# Patient Record
Sex: FEMALE | Race: WHITE | NOT HISPANIC OR LATINO | Employment: FULL TIME | URBAN - METROPOLITAN AREA
[De-identification: names, ages, dates, MRNs, and addresses within clinical notes are randomized per-mention and may not be internally consistent; named-entity substitution may affect disease eponyms.]

---

## 2013-11-13 LAB — HUMAN PAPILLOMAVIRUS (HPV): NORMAL

## 2017-03-06 ENCOUNTER — TELEPHONE (OUTPATIENT)
Dept: FAMILY MEDICINE | Facility: CLINIC | Age: 47
End: 2017-03-06

## 2017-03-06 NOTE — TELEPHONE ENCOUNTER
----- Message from Heide Sullivan sent at 3/6/2017 11:32 AM CST -----  Contact: pt  Pt needs an appt this week.  Pt is out of refills on her inhaler and other prescriptions. Pt will be out of prescription on Thursday for her inhaler.

## 2017-03-08 ENCOUNTER — OFFICE VISIT (OUTPATIENT)
Dept: FAMILY MEDICINE | Facility: CLINIC | Age: 47
End: 2017-03-08
Payer: COMMERCIAL

## 2017-03-08 VITALS
TEMPERATURE: 98 F | OXYGEN SATURATION: 98 % | HEIGHT: 65 IN | WEIGHT: 143.63 LBS | BODY MASS INDEX: 23.93 KG/M2 | HEART RATE: 98 BPM | SYSTOLIC BLOOD PRESSURE: 119 MMHG | DIASTOLIC BLOOD PRESSURE: 66 MMHG

## 2017-03-08 DIAGNOSIS — J45.40 MODERATE PERSISTENT ASTHMA WITHOUT COMPLICATION: ICD-10-CM

## 2017-03-08 DIAGNOSIS — K21.9 GASTROESOPHAGEAL REFLUX DISEASE, ESOPHAGITIS PRESENCE NOT SPECIFIED: ICD-10-CM

## 2017-03-08 DIAGNOSIS — F17.219 CIGARETTE NICOTINE DEPENDENCE WITH NICOTINE-INDUCED DISORDER: ICD-10-CM

## 2017-03-08 DIAGNOSIS — R41.840 ATTENTION DEFICIT: Primary | ICD-10-CM

## 2017-03-08 PROCEDURE — 1160F RVW MEDS BY RX/DR IN RCRD: CPT | Mod: S$GLB,,, | Performed by: FAMILY MEDICINE

## 2017-03-08 PROCEDURE — 99999 PR PBB SHADOW E&M-EST. PATIENT-LVL III: CPT | Mod: PBBFAC,,, | Performed by: FAMILY MEDICINE

## 2017-03-08 PROCEDURE — 99213 OFFICE O/P EST LOW 20 MIN: CPT | Mod: S$GLB,,, | Performed by: FAMILY MEDICINE

## 2017-03-08 RX ORDER — DEXTROAMPHETAMINE SACCHARATE, AMPHETAMINE ASPARTATE, DEXTROAMPHETAMINE SULFATE AND AMPHETAMINE SULFATE 7.5; 7.5; 7.5; 7.5 MG/1; MG/1; MG/1; MG/1
1 TABLET ORAL 2 TIMES DAILY
Qty: 60 TABLET | Refills: 0 | Status: SHIPPED | OUTPATIENT
Start: 2017-03-08 | End: 2017-04-07

## 2017-03-08 RX ORDER — DEXTROAMPHETAMINE SACCHARATE, AMPHETAMINE ASPARTATE, DEXTROAMPHETAMINE SULFATE AND AMPHETAMINE SULFATE 7.5; 7.5; 7.5; 7.5 MG/1; MG/1; MG/1; MG/1
1 TABLET ORAL 2 TIMES DAILY
Qty: 60 TABLET | Refills: 0 | Status: SHIPPED | OUTPATIENT
Start: 2017-05-07 | End: 2017-06-06 | Stop reason: SDUPTHER

## 2017-03-08 RX ORDER — MELOXICAM 15 MG/1
15 TABLET ORAL DAILY
Qty: 90 TABLET | Refills: 3 | Status: SHIPPED | OUTPATIENT
Start: 2017-03-08 | End: 2017-12-07 | Stop reason: SDUPTHER

## 2017-03-08 RX ORDER — OMEPRAZOLE 20 MG/1
CAPSULE, DELAYED RELEASE ORAL
Qty: 180 CAPSULE | Refills: 3 | Status: SHIPPED | OUTPATIENT
Start: 2017-03-08 | End: 2018-05-24 | Stop reason: SDUPTHER

## 2017-03-08 RX ORDER — TIOTROPIUM BROMIDE 18 UG/1
CAPSULE ORAL; RESPIRATORY (INHALATION)
Qty: 30 CAPSULE | Refills: 11 | Status: SHIPPED | OUTPATIENT
Start: 2017-03-08 | End: 2018-03-01 | Stop reason: SDUPTHER

## 2017-03-08 RX ORDER — FLUTICASONE PROPIONATE AND SALMETEROL 250; 50 UG/1; UG/1
POWDER RESPIRATORY (INHALATION)
Qty: 60 EACH | Refills: 11 | Status: SHIPPED | OUTPATIENT
Start: 2017-03-08 | End: 2018-05-24 | Stop reason: SDUPTHER

## 2017-03-08 RX ORDER — DEXTROAMPHETAMINE SACCHARATE, AMPHETAMINE ASPARTATE, DEXTROAMPHETAMINE SULFATE AND AMPHETAMINE SULFATE 7.5; 7.5; 7.5; 7.5 MG/1; MG/1; MG/1; MG/1
1 TABLET ORAL 2 TIMES DAILY
Qty: 60 TABLET | Refills: 0 | Status: SHIPPED | OUTPATIENT
Start: 2017-04-07 | End: 2017-05-07

## 2017-03-08 NOTE — MR AVS SNAPSHOT
[facility]  [address] [facility] [address]  Phone: [phone]  Fax: [phone]                  Luz Pelaez   3/8/2017 3:20 PM   Office Visit    Description:  Female : 1970   Provider:  Kb Mcgarry MD   Department:  [facility]           Diagnoses this Visit        Comments    Attention deficit    -  Primary     Gastroesophageal reflux disease, esophagitis presence not specified         Moderate persistent asthma without complication         Cigarette nicotine dependence with nicotine-induced disorder                To Do List           Goals (5 Years of Data)     None       These Medications        Disp Refills Start End    dextroamphetamine-amphetamine 30 mg Tab 60 tablet 0 3/8/2017 2017    Take 1 tablet by mouth 2 (two) times daily. - Oral    Pharmacy: [place] [address] Ph #: [phone]       dextroamphetamine-amphetamine 30 mg Tab 60 tablet 0 2017    Take 1 tablet by mouth 2 (two) times daily. - Oral    Pharmacy: [facility] [address] [facility] Ph #: [phone]       dextroamphetamine-amphetamine 30 mg Tab 60 tablet 0 2017    Take 1 tablet by mouth 2 (two) times daily. - Oral    Pharmacy: [facility] [address] [facility] Ph #: [phone]       fluticasone-salmeterol 250-50 mcg/dose (ADVAIR DISKUS) 250-50 mcg/dose diskus inhaler 60 each 11 3/8/2017     Use 1 inhalation into the  lungs two times daily    Pharmacy: [facility] [address] [facility] Ph #: [phone]       meloxicam (MOBIC) 15 MG tablet 90 tablet 3 3/8/2017 3/8/2018    Take 1 tablet (15 mg total) by mouth once daily. - Oral    Pharmacy: [facility] [address] [facility] Ph #: [phone]       omeprazole (PRILOSEC) 20 MG capsule 180 capsule 3 3/8/2017     Take 1 capsule by mouth  twice daily    Pharmacy:  [place], [place] - [address] [address]  #: [phone]       tiotropium (SPIRIVA WITH HANDIHALER) 18 mcg inhalation capsule 30 capsule 11 3/8/2017     Inhale the contents of 1    capsule via HandiHaler once daily    Pharmacy: [place], [place] - [address] Ph #: [phone]         [facility]     [facility] - 24/7 Assistance  Registered nurses in the [facility] provide clinical advisement, health education, appointment booking, and other advisory services.  Call for this free service at [phone].             Medications           Message regarding Medications     Verify the changes and/or additions to your medication regime listed below are the same as discussed with your clinician today.  If any of these changes or additions are incorrect, please notify your healthcare provider.        START taking these NEW medications        Refills    dextroamphetamine-amphetamine 30 mg Tab 0    Sig: Take 1 tablet by mouth 2 (two) times daily.    Class: Normal    Route: Oral    dextroamphetamine-amphetamine 30 mg Tab 0    Starting on: 4/7/2017    Sig: Take 1 tablet by mouth 2 (two) times daily.    Class: Normal    Route: Oral    dextroamphetamine-amphetamine 30 mg Tab 0    Starting on: 5/7/2017    Sig: Take 1 tablet by mouth 2 (two) times daily.    Class: Normal    Route: Oral           Verify that the below list of medications is an accurate representation of the medications you are currently taking.  If none reported, the list may be blank. If incorrect, please contact your healthcare provider. Carry this list with you in case of emergency.           Current Medications     albuterol (PROAIR HFA) 90 mcg/actuation inhaler Inhale 2 puffs into the  lungs every 6 hours as  needed for wheezing    fluticasone-salmeterol 250-50 mcg/dose (ADVAIR DISKUS) 250-50 mcg/dose diskus inhaler Use 1 inhalation into the  lungs two times daily    meloxicam  "(MOBIC) 15 MG tablet Take 1 tablet (15 mg total) by mouth once daily.    omeprazole (PRILOSEC) 20 MG capsule Take 1 capsule by mouth  twice daily    sumatriptan (IMITREX) 100 MG tablet Take at the onset of headache and if not resolved in 2 hours, repeat once. Do not take more than 2 pills in a 24 hour period.    tiotropium (SPIRIVA WITH HANDIHALER) 18 mcg inhalation capsule Inhale the contents of 1    capsule via HandiHaler once daily    dextroamphetamine-amphetamine 30 mg Tab Take 1 tablet by mouth 2 (two) times daily.    dextroamphetamine-amphetamine 30 mg Tab Starting on Apr 07, 2017. Take 1 tablet by mouth 2 (two) times daily.    dextroamphetamine-amphetamine 30 mg Tab Starting on May 07, 2017. Take 1 tablet by mouth 2 (two) times daily.           Clinical Reference Information           Your Vitals Were     BP Pulse Temp Height Weight SpO2    119/66 98 98.3 °F (36.8 °C) (Oral) 5' 5" (1.651 m) 65.2 kg (143 lb 10.1 oz) 98%    BMI                23.9 kg/m2          Blood Pressure          Most Recent Value    BP  119/66      Allergies as of 3/8/2017     Wellbutrin [Bupropion Hcl]      Immunizations Administered on Date of Encounter - 3/8/2017     None      Orders Placed During Today's Visit      Normal Orders This Visit    Ambulatory referral to Smoking Cessation Program       Smoking Cessation     If you would like to quit smoking:   You may be eligible for free services if you are a [place] resident and started smoking cigarettes before September 1, 1988.  Call the [employer] [facility] ([facility]) toll free at [phone] [phone] or [phone].   Call 1-800-QUIT-NOW if you do not meet the above criteria.            Language Assistance Services     ATTENTION: Language assistance services are available, free of charge. Please call [phone].      ATENCIÓN: Si lawrencela rosalio, tiene a uribe disposición servicios gratuitos de asistencia lingüística. Llame al [phone].     CHÚ Ý: N?u b?n nói Ti?ng Vi?t, có các " d?ch v? h? tr? ngôn ng? mi?n phí yvetteh cho b?n. G?i s? [phone].         [facility] complies with applicable Federal civil rights laws and does not discriminate on the basis of race, color, national origin, age, disability, or sex.

## 2017-03-08 NOTE — PROGRESS NOTES
Subjective:      Patient ID: Luz Peleaz is a 46 y.o. female.    Chief Complaint:   HPI CC:Attention Deficit Disorder  HPI:Luz Pelaez is a 46 y.o. female with a history of ADD. She is coming in today to discuss the chronic use of attention deficit medications. She has been on the medicine for the last 15 years and has been stable on the current dose of medicine for the last several years. She has been compliant on the medicine and is not receiving narcotics from any other physician. She is in need of refills of the medicine that is taken for ADD. She denies any complications from taking the medicine. The patient's weight and apatite has been stable and has not had difficulties with agitation. She reports improvement in the symptoms with the current dose.      She has asthma and she had to use her albuterol recently and we discussed her asthma and quitting smoking.          I counseld her on stopping smoking and she is willing to try to quit.      Health Maintenance Due   Topic Date Due    Pap Smear  12/15/2016       Past Medical History:  Past Medical History:   Diagnosis Date    Allergy     Asthma     Attention deficit     GERD (gastroesophageal reflux disease)     Kidney stones      Past Surgical History:   Procedure Laterality Date    BREAST SURGERY      Breast Reduction    CHOLECYSTECTOMY      COLONOSCOPY  4/1/2012    date is approximate-done by Dr. Naranjo due to chronic constipation    SLEEVE GASTROPLASTY       Review of patient's allergies indicates:   Allergen Reactions    Wellbutrin [bupropion hcl]      Mood swings     Current Outpatient Prescriptions on File Prior to Visit   Medication Sig Dispense Refill    albuterol (PROAIR HFA) 90 mcg/actuation inhaler Inhale 2 puffs into the  lungs every 6 hours as  needed for wheezing 18 g 11    sumatriptan (IMITREX) 100 MG tablet Take at the onset of headache and if not resolved in 2 hours, repeat once. Do not take more than 2 pills in a  "24 hour period. 9 tablet 11    [DISCONTINUED] fluticasone-salmeterol 250-50 mcg/dose (ADVAIR DISKUS) 250-50 mcg/dose diskus inhaler Use 1 inhalation into the  lungs two times daily 60 each 11    [DISCONTINUED] meloxicam (MOBIC) 15 MG tablet Take 1 tablet (15 mg total) by mouth once daily. 30 tablet 0    [DISCONTINUED] omeprazole (PRILOSEC) 20 MG capsule Take 1 capsule by mouth  twice daily 60 capsule 11    [DISCONTINUED] tiotropium (SPIRIVA WITH HANDIHALER) 18 mcg inhalation capsule Inhale the contents of 1    capsule via HandiHaler once daily 30 capsule 11    [DISCONTINUED] dextroamphetamine-amphetamine 30 mg Tab Take 1 tablet by mouth 2 (two) times daily. 60 tablet 0     No current facility-administered medications on file prior to visit.      Social History     Social History    Marital status: Single     Spouse name: N/A    Number of children: 2    Years of education: N/A     Occupational History     [employer]     Social History Main Topics    Smoking status: Current Every Day Smoker     Packs/day: 0.50     Years: 25.00     Types: Cigarettes    Smokeless tobacco: Never Used      Comment: she did quit once on chantix but it caused her nausea.    Alcohol use Yes      Comment: Socially    Drug use: No    Sexual activity: Yes     Partners: Male     Other Topics Concern    Not on file     Social History Narrative     Family History   Problem Relation Age of Onset    Hypertension Mother     Diabetes Mother     Hyperlipidemia Mother     Heart disease Mother     Colon cancer Maternal Grandmother     Breast cancer Paternal Grandmother      Breast    Colon polyps Father     Colon polyps Sister     Stroke Neg Hx              Review of Systems    Objective:   /66  Pulse 98  Temp 98.3 °F (36.8 °C) (Oral)   Ht 5' 5" (1.651 m)  Wt 65.2 kg (143 lb 10.1 oz)  SpO2 98%  BMI 23.9 kg/m2    Physical Exam   Constitutional: She appears well-developed and " well-nourished. No distress.   Cardiovascular: Normal rate, regular rhythm and intact distal pulses.  Exam reveals no gallop and no friction rub.    No murmur heard.  Pulmonary/Chest: Effort normal and breath sounds normal. No respiratory distress. She has no wheezes. She has no rales.   Musculoskeletal: She exhibits no edema.   Skin: She is not diaphoretic.       Assessment:     1. Attention deficit    2. Gastroesophageal reflux disease, esophagitis presence not specified    3. Moderate persistent asthma without complication    4. Cigarette nicotine dependence with nicotine-induced disorder        Plan:   Diagnoses and all orders for this visit:    Attention deficit  -     dextroamphetamine-amphetamine 30 mg Tab; Take 1 tablet by mouth 2 (two) times daily.  -     dextroamphetamine-amphetamine 30 mg Tab; Take 1 tablet by mouth 2 (two) times daily.  -     dextroamphetamine-amphetamine 30 mg Tab; Take 1 tablet by mouth 2 (two) times daily.    Gastroesophageal reflux disease, esophagitis presence not specified  -     omeprazole (PRILOSEC) 20 MG capsule; Take 1 capsule by mouth  twice daily    Moderate persistent asthma without complication  -     fluticasone-salmeterol 250-50 mcg/dose (ADVAIR DISKUS) 250-50 mcg/dose diskus inhaler; Use 1 inhalation into the  lungs two times daily  -     tiotropium (SPIRIVA WITH HANDIHALER) 18 mcg inhalation capsule; Inhale the contents of 1    capsule via HandiHaler once daily    Cigarette nicotine dependence with nicotine-induced disorder  -     Ambulatory referral to Smoking Cessation Program    Other orders  -     meloxicam (MOBIC) 15 MG tablet; Take 1 tablet (15 mg total) by mouth once daily.      She will call to get on the smoking cessation program.

## 2017-06-06 ENCOUNTER — OFFICE VISIT (OUTPATIENT)
Dept: FAMILY MEDICINE | Facility: CLINIC | Age: 47
End: 2017-06-06
Payer: COMMERCIAL

## 2017-06-06 VITALS
DIASTOLIC BLOOD PRESSURE: 74 MMHG | HEART RATE: 84 BPM | WEIGHT: 134.5 LBS | HEIGHT: 65 IN | BODY MASS INDEX: 22.41 KG/M2 | SYSTOLIC BLOOD PRESSURE: 115 MMHG

## 2017-06-06 DIAGNOSIS — R41.840 ATTENTION DEFICIT: Primary | ICD-10-CM

## 2017-06-06 PROCEDURE — 99213 OFFICE O/P EST LOW 20 MIN: CPT | Mod: S$GLB,,, | Performed by: FAMILY MEDICINE

## 2017-06-06 PROCEDURE — 99999 PR PBB SHADOW E&M-EST. PATIENT-LVL III: CPT | Mod: PBBFAC,,, | Performed by: FAMILY MEDICINE

## 2017-06-06 RX ORDER — DEXTROAMPHETAMINE SACCHARATE, AMPHETAMINE ASPARTATE, DEXTROAMPHETAMINE SULFATE AND AMPHETAMINE SULFATE 7.5; 7.5; 7.5; 7.5 MG/1; MG/1; MG/1; MG/1
1 TABLET ORAL 2 TIMES DAILY
Qty: 60 TABLET | Refills: 0 | Status: SHIPPED | OUTPATIENT
Start: 2017-06-06 | End: 2017-07-06

## 2017-06-06 RX ORDER — DEXTROAMPHETAMINE SACCHARATE, AMPHETAMINE ASPARTATE, DEXTROAMPHETAMINE SULFATE AND AMPHETAMINE SULFATE 7.5; 7.5; 7.5; 7.5 MG/1; MG/1; MG/1; MG/1
30 TABLET ORAL 2 TIMES DAILY
Qty: 60 TABLET | Refills: 0 | Status: SHIPPED | OUTPATIENT
Start: 2017-07-06 | End: 2017-09-06

## 2017-06-06 RX ORDER — DEXTROAMPHETAMINE SACCHARATE, AMPHETAMINE ASPARTATE, DEXTROAMPHETAMINE SULFATE AND AMPHETAMINE SULFATE 7.5; 7.5; 7.5; 7.5 MG/1; MG/1; MG/1; MG/1
30 TABLET ORAL 2 TIMES DAILY
Qty: 60 TABLET | Refills: 0 | Status: SHIPPED | OUTPATIENT
Start: 2017-08-05 | End: 2017-09-06

## 2017-06-06 NOTE — PROGRESS NOTES
"CC:Attention Deficit Disorder  HPI:Luz Pelaez is a 46 y.o. female with a history of ADD.  She is coming in today to discuss the chronic use of attention deficit medications.  She has been on the medicine for the greater than 10 years and has been stable on the current dose of medicine .  She has been compliant on the medicine and is not receiving narcotics from any other physician.  She is in need of refills of the medicine that is taken for ADD.  She denies any complications from taking the medicine.  The patient's weight and appetite has been stable and has not had difficulties with agitation.  She reports improvement in the symptoms with the current dose.        The current allergy list that we have since it was last reconciled is as follows:  Allergies   Allergen Reactions    Wellbutrin [Bupropion Hcl]      Mood swings     Outpatient Medications Prior to Visit   Medication Sig Dispense Refill    albuterol (PROAIR HFA) 90 mcg/actuation inhaler Inhale 2 puffs into the  lungs every 6 hours as  needed for wheezing 18 g 11    fluticasone-salmeterol 250-50 mcg/dose (ADVAIR DISKUS) 250-50 mcg/dose diskus inhaler Use 1 inhalation into the  lungs two times daily 60 each 11    meloxicam (MOBIC) 15 MG tablet Take 1 tablet (15 mg total) by mouth once daily. 90 tablet 3    omeprazole (PRILOSEC) 20 MG capsule Take 1 capsule by mouth  twice daily 180 capsule 3    sumatriptan (IMITREX) 100 MG tablet Take at the onset of headache and if not resolved in 2 hours, repeat once. Do not take more than 2 pills in a 24 hour period. 9 tablet 11    tiotropium (SPIRIVA WITH HANDIHALER) 18 mcg inhalation capsule Inhale the contents of 1    capsule via HandiHaler once daily 30 capsule 11    dextroamphetamine-amphetamine 30 mg Tab Take 1 tablet by mouth 2 (two) times daily. 60 tablet 0     No facility-administered medications prior to visit.         Physical Exam   /74   Pulse 84   Ht 5' 5" (1.651 m)   Wt 61 kg (134 " lb 7.7 oz)   BMI 22.38 kg/m²   Constitutional: The patient appears well-developed and well-nourished. No distress.   Skin: Not diaphoretic.   Psychiatric: The mood appears not anxious and the affect is not angry, not blunt, not labile and not inappropriate. Speech is not rapid and/or pressured, not delayed, not tangential and not slurred. The patient is not agitated, not aggressive, is not hyperactive, not slowed, not withdrawn, not actively hallucinating and not combative. Thought content is not paranoid and not delusional. Cognition and memory are not impaired. The patient does not express impulsivity or inappropriate judgment and does not exhibit a depressed mood. The patient expresses no homicidal and no suicidal ideation and has no suicidal plans and no homicidal plans. The patient is communicative and exhibits a normal recent memory and normal remote memory. The patient is attentive.     The right hand has poor range of motion.  There is no atrophy.  Motor strength intact.  Negative Tinel's and Phalen's.    Encounter Diagnosis   Name Primary?    Attention deficit Yes       PLAN:    Diagnoses and all orders for this visit:    Attention deficit  -     dextroamphetamine-amphetamine 30 mg Tab; Take 1 tablet by mouth 2 (two) times daily.    Other orders  -     dextroamphetamine-amphetamine (ADDERALL) 30 mg Tab; Take 30 mg by mouth 2 (two) times daily.  -     dextroamphetamine-amphetamine (ADDERALL) 30 mg Tab; Take 30 mg by mouth 2 (two) times daily.          Medications Ordered This Encounter      dextroamphetamine-amphetamine (ADDERALL) 30 mg Tab          Sig: Take 30 mg by mouth 2 (two) times daily.          Dispense:  60 tablet          Refill:  0      dextroamphetamine-amphetamine (ADDERALL) 30 mg Tab          Sig: Take 30 mg by mouth 2 (two) times daily.          Dispense:  60 tablet          Refill:  0      dextroamphetamine-amphetamine 30 mg Tab          Sig: Take 1 tablet by mouth 2 (two) times daily.           Dispense:  60 tablet          Refill:  0  No orders of the defined types were placed in this encounter.     Medications ordered for 3 months.  Followup 3 months with Dr. Mcgarry.

## 2017-08-07 RX ORDER — DEXTROAMPHETAMINE SACCHARATE, AMPHETAMINE ASPARTATE, DEXTROAMPHETAMINE SULFATE AND AMPHETAMINE SULFATE 7.5; 7.5; 7.5; 7.5 MG/1; MG/1; MG/1; MG/1
TABLET ORAL
Qty: 60 TABLET | Refills: 0 | OUTPATIENT
Start: 2017-08-07

## 2017-08-31 DIAGNOSIS — Z83.719 FAMILY HISTORY OF COLONIC POLYPS: ICD-10-CM

## 2017-08-31 DIAGNOSIS — Z12.39 BREAST CANCER SCREENING: Primary | ICD-10-CM

## 2017-08-31 DIAGNOSIS — Z80.0 FAMILY HISTORY OF MALIGNANT NEOPLASM OF GASTROINTESTINAL TRACT: ICD-10-CM

## 2017-08-31 DIAGNOSIS — J45.909 UNCOMPLICATED ASTHMA: ICD-10-CM

## 2017-08-31 RX ORDER — SODIUM, POTASSIUM,MAG SULFATES 17.5-3.13G
SOLUTION, RECONSTITUTED, ORAL ORAL
Qty: 354 ML | Refills: 0 | Status: SHIPPED | OUTPATIENT
Start: 2017-08-31 | End: 2017-12-07

## 2017-08-31 RX ORDER — PROMETHAZINE HYDROCHLORIDE 25 MG/1
25 TABLET ORAL EVERY 6 HOURS PRN
Qty: 6 TABLET | Refills: 0 | Status: SHIPPED | OUTPATIENT
Start: 2017-08-31 | End: 2017-12-07

## 2017-08-31 RX ORDER — ALBUTEROL SULFATE 90 UG/1
AEROSOL, METERED RESPIRATORY (INHALATION)
Qty: 17 G | Refills: 11 | Status: SHIPPED | OUTPATIENT
Start: 2017-08-31 | End: 2019-02-15 | Stop reason: SDUPTHER

## 2017-09-01 NOTE — TELEPHONE ENCOUNTER
Please place mammo order for pt. It has been over a year. She would like to do it on 9/6/17 the same day she has an appt with you. She had her pap done June 2016

## 2017-09-01 NOTE — TELEPHONE ENCOUNTER
I have signed for the following orders AND/OR meds.  Please call the patient and ask the patient to schedule the testing AND/OR inform about any medications that were sent.      Orders Placed This Encounter   Procedures    Mammo Digital Screening Bilat with CAD     Standing Status:   Future     Standing Expiration Date:   9/1/2018     Order Specific Question:   Reason for Exam:     Answer:   screening     Order Specific Question:   Is the patient pregnant?     Answer:   No    Case request GI: COLONOSCOPY     Order Specific Question:   Pre-op Diagnosis     Answer:   History of colon polyps [606676]     Order Specific Question:   CPT Code:     Answer:   [place] INTERVAL >=3 YRS PATIENT'S LAST COLONOSCOPY DOCUMENTED [0529F]     Order Specific Question:   Case Referring Provider     Answer:   LANI DESIR [[id number]]         Medications Ordered This Encounter      PROAIR HFA 90 mcg/actuation inhaler          Sig: INHALE 1-2 PUFFS INTO THE LUNGS EVERY 6 HOURS AS NEEDED FOR WHEEZING          Dispense:  17 g          Refill:  11          PT REQUEST REFILL  08/31/2017 2:39:08 PM     N O T I C E    Last quantity doesn't match original quantity      promethazine (PHENERGAN) 25 MG tablet          Sig: Take 1 tablet (25 mg total) by mouth every 6 (six) hours as needed for Nausea.          Dispense:  6 tablet          Refill:  0      sodium,potassium,mag sulfates (SUPREP BOWEL PREP KIT) 17.5-3.13-1.6 gram SolR          Sig: Take as instructed on prep sheet          Dispense:  354 mL          Refill:  0

## 2017-09-01 NOTE — TELEPHONE ENCOUNTER
Tell her that I noted that she is due for a colonoscopy.  I will put in orders so that we can book it at [facility] in [place] again.      She also needs a mammogram and pap smear.  Please arrange these. If she has had them elsewhere, get the copies.

## 2017-09-06 ENCOUNTER — OFFICE VISIT (OUTPATIENT)
Dept: FAMILY MEDICINE | Facility: CLINIC | Age: 47
End: 2017-09-06
Payer: COMMERCIAL

## 2017-09-06 VITALS
SYSTOLIC BLOOD PRESSURE: 131 MMHG | BODY MASS INDEX: 23.27 KG/M2 | WEIGHT: 139.63 LBS | HEART RATE: 68 BPM | HEIGHT: 65 IN | DIASTOLIC BLOOD PRESSURE: 78 MMHG

## 2017-09-06 DIAGNOSIS — Z86.010 HISTORY OF COLON POLYPS: ICD-10-CM

## 2017-09-06 DIAGNOSIS — R41.840 ATTENTION DEFICIT: Primary | ICD-10-CM

## 2017-09-06 DIAGNOSIS — Z12.31 ENCOUNTER FOR SCREENING MAMMOGRAM FOR BREAST CANCER: ICD-10-CM

## 2017-09-06 PROCEDURE — 3008F BODY MASS INDEX DOCD: CPT | Mod: S$GLB,,, | Performed by: FAMILY MEDICINE

## 2017-09-06 PROCEDURE — 99999 PR PBB SHADOW E&M-EST. PATIENT-LVL III: CPT | Mod: PBBFAC,,, | Performed by: FAMILY MEDICINE

## 2017-09-06 PROCEDURE — 99213 OFFICE O/P EST LOW 20 MIN: CPT | Mod: S$GLB,,, | Performed by: FAMILY MEDICINE

## 2017-09-06 RX ORDER — DEXTROAMPHETAMINE SACCHARATE, AMPHETAMINE ASPARTATE, DEXTROAMPHETAMINE SULFATE AND AMPHETAMINE SULFATE 7.5; 7.5; 7.5; 7.5 MG/1; MG/1; MG/1; MG/1
1 TABLET ORAL 2 TIMES DAILY
Qty: 60 TABLET | Refills: 0 | Status: SHIPPED | OUTPATIENT
Start: 2017-11-05 | End: 2017-12-07 | Stop reason: SDUPTHER

## 2017-09-06 RX ORDER — DEXTROAMPHETAMINE SACCHARATE, AMPHETAMINE ASPARTATE, DEXTROAMPHETAMINE SULFATE AND AMPHETAMINE SULFATE 7.5; 7.5; 7.5; 7.5 MG/1; MG/1; MG/1; MG/1
1 TABLET ORAL 2 TIMES DAILY
Qty: 60 TABLET | Refills: 0 | Status: SHIPPED | OUTPATIENT
Start: 2017-10-06 | End: 2017-11-05

## 2017-09-06 RX ORDER — DEXTROAMPHETAMINE SACCHARATE, AMPHETAMINE ASPARTATE, DEXTROAMPHETAMINE SULFATE AND AMPHETAMINE SULFATE 7.5; 7.5; 7.5; 7.5 MG/1; MG/1; MG/1; MG/1
1 TABLET ORAL 2 TIMES DAILY
Qty: 60 TABLET | Refills: 0 | Status: SHIPPED | OUTPATIENT
Start: 2017-09-06 | End: 2017-10-06

## 2017-09-06 NOTE — PROGRESS NOTES
Subjective:      Patient ID: Luz Pelaez is a 47 y.o. female.    Chief Complaint:   HPI CC:Attention Deficit Disorder  HPI:Luz Pelaez is a 46 y.o. female with a history of ADD. She is coming in today to discuss the chronic use of attention deficit medications. She has been on the medicine for the last 15 years and has been stable on the current dose of medicine for the last several years. She has been compliant on the medicine and is not receiving narcotics from any other physician. She is in need of refills of the medicine that is taken for ADD. She denies any complications from taking the medicine. The patient's weight and apatite has been stable and has not had difficulties with agitation. She reports improvement in the symptoms with the current dose.      She has had her pap done and [facility] will get me the pap.       Health Maintenance Due   Topic Date Due    Pap Smear with HPV Cotest  12/15/2016    Mammogram  06/13/2017    Colonoscopy  07/09/2017    Influenza Vaccine  08/01/2017     She is due for her mammogram and her colonoscopy so I put in orders for her.     Past Medical History:  Past Medical History:   Diagnosis Date    Allergy     Asthma     Attention deficit     GERD (gastroesophageal reflux disease)     Kidney stones      Past Surgical History:   Procedure Laterality Date    BREAST SURGERY      Breast Reduction    CHOLECYSTECTOMY      COLONOSCOPY  4/1/2012    date is approximate-done by Dr. Naranjo due to chronic constipation    SLEEVE GASTROPLASTY       Review of patient's allergies indicates:   Allergen Reactions    Wellbutrin [bupropion hcl]      Mood swings     Current Outpatient Prescriptions on File Prior to Visit   Medication Sig Dispense Refill    fluticasone-salmeterol 250-50 mcg/dose (ADVAIR DISKUS) 250-50 mcg/dose diskus inhaler Use 1 inhalation into the  lungs two times daily 60 each 11    meloxicam (MOBIC) 15 MG tablet Take 1 tablet (15 mg total) by mouth once  daily. 90 tablet 3    omeprazole (PRILOSEC) 20 MG capsule Take 1 capsule by mouth  twice daily 180 capsule 3    PROAIR HFA 90 mcg/actuation inhaler INHALE 1-2 PUFFS INTO THE LUNGS EVERY 6 HOURS AS NEEDED FOR WHEEZING 17 g 11    sumatriptan (IMITREX) 100 MG tablet Take at the onset of headache and if not resolved in 2 hours, repeat once. Do not take more than 2 pills in a 24 hour period. 9 tablet 11    tiotropium (SPIRIVA WITH HANDIHALER) 18 mcg inhalation capsule Inhale the contents of 1    capsule via HandiHaler once daily 30 capsule 11    [DISCONTINUED] dextroamphetamine-amphetamine (ADDERALL) 30 mg Tab Take 30 mg by mouth 2 (two) times daily. 60 tablet 0    [DISCONTINUED] dextroamphetamine-amphetamine (ADDERALL) 30 mg Tab Take 30 mg by mouth 2 (two) times daily. 60 tablet 0    promethazine (PHENERGAN) 25 MG tablet Take 1 tablet (25 mg total) by mouth every 6 (six) hours as needed for Nausea. 6 tablet 0    sodium,potassium,mag sulfates (SUPREP BOWEL PREP KIT) 17.5-3.13-1.6 gram SolR Take as instructed on prep sheet 354 mL 0     No current facility-administered medications on file prior to visit.      Social History     Social History    Marital status: Single     Spouse name: N/A    Number of children: 2    Years of education: N/A     Occupational History     [employer]     Social History Main Topics    Smoking status: Current Every Day Smoker     Packs/day: 0.50     Years: 25.00     Types: Cigarettes    Smokeless tobacco: Never Used      Comment: she did quit once on chantix but it caused her nausea.    Alcohol use Yes      Comment: Socially    Drug use: No    Sexual activity: Yes     Partners: Male     Other Topics Concern    Not on file     Social History Narrative    No narrative on file     Family History   Problem Relation Age of Onset    Hypertension Mother     Diabetes Mother     Hyperlipidemia Mother     Heart disease Mother     Colon cancer  "Maternal Grandmother     Breast cancer Paternal Grandmother      Breast    Colon polyps Father     Colon polyps Sister     Stroke Neg Hx              Review of Systems    Objective:   /78   Pulse 68   Ht 5' 5" (1.651 m)   Wt 63.3 kg (139 lb 9.6 oz)   BMI 23.23 kg/m²     Physical Exam   Constitutional: She appears well-developed and well-nourished. No distress.   Cardiovascular: Normal rate, regular rhythm and intact distal pulses.  Exam reveals no gallop and no friction rub.    No murmur heard.  Pulmonary/Chest: Effort normal and breath sounds normal. No respiratory distress. She has no wheezes. She has no rales.   Musculoskeletal: She exhibits no edema.   Skin: She is not diaphoretic.       Assessment:     1. Attention deficit    2. Encounter for screening mammogram for breast cancer    3. History of colon polyps        Plan:   Luz Guzman was seen today for medication refill.    Diagnoses and all orders for this visit:    Attention deficit  -     dextroamphetamine-amphetamine 30 mg Tab; Take 1 tablet by mouth 2 (two) times daily.  -     dextroamphetamine-amphetamine 30 mg Tab; Take 1 tablet by mouth 2 (two) times daily.  -     dextroamphetamine-amphetamine 30 mg Tab; Take 1 tablet by mouth 2 (two) times daily.    Encounter for screening mammogram for breast cancer  -     Mammo Digital Screening Bilat with CAD; Future    History of colon polyps  -     Case request GI: COLONOSCOPY      She will call to get on the smoking cessation program.    "

## 2017-09-28 ENCOUNTER — HOSPITAL ENCOUNTER (OUTPATIENT)
Dept: RADIOLOGY | Facility: HOSPITAL | Age: 47
Discharge: HOME | End: 2017-09-28
Attending: FAMILY MEDICINE
Payer: COMMERCIAL

## 2017-09-28 VITALS — HEIGHT: 65 IN | WEIGHT: 139 LBS | BODY MASS INDEX: 23.16 KG/M2

## 2017-09-28 DIAGNOSIS — Z12.31 ENCOUNTER FOR SCREENING MAMMOGRAM FOR BREAST CANCER: ICD-10-CM

## 2017-09-28 PROCEDURE — 77063 BREAST TOMOSYNTHESIS BI: CPT | Mod: 26,,, | Performed by: RADIOLOGY

## 2017-09-28 PROCEDURE — 77067 SCR MAMMO BI INCL CAD: CPT | Mod: 26,,, | Performed by: RADIOLOGY

## 2017-09-28 PROCEDURE — 77067 SCR MAMMO BI INCL CAD: CPT | Mod: TC

## 2017-11-28 ENCOUNTER — TELEPHONE (OUTPATIENT)
Dept: FAMILY MEDICINE | Facility: CLINIC | Age: 47
End: 2017-11-28

## 2017-11-28 NOTE — TELEPHONE ENCOUNTER
----- Message from Arturo Lawrence sent at 11/28/2017  2:33 PM CST -----  Contact: [phone]  Would like to consult with nurse about appt for refills; pt asked to be called bk at [phone]; would like 12/7 as fit in appt. roma powers

## 2017-12-07 ENCOUNTER — LAB VISIT (OUTPATIENT)
Dept: LAB | Facility: HOSPITAL | Age: 47
End: 2017-12-07
Attending: NURSE PRACTITIONER
Payer: COMMERCIAL

## 2017-12-07 ENCOUNTER — OFFICE VISIT (OUTPATIENT)
Dept: FAMILY MEDICINE | Facility: CLINIC | Age: 47
End: 2017-12-07
Payer: COMMERCIAL

## 2017-12-07 VITALS
HEART RATE: 78 BPM | WEIGHT: 140.19 LBS | BODY MASS INDEX: 23.36 KG/M2 | DIASTOLIC BLOOD PRESSURE: 80 MMHG | HEIGHT: 65 IN | SYSTOLIC BLOOD PRESSURE: 128 MMHG

## 2017-12-07 DIAGNOSIS — Z51.81 MEDICATION MONITORING ENCOUNTER: ICD-10-CM

## 2017-12-07 DIAGNOSIS — M54.12 CERVICAL RADICULOPATHY: ICD-10-CM

## 2017-12-07 DIAGNOSIS — R41.840 ATTENTION DEFICIT: Primary | ICD-10-CM

## 2017-12-07 LAB
ANION GAP SERPL CALC-SCNC: 8 MMOL/L
BUN SERPL-MCNC: 9 MG/DL
CALCIUM SERPL-MCNC: 8.9 MG/DL
CHLORIDE SERPL-SCNC: 103 MMOL/L
CO2 SERPL-SCNC: 27 MMOL/L
CREAT SERPL-MCNC: 0.7 MG/DL
EST. GFR  (AFRICAN AMERICAN): >60 ML/MIN/1.73 M^2
EST. GFR  (NON AFRICAN AMERICAN): >60 ML/MIN/1.73 M^2
GLUCOSE SERPL-MCNC: 102 MG/DL
POTASSIUM SERPL-SCNC: 4.4 MMOL/L
SODIUM SERPL-SCNC: 138 MMOL/L

## 2017-12-07 PROCEDURE — 99213 OFFICE O/P EST LOW 20 MIN: CPT | Mod: S$GLB,,, | Performed by: NURSE PRACTITIONER

## 2017-12-07 PROCEDURE — 80048 BASIC METABOLIC PNL TOTAL CA: CPT

## 2017-12-07 PROCEDURE — 99999 PR PBB SHADOW E&M-EST. PATIENT-LVL III: CPT | Mod: PBBFAC,,, | Performed by: NURSE PRACTITIONER

## 2017-12-07 PROCEDURE — 36415 COLL VENOUS BLD VENIPUNCTURE: CPT | Mod: PO

## 2017-12-07 RX ORDER — DEXTROAMPHETAMINE SACCHARATE, AMPHETAMINE ASPARTATE, DEXTROAMPHETAMINE SULFATE AND AMPHETAMINE SULFATE 7.5; 7.5; 7.5; 7.5 MG/1; MG/1; MG/1; MG/1
30 TABLET ORAL 2 TIMES DAILY
Qty: 60 TABLET | Refills: 0 | Status: SHIPPED | OUTPATIENT
Start: 2018-02-03 | End: 2018-03-01 | Stop reason: SDUPTHER

## 2017-12-07 RX ORDER — DEXTROAMPHETAMINE SACCHARATE, AMPHETAMINE ASPARTATE, DEXTROAMPHETAMINE SULFATE AND AMPHETAMINE SULFATE 7.5; 7.5; 7.5; 7.5 MG/1; MG/1; MG/1; MG/1
1 TABLET ORAL 2 TIMES DAILY
Qty: 60 TABLET | Refills: 0 | Status: SHIPPED | OUTPATIENT
Start: 2017-12-07 | End: 2018-01-06

## 2017-12-07 RX ORDER — DEXTROAMPHETAMINE SACCHARATE, AMPHETAMINE ASPARTATE, DEXTROAMPHETAMINE SULFATE AND AMPHETAMINE SULFATE 7.5; 7.5; 7.5; 7.5 MG/1; MG/1; MG/1; MG/1
30 TABLET ORAL 2 TIMES DAILY
Qty: 60 TABLET | Refills: 0 | Status: SHIPPED | OUTPATIENT
Start: 2018-01-05 | End: 2018-02-04

## 2017-12-07 RX ORDER — MELOXICAM 15 MG/1
15 TABLET ORAL DAILY
Qty: 90 TABLET | Refills: 3 | Status: SHIPPED | OUTPATIENT
Start: 2017-12-07 | End: 2018-11-19 | Stop reason: SDUPTHER

## 2017-12-07 NOTE — PROGRESS NOTES
Subjective:      Patient ID: Luz Pelaez is a 47 y.o. female.    Chief Complaint: Medication Refill    HPI   Luz Pelaez is a 46 y.o. female with a history of ADD. She is coming in today to discuss the chronic use of attention deficit medications. She has been on the medicine for the last 15 years and has been stable on the current dose of medicine for the last several years. She has been compliant on the medicine and is not receiving narcotics from any other physician.  She says that medication controls their symptoms well.  She denies any side effects from the medication; no chest pain, no restlessness, no palpitations, no unexpected weight loss, no agitation.  She has had not significantly changed weight since last visit.     Patient also requesting refill of Mobic which she takes for cervical radiculopathy.  She reports she has been on this medication over a year and it works well for her pain.  She does voice intermittent numbness in upper extremity associated with pain.  She cannot identify any exacerbating factors.    Review of Systems   Constitutional: Negative for activity change, chills, fatigue, fever and unexpected weight change.   HENT: Negative for hearing loss, rhinorrhea and trouble swallowing.    Eyes: Negative for discharge and visual disturbance.   Respiratory: Negative for cough, chest tightness, shortness of breath and wheezing.    Cardiovascular: Negative for chest pain, palpitations and leg swelling.   Gastrointestinal: Positive for constipation (treated with Linzess by GYN). Negative for blood in stool, diarrhea and vomiting.   Endocrine: Negative for polydipsia and polyuria.   Genitourinary: Negative for difficulty urinating, dysuria, hematuria and menstrual problem.   Musculoskeletal: Negative for arthralgias, joint swelling and neck pain.   Skin: Negative for rash and wound.   Neurological: Negative for weakness, numbness and headaches.   Psychiatric/Behavioral: Negative  "for confusion and dysphoric mood. The patient is not nervous/anxious.        Objective:     /80   Pulse 78   Ht 5' 5" (1.651 m)   Wt 63.6 kg (140 lb 3.2 oz)   BMI 23.33 kg/m²     Physical Exam   Constitutional: She is oriented to person, place, and time. She appears well-developed and well-nourished.   HENT:   Head: Normocephalic.   Eyes: Pupils are equal, round, and reactive to light.   Cardiovascular: Normal rate, regular rhythm and normal heart sounds.    Pulmonary/Chest: Effort normal and breath sounds normal. No respiratory distress. She has no decreased breath sounds. She has no wheezes. She has no rhonchi. She has no rales.   Lymphadenopathy:     She has no cervical adenopathy.   Neurological: She is alert and oriented to person, place, and time.   Skin: Skin is warm and dry. No rash noted.   Psychiatric: She has a normal mood and affect. Her behavior is normal. Judgment and thought content normal.   Vitals reviewed.    Assessment:     1. Attention deficit    2. Cervical radiculopathy    3. Medication monitoring encounter        Plan:     Problem List Items Addressed This Visit        Neuro    Attention deficit - Primary    Relevant Medications    dextroamphetamine-amphetamine 30 mg Tab    dextroamphetamine-amphetamine 30 mg Tab (Start on 1/5/2018)    dextroamphetamine-amphetamine 30 mg Tab (Start on 2/3/2018)      Other Visit Diagnoses     Cervical radiculopathy        Relevant Medications    meloxicam (MOBIC) 15 MG tablet    Medication monitoring encounter        Relevant Orders    Basic metabolic panel        Return in about 3 months (around 3/7/2018), or if symptoms worsen or fail to improve.        "

## 2018-03-01 ENCOUNTER — HOSPITAL ENCOUNTER (OUTPATIENT)
Dept: RADIOLOGY | Facility: HOSPITAL | Age: 48
Discharge: HOME | End: 2018-03-01
Attending: NURSE PRACTITIONER
Payer: COMMERCIAL

## 2018-03-01 ENCOUNTER — OFFICE VISIT (OUTPATIENT)
Dept: FAMILY MEDICINE | Facility: CLINIC | Age: 48
End: 2018-03-01
Payer: COMMERCIAL

## 2018-03-01 ENCOUNTER — TELEPHONE (OUTPATIENT)
Dept: FAMILY MEDICINE | Facility: CLINIC | Age: 48
End: 2018-03-01

## 2018-03-01 VITALS
DIASTOLIC BLOOD PRESSURE: 85 MMHG | WEIGHT: 140.19 LBS | SYSTOLIC BLOOD PRESSURE: 132 MMHG | HEIGHT: 65 IN | HEART RATE: 75 BPM | BODY MASS INDEX: 23.36 KG/M2

## 2018-03-01 DIAGNOSIS — J45.909 UNCOMPLICATED ASTHMA, UNSPECIFIED ASTHMA SEVERITY, UNSPECIFIED WHETHER PERSISTENT: ICD-10-CM

## 2018-03-01 DIAGNOSIS — J45.40 MODERATE PERSISTENT ASTHMA WITHOUT COMPLICATION: ICD-10-CM

## 2018-03-01 DIAGNOSIS — R41.840 ATTENTION DEFICIT: Primary | ICD-10-CM

## 2018-03-01 DIAGNOSIS — M54.12 CERVICAL RADICULOPATHY: ICD-10-CM

## 2018-03-01 DIAGNOSIS — Z12.11 ENCOUNTER FOR FIT (FECAL IMMUNOCHEMICAL TEST) SCREENING: ICD-10-CM

## 2018-03-01 DIAGNOSIS — B35.1 ONYCHOMYCOSIS: ICD-10-CM

## 2018-03-01 DIAGNOSIS — F17.219 CIGARETTE NICOTINE DEPENDENCE WITH NICOTINE-INDUCED DISORDER: ICD-10-CM

## 2018-03-01 PROCEDURE — 72050 X-RAY EXAM NECK SPINE 4/5VWS: CPT | Mod: TC,PO

## 2018-03-01 PROCEDURE — 99999 PR PBB SHADOW E&M-EST. PATIENT-LVL III: CPT | Mod: PBBFAC,,, | Performed by: NURSE PRACTITIONER

## 2018-03-01 PROCEDURE — 99214 OFFICE O/P EST MOD 30 MIN: CPT | Mod: S$GLB,,, | Performed by: NURSE PRACTITIONER

## 2018-03-01 PROCEDURE — 72050 X-RAY EXAM NECK SPINE 4/5VWS: CPT | Mod: 26,,, | Performed by: RADIOLOGY

## 2018-03-01 RX ORDER — DEXTROAMPHETAMINE SACCHARATE, AMPHETAMINE ASPARTATE, DEXTROAMPHETAMINE SULFATE AND AMPHETAMINE SULFATE 7.5; 7.5; 7.5; 7.5 MG/1; MG/1; MG/1; MG/1
30 TABLET ORAL 2 TIMES DAILY
Qty: 60 TABLET | Refills: 0 | Status: SHIPPED | OUTPATIENT
Start: 2018-04-28 | End: 2018-05-24 | Stop reason: SDUPTHER

## 2018-03-01 RX ORDER — TIOTROPIUM BROMIDE 18 UG/1
CAPSULE ORAL; RESPIRATORY (INHALATION)
Qty: 30 CAPSULE | Refills: 11 | Status: SHIPPED | OUTPATIENT
Start: 2018-03-01 | End: 2019-02-15 | Stop reason: SDUPTHER

## 2018-03-01 RX ORDER — DEXTROAMPHETAMINE SACCHARATE, AMPHETAMINE ASPARTATE, DEXTROAMPHETAMINE SULFATE AND AMPHETAMINE SULFATE 7.5; 7.5; 7.5; 7.5 MG/1; MG/1; MG/1; MG/1
30 TABLET ORAL 2 TIMES DAILY
Qty: 60 TABLET | Refills: 0 | Status: SHIPPED | OUTPATIENT
Start: 2018-03-01 | End: 2018-03-31

## 2018-03-01 RX ORDER — DEXTROAMPHETAMINE SACCHARATE, AMPHETAMINE ASPARTATE, DEXTROAMPHETAMINE SULFATE AND AMPHETAMINE SULFATE 7.5; 7.5; 7.5; 7.5 MG/1; MG/1; MG/1; MG/1
30 TABLET ORAL 2 TIMES DAILY
Qty: 60 TABLET | Refills: 0 | Status: SHIPPED | OUTPATIENT
Start: 2018-03-30 | End: 2018-04-29

## 2018-03-01 NOTE — PROGRESS NOTES
Subjective:      Patient ID: Luz Pelaez is a 47 y.o. female.    Chief Complaint: Medication Refill    HPI     The patient presents today for refill of her  Adderall which she takes for ADD.  The patient has been on the medicine for the last 15 years and has been stable on the current dose of medicine for the last several years.  She says that medication controls their symptoms well.  She denies any side effects from the medication; no chest pain, no restlessness, no palpitations, no unexpected weight loss, no agitation.  She has had unchanged weight since last visit.     Patient also reports a recent exacerbation of cervical radiculopathy.  She reports several days of pain that began in her neck and radiated to right shoulder and in trapezius area.  She does voice she had intermittent numbness in her right upper extremity associated with pain.  She takes Mobic for the pain and used a heating pad which helped.  She reports her pain and numbness have subsided.  She cannot identify any exacerbating factors.    Patient also verbalizes concern about both of her great toe nails becoming discolored after she stopped getting solar pedicures.  She reports the discoloration is constant.  She cannot identify any other exacerbating or mitigating factors.    She is due for colonoscopy but reports her deductible for this is too high.  Offered Fit testing and she agrees.    She is requesting a refill of Spiriva which she takes for her asthma which controls her symptoms.  She does admit that she is still smoking and she plans to quit smoking by May when her grandchild is born.  She has quit on Chantix before and she is open to smoking cessation.    Review of Systems   Constitutional: Negative for chills, fatigue and fever.   Respiratory: Negative for cough, shortness of breath and wheezing.    Cardiovascular: Negative for chest pain, palpitations and leg swelling.   Musculoskeletal: Positive for neck pain.   Skin: Negative  "for rash and wound.   Neurological: Negative for weakness and numbness.   Psychiatric/Behavioral: The patient is not nervous/anxious.        Objective:     /85   Pulse 75   Ht 5' 5" (1.651 m)   Wt 63.6 kg (140 lb 3.2 oz)   BMI 23.33 kg/m²     Physical Exam   Constitutional: She is oriented to person, place, and time. She appears well-developed and well-nourished.   HENT:   Head: Normocephalic.   Eyes: Pupils are equal, round, and reactive to light.   Cardiovascular: Normal rate, regular rhythm and normal heart sounds.    Pulmonary/Chest: Effort normal. No respiratory distress. She has no decreased breath sounds. She has no wheezes. She has rhonchi (faint, scattered rhonchi noted). She has no rales.   Lymphadenopathy:     She has no cervical adenopathy.   Neurological: She is alert and oriented to person, place, and time.   Skin: Skin is warm and dry. No rash noted.   Psychiatric: She has a normal mood and affect. Her behavior is normal. Judgment and thought content normal.   Vitals reviewed.    Assessment:     1. Attention deficit    2. Onychomycosis    3. Cervical radiculopathy    4. Encounter for FIT (fecal immunochemical test) screening    5. Cigarette nicotine dependence with nicotine-induced disorder    6. Uncomplicated asthma, unspecified asthma severity, unspecified whether persistent    7. Moderate persistent asthma without complication        Plan:     Problem List Items Addressed This Visit        Neuro    Attention deficit - Primary    Relevant Medications    dextroamphetamine-amphetamine 30 mg Tab    dextroamphetamine-amphetamine 30 mg Tab (Start on 3/30/2018)    dextroamphetamine-amphetamine 30 mg Tab (Start on 4/28/2018)       Pulmonary    Asthma    Relevant Medications    tiotropium (SPIRIVA WITH HANDIHALER) 18 mcg inhalation capsule       Other    Cigarette nicotine dependence with nicotine-induced disorder    Relevant Orders    Ambulatory referral to Smoking Cessation Program      Other " Visit Diagnoses     Onychomycosis        Relevant Medications    efinaconazole 10 % Lety    Cervical radiculopathy        Relevant Orders    X-Ray Cervical Spine Complete 5 view (Completed)    Encounter for FIT (fecal immunochemical test) screening        Relevant Orders    Fecal Immunochemical Test (iFOBT)        The patient was checked in the [facility] Board of Pharmacy's  Prescription Monitoring Program. There appears to be no incongruities with the patient's verbalized history.     Follow-up in about 3 months (around 6/1/2018), or if symptoms worsen or fail to improve.

## 2018-03-08 NOTE — PROGRESS NOTES
Patient's topical medication for toenails denied.  Attempted to contact patient to discuss other options such as oral medication.  No answer.

## 2018-03-29 ENCOUNTER — TELEPHONE (OUTPATIENT)
Dept: FAMILY MEDICINE | Facility: CLINIC | Age: 48
End: 2018-03-29

## 2018-03-29 NOTE — TELEPHONE ENCOUNTER
----- Message from Saige Arzate sent at 3/29/2018  4:14 PM CDT -----  Contact: pt  The pt states she is returning a missed call, the pt can be reached at [phone]///[id number]

## 2018-04-05 NOTE — TELEPHONE ENCOUNTER
Pt states she bought otc fungus medication for nail fungus and will try that and if that does not work she will let us know at her 3 mo follow up

## 2018-04-13 ENCOUNTER — OFFICE VISIT (OUTPATIENT)
Dept: FAMILY MEDICINE | Facility: CLINIC | Age: 48
End: 2018-04-13
Payer: COMMERCIAL

## 2018-04-13 VITALS
SYSTOLIC BLOOD PRESSURE: 123 MMHG | HEIGHT: 65 IN | DIASTOLIC BLOOD PRESSURE: 84 MMHG | BODY MASS INDEX: 23.14 KG/M2 | WEIGHT: 138.88 LBS | TEMPERATURE: 98 F | HEART RATE: 77 BPM

## 2018-04-13 DIAGNOSIS — R22.2 MASS OF LEFT CHEST WALL: Primary | ICD-10-CM

## 2018-04-13 PROCEDURE — 99213 OFFICE O/P EST LOW 20 MIN: CPT | Mod: S$GLB,,, | Performed by: NURSE PRACTITIONER

## 2018-04-13 PROCEDURE — 99999 PR PBB SHADOW E&M-EST. PATIENT-LVL III: CPT | Mod: PBBFAC,,, | Performed by: NURSE PRACTITIONER

## 2018-04-13 NOTE — PROGRESS NOTES
"Subjective:      Patient ID: Luz Pelaez is a 47 y.o. female.    Chief Complaint: Cyst    HPI   Patient to clinic with concern over a lump noted just under left breast about 1 month ago.  She reports she thought she felt a little knot under her breast and then it began to itch.  She reports it did not itch at first and once it began itching, it turned red.  She started using OTC antiitch cream that resolved the itching and redness but she still has a lump.  She denies fever, drainage, pain at site.  She has a family history of breast cancer in her maternal grandmother.  She had a negative mammogram in September 2017.  She has had bilateral breast reduction and the mass is located just under left breast scarring.  She cannot identify any exacerbating or mitigating factors.    Review of Systems   Constitutional: Negative for chills, fatigue and fever.   Respiratory: Negative for cough, shortness of breath and wheezing.    Cardiovascular: Negative for chest pain, palpitations and leg swelling.   Skin: Negative for rash and wound.   Neurological: Negative for weakness and numbness.   Hematological:        Mass under left breast   Psychiatric/Behavioral: The patient is not nervous/anxious.        Objective:     /84   Pulse 77   Temp 98.2 °F (36.8 °C) (Oral)   Ht 5' 5" (1.651 m)   Wt 63 kg (138 lb 14.2 oz)   BMI 23.11 kg/m²     Physical Exam   Constitutional: She is oriented to person, place, and time. She appears well-developed and well-nourished.   HENT:   Head: Normocephalic.   Eyes: Pupils are equal, round, and reactive to light.   Cardiovascular: Normal rate, regular rhythm and normal heart sounds.    Pulmonary/Chest: Effort normal and breath sounds normal. No respiratory distress. She has no decreased breath sounds. She has no wheezes. She has no rhonchi. She has no rales.       Approximate 1.5 cm firm, indurated mass noted just under left breast.  No erythema, warmth, or drainage noted. "   Lymphadenopathy:     She has no cervical adenopathy.   Neurological: She is alert and oriented to person, place, and time.   Skin: Skin is warm and dry. No rash noted.   Psychiatric: She has a normal mood and affect. Her behavior is normal. Judgment and thought content normal.   Vitals reviewed.    Assessment:     1. Mass of left chest wall        Plan:     Problem List Items Addressed This Visit     None      Visit Diagnoses     Mass of left chest wall    -  Primary    Relevant Orders    US Soft Tissue Misc      Will call with results    Follow-up if symptoms worsen or fail to improve.

## 2018-04-17 ENCOUNTER — HOSPITAL ENCOUNTER (OUTPATIENT)
Dept: RADIOLOGY | Facility: HOSPITAL | Age: 48
Discharge: HOME | End: 2018-04-17
Attending: NURSE PRACTITIONER
Payer: COMMERCIAL

## 2018-04-17 DIAGNOSIS — R22.2 MASS OF LEFT CHEST WALL: ICD-10-CM

## 2018-04-17 PROCEDURE — 76604 US EXAM CHEST: CPT | Mod: 26,52,, | Performed by: RADIOLOGY

## 2018-04-17 PROCEDURE — 76999 ECHO EXAMINATION PROCEDURE: CPT | Mod: TC,PO

## 2018-05-10 ENCOUNTER — PATIENT OUTREACH (OUTPATIENT)
Dept: ADMINISTRATIVE | Facility: HOSPITAL | Age: 48
End: 2018-05-10

## 2018-05-24 ENCOUNTER — OFFICE VISIT (OUTPATIENT)
Dept: FAMILY MEDICINE | Facility: CLINIC | Age: 48
End: 2018-05-24
Payer: COMMERCIAL

## 2018-05-24 VITALS
DIASTOLIC BLOOD PRESSURE: 82 MMHG | HEART RATE: 83 BPM | HEIGHT: 65 IN | BODY MASS INDEX: 23.82 KG/M2 | SYSTOLIC BLOOD PRESSURE: 117 MMHG | WEIGHT: 143 LBS | TEMPERATURE: 98 F

## 2018-05-24 DIAGNOSIS — R07.89 CHEST HEAVINESS: ICD-10-CM

## 2018-05-24 DIAGNOSIS — J45.40 MODERATE PERSISTENT ASTHMA WITHOUT COMPLICATION: ICD-10-CM

## 2018-05-24 DIAGNOSIS — R41.840 ATTENTION DEFICIT: Primary | ICD-10-CM

## 2018-05-24 DIAGNOSIS — K21.9 GASTROESOPHAGEAL REFLUX DISEASE, ESOPHAGITIS PRESENCE NOT SPECIFIED: ICD-10-CM

## 2018-05-24 DIAGNOSIS — F17.219 CIGARETTE NICOTINE DEPENDENCE WITH NICOTINE-INDUCED DISORDER: ICD-10-CM

## 2018-05-24 PROCEDURE — 3008F BODY MASS INDEX DOCD: CPT | Mod: CPTII,S$GLB,, | Performed by: NURSE PRACTITIONER

## 2018-05-24 PROCEDURE — 93005 ELECTROCARDIOGRAM TRACING: CPT | Mod: S$GLB,,, | Performed by: NURSE PRACTITIONER

## 2018-05-24 PROCEDURE — 99214 OFFICE O/P EST MOD 30 MIN: CPT | Mod: S$GLB,,, | Performed by: NURSE PRACTITIONER

## 2018-05-24 PROCEDURE — 93010 ELECTROCARDIOGRAM REPORT: CPT | Mod: S$GLB,,, | Performed by: INTERNAL MEDICINE

## 2018-05-24 PROCEDURE — 99999 PR PBB SHADOW E&M-EST. PATIENT-LVL III: CPT | Mod: PBBFAC,,, | Performed by: NURSE PRACTITIONER

## 2018-05-24 RX ORDER — FLUTICASONE PROPIONATE AND SALMETEROL 250; 50 UG/1; UG/1
POWDER RESPIRATORY (INHALATION)
Qty: 60 EACH | Refills: 11 | Status: SHIPPED | OUTPATIENT
Start: 2018-05-24 | End: 2019-02-15 | Stop reason: SDUPTHER

## 2018-05-24 RX ORDER — DEXTROAMPHETAMINE SACCHARATE, AMPHETAMINE ASPARTATE, DEXTROAMPHETAMINE SULFATE AND AMPHETAMINE SULFATE 7.5; 7.5; 7.5; 7.5 MG/1; MG/1; MG/1; MG/1
30 TABLET ORAL 2 TIMES DAILY
Qty: 60 TABLET | Refills: 0 | Status: SHIPPED | OUTPATIENT
Start: 2018-07-24 | End: 2018-08-22 | Stop reason: SDUPTHER

## 2018-05-24 RX ORDER — DEXTROAMPHETAMINE SACCHARATE, AMPHETAMINE ASPARTATE, DEXTROAMPHETAMINE SULFATE AND AMPHETAMINE SULFATE 7.5; 7.5; 7.5; 7.5 MG/1; MG/1; MG/1; MG/1
30 TABLET ORAL 2 TIMES DAILY
Qty: 60 TABLET | Refills: 0 | Status: SHIPPED | OUTPATIENT
Start: 2018-05-27 | End: 2018-06-26

## 2018-05-24 RX ORDER — DEXTROAMPHETAMINE SACCHARATE, AMPHETAMINE ASPARTATE, DEXTROAMPHETAMINE SULFATE AND AMPHETAMINE SULFATE 7.5; 7.5; 7.5; 7.5 MG/1; MG/1; MG/1; MG/1
30 TABLET ORAL 2 TIMES DAILY
Qty: 60 TABLET | Refills: 0 | Status: SHIPPED | OUTPATIENT
Start: 2018-06-25 | End: 2018-07-25

## 2018-05-24 RX ORDER — LINACLOTIDE 290 UG/1
290 CAPSULE, GELATIN COATED ORAL DAILY
Refills: 6 | COMMUNITY
Start: 2018-02-23 | End: 2018-11-19 | Stop reason: SDUPTHER

## 2018-05-24 RX ORDER — OMEPRAZOLE 20 MG/1
CAPSULE, DELAYED RELEASE ORAL
Qty: 180 CAPSULE | Refills: 3 | Status: SHIPPED | OUTPATIENT
Start: 2018-05-24 | End: 2019-02-15 | Stop reason: SDUPTHER

## 2018-05-24 NOTE — PROGRESS NOTES
Subjective:      Patient ID: Luz Pelaez is a 47 y.o. female.    Chief Complaint: Medication Refill     HPI   The patient presents today for refill of her  Adderall which she takes for ADD.  The patient has been on the medicine for the last 15 years and has been stable on the current dose of medicine for the last several years.  She says that medication controls their symptoms well.  She denies any side effects from the medication; no chest pain, no restlessness, no palpitations, no unexpected weight loss, no agitation.  She has had unchanged weight since last visit.     Patient also reports an ongoing, intermittent chest heaviness that has been occurring over the last several years.  Feels like a  on her chest and that she cannot catch her breath.  This discomfort occurs at rest and with activity.  She reports about 5 years ago she was worked up by cardiology for this same complaint and her workup was negative.  She cannot recall the name of the cardiologist she seen at that time.  The discomfort does not radiate and she does not experience any shortness of breath, dyspnea on exertion, heart palpitations, diaphoresis, or nausea.  She is a long-time smoker.  She cannot identify any exacerbating or mitigating factors.    She is also requesting refills on her Prilosec which she takes takes for GERD.  She tolerates the medication well and is not experiencing any side effects.    She is also requesting refills on her Advair for her asthma.  She admits she does not use her Advair consistently.  She does admit that when she uses consistently she notes a difference in her breathing.  She is not currently complaining of any wheezing, shortness of breath, or coughing.      Review of Systems   Constitutional: Negative for activity change, chills, fatigue, fever and unexpected weight change.   HENT: Negative for hearing loss, rhinorrhea and trouble swallowing.    Eyes: Negative for discharge and visual disturbance.  "  Respiratory: Positive for chest tightness (Heaviness). Negative for cough, shortness of breath and wheezing.    Cardiovascular: Negative for chest pain, palpitations and leg swelling.   Gastrointestinal: Negative for blood in stool, constipation, diarrhea and vomiting.   Endocrine: Negative for polydipsia and polyuria.   Genitourinary: Negative for difficulty urinating, dysuria, hematuria and menstrual problem.   Musculoskeletal: Positive for neck pain. Negative for arthralgias and joint swelling.   Skin: Negative for rash and wound.   Neurological: Negative for weakness, numbness and headaches.   Psychiatric/Behavioral: Negative for confusion and dysphoric mood. The patient is not nervous/anxious.        Objective:     /82   Pulse 83   Temp 98.2 °F (36.8 °C) (Oral)   Ht 5' 5" (1.651 m)   Wt 64.9 kg (143 lb)   BMI 23.80 kg/m²     Physical Exam   Constitutional: She is oriented to person, place, and time. She appears well-developed and well-nourished.   HENT:   Head: Normocephalic.   Eyes: Pupils are equal, round, and reactive to light.   Neck: No JVD present. Carotid bruit is not present.   Cardiovascular: Normal rate, regular rhythm and normal heart sounds.    Pulmonary/Chest: Effort normal and breath sounds normal. No respiratory distress. She has no decreased breath sounds. She has no wheezes. She has no rhonchi. She has no rales.   Lymphadenopathy:     She has no cervical adenopathy.   Neurological: She is alert and oriented to person, place, and time.   Skin: Skin is warm and dry. No rash noted.   Psychiatric: She has a normal mood and affect. Her behavior is normal. Judgment and thought content normal.   Vitals reviewed.    Assessment:     1. Attention deficit    2. Gastroesophageal reflux disease, esophagitis presence not specified    3. Moderate persistent asthma without complication    4. Cigarette nicotine dependence with nicotine-induced disorder    5. Chest heaviness        Plan:     Problem " List Items Addressed This Visit        Neuro    Attention deficit - Primary    Relevant Medications    dextroamphetamine-amphetamine 30 mg Tab (Start on 5/27/2018)    dextroamphetamine-amphetamine 30 mg Tab (Start on 6/25/2018)    dextroamphetamine-amphetamine 30 mg Tab (Start on 7/24/2018)       Pulmonary    Asthma    Relevant Medications    fluticasone-salmeterol 250-50 mcg/dose (ADVAIR DISKUS) 250-50 mcg/dose diskus inhaler       GI    GERD (gastroesophageal reflux disease)    Relevant Medications    omeprazole (PRILOSEC) 20 MG capsule       Other    Cigarette nicotine dependence with nicotine-induced disorder    Relevant Orders    Ambulatory referral to Smoking Cessation Program      Other Visit Diagnoses     Chest heaviness        Relevant Orders    IN OFFICE EKG 12-LEAD (to Muse)      Patient courage to stop smoking  Report to emergency room if chest heaviness returns  Symptomatic care discussed  Report to ER if symptoms worsen    Follow-up in about 3 months (around 8/24/2018), or if symptoms worsen or fail to improve, for medication refills.

## 2018-08-08 ENCOUNTER — PATIENT OUTREACH (OUTPATIENT)
Dept: ADMINISTRATIVE | Facility: HOSPITAL | Age: 48
End: 2018-08-08

## 2018-08-08 NOTE — LETTER
August 15, 2018        Luz Pelaez  [address]  [facility] [address]      Dear Mrs. Pelaez,    You have an upcoming appointment with Kb Mcgarry MD on 8/22/18 @ 9:00 am.      Your chart is indicating you may be due for the following and I will be happy to assist you in scheduling any needed appointments:  Health Maintenance Due   Topic    Colonoscopy     Influenza Vaccine     Mammogram      If you have had any of the above done at another facility, please bring the records or information with you so that your record at [facility] will be complete.    We will be happy to assist you with scheduling any necessary appointments or you may contact the [facility] appointment desk at [phone] to schedule at your convenience.   This information was sent to you via your patient portal.  Please check your message portal for important information.      Thank you for choosing [facility] for your healthcare needs,      DARÍO Livingston  Care Coordination Department  [facility]  [phone]

## 2018-08-22 ENCOUNTER — OFFICE VISIT (OUTPATIENT)
Dept: FAMILY MEDICINE | Facility: CLINIC | Age: 48
End: 2018-08-22
Payer: COMMERCIAL

## 2018-08-22 VITALS
HEART RATE: 75 BPM | TEMPERATURE: 99 F | DIASTOLIC BLOOD PRESSURE: 87 MMHG | HEIGHT: 65 IN | WEIGHT: 147 LBS | BODY MASS INDEX: 24.49 KG/M2 | SYSTOLIC BLOOD PRESSURE: 128 MMHG

## 2018-08-22 DIAGNOSIS — Z12.11 COLON CANCER SCREENING: ICD-10-CM

## 2018-08-22 DIAGNOSIS — R41.840 ATTENTION DEFICIT: Primary | ICD-10-CM

## 2018-08-22 PROCEDURE — 3008F BODY MASS INDEX DOCD: CPT | Mod: CPTII,S$GLB,, | Performed by: NURSE PRACTITIONER

## 2018-08-22 PROCEDURE — 99999 PR PBB SHADOW E&M-EST. PATIENT-LVL III: CPT | Mod: PBBFAC,,, | Performed by: NURSE PRACTITIONER

## 2018-08-22 PROCEDURE — 99213 OFFICE O/P EST LOW 20 MIN: CPT | Mod: S$GLB,,, | Performed by: NURSE PRACTITIONER

## 2018-08-22 RX ORDER — DEXTROAMPHETAMINE SACCHARATE, AMPHETAMINE ASPARTATE, DEXTROAMPHETAMINE SULFATE AND AMPHETAMINE SULFATE 7.5; 7.5; 7.5; 7.5 MG/1; MG/1; MG/1; MG/1
30 TABLET ORAL 2 TIMES DAILY
Qty: 60 TABLET | Refills: 0 | Status: SHIPPED | OUTPATIENT
Start: 2018-08-22 | End: 2018-09-21

## 2018-08-22 RX ORDER — DEXTROAMPHETAMINE SACCHARATE, AMPHETAMINE ASPARTATE, DEXTROAMPHETAMINE SULFATE AND AMPHETAMINE SULFATE 7.5; 7.5; 7.5; 7.5 MG/1; MG/1; MG/1; MG/1
30 TABLET ORAL 2 TIMES DAILY
Qty: 60 TABLET | Refills: 0 | Status: SHIPPED | OUTPATIENT
Start: 2018-10-19 | End: 2018-11-19 | Stop reason: SDUPTHER

## 2018-08-22 RX ORDER — DEXTROAMPHETAMINE SACCHARATE, AMPHETAMINE ASPARTATE, DEXTROAMPHETAMINE SULFATE AND AMPHETAMINE SULFATE 7.5; 7.5; 7.5; 7.5 MG/1; MG/1; MG/1; MG/1
30 TABLET ORAL 2 TIMES DAILY
Qty: 60 TABLET | Refills: 0 | Status: SHIPPED | OUTPATIENT
Start: 2018-09-20 | End: 2018-10-20

## 2018-08-22 NOTE — PROGRESS NOTES
"Subjective:      Patient ID: Luz Pelaez is a 48 y.o. female.    Chief Complaint: Medication Refill    HPI   The patient presents today for refill of her  Adderall which she takes for ADD.  The patient has been on the medicine for the last 16 years and has been stable on the current dose of medicine for the last several years.  She says that medication controls their symptoms well.  She denies any side effects from the medication; no chest pain, no restlessness, no palpitations, no unexpected weight loss, no agitation.  Her weight has been relatively unchanged since last visit.  Health Maintenance Due   Topic Date Due    Colonoscopy  07/09/2017    Influenza Vaccine  08/01/2018    Mammogram  09/28/2018       Review of Systems   Constitutional: Negative for chills, fatigue and fever.   Respiratory: Negative for cough, shortness of breath and wheezing.    Cardiovascular: Negative for chest pain, palpitations and leg swelling.   Skin: Negative for rash and wound.   Neurological: Negative for weakness and numbness.   Psychiatric/Behavioral: The patient is not nervous/anxious.        Objective:     /87   Pulse 75   Temp 98.6 °F (37 °C) (Oral)   Ht 5' 5" (1.651 m)   Wt 66.7 kg (147 lb)   BMI 24.46 kg/m²     Physical Exam   Constitutional: She is oriented to person, place, and time. She appears well-developed and well-nourished.   HENT:   Head: Normocephalic.   Eyes: Pupils are equal, round, and reactive to light.   Neck: Normal range of motion. Neck supple.   Cardiovascular: Normal rate, regular rhythm and normal heart sounds.   Pulmonary/Chest: Effort normal and breath sounds normal. No respiratory distress. She has no decreased breath sounds. She has no wheezes. She has no rhonchi. She has no rales.   Neurological: She is alert and oriented to person, place, and time.   Skin: Skin is warm and dry. No rash noted.   Psychiatric: She has a normal mood and affect. Her speech is normal and behavior is " normal. Judgment and thought content normal. She is not actively hallucinating. Cognition and memory are normal. She is attentive.   Vitals reviewed.    Assessment:     1. Attention deficit    2. Colon cancer screening        Plan:     Problem List Items Addressed This Visit        Neuro    Attention deficit - Primary    Relevant Medications    dextroamphetamine-amphetamine 30 mg Tab    dextroamphetamine-amphetamine 30 mg Tab (Start on 9/20/2018)    dextroamphetamine-amphetamine 30 mg Tab (Start on 10/19/2018)      Other Visit Diagnoses     Colon cancer screening        Relevant Orders    Case request GI: COLONOSCOPY (Completed)      The patient was checked in the [facility] Board of Pharmacy's  Prescription Monitoring Program. There appears to be no incongruities with the patient's verbalized history.     Follow-up in about 3 months (around 11/22/2018), or if symptoms worsen or fail to improve.        Parts of this note was dictated using voice recognition software. Please excuse any grammatical or typographical errors.

## 2018-08-27 ENCOUNTER — DOCUMENTATION ONLY (OUTPATIENT)
Dept: ENDOSCOPY | Facility: HOSPITAL | Age: 48
End: 2018-08-27

## 2018-08-27 NOTE — PROGRESS NOTES
08/27/18 Per [employer], Person pressed touch tone key to speak with an endoscopy .  Colonoscopy scheduled for 11/01/18. Instructions given and mailed. Suprep ordered.

## 2018-10-31 PROBLEM — Z86.0100 HISTORY OF COLON POLYPS: Status: ACTIVE | Noted: 2018-10-31

## 2018-10-31 PROBLEM — Z12.11 COLON CANCER SCREENING: Status: ACTIVE | Noted: 2018-10-31

## 2018-10-31 PROBLEM — Z86.010 HISTORY OF COLON POLYPS: Status: ACTIVE | Noted: 2018-10-31

## 2018-11-06 ENCOUNTER — PATIENT OUTREACH (OUTPATIENT)
Dept: ADMINISTRATIVE | Facility: HOSPITAL | Age: 48
End: 2018-11-06

## 2018-11-06 NOTE — PROGRESS NOTES
Patient return my call and stated she will discuss both mammogram and colonoscopy with Ernst Clayton NP due to concerns of insurance.

## 2018-11-06 NOTE — PROGRESS NOTES
Attempt to call pt concerning Mammogram.  No Answer. Left message for pt to return my call. Mammogram order pended.

## 2018-11-19 ENCOUNTER — OFFICE VISIT (OUTPATIENT)
Dept: FAMILY MEDICINE | Facility: CLINIC | Age: 48
End: 2018-11-19
Payer: COMMERCIAL

## 2018-11-19 ENCOUNTER — HOSPITAL ENCOUNTER (OUTPATIENT)
Dept: RADIOLOGY | Facility: HOSPITAL | Age: 48
Discharge: HOME | End: 2018-11-19
Attending: NURSE PRACTITIONER
Payer: COMMERCIAL

## 2018-11-19 VITALS — WEIGHT: 147.25 LBS | HEIGHT: 65 IN | BODY MASS INDEX: 24.53 KG/M2

## 2018-11-19 VITALS
WEIGHT: 147.19 LBS | HEART RATE: 80 BPM | DIASTOLIC BLOOD PRESSURE: 88 MMHG | TEMPERATURE: 98 F | HEIGHT: 65 IN | SYSTOLIC BLOOD PRESSURE: 129 MMHG | BODY MASS INDEX: 24.52 KG/M2

## 2018-11-19 DIAGNOSIS — Z12.31 ENCOUNTER FOR SCREENING MAMMOGRAM FOR BREAST CANCER: ICD-10-CM

## 2018-11-19 DIAGNOSIS — Z80.0 FAMILY HISTORY OF MALIGNANT NEOPLASM OF GASTROINTESTINAL TRACT: ICD-10-CM

## 2018-11-19 DIAGNOSIS — F17.219 CIGARETTE NICOTINE DEPENDENCE WITH NICOTINE-INDUCED DISORDER: ICD-10-CM

## 2018-11-19 DIAGNOSIS — R41.840 ATTENTION DEFICIT: ICD-10-CM

## 2018-11-19 DIAGNOSIS — M54.12 CERVICAL RADICULOPATHY: ICD-10-CM

## 2018-11-19 DIAGNOSIS — Z00.00 ANNUAL PHYSICAL EXAM: ICD-10-CM

## 2018-11-19 DIAGNOSIS — Z12.11 COLON CANCER SCREENING: ICD-10-CM

## 2018-11-19 DIAGNOSIS — K59.00 CONSTIPATION, UNSPECIFIED CONSTIPATION TYPE: ICD-10-CM

## 2018-11-19 DIAGNOSIS — Z00.00 ANNUAL PHYSICAL EXAM: Primary | ICD-10-CM

## 2018-11-19 DIAGNOSIS — K21.9 GASTROESOPHAGEAL REFLUX DISEASE, ESOPHAGITIS PRESENCE NOT SPECIFIED: ICD-10-CM

## 2018-11-19 DIAGNOSIS — Z83.719 FAMILY HISTORY OF COLONIC POLYPS: ICD-10-CM

## 2018-11-19 DIAGNOSIS — Z86.010 HISTORY OF COLON POLYPS: ICD-10-CM

## 2018-11-19 DIAGNOSIS — Z23 NEED FOR INFLUENZA VACCINATION: ICD-10-CM

## 2018-11-19 DIAGNOSIS — J45.909 UNCOMPLICATED ASTHMA, UNSPECIFIED ASTHMA SEVERITY, UNSPECIFIED WHETHER PERSISTENT: ICD-10-CM

## 2018-11-19 PROCEDURE — 99396 PREV VISIT EST AGE 40-64: CPT | Mod: 25,S$GLB,, | Performed by: NURSE PRACTITIONER

## 2018-11-19 PROCEDURE — 90471 IMMUNIZATION ADMIN: CPT | Mod: S$GLB,,, | Performed by: NURSE PRACTITIONER

## 2018-11-19 PROCEDURE — 77063 BREAST TOMOSYNTHESIS BI: CPT | Mod: TC,PO

## 2018-11-19 PROCEDURE — 99999 PR PBB SHADOW E&M-EST. PATIENT-LVL III: CPT | Mod: PBBFAC,,, | Performed by: NURSE PRACTITIONER

## 2018-11-19 PROCEDURE — 77063 BREAST TOMOSYNTHESIS BI: CPT | Mod: 26,,, | Performed by: RADIOLOGY

## 2018-11-19 PROCEDURE — 77067 SCR MAMMO BI INCL CAD: CPT | Mod: 26,,, | Performed by: RADIOLOGY

## 2018-11-19 PROCEDURE — 90686 IIV4 VACC NO PRSV 0.5 ML IM: CPT | Mod: S$GLB,,, | Performed by: NURSE PRACTITIONER

## 2018-11-19 RX ORDER — LINACLOTIDE 290 UG/1
290 CAPSULE, GELATIN COATED ORAL DAILY
Qty: 30 CAPSULE | Refills: 6 | Status: SHIPPED | OUTPATIENT
Start: 2018-11-19 | End: 2019-02-15 | Stop reason: SDUPTHER

## 2018-11-19 RX ORDER — DEXTROAMPHETAMINE SACCHARATE, AMPHETAMINE ASPARTATE, DEXTROAMPHETAMINE SULFATE AND AMPHETAMINE SULFATE 7.5; 7.5; 7.5; 7.5 MG/1; MG/1; MG/1; MG/1
30 TABLET ORAL 2 TIMES DAILY
Qty: 60 TABLET | Refills: 0 | Status: SHIPPED | OUTPATIENT
Start: 2018-11-19 | End: 2018-12-19

## 2018-11-19 RX ORDER — DEXTROAMPHETAMINE SACCHARATE, AMPHETAMINE ASPARTATE, DEXTROAMPHETAMINE SULFATE AND AMPHETAMINE SULFATE 7.5; 7.5; 7.5; 7.5 MG/1; MG/1; MG/1; MG/1
1 TABLET ORAL 2 TIMES DAILY
Qty: 60 TABLET | Refills: 0 | Status: SHIPPED | OUTPATIENT
Start: 2019-01-16 | End: 2019-02-15 | Stop reason: SDUPTHER

## 2018-11-19 RX ORDER — DEXTROAMPHETAMINE SACCHARATE, AMPHETAMINE ASPARTATE, DEXTROAMPHETAMINE SULFATE AND AMPHETAMINE SULFATE 7.5; 7.5; 7.5; 7.5 MG/1; MG/1; MG/1; MG/1
1 TABLET ORAL 2 TIMES DAILY
Qty: 60 TABLET | Refills: 0 | Status: SHIPPED | OUTPATIENT
Start: 2018-12-18 | End: 2019-01-17

## 2018-11-19 RX ORDER — MELOXICAM 15 MG/1
15 TABLET ORAL DAILY
Qty: 90 TABLET | Refills: 3 | Status: SHIPPED | OUTPATIENT
Start: 2018-11-19 | End: 2019-11-13 | Stop reason: SDUPTHER

## 2018-11-19 NOTE — PROGRESS NOTES
Administered flu shot to L deltoid.  VIS given.  See immunization record.  Pt tolerated well.  Advised to wait at least 15 minutes to monitor for adverse reactions.  Pt verbalizes understanding.

## 2018-11-19 NOTE — PROGRESS NOTES
Subjective:      Patient ID: Luz Pelaez is a 48 y.o. female.    Chief Complaint: Medication Refill (and to talk about colonoscpy and mammo)    HPI   Patient to clinic for annual exam and medication refill.  Her last colonoscopy was 7/9/2014 which she was found to have 3 polyps ranging in size 8 mm to 15 mm.  It was recommended that she follow up with the repeat colonoscopy in 3 years.  She is now due for that.  She denies blood in stool, dark stools, unexplained weight loss.  She does have constipation which she takes Linzess for and controls her symptoms well.  Patient also has had cervical radiculopathy for quite some time.  She reports numbness and tingling to right arm radiating down to her hand and is now starting to experience left arm and hand numbness and tingling.  She reports it is worst when performing overhead tasks such as fixing her hair and makeup.  The Mobic help some with this but patient reports is becoming more bothersome.  She has not gone to physical therapy for this yet.  She is a smoker and has asthma.  Her symptoms are controlled with Advair, Spiriva, and ProAir p.r.n.  Patient presents today for refill of her Adderall which she takes for ADD.  The patient has been on the medicine for the last 16 years and has been stable on the current dose of medicine for the last several years.  She says that medication controls their symptoms well.  She denies any side effects from the medication; no chest pain, no restlessness, no palpitations, no unexpected weight loss, no agitation.  Her weight has been relatively unchanged since last visit.    Review of Systems   Constitutional: Negative for activity change, chills, fatigue, fever and unexpected weight change.   HENT: Positive for rhinorrhea. Negative for hearing loss and trouble swallowing.    Eyes: Negative for discharge and visual disturbance.   Respiratory: Negative for cough, chest tightness, shortness of breath and wheezing.   "  Cardiovascular: Negative for chest pain, palpitations and leg swelling.   Gastrointestinal: Positive for constipation. Negative for blood in stool, diarrhea and vomiting.   Endocrine: Negative for polydipsia and polyuria.   Genitourinary: Negative for difficulty urinating, dysuria, hematuria and menstrual problem.   Musculoskeletal: Positive for arthralgias, joint swelling and neck pain.   Skin: Negative for rash and wound.   Neurological: Negative for weakness, numbness and headaches.   Psychiatric/Behavioral: Negative for confusion and dysphoric mood. The patient is not nervous/anxious.        Objective:     /88   Pulse 80   Temp 98.3 °F (36.8 °C)   Ht 5' 5" (1.651 m)   Wt 66.8 kg (147 lb 3.2 oz)   BMI 24.50 kg/m²     Physical Exam   Constitutional: She is oriented to person, place, and time. She appears well-developed and well-nourished. No distress.   HENT:   Head: Normocephalic and atraumatic.   Right Ear: Tympanic membrane normal.   Left Ear: Tympanic membrane normal.   Nose: Nose normal. Right sinus exhibits no maxillary sinus tenderness and no frontal sinus tenderness. Left sinus exhibits no maxillary sinus tenderness and no frontal sinus tenderness.   Mouth/Throat: Oropharynx is clear and moist and mucous membranes are normal.   Eyes: EOM are normal. Pupils are equal, round, and reactive to light.   Neck: Normal range of motion. Neck supple. No JVD present. Carotid bruit is not present.   Cardiovascular: Normal rate, regular rhythm and normal heart sounds.   Pulses:       Carotid pulses are 2+ on the right side, and 2+ on the left side.       Dorsalis pedis pulses are 2+ on the right side, and 2+ on the left side.        Posterior tibial pulses are 2+ on the right side, and 2+ on the left side.   Pulmonary/Chest: Effort normal and breath sounds normal. No respiratory distress. She has no wheezes. She has no rhonchi. She has no rales.   Abdominal: Soft. Bowel sounds are normal. There is no " tenderness.   Musculoskeletal: Normal range of motion. She exhibits no edema.        Cervical back: She exhibits pain. She exhibits normal range of motion, no tenderness, no bony tenderness, no swelling, no edema, no deformity, no laceration, no spasm and normal pulse.   Lymphadenopathy:     She has no cervical adenopathy.   Neurological: She is alert and oriented to person, place, and time.   Skin: Skin is warm and dry. No rash noted.   Psychiatric: She has a normal mood and affect. Her behavior is normal. Judgment and thought content normal.   Vitals reviewed.    Assessment:     1. Annual physical exam    2. Cervical radiculopathy    3. Attention deficit    4. Constipation, unspecified constipation type    5. History of colon polyps    6. Colon cancer screening    7. Encounter for screening mammogram for breast cancer    8. Need for influenza vaccination    9. Uncomplicated asthma, unspecified asthma severity, unspecified whether persistent    10. Gastroesophageal reflux disease, esophagitis presence not specified    11. Family history of colonic polyps    12. Family history of malignant neoplasm of gastrointestinal tract    13. Cigarette nicotine dependence with nicotine-induced disorder        Plan:     Problem List Items Addressed This Visit        Neuro    Attention deficit    Relevant Medications    dextroamphetamine-amphetamine 30 mg Tab    dextroamphetamine-amphetamine 30 mg Tab (Start on 12/18/2018)    dextroamphetamine-amphetamine 30 mg Tab (Start on 1/16/2019)       Pulmonary    Asthma       GI    GERD (gastroesophageal reflux disease)    Relevant Orders    Case request GI: COLONOSCOPY (Completed)    Colon cancer screening    Relevant Orders    Case request GI: COLONOSCOPY (Completed)    History of colon polyps    Relevant Orders    Case request GI: COLONOSCOPY (Completed)       Other    Family history of colonic polyps    Relevant Orders    Case request GI: COLONOSCOPY (Completed)    Family history of  malignant neoplasm of gastrointestinal tract    Relevant Orders    Case request GI: COLONOSCOPY (Completed)    Cigarette nicotine dependence with nicotine-induced disorder      Other Visit Diagnoses     Annual physical exam    -  Primary    Relevant Orders    Mammo Digital Screening Bilateral With CAD    Comprehensive metabolic panel    Lipid panel    TSH    Cervical radiculopathy        Relevant Medications    meloxicam (MOBIC) 15 MG tablet    Other Relevant Orders    Ambulatory Referral to Physical/Occupational Therapy    Constipation, unspecified constipation type        Relevant Medications    LINZESS 290 mcg Cap    Encounter for screening mammogram for breast cancer        Relevant Orders    Mammo Digital Screening Bilateral With CAD    Need for influenza vaccination        Relevant Orders    Influenza - Quadrivalent (3 years & older) (PF)      The patient was checked in the [facility] Board of Pharmacy's  Prescription Monitoring Program. There appears to be no incongruities with the patient's verbalized history.     Follow-up in about 3 months (around 2/19/2019), or if symptoms worsen or fail to improve.        Parts of this note was dictated using voice recognition software. Please excuse any grammatical or typographical errors.

## 2018-11-20 DIAGNOSIS — R74.8 ELEVATED LIVER ENZYMES: Primary | ICD-10-CM

## 2018-11-21 ENCOUNTER — TELEPHONE (OUTPATIENT)
Dept: FAMILY MEDICINE | Facility: CLINIC | Age: 48
End: 2018-11-21

## 2018-11-21 NOTE — TELEPHONE ENCOUNTER
----- Message from Ernst Clatyon NP sent at 11/20/2018 12:50 PM CST -----  Thyroid and cholesterol is normal.  Sugar, kidneys, electrolytes are all acceptable.  Liver enzymes are high and this needs to be further evaluated.  I am ordering a liver ultrasound in addition to hepatitis panel.  We will also plan to repeat hepatic enzymes in 3 months.  Avoid alcohol and follow a low saturated fat diet as well as incorporate exercise.

## 2018-12-19 ENCOUNTER — TELEPHONE (OUTPATIENT)
Dept: RADIOLOGY | Facility: HOSPITAL | Age: 48
End: 2018-12-19

## 2019-01-23 ENCOUNTER — TELEPHONE (OUTPATIENT)
Dept: ENDOSCOPY | Facility: HOSPITAL | Age: 49
End: 2019-01-23

## 2019-02-15 ENCOUNTER — OFFICE VISIT (OUTPATIENT)
Dept: FAMILY MEDICINE | Facility: CLINIC | Age: 49
End: 2019-02-15
Payer: COMMERCIAL

## 2019-02-15 VITALS
HEIGHT: 65 IN | BODY MASS INDEX: 25.09 KG/M2 | SYSTOLIC BLOOD PRESSURE: 136 MMHG | TEMPERATURE: 98 F | DIASTOLIC BLOOD PRESSURE: 87 MMHG | WEIGHT: 150.63 LBS | HEART RATE: 78 BPM

## 2019-02-15 DIAGNOSIS — K21.9 GASTROESOPHAGEAL REFLUX DISEASE, ESOPHAGITIS PRESENCE NOT SPECIFIED: ICD-10-CM

## 2019-02-15 DIAGNOSIS — R41.840 ATTENTION DEFICIT: ICD-10-CM

## 2019-02-15 DIAGNOSIS — J45.40 MODERATE PERSISTENT ASTHMA WITHOUT COMPLICATION: ICD-10-CM

## 2019-02-15 DIAGNOSIS — K59.00 CONSTIPATION, UNSPECIFIED CONSTIPATION TYPE: ICD-10-CM

## 2019-02-15 PROCEDURE — 3008F BODY MASS INDEX DOCD: CPT | Mod: CPTII,S$GLB,, | Performed by: NURSE PRACTITIONER

## 2019-02-15 PROCEDURE — 99999 PR PBB SHADOW E&M-EST. PATIENT-LVL III: CPT | Mod: PBBFAC,,, | Performed by: NURSE PRACTITIONER

## 2019-02-15 PROCEDURE — 99214 PR OFFICE/OUTPT VISIT, EST, LEVL IV, 30-39 MIN: ICD-10-PCS | Mod: S$GLB,,, | Performed by: NURSE PRACTITIONER

## 2019-02-15 PROCEDURE — 99214 OFFICE O/P EST MOD 30 MIN: CPT | Mod: S$GLB,,, | Performed by: NURSE PRACTITIONER

## 2019-02-15 PROCEDURE — 3008F PR BODY MASS INDEX (BMI) DOCUMENTED: ICD-10-PCS | Mod: CPTII,S$GLB,, | Performed by: NURSE PRACTITIONER

## 2019-02-15 PROCEDURE — 99999 PR PBB SHADOW E&M-EST. PATIENT-LVL III: ICD-10-PCS | Mod: PBBFAC,,, | Performed by: NURSE PRACTITIONER

## 2019-02-15 RX ORDER — OMEPRAZOLE 20 MG/1
CAPSULE, DELAYED RELEASE ORAL
Qty: 180 CAPSULE | Refills: 3 | Status: SHIPPED | OUTPATIENT
Start: 2019-02-15 | End: 2019-10-09

## 2019-02-15 RX ORDER — ALBUTEROL SULFATE 90 UG/1
AEROSOL, METERED RESPIRATORY (INHALATION)
Qty: 17 G | Refills: 11 | Status: SHIPPED | OUTPATIENT
Start: 2019-02-15 | End: 2020-02-12 | Stop reason: SDUPTHER

## 2019-02-15 RX ORDER — DEXTROAMPHETAMINE SACCHARATE, AMPHETAMINE ASPARTATE, DEXTROAMPHETAMINE SULFATE AND AMPHETAMINE SULFATE 7.5; 7.5; 7.5; 7.5 MG/1; MG/1; MG/1; MG/1
1 TABLET ORAL 2 TIMES DAILY
Qty: 60 TABLET | Refills: 0 | Status: SHIPPED | OUTPATIENT
Start: 2019-04-11 | End: 2019-05-11

## 2019-02-15 RX ORDER — LINACLOTIDE 290 UG/1
290 CAPSULE, GELATIN COATED ORAL DAILY
Qty: 30 CAPSULE | Refills: 6 | Status: SHIPPED | OUTPATIENT
Start: 2019-02-15 | End: 2019-12-18 | Stop reason: SDUPTHER

## 2019-02-15 RX ORDER — FLUTICASONE PROPIONATE AND SALMETEROL 250; 50 UG/1; UG/1
POWDER RESPIRATORY (INHALATION)
Qty: 60 EACH | Refills: 11 | Status: SHIPPED | OUTPATIENT
Start: 2019-02-15 | End: 2020-02-12 | Stop reason: SDUPTHER

## 2019-02-15 RX ORDER — TIOTROPIUM BROMIDE 18 UG/1
CAPSULE ORAL; RESPIRATORY (INHALATION)
Qty: 30 CAPSULE | Refills: 11 | Status: SHIPPED | OUTPATIENT
Start: 2019-02-15 | End: 2020-02-12 | Stop reason: SDUPTHER

## 2019-02-15 RX ORDER — DEXTROAMPHETAMINE SACCHARATE, AMPHETAMINE ASPARTATE, DEXTROAMPHETAMINE SULFATE AND AMPHETAMINE SULFATE 7.5; 7.5; 7.5; 7.5 MG/1; MG/1; MG/1; MG/1
30 TABLET ORAL 2 TIMES DAILY
Qty: 60 TABLET | Refills: 0 | Status: SHIPPED | OUTPATIENT
Start: 2019-02-15 | End: 2019-05-13 | Stop reason: SDUPTHER

## 2019-02-15 RX ORDER — DEXTROAMPHETAMINE SACCHARATE, AMPHETAMINE ASPARTATE, DEXTROAMPHETAMINE SULFATE AND AMPHETAMINE SULFATE 7.5; 7.5; 7.5; 7.5 MG/1; MG/1; MG/1; MG/1
1 TABLET ORAL 2 TIMES DAILY
Qty: 60 TABLET | Refills: 0 | Status: SHIPPED | OUTPATIENT
Start: 2019-03-13 | End: 2019-05-13 | Stop reason: SDUPTHER

## 2019-02-15 NOTE — PROGRESS NOTES
Subjective:       Patient ID: Luz Pelaez is a 48 y.o. female.    Chief Complaint: Medication Refill    Medication Refill   This is a chronic (Adderall) problem. The current episode started more than 1 year ago. The problem occurs constantly. The problem has been unchanged. Associated symptoms include neck pain. Pertinent negatives include no abdominal pain, arthralgias, chest pain, coughing, fatigue, fever, headaches, joint swelling, myalgias, rash, sore throat, vomiting or weakness. The treatment provided significant relief.   Gastroesophageal Reflux   She reports no abdominal pain, no chest pain, no coughing, no sore throat or no wheezing. This is a chronic problem. The current episode started more than 1 year ago. The problem occurs occasionally. The problem has been unchanged. The symptoms are aggravated by certain foods. Pertinent negatives include no fatigue. She has tried a PPI for the symptoms. The treatment provided significant relief.   Asthma   There is no cough, shortness of breath or wheezing. This is a chronic problem. The current episode started more than 1 year ago. Asthma causes daytime symptoms monthly. Asthma causes nighttime symptoms monthly. The problem has been waxing and waning. Pertinent negatives include no chest pain, ear pain, fever, headaches, myalgias, rhinorrhea, sore throat or trouble swallowing. Associated symptoms comments: Cough, wheezing. Her past medical history is significant for asthma.       Review of Systems   Constitutional: Negative for activity change, fatigue, fever and unexpected weight change.   HENT: Negative for ear pain, hearing loss, rhinorrhea, sore throat and trouble swallowing.    Eyes: Negative for pain, discharge and visual disturbance.   Respiratory: Negative for cough, chest tightness, shortness of breath and wheezing.    Cardiovascular: Negative for chest pain and palpitations.   Gastrointestinal: Positive for constipation. Negative for abdominal  pain, blood in stool, diarrhea and vomiting.   Endocrine: Negative for polydipsia and polyuria.   Genitourinary: Negative for difficulty urinating, dysuria, hematuria and menstrual problem.   Musculoskeletal: Positive for neck pain. Negative for arthralgias, joint swelling and myalgias.   Skin: Negative for color change and rash.   Neurological: Negative for dizziness, weakness and headaches.   Psychiatric/Behavioral: Negative for confusion, dysphoric mood and sleep disturbance. The patient is not nervous/anxious.        Vitals:    02/15/19 1449   BP: 136/87   Pulse: 78   Temp: 98.3 °F (36.8 °C)       Objective:     Current Outpatient Medications   Medication Sig Dispense Refill    albuterol (PROAIR HFA) 90 mcg/actuation inhaler INHALE 1-2 PUFFS INTO THE LUNGS EVERY 6 HOURS AS NEEDED FOR WHEEZING 17 g 11    [START ON 4/11/2019] dextroamphetamine-amphetamine 30 mg Tab Take 1 tablet by mouth 2 (two) times daily. 60 tablet 0    fluticasone-salmeterol 250-50 mcg/dose (ADVAIR DISKUS) 250-50 mcg/dose diskus inhaler Use 1 inhalation into the  lungs two times daily 60 each 11    LINZESS 290 mcg Cap Take 1 capsule (290 mcg total) by mouth once daily. 30 capsule 6    meloxicam (MOBIC) 15 MG tablet Take 1 tablet (15 mg total) by mouth once daily. 90 tablet 3    omeprazole (PRILOSEC) 20 MG capsule Take 1 capsule by mouth  twice daily 180 capsule 3    tiotropium (SPIRIVA WITH HANDIHALER) 18 mcg inhalation capsule Inhale the contents of 1    capsule via HandiHaler once daily 30 capsule 11    [START ON 3/13/2019] dextroamphetamine-amphetamine (ADDERALL) 30 mg Tab Take 1 tablet by mouth 2 (two) times daily. 60 tablet 0    dextroamphetamine-amphetamine (ADDERALL) 30 mg Tab Take 30 mg by mouth 2 (two) times daily. 60 tablet 0     No current facility-administered medications for this visit.        Physical Exam   Constitutional: She is oriented to person, place, and time. She appears well-developed and well-nourished. No  distress.   HENT:   Head: Normocephalic and atraumatic.   Eyes: EOM are normal. Pupils are equal, round, and reactive to light.   Neck: Normal range of motion. Neck supple.   Cardiovascular: Normal rate and regular rhythm.   Pulmonary/Chest: Effort normal and breath sounds normal.   Musculoskeletal: Normal range of motion.   Neurological: She is alert and oriented to person, place, and time.   Skin: Skin is warm and dry. No rash noted.   Psychiatric: She has a normal mood and affect. Judgment normal.   Nursing note and vitals reviewed.      Assessment:       1. Moderate persistent asthma without complication    2. Constipation, unspecified constipation type    3. Gastroesophageal reflux disease, esophagitis presence not specified    4. Attention deficit        Plan:   Moderate persistent asthma without complication  -     tiotropium (SPIRIVA WITH HANDIHALER) 18 mcg inhalation capsule; Inhale the contents of 1    capsule via HandiHaler once daily  Dispense: 30 capsule; Refill: 11  -     albuterol (PROAIR HFA) 90 mcg/actuation inhaler; INHALE 1-2 PUFFS INTO THE LUNGS EVERY 6 HOURS AS NEEDED FOR WHEEZING  Dispense: 17 g; Refill: 11  -     fluticasone-salmeterol 250-50 mcg/dose (ADVAIR DISKUS) 250-50 mcg/dose diskus inhaler; Use 1 inhalation into the  lungs two times daily  Dispense: 60 each; Refill: 11    Constipation, unspecified constipation type  -     LINZESS 290 mcg Cap; Take 1 capsule (290 mcg total) by mouth once daily.  Dispense: 30 capsule; Refill: 6    Gastroesophageal reflux disease, esophagitis presence not specified  -     omeprazole (PRILOSEC) 20 MG capsule; Take 1 capsule by mouth  twice daily  Dispense: 180 capsule; Refill: 3    Attention deficit  -     dextroamphetamine-amphetamine 30 mg Tab; Take 1 tablet by mouth 2 (two) times daily.  Dispense: 60 tablet; Refill: 0    Other orders  -     dextroamphetamine-amphetamine (ADDERALL) 30 mg Tab; Take 1 tablet by mouth 2 (two) times daily.  Dispense: 60  tablet; Refill: 0  -     dextroamphetamine-amphetamine (ADDERALL) 30 mg Tab; Take 30 mg by mouth 2 (two) times daily.  Dispense: 60 tablet; Refill: 0        Follow-up in about 3 months (around 5/15/2019), or if symptoms worsen or fail to improve.    There are no Patient Instructions on file for this visit.

## 2019-04-30 ENCOUNTER — PATIENT OUTREACH (OUTPATIENT)
Dept: ADMINISTRATIVE | Facility: HOSPITAL | Age: 49
End: 2019-04-30

## 2019-04-30 NOTE — LETTER
April 30, 2019        We are seeing Luz Pelaez, 1970, at [facility]. Kb Mcgarry MD is their primary care physician. To help with our [employer] maintenance records could you please send the following:     Patient last Past Report performed by Dr. Naylor.    Please fax to [facility] at [phone], attention Yara Tavares LPN.    Thank-you in advance for your assistance. If you have any questions or concerns please contact me at [phone].     Yara Tavares LPN  Care Coordination Department  [facility]

## 2019-05-13 ENCOUNTER — OFFICE VISIT (OUTPATIENT)
Dept: FAMILY MEDICINE | Facility: CLINIC | Age: 49
End: 2019-05-13
Payer: COMMERCIAL

## 2019-05-13 VITALS
SYSTOLIC BLOOD PRESSURE: 137 MMHG | BODY MASS INDEX: 25.02 KG/M2 | HEIGHT: 65 IN | DIASTOLIC BLOOD PRESSURE: 83 MMHG | HEART RATE: 82 BPM | WEIGHT: 150.19 LBS | TEMPERATURE: 98 F

## 2019-05-13 DIAGNOSIS — R41.840 ATTENTION DEFICIT: Primary | ICD-10-CM

## 2019-05-13 PROCEDURE — 99213 OFFICE O/P EST LOW 20 MIN: CPT | Mod: S$GLB,,, | Performed by: NURSE PRACTITIONER

## 2019-05-13 PROCEDURE — 99213 PR OFFICE/OUTPT VISIT, EST, LEVL III, 20-29 MIN: ICD-10-PCS | Mod: S$GLB,,, | Performed by: NURSE PRACTITIONER

## 2019-05-13 PROCEDURE — 3008F PR BODY MASS INDEX (BMI) DOCUMENTED: ICD-10-PCS | Mod: CPTII,S$GLB,, | Performed by: NURSE PRACTITIONER

## 2019-05-13 PROCEDURE — 99999 PR PBB SHADOW E&M-EST. PATIENT-LVL III: CPT | Mod: PBBFAC,,, | Performed by: NURSE PRACTITIONER

## 2019-05-13 PROCEDURE — 99999 PR PBB SHADOW E&M-EST. PATIENT-LVL III: ICD-10-PCS | Mod: PBBFAC,,, | Performed by: NURSE PRACTITIONER

## 2019-05-13 PROCEDURE — 3008F BODY MASS INDEX DOCD: CPT | Mod: CPTII,S$GLB,, | Performed by: NURSE PRACTITIONER

## 2019-05-13 RX ORDER — MUPIROCIN 20 MG/G
OINTMENT TOPICAL
Refills: 0 | COMMUNITY
Start: 2019-04-29 | End: 2019-09-19

## 2019-05-13 RX ORDER — DEXTROAMPHETAMINE SACCHARATE, AMPHETAMINE ASPARTATE, DEXTROAMPHETAMINE SULFATE AND AMPHETAMINE SULFATE 7.5; 7.5; 7.5; 7.5 MG/1; MG/1; MG/1; MG/1
1 TABLET ORAL 2 TIMES DAILY
Qty: 60 TABLET | Refills: 0 | Status: SHIPPED | OUTPATIENT
Start: 2019-05-13 | End: 2019-08-12 | Stop reason: SDUPTHER

## 2019-05-13 RX ORDER — DEXTROAMPHETAMINE SACCHARATE, AMPHETAMINE ASPARTATE, DEXTROAMPHETAMINE SULFATE AND AMPHETAMINE SULFATE 7.5; 7.5; 7.5; 7.5 MG/1; MG/1; MG/1; MG/1
30 TABLET ORAL 2 TIMES DAILY
Qty: 60 TABLET | Refills: 0 | Status: SHIPPED | OUTPATIENT
Start: 2019-07-12 | End: 2019-08-12 | Stop reason: SDUPTHER

## 2019-05-13 RX ORDER — DEXTROAMPHETAMINE SACCHARATE, AMPHETAMINE ASPARTATE, DEXTROAMPHETAMINE SULFATE AND AMPHETAMINE SULFATE 7.5; 7.5; 7.5; 7.5 MG/1; MG/1; MG/1; MG/1
1 TABLET ORAL 2 TIMES DAILY
Qty: 60 TABLET | Refills: 0 | Status: SHIPPED | OUTPATIENT
Start: 2019-06-13 | End: 2019-08-12 | Stop reason: SDUPTHER

## 2019-05-13 NOTE — PROGRESS NOTES
Subjective:       Patient ID: Luz Pelaez is a 48 y.o. female.    Chief Complaint: medication refills    Medication Refill   This is a chronic (Adderall) problem. The current episode started more than 1 year ago. The problem occurs daily. The problem has been unchanged. Associated symptoms include neck pain. Pertinent negatives include no abdominal pain, arthralgias, chest pain, coughing, fatigue, fever, headaches, joint swelling, myalgias, rash, sore throat, vomiting or weakness. The treatment provided significant relief.       Review of Systems   Constitutional: Negative for activity change, fatigue, fever and unexpected weight change.   HENT: Negative for ear pain, hearing loss, rhinorrhea, sore throat and trouble swallowing.    Eyes: Negative for pain, discharge and visual disturbance.   Respiratory: Negative for cough, chest tightness, shortness of breath and wheezing.    Cardiovascular: Negative for chest pain and palpitations.   Gastrointestinal: Negative for abdominal pain, blood in stool, constipation, diarrhea and vomiting.   Endocrine: Negative for polydipsia and polyuria.   Genitourinary: Negative for difficulty urinating, dysuria, hematuria and menstrual problem.   Musculoskeletal: Positive for neck pain. Negative for arthralgias, joint swelling and myalgias.   Skin: Negative for color change and rash.   Neurological: Negative for dizziness, weakness and headaches.   Psychiatric/Behavioral: Negative for confusion, dysphoric mood and sleep disturbance. The patient is not nervous/anxious.        Vitals:    05/13/19 0757   BP: 137/83   Pulse: 82   Temp: 97.9 °F (36.6 °C)       Objective:     Current Outpatient Medications   Medication Sig Dispense Refill    albuterol (PROAIR HFA) 90 mcg/actuation inhaler INHALE 1-2 PUFFS INTO THE LUNGS EVERY 6 HOURS AS NEEDED FOR WHEEZING 17 g 11    [START ON 7/12/2019] dextroamphetamine-amphetamine (ADDERALL) 30 mg Tab Take 30 mg by mouth 2 (two) times daily. 60  tablet 0    [START ON 6/13/2019] dextroamphetamine-amphetamine (ADDERALL) 30 mg Tab Take 1 tablet by mouth 2 (two) times daily. 60 tablet 0    fluticasone-salmeterol 250-50 mcg/dose (ADVAIR DISKUS) 250-50 mcg/dose diskus inhaler Use 1 inhalation into the  lungs two times daily 60 each 11    LINZESS 290 mcg Cap Take 1 capsule (290 mcg total) by mouth once daily. 30 capsule 6    meloxicam (MOBIC) 15 MG tablet Take 1 tablet (15 mg total) by mouth once daily. 90 tablet 3    mupirocin (BACTROBAN) 2 % ointment APPLY TOPICALLY 3 (THREE) TIMES DAILY FOR 7 DAYS  0    omeprazole (PRILOSEC) 20 MG capsule Take 1 capsule by mouth  twice daily 180 capsule 3    tiotropium (SPIRIVA WITH HANDIHALER) 18 mcg inhalation capsule Inhale the contents of 1    capsule via HandiHaler once daily 30 capsule 11    dextroamphetamine-amphetamine (ADDERALL) 30 mg Tab Take 1 tablet by mouth 2 (two) times daily. 60 tablet 0     No current facility-administered medications for this visit.        Physical Exam   Constitutional: She is oriented to person, place, and time. She appears well-developed and well-nourished. No distress.   HENT:   Head: Normocephalic and atraumatic.   Eyes: Pupils are equal, round, and reactive to light. EOM are normal.   Neck: Normal range of motion. Neck supple.   Cardiovascular: Normal rate and regular rhythm.   Pulmonary/Chest: Effort normal and breath sounds normal.   Musculoskeletal: Normal range of motion.   Neurological: She is alert and oriented to person, place, and time.   Skin: Skin is warm and dry. No rash noted.   Psychiatric: She has a normal mood and affect. Judgment normal.   Nursing note and vitals reviewed.      Assessment:       1. Attention deficit        Plan:   Attention deficit    Other orders  -     dextroamphetamine-amphetamine (ADDERALL) 30 mg Tab; Take 30 mg by mouth 2 (two) times daily.  Dispense: 60 tablet; Refill: 0  -     dextroamphetamine-amphetamine (ADDERALL) 30 mg Tab; Take 1 tablet  by mouth 2 (two) times daily.  Dispense: 60 tablet; Refill: 0  -     dextroamphetamine-amphetamine (ADDERALL) 30 mg Tab; Take 1 tablet by mouth 2 (two) times daily.  Dispense: 60 tablet; Refill: 0        Follow up in about 3 months (around 8/13/2019), or if symptoms worsen or fail to improve.    There are no Patient Instructions on file for this visit.

## 2019-06-13 ENCOUNTER — PATIENT OUTREACH (OUTPATIENT)
Dept: ADMINISTRATIVE | Facility: HOSPITAL | Age: 49
End: 2019-06-13

## 2019-08-12 ENCOUNTER — HOSPITAL ENCOUNTER (OUTPATIENT)
Dept: RADIOLOGY | Facility: HOSPITAL | Age: 49
Discharge: HOME | End: 2019-08-12
Attending: NURSE PRACTITIONER
Payer: MEDICAID

## 2019-08-12 ENCOUNTER — OFFICE VISIT (OUTPATIENT)
Dept: FAMILY MEDICINE | Facility: CLINIC | Age: 49
End: 2019-08-12
Payer: MEDICAID

## 2019-08-12 VITALS
HEIGHT: 65 IN | HEART RATE: 84 BPM | WEIGHT: 150.19 LBS | SYSTOLIC BLOOD PRESSURE: 136 MMHG | DIASTOLIC BLOOD PRESSURE: 86 MMHG | BODY MASS INDEX: 25.02 KG/M2 | TEMPERATURE: 98 F

## 2019-08-12 DIAGNOSIS — R53.83 FATIGUE, UNSPECIFIED TYPE: Primary | ICD-10-CM

## 2019-08-12 DIAGNOSIS — M25.541 ARTHRALGIA OF BOTH HANDS: ICD-10-CM

## 2019-08-12 DIAGNOSIS — M54.2 NECK PAIN: ICD-10-CM

## 2019-08-12 DIAGNOSIS — R20.0 BILATERAL HAND NUMBNESS: ICD-10-CM

## 2019-08-12 DIAGNOSIS — M25.551 RIGHT HIP PAIN: ICD-10-CM

## 2019-08-12 DIAGNOSIS — M25.542 ARTHRALGIA OF BOTH HANDS: ICD-10-CM

## 2019-08-12 PROCEDURE — 73502 X-RAY EXAM HIP UNI 2-3 VIEWS: CPT | Mod: TC,PO,RT

## 2019-08-12 PROCEDURE — 99999 PR PBB SHADOW E&M-EST. PATIENT-LVL IV: CPT | Mod: PBBFAC,,, | Performed by: NURSE PRACTITIONER

## 2019-08-12 PROCEDURE — 99214 OFFICE O/P EST MOD 30 MIN: CPT | Mod: PBBFAC,25,PO | Performed by: NURSE PRACTITIONER

## 2019-08-12 PROCEDURE — 99214 OFFICE O/P EST MOD 30 MIN: CPT | Mod: S$PBB,,, | Performed by: NURSE PRACTITIONER

## 2019-08-12 PROCEDURE — 99214 PR OFFICE/OUTPT VISIT, EST, LEVL IV, 30-39 MIN: ICD-10-PCS | Mod: S$PBB,,, | Performed by: NURSE PRACTITIONER

## 2019-08-12 PROCEDURE — 99999 PR PBB SHADOW E&M-EST. PATIENT-LVL IV: ICD-10-PCS | Mod: PBBFAC,,, | Performed by: NURSE PRACTITIONER

## 2019-08-12 PROCEDURE — 73502 X-RAY EXAM HIP UNI 2-3 VIEWS: CPT | Mod: 26,RT,, | Performed by: RADIOLOGY

## 2019-08-12 PROCEDURE — 73502 XR HIP 2 VIEW RIGHT: ICD-10-PCS | Mod: 26,RT,, | Performed by: RADIOLOGY

## 2019-08-12 RX ORDER — DEXTROAMPHETAMINE SACCHARATE, AMPHETAMINE ASPARTATE, DEXTROAMPHETAMINE SULFATE AND AMPHETAMINE SULFATE 7.5; 7.5; 7.5; 7.5 MG/1; MG/1; MG/1; MG/1
1 TABLET ORAL 2 TIMES DAILY
Qty: 60 TABLET | Refills: 0 | Status: SHIPPED | OUTPATIENT
Start: 2019-08-12 | End: 2019-09-19 | Stop reason: SDUPTHER

## 2019-08-12 RX ORDER — DEXTROAMPHETAMINE SACCHARATE, AMPHETAMINE ASPARTATE, DEXTROAMPHETAMINE SULFATE AND AMPHETAMINE SULFATE 7.5; 7.5; 7.5; 7.5 MG/1; MG/1; MG/1; MG/1
30 TABLET ORAL 2 TIMES DAILY
Qty: 60 TABLET | Refills: 0 | Status: SHIPPED | OUTPATIENT
Start: 2019-09-12 | End: 2019-09-19 | Stop reason: SDUPTHER

## 2019-08-12 RX ORDER — DEXTROAMPHETAMINE SACCHARATE, AMPHETAMINE ASPARTATE, DEXTROAMPHETAMINE SULFATE AND AMPHETAMINE SULFATE 7.5; 7.5; 7.5; 7.5 MG/1; MG/1; MG/1; MG/1
1 TABLET ORAL 2 TIMES DAILY
Qty: 60 TABLET | Refills: 0 | Status: SHIPPED | OUTPATIENT
Start: 2019-10-12 | End: 2019-11-13 | Stop reason: SDUPTHER

## 2019-08-17 ENCOUNTER — HOSPITAL ENCOUNTER (OUTPATIENT)
Dept: RADIOLOGY | Facility: HOSPITAL | Age: 49
Discharge: HOME | End: 2019-08-17
Attending: NURSE PRACTITIONER
Payer: MEDICAID

## 2019-08-17 DIAGNOSIS — M54.2 NECK PAIN: ICD-10-CM

## 2019-08-17 PROCEDURE — 72141 MRI NECK SPINE W/O DYE: CPT | Mod: 26,,, | Performed by: RADIOLOGY

## 2019-08-17 PROCEDURE — 72141 MRI NECK SPINE W/O DYE: CPT | Mod: TC,PO

## 2019-08-17 PROCEDURE — 72141 MRI CERVICAL SPINE WITHOUT CONTRAST: ICD-10-PCS | Mod: 26,,, | Performed by: RADIOLOGY

## 2019-08-21 ENCOUNTER — TELEPHONE (OUTPATIENT)
Dept: FAMILY MEDICINE | Facility: CLINIC | Age: 49
End: 2019-08-21

## 2019-08-21 DIAGNOSIS — M50.30 DEGENERATIVE CERVICAL DISC: Primary | ICD-10-CM

## 2019-08-21 NOTE — TELEPHONE ENCOUNTER
----- Message from Liv Faustin sent at 8/21/2019  9:09 AM CDT -----  Contact: Patient  Type:  Needs Medical Advice    Who Called: Patient  Symptoms (please be specific):    How long has patient had these symptoms:    Pharmacy name and phone #:    Would the patient rather a call back or a response via [facility]? call  Best Call Back Number: [phone]  Additional Information: Patient would like to speak to someone regarding her MRI results.

## 2019-08-21 NOTE — TELEPHONE ENCOUNTER
Details     Reading Physician Reading Date Result Priority   Chuck Urena MD 8/18/2019 Routine      Narrative     EXAMINATION:  MRI CERVICAL SPINE WITHOUT CONTRAST    CLINICAL HISTORY:  Neck pain, prior xray, abn neuro exam;.  Cervicalgia    TECHNIQUE:  Multiplanar, multisequence MR images of the cervical spine were acquired without the administration of contrast.    COMPARISON:  03/01/2018    FINDINGS:  No spondylolisthesis.  No marrow replacement.  No acute fracture with preserved vertebral body heights.  Cervical cord normal in contour and signal.  Multilevel disc desiccation.  Mild disc height loss at C4-5.    At C2-3 there is left facet degenerative change.    At C3-4 there is right uncovertebral spur and facet degenerative change contributing to severe neural foraminal narrowing.    At C4-5 there is posterior disc osteophyte complex asymmetric to the right and right uncovertebral spur as well as right facet degenerative change.  Severe right neural foraminal narrowing.      Impression       1. No malalignment.  2. Degenerative change at several upper cervical levels contributing to severe neural foraminal narrowing on the right at C3-4 and C4-5.  No high-grade spinal canal stenosis.      Electronically signed by: Chuck Urena  Date: 08/18/2019  Time: 22:48          I am responding to the patient's request for a report that was ordered by Danitza hudson performed on October 17, 2019.    The MRI is showing arthritis contributing to a narrowing of the nerves exiting the spine at the C3-C4 and C4-5 levels.  Because of this, the patient needs an examination by a neurosurgeon.  Please have her seen by  at [facility] in [place].  I have placed orders.

## 2019-08-23 ENCOUNTER — PATIENT MESSAGE (OUTPATIENT)
Dept: FAMILY MEDICINE | Facility: CLINIC | Age: 49
End: 2019-08-23

## 2019-08-27 ENCOUNTER — OFFICE VISIT (OUTPATIENT)
Dept: NEUROSURGERY | Facility: CLINIC | Age: 49
End: 2019-08-27
Payer: MEDICAID

## 2019-08-27 VITALS
HEART RATE: 72 BPM | RESPIRATION RATE: 18 BRPM | SYSTOLIC BLOOD PRESSURE: 154 MMHG | HEIGHT: 65 IN | WEIGHT: 151.25 LBS | BODY MASS INDEX: 25.2 KG/M2 | DIASTOLIC BLOOD PRESSURE: 94 MMHG

## 2019-08-27 DIAGNOSIS — M50.30 DEGENERATIVE DISC DISEASE, CERVICAL: ICD-10-CM

## 2019-08-27 DIAGNOSIS — G56.03 CARPAL TUNNEL SYNDROME ON BOTH SIDES: ICD-10-CM

## 2019-08-27 DIAGNOSIS — R93.7 ABNORMAL X-RAY OF CERVICAL SPINE: Primary | ICD-10-CM

## 2019-08-27 PROCEDURE — 99999 PR PBB SHADOW E&M-EST. PATIENT-LVL III: ICD-10-PCS | Mod: PBBFAC,,, | Performed by: NEUROLOGICAL SURGERY

## 2019-08-27 PROCEDURE — 99204 OFFICE O/P NEW MOD 45 MIN: CPT | Mod: S$PBB,,, | Performed by: NEUROLOGICAL SURGERY

## 2019-08-27 PROCEDURE — 99999 PR PBB SHADOW E&M-EST. PATIENT-LVL III: CPT | Mod: PBBFAC,,, | Performed by: NEUROLOGICAL SURGERY

## 2019-08-27 PROCEDURE — 99204 PR OFFICE/OUTPT VISIT, NEW, LEVL IV, 45-59 MIN: ICD-10-PCS | Mod: S$PBB,,, | Performed by: NEUROLOGICAL SURGERY

## 2019-08-27 PROCEDURE — 99213 OFFICE O/P EST LOW 20 MIN: CPT | Mod: PBBFAC | Performed by: NEUROLOGICAL SURGERY

## 2019-08-27 NOTE — LETTER
August 27, 2019      Kb Mcgarry MD  [address]  [place] [address]           [facility]  [address] [place] [address]  Phone: [phone]  Fax: [phone]          Patient: Luz Pelaez   MR Number: [id number]   YOB: 1970   Date of Visit: 8/27/2019       Dear Dr. Kb Mcgarry:    Thank you for referring Luz Pelaez to me for evaluation. Attached you will find relevant portions of my assessment and plan of care.    If you have questions, please do not hesitate to call me. I look forward to following Luz Pelaez along with you.    Sincerely,    Renny Kelley MD    Enclosure  CC:  No Recipients    If you would like to receive this communication electronically, please contact [email or web address] or [phone] to request more information on [employer] Link access.    For providers and/or their staff who would like to refer a patient to [facility], please contact us through our one-stop-shop provider referral line, [facility] , at [phone].    If you feel you have received this communication in error or would no longer like to receive these types of communications, please e-mail [email or web address]

## 2019-08-27 NOTE — PROGRESS NOTES
Subjective:      Patient ID: Luz Pelaez is a 49 y.o. female.    Chief Complaint: Cervical Spine Pain (C-spine) (Degenerative cervical )    Patient is here today for evaluation of neck and upper extremity pain numbness and tingling    She has had the symptoms for a number of months now getting worse in severity.  Right side is worse than the left.  She states that she has pain from the elbow in particular going into the hands.  Numbness and tingling into bilateral hands.  This occasionally will wake her up from sleep.  Symptoms are worse with activity and better with rest.  She has been taking Mobic as needed for symptom relief  No history of Pain Management  No prior physical therapy  She denies any weakness as well as any bowel or bladder symptoms       Review of Systems   Constitutional: Negative for activity change, appetite change and chills.   HENT: Negative for congestion and ear pain.    Eyes: Negative for photophobia, redness and visual disturbance.   Respiratory: Negative for apnea and chest tightness.    Cardiovascular: Negative for chest pain.   Gastrointestinal: Negative for abdominal distention and abdominal pain.   Endocrine: Negative for cold intolerance.   Genitourinary: Negative for difficulty urinating.   Musculoskeletal: Positive for myalgias, neck pain and neck stiffness. Negative for arthralgias.   Skin: Negative for color change.   Allergic/Immunologic: Negative for environmental allergies.   Neurological: Positive for weakness and numbness. Negative for dizziness.   Hematological: Negative for adenopathy. Does not bruise/bleed easily.   Psychiatric/Behavioral: Negative for agitation, behavioral problems and confusion.         Objective:       Neurosurgery Physical Exam  Ortho/SPM Exam    Nursing note and vitals reviewed  Gen:Oriented to person, place, and time.             Appears stated age   Head:Normocephalic and atraumatic.  Nose: Nose normal.    Eyes: EOM are normal. Pupils are equal,  round, and reactive to light.   Neck: Neck supple. No masses or lesions palpated  Cardiovascular: Intact distal pulses.    Abdominal: Soft.   Neurological: Alert and oriented to person, place, and time.  No cranial nerve deficit.  Coordination normal. Normal muscle tone  Psychiatric: Normal mood and affect. Behavior is normal.    Neck:   Tenderness bilaterally Paraspinal tenderness    No spasms noted Paraspinal muscle spasms    None Pain with flexion and extention    Full range of motion noted Range of motion    Neg  Spurling's sign     Motor   Right Right Left Left  Level Group   5  5  C5 Deltoid   5  5  C6 Bicep   5  5   Wrist extension    5  5  C7 Triceps   5  5   Wrist flexion   5  5  C8    5  5  T1 Interossei    Sensation  NL Decreased (R/L/BL) Level Sensation    X  C5 Lateral upper arm   X  C6 Thumb and index finger, lat forearm   X  C7 Middle finger   X  C8 Ring and little finger   X  T1 Medial arm      Reflex  2+  Bicep tendon   2+  Brachioradialis   2+  Triceps tendon   Not present  Barney's   none  Clonus    Present bilaterally Tinel's     I  reviewed all pertinent imaging regarding this case.    MRI cervical spine  1. No malalignment.  2. Degenerative change at several upper cervical levels contributing to severe neural foraminal narrowing on the right at C3-4 and C4-5.  No high-grade spinal canal stenosis.  Assessment:     1. Abnormal x-ray of cervical spine    2. Degenerative disc disease, cervical    3. Carpal tunnel syndrome on both sides      Plan:     Abnormal x-ray of cervical spine  -     CT Cervical Spine Without Contrast; Future; Expected date: 08/27/2019  -     EMG W/ ULTRASOUND AND NERVE CONDUCTION TEST 2 Extremities; Future    Degenerative disc disease, cervical    Carpal tunnel syndrome on both sides     Patient is primarily complaining of symptoms into her hands bilaterally.  She has some mild degenerative changes in the cervical spine however no major central stenosis or foraminal  compromise is noted.  Her symptoms seem to be more consistent with a peripheral neuropathy.  Will order EMG nerve conduction study of the bilateral upper extremities for evaluation.  In addition to this we will order a CT scan to make sure there is no bony foraminal stenosis noted which could be causing the symptoms.  We will see her back after the imaging is complete as well as EMG nerve conduction study to see if there is any other therapies or treatments warranted      Thank you for the referral   Please call with any questions    Renny Kelley MD  Neurosurgery

## 2019-09-05 ENCOUNTER — HOSPITAL ENCOUNTER (OUTPATIENT)
Dept: RADIOLOGY | Facility: HOSPITAL | Age: 49
Discharge: HOME | End: 2019-09-05
Attending: NEUROLOGICAL SURGERY
Payer: MEDICAID

## 2019-09-05 ENCOUNTER — OFFICE VISIT (OUTPATIENT)
Dept: PHYSICAL MEDICINE AND REHAB | Facility: CLINIC | Age: 49
End: 2019-09-05
Payer: MEDICAID

## 2019-09-05 VITALS
WEIGHT: 151 LBS | HEIGHT: 65 IN | DIASTOLIC BLOOD PRESSURE: 88 MMHG | RESPIRATION RATE: 14 BRPM | HEART RATE: 85 BPM | BODY MASS INDEX: 25.16 KG/M2 | SYSTOLIC BLOOD PRESSURE: 136 MMHG

## 2019-09-05 DIAGNOSIS — R93.7 ABNORMAL X-RAY OF CERVICAL SPINE: ICD-10-CM

## 2019-09-05 DIAGNOSIS — G56.03 BILATERAL CARPAL TUNNEL SYNDROME: ICD-10-CM

## 2019-09-05 PROCEDURE — 99213 OFFICE O/P EST LOW 20 MIN: CPT | Mod: PBBFAC,25 | Performed by: PHYSICAL MEDICINE & REHABILITATION

## 2019-09-05 PROCEDURE — 72125 CT CERVICAL SPINE WITHOUT CONTRAST: ICD-10-PCS | Mod: 26,,, | Performed by: RADIOLOGY

## 2019-09-05 PROCEDURE — 99204 PR OFFICE/OUTPT VISIT, NEW, LEVL IV, 45-59 MIN: ICD-10-PCS | Mod: 25,S$PBB,, | Performed by: PHYSICAL MEDICINE & REHABILITATION

## 2019-09-05 PROCEDURE — 99204 OFFICE O/P NEW MOD 45 MIN: CPT | Mod: 25,S$PBB,, | Performed by: PHYSICAL MEDICINE & REHABILITATION

## 2019-09-05 PROCEDURE — 99999 PR PBB SHADOW E&M-EST. PATIENT-LVL III: ICD-10-PCS | Mod: PBBFAC,,, | Performed by: PHYSICAL MEDICINE & REHABILITATION

## 2019-09-05 PROCEDURE — 95911 NRV CNDJ TEST 9-10 STUDIES: CPT | Mod: 26,S$PBB,, | Performed by: PHYSICAL MEDICINE & REHABILITATION

## 2019-09-05 PROCEDURE — 72125 CT NECK SPINE W/O DYE: CPT | Mod: TC

## 2019-09-05 PROCEDURE — 95911 NRV CNDJ TEST 9-10 STUDIES: CPT | Mod: PBBFAC | Performed by: PHYSICAL MEDICINE & REHABILITATION

## 2019-09-05 PROCEDURE — 95911 PR NERVE CONDUCTION STUDY; 9-10 STUDIES: ICD-10-PCS | Mod: 26,S$PBB,, | Performed by: PHYSICAL MEDICINE & REHABILITATION

## 2019-09-05 PROCEDURE — 72125 CT NECK SPINE W/O DYE: CPT | Mod: 26,,, | Performed by: RADIOLOGY

## 2019-09-05 PROCEDURE — 99999 PR PBB SHADOW E&M-EST. PATIENT-LVL III: CPT | Mod: PBBFAC,,, | Performed by: PHYSICAL MEDICINE & REHABILITATION

## 2019-09-05 NOTE — LETTER
September 5, 2019      Renny Kelley MD  [address] [facility] [address]           [facility]  [address] [facility] [address]  Phone: [phone]  Fax: [phone]          Patient: Luz Pelaez   MR Number: [id number]   YOB: 1970   Date of Visit: 9/5/2019       Dear Dr. Renny Kelley:    Thank you for referring Luz Pelaez to me for evaluation. Attached you will find relevant portions of my assessment and plan of care.    If you have questions, please do not hesitate to call me. I look forward to following Luz Pelaez along with you.    Sincerely,    Mayra Kimbrough MD    Enclosure  CC:  No Recipients    If you would like to receive this communication electronically, please contact [email or web address] or [phone] to request more information on [employer] access.    For providers and/or their staff who would like to refer a patient to [facility], please contact us through our one-stop-shop provider referral line, [facility], at [phone].    If you feel you have received this communication in error or would no longer like to receive these types of communications, please e-mail [email or web address]

## 2019-09-05 NOTE — PROGRESS NOTES
[facility]   [address]  [place] [address]  Phone: [phone]        Full Name: miya bach Patient ID: [id number]      Visit Date: 9/5/2019 14:29  Examining Physician: Mayra Kimbrough M.D.  Referring Physician:   Reason for Referral: ue pain          Chief Complaint   Patient presents with    Hand Pain     numbness    Neck Pain       HPI: This is a 49 y.o.  female being seen in clinic today for evaluation of chronic achy neck pain with hand numbness/tingling.  Her symptoms have gotten worse over the past year-daily symptoms and now waking her up at night.  She has noticed weakness of  strength and difficulty with fine motor activity.  Resting her hands provide minimal relief.     History obtained from patient    Past family, medical, social, and surgical history reviewed in chart    Review of Systems:     General- denies lethargy, weight change, fever, chills  Head/neck- denies swallowing difficulties  ENT- denies hearing changes  Cardiovascular-denies chest pain  Pulmonary- denies shortness of breath  GI- denies constipation or bowel incontinence  - denies bladder incontinence  Skin- denies wounds or rashes  Musculoskeletal- +weakness, +pain  Neurologic-+ numbness and tingling  Psychiatric- denies depressive or psychotic features, denies anxiety  Lymphatic-denies swelling  Endocrine- denies hypoglycemic symptoms/DM history  All other pertinent systems negative     Physical Examination:  General: Well developed, well nourished female, NAD  HEENT:NCAT EOMI bilaterally   Pulmonary:Normal respirations    Spinal Examination: CERVICAL  Active ROM is within normal limits.  Inspection: No deformity of spinal alignment.  Palpation: No vertebral tenderness to percussion.  Tight and tender bands at bilateral trapezius     Spinal Examination: LUMBAR or THORACIC  Active ROM is within normal limits.  Inspection: No deformity of spinal alignment.      Musculoskeletal Tests:  Phalen:  neg  Elbow compression (ulnar): neg  Tinels at wrist: +right    Bilateral Upper and Lower Extremities:  Pulses are 2+ at radial bilaterally.  Shoulder/Elbow/Wrist/Hand ROM wnl  Hip/Knee/Ankle ROM   Bilateral Extremities show normal capillary refill.  No signs of cyanosis, rubor, edema, skin changes, or dysvascular changes of appendages.  Nails appear intact.    Neurological Exam:  Cranial Nerves:  II-XII grossly intact    Manual Muscle Testing: (Motor 5=normal)  5/5 strength bilateral upper extremities    No focal atrophy is noted of either upper extremity.    Bilateral Reflexes:1+bic tric br  Barney's response is absent bilaterally.    Sensation: tested to light touch  - intact in arms except mild dec at right 1st-3rd digits  Gait: Narrow base and good arm swing.      Entire procedure explained to patient prior to proceeding.  Verbal consent obtained    SNC      Nerve / Sites Rec. Site Onset Lat Peak Lat Amp Segments Distance Velocity     ms ms µV  mm m/s   R Median - Digit II (Antidromic)      Wrist Dig II 4.5 5.8 19.4 Wrist - Dig  31   L Median - Digit II (Antidromic)      Wrist Dig II 4.9 6.7 16.4 Wrist - Dig  28   R Ulnar - Digit V (Antidromic)      Wrist Dig V 2.9 3.7 36.0 Wrist - Dig V 140 49   L Ulnar - Digit V (Antidromic)      Wrist Dig V 2.8 3.6 21.1 Wrist - Dig V 140 51   R Radial - Anatomical snuff box (Forearm)      Forearm Wrist 2.0 2.6 11.5 Forearm - Wrist 100 51   L Radial - Anatomical snuff box (Forearm)      Forearm Wrist 1.8 2.4 12.7 Forearm - Wrist 100 55       MNC      Nerve / Sites Muscle Latency Amplitude Duration Rel Amp Segments Distance Lat Diff Velocity     ms mV ms %  mm ms m/s   R Median - APB      Wrist APB 6.1 7.2 7.2 100 Wrist - APB 80        Elbow APB 10.6 7.1 7.8 97.9 Elbow - Wrist 210 4.5 47   L Median - APB      Wrist APB 6.0 7.0 7.9 100 Wrist - APB 80        Elbow APB 10.4 7.0 8.2 101 Elbow - Wrist 220 4.3 51   R Ulnar - ADM      Wrist ADM 3.0 6.8 7.2 100 Wrist -  ADM 80        B.Elbow ADM 6.4 6.7 7.2 97.5 B.Elbow - Wrist 220 3.4 64      A.Elbow ADM 8.5 6.5 7.7 96.7 A.Elbow - B.Elbow 120 2.1 56         A.Elbow - Wrist  5.6    L Ulnar - ADM      Wrist ADM 3.1 7.8 6.8 100 Wrist - ADM 80        B.Elbow ADM 6.9 7.9 6.8 102 B.Elbow - Wrist 210 3.8 55      A.Elbow ADM 8.8 7.7 7.4 96.5 A.Elbow - B.Elbow 100 1.9 52         A.Elbow - Wrist  5.7                                   INTERPRETATION  -Bilateral median motor nerve conduction study showed prolonged latency, normal amplitude, and conduction velocity  -Bilateral median sensory nerve conduction study showed prolonged peak latency and amplitude  -Bilateral ulnar motor nerve conduction study showed normal latency, amplitude, and conduction velocity  -Bilateral ulnar sensory nerve conduction study showed normal peak latency and amplitude  -Bilateral radial sensory nerve conduction study showed normal peak latency and amplitude      IMPRESSION  1. ABNORMAL study  2. There is electrodiagnostic evidence of a MODERATE demyelinating median neuropathy (Carpal tunnel syndrome) across BILATERAL wrists     PLAN  1. Follow up with referring provider: Dr. Renny Kelley  2. Handouts on CTS provided. Rec CTS release eval for both wrists.  3. This study is good for one year. If symptoms worsen or do not improve, please re-consult.    Mayra Kimbrough M.D.  Physical Medicine and Rehab

## 2019-09-05 NOTE — PATIENT INSTRUCTIONS
Carpal Tunnel Syndrome    Carpal tunnel syndrome is a painful condition of the wrist and arm. It is caused by pressure on the median nerve.  The median nerve is one of the nerves that give feeling and movement to the hand. It passes through a tunnel in the wrist called the carpal tunnel. This tunnel is made up of bones and ligaments. Narrowing of this tunnel or swelling of the tissues inside the tunnel puts pressure on the median nerve. This causes numbness, pins and needles, or electric shooting pains in your hand and forearm. Often the pain is worse at night and may wake you when you are asleep.  Carpal tunnel syndrome may occur during pregnancy and with use of birth control pills. It is more common in workers who must often bend their wrists. It is also common in people who work with power tools that cause strong vibrations.  Home care  · Rest the painful wrist. Avoid repeated bending of the wrist back and forth. This puts pressure on the median nerve. Avoid using power tools with strong vibrations.  · If you were given a splint, wear it at night while you sleep. You may also wear it during the day for comfort.  · Move your fingers and wrists often to avoid stiffness.  · Elevate your arms on pillows when you lie down.  · Try using the unaffected hand more.  · Try not to hold your wrists in a bent, downward position.  · Sometimes changes in the work place may ease symptoms. If you type most of the day, it may help to change the position of your keyboard or add a wrist support. Your wrist should be in a neutral position and not bent back when typing.  · You may use over-the-counter pain medicine to treat pain and inflammation, unless another medicine was prescribed. Anti-inflammatory pain medicines, such as ibuprofen or naproxen may be more effective than acetaminophen, which treats pain, but not inflammation. If you have chronic liver or kidney disease or ever had a stomach ulcer or GI bleeding, talk with your  doctor before using these medicines.  · Opioid pain medicine will only give temporary relief and does not treat the problem. If pain continues, you may need a shot of a steroid drug into your wrist.  · If the above methods fail, you may need surgery. This will open the carpal tunnel and release the pressure on the trapped nerve.  Follow-up care  Follow up with your healthcare provider, or as advised, if the pain doesnt begin to improve within the next week.  If X-rays were taken, you will be notified of any new findings that may affect your care.  When to seek medical advice  Call your healthcare provider right away if any of these occur:  · Pain not improving with the above treatment  · Fingers or hand become cold, blue, numb, or tingly  · Your whole arm becomes swollen or weak  Date Last Reviewed: 11/23/2015  © 8411-2402 [employer]. [address] [place] [address]. All rights reserved. This information is not intended as a substitute for professional medical care. Always follow your healthcare professional's instructions.        Understanding Carpal Tunnel Syndrome    The carpal tunnel is a narrow space inside the wrist. It is ringed by bone and a band of tough tissue called the transverse carpal ligament. A major nerve called the median nerve runs from the forearm into the hand through the carpal tunnel. Tendons also run through the carpal tunnel.  With carpal tunnel syndrome, the tendons or nearby tissues within the carpal tunnel may swell or thicken. Or the transverse carpal ligament may harden and shorten. This narrows the space in the carpal tunnel and puts pressure on the median nerve. This pressure leads to tingling and numbness of the hand and wrist. In time, the condition can make even simple tasks hard to do.  What causes carpal tunnel syndrome?  Doctors arent entirely clear why the condition occurs. Certain things may make a person more likely to have it. These  include:  · Being female  · Being pregnant  · Being overweight  · Having diabetes or rheumatoid arthritis  Symptoms of carpal tunnel syndrome  Symptoms often come and go. At first, symptoms may occur mainly at night. Later, they may be noticed during the day as well. They may get worse with activities such as driving, reading, typing, or holding a phone. Symptoms can include:  · Tingling and numbness in the hand or wrist  · Sharp pain that shoots up the arm or down to the fingers  · Hand stiffness or cramping, especially in the morning  · Trouble making a fist  · Hand weakness and clumsiness  Treatment for carpal tunnel syndrome  Certain treatments help reduce the pressure on the median nerve and relieve symptoms. Choices for treatment may include one or more of the following:  · Wrist splint. This involves wearing a special brace on the wrist and hand. The splint holds the wrist straight, in a neutral position. This helps keep the carpal tunnel as open as possible.  · Cortisone shots. Cortisone is a medicine that helps reduce swelling. It is injected directly into the wrist. It helps shrink tissues inside the carpal tunnel. This relieves symptoms for a time.  · Pain medicines. You may take over-the-counter or prescription medicines to help reduce swelling and relieve symptoms.  · Surgery. If the condition doesnt respond to other treatments and doesnt go away on its own, you may need surgery. During surgery, the surgeon cuts the transverse carpal ligament to relieve pressure on the median nerve.     When to call your healthcare provider  Call your healthcare provider right away if you have any of these:  · Fever of 100.4°F (38°C) or higher, or as directed  · Symptoms that dont get better, or get worse  · New symptoms   Date Last Reviewed: 3/10/2016  © 1625-1198 [employer]. [address] [place] [address], [place] [address]. All rights reserved. This information is not intended as a substitute for  professional medical care. Always follow your healthcare professional's instructions.        Carpal Tunnel Syndrome Prevention Tips  Some repetitive hand activities put you at higher risk for carpal tunnel syndrome (CTS). But you can reduce your risk. Learn how to change the way you use your hands. Below are tips for at home and on the job. Be sure to also follow the hand and wrist safety policies at your workplace.      Keep your wrist in a neutral (straight) position when exercising.      Keep your wrist in neutral  Keep a neutral (straight) wrist position as often as you can. Dont use your wrist in a bent (flexed) position for long periods of time. This includes extended or twisted positions.  Watch your   Dont just use your thumb and index finger to grasp or lift. This can put stress on your wrist. When you can, use your whole hand and all its fingers to grasp an object.  Minimize repetition  Dont move your arms or hands or hold an object in the same way for long periods of time. Even simple, light tasks can cause injury this way. Instead, alternate tasks or switch hands.  Rest your hands  Give your hands a break from time to time with a rest. Even a few minutes once an hour can help.  Reduce speed and force  Slow down the speed in which you do a forceful, repetitive motion. This gives your wrist time to recover from the effort. Use power tools to help reduce the force.  Strengthen the muscles  Weak muscles may lead to a poor wrist or arm position. Exercises will make your hand and arm muscles stronger. This can help you keep a better position.  Date Last Reviewed: 9/11/2015  © 3899-0353 [employer]. [address] [place] [address]. All rights reserved. This information is not intended as a substitute for professional medical care. Always follow your healthcare professional's instructions.        Carpal Tunnel Release Surgery  Surgery may be done if your carpal tunnel syndrome  (CTS) symptoms become severe. Or, you may have surgery if no other treatment brings relief. There are 2 types of CTS procedures. You will be told about the one you will have. Youll also be instructed how to prepare for it.      The goals of surgery  Two types of surgery--open and endoscopic--are used to treat CTS.  · With open surgery, your surgeon makes one incision in your palm. Standard surgical tools are used.  · With endoscopic surgery, one or two small incisions may be made in your hand. A scope (with a very small camera attached) and tools are inserted under the carpal ligament. The surgeon then operates while watching images on a video screen. No matter which one you have, the goal remains the same: Your surgeon will relieve pressure on the median nerve. To do this, the transverse carpal ligament is cut (released).  After surgery  If youve had carpal tunnel surgery, you will spend a few hours resting before you go home. The nerve sensation and circulation in your hand will be checked at this time. For the safest healing, keep the following in mind.  · Keep your hand raised above heart level. This will help reduce swelling.  · Limit hand and wrist use as instructed. A wrist brace may be required.  · Take any pain medication as directed.  · Do hand exercises as directed by your surgeon or therapist.  When to call the surgeon  Call your surgeon if you notice any of the following:  · White or pale-blue hand or nails (If you pinch your skin or nail and the color doesnt return)  · Pain that is not relieved by prescribed medicine  · Loss of sensation or excess swelling in hand or fingers  · Fever over 100.4°F (38°C)   Date Last Reviewed: 9/11/2015  © 6280-2764 [employer]. [address] [place] [address]. All rights reserved. This information is not intended as a substitute for professional medical care. Always follow your healthcare professional's instructions.

## 2019-09-09 ENCOUNTER — TELEPHONE (OUTPATIENT)
Dept: NEUROSURGERY | Facility: CLINIC | Age: 49
End: 2019-09-09

## 2019-09-09 NOTE — TELEPHONE ENCOUNTER
Spoke with patient advised to keep scheduled follow up appt to be informed testing results .    ----- Message from Annette Jade sent at 9/9/2019 11:53 AM CDT -----  Contact: [phone]  Would like to consult with the nurse, Patient has some question concerning her Test result, Patient would like to speak with sooner before her Appt, please call back at [phone], Thanks sj

## 2019-09-11 ENCOUNTER — OFFICE VISIT (OUTPATIENT)
Dept: NEUROSURGERY | Facility: CLINIC | Age: 49
End: 2019-09-11
Payer: MEDICAID

## 2019-09-11 VITALS
HEART RATE: 75 BPM | WEIGHT: 152.31 LBS | DIASTOLIC BLOOD PRESSURE: 93 MMHG | HEIGHT: 65 IN | BODY MASS INDEX: 25.38 KG/M2 | SYSTOLIC BLOOD PRESSURE: 122 MMHG

## 2019-09-11 DIAGNOSIS — M50.30 DEGENERATIVE DISC DISEASE, CERVICAL: ICD-10-CM

## 2019-09-11 DIAGNOSIS — R93.7 ABNORMAL X-RAY OF CERVICAL SPINE: ICD-10-CM

## 2019-09-11 DIAGNOSIS — G56.03 CARPAL TUNNEL SYNDROME ON BOTH SIDES: Primary | ICD-10-CM

## 2019-09-11 PROCEDURE — 99214 OFFICE O/P EST MOD 30 MIN: CPT | Mod: S$PBB,,, | Performed by: NEUROLOGICAL SURGERY

## 2019-09-11 PROCEDURE — 99214 OFFICE O/P EST MOD 30 MIN: CPT | Mod: PBBFAC | Performed by: NEUROLOGICAL SURGERY

## 2019-09-11 PROCEDURE — 99999 PR PBB SHADOW E&M-EST. PATIENT-LVL IV: CPT | Mod: PBBFAC,,, | Performed by: NEUROLOGICAL SURGERY

## 2019-09-11 PROCEDURE — 99214 PR OFFICE/OUTPT VISIT, EST, LEVL IV, 30-39 MIN: ICD-10-PCS | Mod: S$PBB,,, | Performed by: NEUROLOGICAL SURGERY

## 2019-09-11 PROCEDURE — 99999 PR PBB SHADOW E&M-EST. PATIENT-LVL IV: ICD-10-PCS | Mod: PBBFAC,,, | Performed by: NEUROLOGICAL SURGERY

## 2019-09-11 NOTE — PROGRESS NOTES
Subjective:      Patient ID: Luz Pelaez is a 49 y.o. female.    Chief Complaint: Cervical Spine Pain (C-spine); Follow-up; and Results    Patient is here today for evaluation of neck and upper extremity she is here today as a follow-up    She complains of pain in the neck 8/10 in severity  With radiation into the bilateral upper extremities  She states that the arm pain is 6/10.  She has numbness and tingling and weakness bilaterally  Pain is primarily in the hands  Recently had EMG nerve conduction study which showed carpal tunnel bilateraly        Follow-up   Associated symptoms include myalgias, neck pain, numbness and weakness. Pertinent negatives include no abdominal pain, arthralgias, chest pain, chills or congestion.        Review of Systems   Constitutional: Negative for activity change, appetite change and chills.   HENT: Negative for congestion and ear pain.    Eyes: Negative for photophobia, redness and visual disturbance.   Respiratory: Negative for apnea and chest tightness.    Cardiovascular: Negative for chest pain.   Gastrointestinal: Negative for abdominal distention and abdominal pain.   Endocrine: Negative for cold intolerance.   Genitourinary: Negative for difficulty urinating.   Musculoskeletal: Positive for myalgias, neck pain and neck stiffness. Negative for arthralgias.   Skin: Negative for color change.   Allergic/Immunologic: Negative for environmental allergies.   Neurological: Positive for weakness and numbness. Negative for dizziness.   Hematological: Negative for adenopathy. Does not bruise/bleed easily.   Psychiatric/Behavioral: Negative for agitation, behavioral problems and confusion.         Objective:       Neurosurgery Physical Exam  Ortho/SPM Exam    Nursing note and vitals reviewed  Gen:Oriented to person, place, and time.             Appears stated age   Head:Normocephalic and atraumatic.  Nose: Nose normal.    Eyes: EOM are normal. Pupils are equal, round, and reactive to  light.   Neck: Neck supple. No masses or lesions palpated  Cardiovascular: Intact distal pulses.    Abdominal: Soft.   Neurological: Alert and oriented to person, place, and time.  No cranial nerve deficit.  Coordination normal. Normal muscle tone  Psychiatric: Normal mood and affect. Behavior is normal.    Neck:   Tenderness bilaterally Paraspinal tenderness    No spasms noted Paraspinal muscle spasms    None Pain with flexion and extention    Full range of motion noted Range of motion    Neg  Spurling's sign     Motor   Right Right Left Left  Level Group   5  5  C5 Deltoid   5  5  C6 Bicep   5  5   Wrist extension    5  5  C7 Triceps   5  5   Wrist flexion   5  5  C8    5  5  T1 Interossei    Sensation  NL Decreased (R/L/BL) Level Sensation    X  C5 Lateral upper arm   X  C6 Thumb and index finger, lat forearm   X  C7 Middle finger   X  C8 Ring and little finger   X  T1 Medial arm      Reflex  2+  Bicep tendon   2+  Brachioradialis   2+  Triceps tendon   Not present  Barney's   none  Clonus    Present bilaterally Tinel's     I  reviewed all pertinent imaging regarding this case.    MRI cervical spine  1. No malalignment.  2. Degenerative change at several upper cervical levels contributing to severe neural foraminal narrowing on the right at C3-4 and C4-5.  No high-grade spinal canal stenosis.    CT scan of the cervical spine shows mild degenerative changes at C4-5 without any major foraminal or central stenosis noted.  There is a mention of right lung apex opacity.  CT chest is recommended  Assessment:     1. Carpal tunnel syndrome on both sides    2. Abnormal x-ray of cervical spine    3. Degenerative disc disease, cervical      Plan:     Carpal tunnel syndrome on both sides  -     CT Neck Chest Without Contrast (XPD); Future; Expected date: 09/11/2019  -     Ambulatory referral/consult to Orthopedics; Future; Expected date: 09/11/2019    Abnormal x-ray of cervical spine  -     CT Neck Chest Without  Contrast (XPD); Future; Expected date: 09/11/2019    Degenerative disc disease, cervical  -     CT Neck Chest Without Contrast (XPD); Future; Expected date: 09/11/2019     Patient had evidence of carpal tunnel syndrome bilaterally  Recommend evaluation by Orthopedics hand specialist for possible release  The report recommends CT of the chest for incidental right lung apex nodule.  At this time do not feel as though the symptoms are from her cervical spine.  Will continue to follow the patient.  Will see her back after orthopedic evaluation.        Thank you for the referral   Please call with any questions    Renny Kelley MD  Neurosurgery

## 2019-09-12 ENCOUNTER — TELEPHONE (OUTPATIENT)
Dept: ORTHOPEDICS | Facility: CLINIC | Age: 49
End: 2019-09-12

## 2019-09-17 DIAGNOSIS — M25.532 PAIN IN BOTH WRISTS: Primary | ICD-10-CM

## 2019-09-17 DIAGNOSIS — M25.531 PAIN IN BOTH WRISTS: Primary | ICD-10-CM

## 2019-09-18 ENCOUNTER — HOSPITAL ENCOUNTER (OUTPATIENT)
Dept: RADIOLOGY | Facility: HOSPITAL | Age: 49
Discharge: HOME | End: 2019-09-18
Attending: ORTHOPAEDIC SURGERY
Payer: MEDICAID

## 2019-09-18 ENCOUNTER — OFFICE VISIT (OUTPATIENT)
Dept: ORTHOPEDICS | Facility: CLINIC | Age: 49
End: 2019-09-18
Payer: MEDICAID

## 2019-09-18 VITALS
HEIGHT: 65 IN | HEART RATE: 87 BPM | WEIGHT: 152 LBS | BODY MASS INDEX: 25.33 KG/M2 | SYSTOLIC BLOOD PRESSURE: 127 MMHG | DIASTOLIC BLOOD PRESSURE: 87 MMHG

## 2019-09-18 DIAGNOSIS — M25.532 PAIN IN BOTH WRISTS: ICD-10-CM

## 2019-09-18 DIAGNOSIS — G56.03 CARPAL TUNNEL SYNDROME ON BOTH SIDES: ICD-10-CM

## 2019-09-18 DIAGNOSIS — M25.531 PAIN IN BOTH WRISTS: ICD-10-CM

## 2019-09-18 DIAGNOSIS — G56.02 CARPAL TUNNEL SYNDROME ON LEFT: Primary | ICD-10-CM

## 2019-09-18 PROCEDURE — 99999 PR PBB SHADOW E&M-EST. PATIENT-LVL III: CPT | Mod: PBBFAC,,, | Performed by: ORTHOPAEDIC SURGERY

## 2019-09-18 PROCEDURE — 73110 XR WRIST COMPLETE 3 VIEWS BILATERAL: ICD-10-PCS | Mod: 26,50,, | Performed by: RADIOLOGY

## 2019-09-18 PROCEDURE — 99999 PR PBB SHADOW E&M-EST. PATIENT-LVL III: ICD-10-PCS | Mod: PBBFAC,,, | Performed by: ORTHOPAEDIC SURGERY

## 2019-09-18 PROCEDURE — 99203 PR OFFICE/OUTPT VISIT, NEW, LEVL III, 30-44 MIN: ICD-10-PCS | Mod: S$PBB,,, | Performed by: ORTHOPAEDIC SURGERY

## 2019-09-18 PROCEDURE — 99203 OFFICE O/P NEW LOW 30 MIN: CPT | Mod: S$PBB,,, | Performed by: ORTHOPAEDIC SURGERY

## 2019-09-18 PROCEDURE — 99213 OFFICE O/P EST LOW 20 MIN: CPT | Mod: PBBFAC,25 | Performed by: ORTHOPAEDIC SURGERY

## 2019-09-18 PROCEDURE — 73110 X-RAY EXAM OF WRIST: CPT | Mod: 26,50,, | Performed by: RADIOLOGY

## 2019-09-18 PROCEDURE — 73110 X-RAY EXAM OF WRIST: CPT | Mod: 50,TC

## 2019-09-18 NOTE — PROGRESS NOTES
Subjective:     Patient ID: Luz Pelaez is a 49 y.o. female.    Chief Complaint: Pain, Numbness, and Swelling of the Left Wrist and Pain, Numbness, and Swelling of the Right Wrist    The patient is a 49-year-old female with bilateral carpal tunnel syndrome documented by nerve conduction studies unresponsive to conservative measures.  She has tried splinting without improvement.  The numbness is constant rather than intermittent.  She wishes to have left carpal tunnel release.      Past Medical History:   Diagnosis Date    Allergy     Asthma     Attention deficit     GERD (gastroesophageal reflux disease)     Kidney stones      Past Surgical History:   Procedure Laterality Date    CHOLECYSTECTOMY      COLONOSCOPY  4/1/2012    date is approximate-done by Dr. Naranjo due to chronic constipation    COLONOSCOPY N/A 7/9/2014    [facility]    ESOPHAGOGASTRODUODENOSCOPY (EGD) N/A 7/9/2014    [facility]    SLEEVE GASTROPLASTY      TOTAL REDUCTION MAMMOPLASTY       Family History   Problem Relation Age of Onset    Hypertension Mother     Diabetes Mother     Hyperlipidemia Mother     Heart disease Mother     Colon cancer Maternal Grandmother     Breast cancer Maternal Grandmother     Breast cancer Paternal Grandmother         Breast    Colon polyps Father     Colon polyps Sister     Breast cancer Paternal Aunt     Stroke Neg Hx      Social History     Socioeconomic History    Marital status: Single     Spouse name: Not on file    Number of children: 2    Years of education: Not on file    Highest education level: Not on file   Occupational History    Occupation:      Employer: [employer]   Social Needs    Financial resource strain: Not on file    Food insecurity:     Worry: Not on file     Inability: Not on file    Transportation needs:     Medical: Not on file     Non-medical: Not on file    Tobacco Use    Smoking status: Current Every Day Smoker     Packs/day: 0.50     Years: 25.00     Pack years: 12.50     Types: Cigarettes    Smokeless tobacco: Never Used    Tobacco comment: she did quit once on chantix but it caused her nausea.   Substance and Sexual Activity    Alcohol use: Yes     Comment: Socially    Drug use: No    Sexual activity: Yes     Partners: Male   Lifestyle    Physical activity:     Days per week: Not on file     Minutes per session: Not on file    Stress: Not on file   Relationships    Social connections:     Talks on phone: Not on file     Gets together: Not on file     Attends Anglican service: Not on file     Active member of club or organization: Not on file     Attends meetings of clubs or organizations: Not on file     Relationship status: Not on file   Other Topics Concern    Not on file   Social History Narrative    Not on file     Medication List with Changes/Refills   Current Medications    ALBUTEROL (PROAIR HFA) 90 MCG/ACTUATION INHALER    INHALE 1-2 PUFFS INTO THE LUNGS EVERY 6 HOURS AS NEEDED FOR WHEEZING    DEXTROAMPHETAMINE-AMPHETAMINE (ADDERALL) 30 MG TAB    Take 1 tablet by mouth 2 (two) times daily.    DEXTROAMPHETAMINE-AMPHETAMINE (ADDERALL) 30 MG TAB    Take 30 mg by mouth 2 (two) times daily.    DEXTROAMPHETAMINE-AMPHETAMINE (ADDERALL) 30 MG TAB    Take 1 tablet by mouth 2 (two) times daily.    FLUTICASONE-SALMETEROL 250-50 MCG/DOSE (ADVAIR DISKUS) 250-50 MCG/DOSE DISKUS INHALER    Use 1 inhalation into the  lungs two times daily    LINZESS 290 MCG CAP    Take 1 capsule (290 mcg total) by mouth once daily.    MELOXICAM (MOBIC) 15 MG TABLET    Take 1 tablet (15 mg total) by mouth once daily.    MUPIROCIN (BACTROBAN) 2 % OINTMENT    APPLY TOPICALLY 3 (THREE) TIMES DAILY FOR 7 DAYS    OMEPRAZOLE (PRILOSEC) 20 MG CAPSULE    Take 1 capsule by mouth  twice daily    TIOTROPIUM (SPIRIVA WITH HANDIHALER) 18 MCG INHALATION CAPSULE    Inhale the contents of 1     capsule via HandiHaler once daily     Review of patient's allergies indicates:   Allergen Reactions    Bupropion hcl Other (See Comments)     Mood swings  Mood swings     Review of Systems   Constitution: Negative for malaise/fatigue.   HENT: Negative for hearing loss.    Eyes: Negative for double vision and visual disturbance.   Cardiovascular: Negative for chest pain.   Respiratory: Positive for shortness of breath and wheezing.    Endocrine: Negative for cold intolerance.   Hematologic/Lymphatic: Does not bruise/bleed easily.   Skin: Negative for poor wound healing and suspicious lesions.   Musculoskeletal: Negative for gout, joint pain and joint swelling.   Gastrointestinal: Negative for nausea and vomiting.   Genitourinary: Negative for dysuria.   Neurological: Positive for headaches, numbness, paresthesias and sensory change.   Psychiatric/Behavioral: Positive for altered mental status. Negative for depression, memory loss and substance abuse. The patient is not nervous/anxious.    Allergic/Immunologic: Negative for persistent infections.       Objective:   Body mass index is 25.29 kg/m².  Vitals:    09/18/19 1358   BP: 127/87   Pulse: 87                General    Constitutional: She is oriented to person, place, and time. She appears well-developed and well-nourished. No distress.   HENT:   Head: Normocephalic.   Right Ear: External ear normal.   Left Ear: External ear normal.   Mouth/Throat: Oropharynx is clear and moist.   Eyes: EOM are normal. Pupils are equal, round, and reactive to light.   Neck: Normal range of motion.   Cardiovascular: Regular rhythm.    Pulmonary/Chest: Breath sounds normal.   Abdominal: Soft.   Neurological: She is alert and oriented to person, place, and time. No cranial nerve deficit.   Psychiatric: She has a normal mood and affect.             Right Hand/Wrist Exam     Inspection   Scars: Hand -  absent  Effusion: Hand -  absent    Pain   Wrist - The patient exhibits pain of the  flexor/pronator group.    Tenderness   The patient is tender to palpation of the [place] area.    Tests   Phalens sign: positive  Tinel's sign (median nerve): positive  Carpal Tunnel Compression Test: positive    Atrophy   Thenar:  negative  Intrinsic:  negative    Other     Neuorologic Exam    Median Distribution: abnormal  Ulnar Distribution: normal  Radial Distribution: normal    Comments:  The patient has a positive Tinel and a positive Phalen sign.  Two-point discrimination is 3-5 mm in all fingertips.  There is no intrinsic atrophy noted.      Left Hand/Wrist Exam     Inspection   Scars: Hand -  absent  Effusion: Hand -  absent    Pain   Wrist - The patient exhibits pain of the flexor/pronator group.    Tenderness   The patient is tender to palpation of the [place] area.     Tests   Phalens sign: positive  Tinel's sign (median nerve): positive  Carpal Tunnel Compression Test: positive    Atrophy  Thenar:  Negative  Intrinsic: negative    Other     Sensory Exam  Median Distribution: abnormal  Ulnar Distribution: normal  Radial Distribution: normal    Comments:  The patient has a positive Tinel and a positive Phalen sign.  Two-point discrimination is 3-5 mm in all fingertips.  There is no intrinsic atrophy noted.          Vascular Exam       Capillary Refill  Right Hand: normal capillary refill  Left Hand: normal capillary refill      Relevant imaging results reviewed and interpreted by me, discussed with the patient and / or family today radiographs of both hands are normal with the exception early osteoarthritis  Assessment:     Encounter Diagnosis   Name Primary?    Carpal tunnel syndrome on both sides         Plan:   The patient wishes to have left carpal tunnel release.  She was counseled regarding that surgery. Risk complications and alternatives were discussed including the risk of infection, anesthetic risk, injury to nerves and vessels, loss of motion, and possible need for additional surgeries were  discussed. She seems to understand and agree to that surgery. All questions were answered.                  Disclaimer: This note was prepared using a voice recognition system and is likely to have sound alike errors within the text.

## 2019-09-18 NOTE — H&P (VIEW-ONLY)
Subjective:     Patient ID: Luz Pelaez is a 49 y.o. female.    Chief Complaint: Pain, Numbness, and Swelling of the Left Wrist and Pain, Numbness, and Swelling of the Right Wrist    The patient is a 49-year-old female with bilateral carpal tunnel syndrome documented by nerve conduction studies unresponsive to conservative measures.  She has tried splinting without improvement.  The numbness is constant rather than intermittent.  She wishes to have left carpal tunnel release.      Past Medical History:   Diagnosis Date    Allergy     Asthma     Attention deficit     GERD (gastroesophageal reflux disease)     Kidney stones      Past Surgical History:   Procedure Laterality Date    CHOLECYSTECTOMY      COLONOSCOPY  4/1/2012    date is approximate-done by Dr. Naranjo due to chronic constipation    COLONOSCOPY N/A 7/9/2014    [facility]    ESOPHAGOGASTRODUODENOSCOPY (EGD) N/A 7/9/2014    [facility]    SLEEVE GASTROPLASTY      TOTAL REDUCTION MAMMOPLASTY       Family History   Problem Relation Age of Onset    Hypertension Mother     Diabetes Mother     Hyperlipidemia Mother     Heart disease Mother     Colon cancer Maternal Grandmother     Breast cancer Maternal Grandmother     Breast cancer Paternal Grandmother         Breast    Colon polyps Father     Colon polyps Sister     Breast cancer Paternal Aunt     Stroke Neg Hx      Social History     Socioeconomic History    Marital status: Single     Spouse name: Not on file    Number of children: 2    Years of education: Not on file    Highest education level: Not on file   Occupational History    Occupation:      Employer: [employer]   Social Needs    Financial resource strain: Not on file    Food insecurity:     Worry: Not on file     Inability: Not on file    Transportation needs:     Medical: Not on file     Non-medical: Not on file    Tobacco Use    Smoking status: Current Every Day Smoker     Packs/day: 0.50     Years: 25.00     Pack years: 12.50     Types: Cigarettes    Smokeless tobacco: Never Used    Tobacco comment: she did quit once on chantix but it caused her nausea.   Substance and Sexual Activity    Alcohol use: Yes     Comment: Socially    Drug use: No    Sexual activity: Yes     Partners: Male   Lifestyle    Physical activity:     Days per week: Not on file     Minutes per session: Not on file    Stress: Not on file   Relationships    Social connections:     Talks on phone: Not on file     Gets together: Not on file     Attends Pentecostalism service: Not on file     Active member of club or organization: Not on file     Attends meetings of clubs or organizations: Not on file     Relationship status: Not on file   Other Topics Concern    Not on file   Social History Narrative    Not on file     Medication List with Changes/Refills   Current Medications    ALBUTEROL (PROAIR HFA) 90 MCG/ACTUATION INHALER    INHALE 1-2 PUFFS INTO THE LUNGS EVERY 6 HOURS AS NEEDED FOR WHEEZING    DEXTROAMPHETAMINE-AMPHETAMINE (ADDERALL) 30 MG TAB    Take 1 tablet by mouth 2 (two) times daily.    DEXTROAMPHETAMINE-AMPHETAMINE (ADDERALL) 30 MG TAB    Take 30 mg by mouth 2 (two) times daily.    DEXTROAMPHETAMINE-AMPHETAMINE (ADDERALL) 30 MG TAB    Take 1 tablet by mouth 2 (two) times daily.    FLUTICASONE-SALMETEROL 250-50 MCG/DOSE (ADVAIR DISKUS) 250-50 MCG/DOSE DISKUS INHALER    Use 1 inhalation into the  lungs two times daily    LINZESS 290 MCG CAP    Take 1 capsule (290 mcg total) by mouth once daily.    MELOXICAM (MOBIC) 15 MG TABLET    Take 1 tablet (15 mg total) by mouth once daily.    MUPIROCIN (BACTROBAN) 2 % OINTMENT    APPLY TOPICALLY 3 (THREE) TIMES DAILY FOR 7 DAYS    OMEPRAZOLE (PRILOSEC) 20 MG CAPSULE    Take 1 capsule by mouth  twice daily    TIOTROPIUM (SPIRIVA WITH HANDIHALER) 18 MCG INHALATION CAPSULE    Inhale the contents of 1     capsule via HandiHaler once daily     Review of patient's allergies indicates:   Allergen Reactions    Bupropion hcl Other (See Comments)     Mood swings  Mood swings     Review of Systems   Constitution: Negative for malaise/fatigue.   HENT: Negative for hearing loss.    Eyes: Negative for double vision and visual disturbance.   Cardiovascular: Negative for chest pain.   Respiratory: Positive for shortness of breath and wheezing.    Endocrine: Negative for cold intolerance.   Hematologic/Lymphatic: Does not bruise/bleed easily.   Skin: Negative for poor wound healing and suspicious lesions.   Musculoskeletal: Negative for gout, joint pain and joint swelling.   Gastrointestinal: Negative for nausea and vomiting.   Genitourinary: Negative for dysuria.   Neurological: Positive for headaches, numbness, paresthesias and sensory change.   Psychiatric/Behavioral: Positive for altered mental status. Negative for depression, memory loss and substance abuse. The patient is not nervous/anxious.    Allergic/Immunologic: Negative for persistent infections.       Objective:   Body mass index is 25.29 kg/m².  Vitals:    09/18/19 1358   BP: 127/87   Pulse: 87                General    Constitutional: She is oriented to person, place, and time. She appears well-developed and well-nourished. No distress.   HENT:   Head: Normocephalic.   Right Ear: External ear normal.   Left Ear: External ear normal.   Mouth/Throat: Oropharynx is clear and moist.   Eyes: EOM are normal. Pupils are equal, round, and reactive to light.   Neck: Normal range of motion.   Cardiovascular: Regular rhythm.    Pulmonary/Chest: Breath sounds normal.   Abdominal: Soft.   Neurological: She is alert and oriented to person, place, and time. No cranial nerve deficit.   Psychiatric: She has a normal mood and affect.             Right Hand/Wrist Exam     Inspection   Scars: Hand -  absent  Effusion: Hand -  absent    Pain   Wrist - The patient exhibits pain of the  flexor/pronator group.    Tenderness   The patient is tender to palpation of the [place] area.    Tests   Phalens sign: positive  Tinel's sign (median nerve): positive  Carpal Tunnel Compression Test: positive    Atrophy   Thenar:  negative  Intrinsic:  negative    Other     Neuorologic Exam    Median Distribution: abnormal  Ulnar Distribution: normal  Radial Distribution: normal    Comments:  The patient has a positive Tinel and a positive Phalen sign.  Two-point discrimination is 3-5 mm in all fingertips.  There is no intrinsic atrophy noted.      Left Hand/Wrist Exam     Inspection   Scars: Hand -  absent  Effusion: Hand -  absent    Pain   Wrist - The patient exhibits pain of the flexor/pronator group.    Tenderness   The patient is tender to palpation of the [place] area.     Tests   Phalens sign: positive  Tinel's sign (median nerve): positive  Carpal Tunnel Compression Test: positive    Atrophy  Thenar:  Negative  Intrinsic: negative    Other     Sensory Exam  Median Distribution: abnormal  Ulnar Distribution: normal  Radial Distribution: normal    Comments:  The patient has a positive Tinel and a positive Phalen sign.  Two-point discrimination is 3-5 mm in all fingertips.  There is no intrinsic atrophy noted.          Vascular Exam       Capillary Refill  Right Hand: normal capillary refill  Left Hand: normal capillary refill      Relevant imaging results reviewed and interpreted by me, discussed with the patient and / or family today radiographs of both hands are normal with the exception early osteoarthritis  Assessment:     Encounter Diagnosis   Name Primary?    Carpal tunnel syndrome on both sides         Plan:   The patient wishes to have left carpal tunnel release.  She was counseled regarding that surgery. Risk complications and alternatives were discussed including the risk of infection, anesthetic risk, injury to nerves and vessels, loss of motion, and possible need for additional surgeries were  discussed. She seems to understand and agree to that surgery. All questions were answered.                  Disclaimer: This note was prepared using a voice recognition system and is likely to have sound alike errors within the text.

## 2019-09-19 ENCOUNTER — HOSPITAL ENCOUNTER (OUTPATIENT)
Dept: PREADMISSION TESTING | Facility: HOSPITAL | Age: 49
Discharge: HOME | End: 2019-09-19
Attending: ORTHOPAEDIC SURGERY
Payer: MEDICAID

## 2019-09-19 VITALS
RESPIRATION RATE: 15 BRPM | SYSTOLIC BLOOD PRESSURE: 139 MMHG | DIASTOLIC BLOOD PRESSURE: 77 MMHG | HEART RATE: 78 BPM | OXYGEN SATURATION: 97 % | TEMPERATURE: 98 F

## 2019-09-19 DIAGNOSIS — R41.840 ATTENTION DEFICIT: ICD-10-CM

## 2019-09-19 DIAGNOSIS — G56.03 BILATERAL CARPAL TUNNEL SYNDROME: ICD-10-CM

## 2019-09-19 DIAGNOSIS — J45.909 UNCOMPLICATED ASTHMA, UNSPECIFIED ASTHMA SEVERITY, UNSPECIFIED WHETHER PERSISTENT: ICD-10-CM

## 2019-09-19 DIAGNOSIS — Z01.818 PREOP TESTING: Primary | ICD-10-CM

## 2019-09-19 DIAGNOSIS — K21.9 GASTROESOPHAGEAL REFLUX DISEASE, ESOPHAGITIS PRESENCE NOT SPECIFIED: ICD-10-CM

## 2019-09-19 DIAGNOSIS — Z01.818 PREOPERATIVE TESTING: Primary | ICD-10-CM

## 2019-09-19 PROBLEM — M47.812 CERVICAL SPINE DEGENERATION: Status: ACTIVE | Noted: 2019-09-19

## 2019-09-19 LAB
ALBUMIN SERPL BCP-MCNC: 3.9 G/DL (ref 3.5–5.2)
ALP SERPL-CCNC: 71 U/L (ref 55–135)
ALT SERPL W/O P-5'-P-CCNC: 16 U/L (ref 10–44)
ANION GAP SERPL CALC-SCNC: 10 MMOL/L (ref 8–16)
AST SERPL-CCNC: 17 U/L (ref 10–40)
BILIRUB SERPL-MCNC: 0.5 MG/DL (ref 0.1–1)
BUN SERPL-MCNC: 16 MG/DL (ref 6–20)
CALCIUM SERPL-MCNC: 9.2 MG/DL (ref 8.7–10.5)
CHLORIDE SERPL-SCNC: 106 MMOL/L (ref 95–110)
CO2 SERPL-SCNC: 25 MMOL/L (ref 23–29)
CREAT SERPL-MCNC: 0.7 MG/DL (ref 0.5–1.4)
EST. GFR  (AFRICAN AMERICAN): >60 ML/MIN/1.73 M^2
EST. GFR  (NON AFRICAN AMERICAN): >60 ML/MIN/1.73 M^2
GLUCOSE SERPL-MCNC: 104 MG/DL (ref 70–110)
POTASSIUM SERPL-SCNC: 4 MMOL/L (ref 3.5–5.1)
PROT SERPL-MCNC: 6.7 G/DL (ref 6–8.4)
SODIUM SERPL-SCNC: 141 MMOL/L (ref 136–145)

## 2019-09-19 PROCEDURE — 93010 EKG 12-LEAD: ICD-10-PCS | Mod: ,,, | Performed by: INTERNAL MEDICINE

## 2019-09-19 PROCEDURE — 93010 ELECTROCARDIOGRAM REPORT: CPT | Mod: ,,, | Performed by: INTERNAL MEDICINE

## 2019-09-19 PROCEDURE — 80053 COMPREHEN METABOLIC PANEL: CPT

## 2019-09-19 PROCEDURE — 93005 ELECTROCARDIOGRAM TRACING: CPT

## 2019-09-19 RX ORDER — ACETAMINOPHEN 500 MG
1000 TABLET ORAL
Status: CANCELLED | OUTPATIENT
Start: 2019-09-19 | End: 2019-09-19

## 2019-09-19 RX ORDER — FAMOTIDINE 20 MG/1
20 TABLET, FILM COATED ORAL
Status: CANCELLED | OUTPATIENT
Start: 2019-09-19 | End: 2019-09-19

## 2019-09-19 RX ORDER — SODIUM CHLORIDE, SODIUM LACTATE, POTASSIUM CHLORIDE, CALCIUM CHLORIDE 600; 310; 30; 20 MG/100ML; MG/100ML; MG/100ML; MG/100ML
INJECTION, SOLUTION INTRAVENOUS
Status: CANCELLED | OUTPATIENT
Start: 2019-09-19 | End: 2019-09-19

## 2019-09-19 RX ORDER — ALPRAZOLAM 0.25 MG/1
0.25 TABLET ORAL ONCE AS NEEDED
Status: CANCELLED | OUTPATIENT
Start: 2019-09-19 | End: 2031-02-15

## 2019-09-19 NOTE — H&P
Preoperative History and Physical                                                             Hospital Medicine      Chief Complaint: Preoperative evaluation     History of Present Illness:      Luz Pelaez is a 49 y.o. female with Carpal tunnel syndrome, Asthma, Tobacco abuse, ADD, GERD, S/p gastric sleeve who presents to the office today for a preoperative consultation at the request of Dr. Cain  who plans on performing left carpal tunnel release on October 7.     Functional Status:      The patient is able to climb a flight of stairs. The patient is able to ambulate over 3 blocks without difficulty. The patient's functional status is not affected by the surgical problem. The patient's functional status is not affected by shortness of breath, chest pain, dyspnea on exertion and fatigue.    MET score greater than 4    Past Medical History:      Past Medical History:   Diagnosis Date    Allergy     Asthma     Attention deficit     Cervical spine degeneration     GERD (gastroesophageal reflux disease)     Kidney stones         Past Surgical History:      Past Surgical History:   Procedure Laterality Date    CHOLECYSTECTOMY      COLONOSCOPY  4/1/2012    date is approximate-done by Dr. Naranjo due to chronic constipation    COLONOSCOPY N/A 7/9/2014    Performed by [facility] at [facility]    ESOPHAGOGASTRODUODENOSCOPY (EGD) N/A 7/9/2014    Performed by [facility] MD at [facility]    SLEEVE GASTROPLASTY      TOTAL REDUCTION MAMMOPLASTY          Social History:      Social History     Socioeconomic History    Marital status: Single     Spouse name: Not on file    Number of children: 2    Years of education: Not on file    Highest education level: Not on file   Occupational History    Occupation:      Employer: [employer]   Social Needs    Financial resource strain: Not on file    Food insecurity:      Worry: Not on file     Inability: Not on file    Transportation needs:     Medical: Not on file     Non-medical: Not on file   Tobacco Use    Smoking status: Current Every Day Smoker     Packs/day: 1.00     Years: 30.00     Pack years: 30.00     Types: Cigarettes    Smokeless tobacco: Never Used   Substance and Sexual Activity    Alcohol use: Yes     Frequency: 2-3 times a week     Drinks per session: 1 or 2     Comment: Socially    Drug use: No    Sexual activity: Yes     Partners: Male   Lifestyle    Physical activity:     Days per week: Not on file     Minutes per session: Not on file    Stress: Not on file   Relationships    Social connections:     Talks on phone: Not on file     Gets together: Not on file     Attends Jehovah's witness service: Not on file     Active member of club or organization: Not on file     Attends meetings of clubs or organizations: Not on file     Relationship status: Not on file   Other Topics Concern    Not on file   Social History Narrative    Not on file        Family History:      Family History   Problem Relation Age of Onset    Hypertension Mother     Diabetes Mother     Hyperlipidemia Mother     Heart disease Mother 58    Colon cancer Maternal Grandmother     Breast cancer Maternal Grandmother     Breast cancer Paternal Grandmother         Breast    Heart attack Father 70    Thyroid disease Sister     Hypertension Sister     Breast cancer Paternal Aunt     Stroke Neg Hx        Allergies:      Review of patient's allergies indicates:   Allergen Reactions    Bupropion hcl Other (See Comments)     Mood swings  Mood swings       Medications:      Current Outpatient Medications   Medication Sig    albuterol (PROAIR HFA) 90 mcg/actuation inhaler INHALE 1-2 PUFFS INTO THE LUNGS EVERY 6 HOURS AS NEEDED FOR WHEEZING (Patient taking differently: INHALE 1-2 PUFFS INTO THE LUNGS EVERY 6 HOURS AS NEEDED FOR WHEEZING  Take am of surgery)    [START ON 10/12/2019]  dextroamphetamine-amphetamine (ADDERALL) 30 mg Tab Take 1 tablet by mouth 2 (two) times daily. (Patient taking differently: Take 1 tablet by mouth 2 (two) times daily. Hold am of surgery)    fluticasone-salmeterol 250-50 mcg/dose (ADVAIR DISKUS) 250-50 mcg/dose diskus inhaler Use 1 inhalation into the  lungs two times daily (Patient taking differently: Use 1 inhalation into the  lungs two times daily  Take am of surgery)    LINZESS 290 mcg Cap Take 1 capsule (290 mcg total) by mouth once daily.    meloxicam (MOBIC) 15 MG tablet Take 1 tablet (15 mg total) by mouth once daily. (Patient taking differently: Take 15 mg by mouth once daily. Hold 5-7 days prior to surgery)    omeprazole (PRILOSEC) 20 MG capsule Take 1 capsule by mouth  twice daily (Patient taking differently: Take 1 capsule by mouth  twice daily  Take am of surgery)    tiotropium (SPIRIVA WITH HANDIHALER) 18 mcg inhalation capsule Inhale the contents of 1    capsule via HandiHaler once daily (Patient taking differently: Inhale the contents of 1    capsule via HandiHaler once daily  Take am of surgery)     No current facility-administered medications for this encounter.        Vitals:      Vitals:    09/19/19 1319   BP: 139/77   Pulse: 78   Resp: 15   Temp: 98.3 °F (36.8 °C)       Review of Systems:        Constitutional: Negative for fever, chills, weight loss, malaise/fatigue and diaphoresis.   HENT: Negative for hearing loss, ear pain, nosebleeds, congestion, sore throat, neck pain, tinnitus and ear discharge.    Eyes: Negative for blurred vision, double vision, photophobia, pain, discharge and redness.   Respiratory: Negative for cough, hemoptysis, sputum production, shortness of breath, wheezing and stridor.    Cardiovascular: Negative for chest pain, palpitations, orthopnea, claudication, leg swelling and PND.   Gastrointestinal: Negative for heartburn, nausea, vomiting, abdominal pain, diarrhea, constipation, blood in stool and melena.    Genitourinary: Negative for dysuria, urgency, frequency, hematuria and flank pain.   Musculoskeletal: +chronic neck pain and bilateral wrist/hand pain Negative for myalgias, back pain, joint pain and falls.   Skin: Negative for itching and rash.   Neurological: Negative for dizziness, tingling, tremors, sensory change, speech change, focal weakness, seizures, loss of consciousness, weakness and headaches.   Endo/Heme/Allergies: Negative for environmental allergies and polydipsia. Does not bruise/bleed easily.   Psychiatric/Behavioral: Negative for depression, suicidal ideas, hallucinations, memory loss and substance abuse. The patient is not nervous/anxious and does not have insomnia.    All 14 systems reviewed and negative except as noted above.    Physical Exam:      Constitutional: Appears well-developed, well-nourished and in no acute distress.  Patient is oriented to person, place, and time.   Head: Normocephalic and atraumatic. Mucous membranes moist.  Neck: Neck supple no mass.   Cardiovascular: Normal rate and regular rhythm.  S1 S2 appreciated by ascultation.  Pulmonary/Chest: Effort normal and clear to auscultation bilaterally. No respiratory distress.   Abdomen: Soft. Non-tender and non-distended. Bowel sounds are normal.   Neurological: Patient is alert and oriented to person, place and time. Moves all extremities.  Skin: Warm and dry. No lesions.  Extremities: No clubbing, cyanosis or edema.    Laboratory data:      Reviewed and noted in plan where applicable. Please see chart for full laboratory data.    No results for input(s): CPK, CPKMB, TROPONINI, MB in the last 24 hours. No results for input(s): POCTGLUCOSE in the last 24 hours.     No results found for: INR, PROTIME    Lab Results   Component Value Date    WBC 5.86 08/12/2019    HGB 14.6 08/12/2019    HCT 47.6 08/12/2019     (H) 08/12/2019     08/12/2019       Recent Labs   Lab 09/19/19  1330         K 4.0       CO2 25   BUN 16   CREATININE 0.7   CALCIUM 9.2       Predictors of intubation difficulty:       Morbid obesity? no   Anatomically abnormal facies? no   Prominent incisors? no   Receding mandible? no   Short, thick neck? no   Neck range of motion: normal      Cardiographics:      ECG: normal sinus rhythm, no blocks or conduction defects, no ischemic changes 9/19/19    Imaging:      Chest x-ray: Pt has CT chest scheduled next week     Assessment and Plan:      Bilateral carpal tunnel syndrome  The patient has carpal tunnel syndrome and will have a left carpal tunnel release by Dr. Cain on 10/7/19    Known risk factors for perioperative complications:     Asthma/COPD  Tobacco abuse  ADD    Difficulty with intubation is not anticipated.    Cardiac Risk Estimation: low intraoperative cardiac risk     1.) Preoperative workup as follows: pt has a CT chest pending .  2.) Change in medication regimen before surgery: discontinue NSAIDs (mobic) 7 days before surgery.  3.) Prophylaxis for cardiac events with perioperative beta-blockers: not indicated.  4.) Invasive hemodynamic monitoring perioperatively: not indicated.  5.) Deep vein thrombosis prophylaxis postoperatively: regimen to be chosen by surgical team.  6.) Surveillance for postoperative MI with ECG immediately postoperatively and on postoperati ve days 1 and 2 AND troponin levels 24 hours postoperatively and on day 4 or hospital discharge (whichever comes first): not indicated.  7.) Current medications which may produce withdrawal symptoms if withheld perioperatively: none  8.) Other measures: Preoperative tobacco abstention x 60 days.    Asthma  Long smoking history   Has not seen a Pulmonologist- managed by PCP  Cont Albuterol inhaler prn, Advair BID and Spiriva daily - take Am of surgery     Attention deficit  Cont Adderall- hold am of surgery     GERD (gastroesophageal reflux disease)  Cont Prilosec     Cervical spine degeneration  Cont Mobic  Hold 5 days  prior to surgery   Good cervical ROM w/o complaints    MRI C-spine 8/18/19   1. No malalignment.  2. Degenerative change at several upper cervical levels contributing to severe neural foraminal narrowing on the right at C3-4 and C4-5.  No high-grade spinal canal stenosis.    S/P Gastric sleeve  Stable- denies digestive problems

## 2019-09-19 NOTE — ASSESSMENT & PLAN NOTE
Cont Mobic  Hold 5 days prior to surgery   Good cervical ROM w/o complaints    MRI C-spine 8/18/19   1. No malalignment.  2. Degenerative change at several upper cervical levels contributing to severe neural foraminal narrowing on the right at C3-4 and C4-5.  No high-grade spinal canal stenosis.

## 2019-09-19 NOTE — ASSESSMENT & PLAN NOTE
The patient has carpal tunnel syndrome and will have a left carpal tunnel release by Dr. Cain on 10/7/19    Known risk factors for perioperative complications:     Asthma/COPD  Tobacco abuse  ADD    Difficulty with intubation is not anticipated.    Cardiac Risk Estimation: low intraoperative cardiac risk     1.) Preoperative workup as follows: pt has a CT chest pending .  2.) Change in medication regimen before surgery: discontinue NSAIDs (mobic) 7 days before surgery.  3.) Prophylaxis for cardiac events with perioperative beta-blockers: not indicated.  4.) Invasive hemodynamic monitoring perioperatively: not indicated.  5.) Deep vein thrombosis prophylaxis postoperatively: regimen to be chosen by surgical team.  6.) Surveillance for postoperative MI with ECG immediately postoperatively and on postoperati ve days 1 and 2 AND troponin levels 24 hours postoperatively and on day 4 or hospital discharge (whichever comes first): not indicated.  7.) Current medications which may produce withdrawal symptoms if withheld perioperatively: none  8.) Other measures: Preoperative tobacco abstention x 60 days.

## 2019-09-19 NOTE — ASSESSMENT & PLAN NOTE
Long smoking history   Has not seen a Pulmonologist- managed by PCP  Cont Albuterol inhaler prn, Advair BID and Spiriva daily - take Am of surgery

## 2019-09-19 NOTE — DISCHARGE INSTRUCTIONS
To confirm, Your doctor has instructed you that surgery is scheduled for 10/07/2019.       Please report to [facility] [place].  Pre admit office will call afternoon prior to surgery with final arrival time    INSTRUCTIONS IMPORTANT!!!   Do not eat, drink, or smoke after 12 midnight-including water. OK to brush teeth, no gum, candy or mints!    ¨ Take only these medicines with a small swallow of water-morning of surgery.  Spiriva  Prilosec  Advair  Albuterol Inhaler    Pre operative instructions:  Please review the Pre-Operative Instruction booklet that you were given.        Bathing Instructions--See page 6 in the Pre-operative booklet.      Prevention of surgical site infections:     -Keep incisions clean and dry.   -Do not soak/submerge incisions in water until completely healed.   -Do not apply lotions, powders, creams, or deodorants to site.   -Always make sure hands are cleaned with antibacterial soap/ alcohol-based  prior to touching the surgical site.  (This includes doctors, nurses, staff, and yourself.)    Signs and symptoms:   -Redness and pain around the area where you had surgery   -Drainage of cloudy fluid from your surgical wound   -Fever over 100.4       I have read or had read and explained to me, and understand the above information.  Additional comments or instructions:  Received a copy of Pre-operative instructions booklet, FAQ surgical site infection sheet, and packets of hibiclens (if indicated).

## 2019-09-23 ENCOUNTER — TELEPHONE (OUTPATIENT)
Dept: NEUROSURGERY | Facility: CLINIC | Age: 49
End: 2019-09-23

## 2019-09-23 DIAGNOSIS — M50.30 DEGENERATIVE DISC DISEASE, CERVICAL: Primary | ICD-10-CM

## 2019-09-23 NOTE — TELEPHONE ENCOUNTER
Order is in and linked to the appt//ns    ----- Message from Mercy Sims RT sent at 9/23/2019  9:15 AM CDT -----  Regarding: CT Order  Ms Pelaez had a CT Cervical spine that suggested a follow up ct of the chest. The order that is in is for a CT Neck Chest without. Please change this order to CT CHEST WITHOUT and link the new order to her appointment.

## 2019-09-26 ENCOUNTER — HOSPITAL ENCOUNTER (OUTPATIENT)
Dept: RADIOLOGY | Facility: HOSPITAL | Age: 49
Discharge: HOME | End: 2019-09-26
Attending: NEUROLOGICAL SURGERY
Payer: MEDICAID

## 2019-09-26 DIAGNOSIS — R93.7 ABNORMAL X-RAY OF CERVICAL SPINE: ICD-10-CM

## 2019-09-26 DIAGNOSIS — G56.03 CARPAL TUNNEL SYNDROME ON BOTH SIDES: ICD-10-CM

## 2019-09-26 DIAGNOSIS — M50.30 DEGENERATIVE DISC DISEASE, CERVICAL: ICD-10-CM

## 2019-09-26 PROCEDURE — 71250 CT CHEST WITHOUT CONTRAST: ICD-10-PCS | Mod: 26,,, | Performed by: RADIOLOGY

## 2019-09-26 PROCEDURE — 71250 CT THORAX DX C-: CPT | Mod: 26,,, | Performed by: RADIOLOGY

## 2019-09-26 PROCEDURE — 71250 CT THORAX DX C-: CPT | Mod: TC

## 2019-10-04 ENCOUNTER — ANESTHESIA EVENT (OUTPATIENT)
Dept: SURGERY | Facility: HOSPITAL | Age: 49
End: 2019-10-04
Payer: MEDICAID

## 2019-10-04 RX ORDER — ACETAMINOPHEN 500 MG
1000 TABLET ORAL
Status: CANCELLED | OUTPATIENT
Start: 2019-10-04

## 2019-10-04 NOTE — ANESTHESIA PREPROCEDURE EVALUATION
"                                                                                                             10/04/2019  Luz Pelaez is a 49 y.o., female.    Anesthesia Evaluation    I have reviewed the Patient Summary Reports.    I have reviewed the Nursing Notes.   I have reviewed the Medications.     Review of Systems  Anesthesia Hx:  No problems with previous Anesthesia EGD without problems. Denies Family Hx of Anesthesia complications.   Denies Personal Hx of Anesthesia complications.   Social:  Smoker, Social Alcohol Use    Hematology/Oncology:  Hematology Normal        Cardiovascular:  Cardiovascular Normal  ECG has been reviewed.    Pulmonary:   Asthma moderate "Spot on lungs", being worked up.   Renal/:   renal calculi    Hepatic/GI:   GERD    Musculoskeletal:   Arthritis   Spine Disorders: cervical    Neurological:   Neuromuscular Disease, Headaches CTS.   Psych:  Psychiatric Normal  Denies Psychiatric History.          Physical Exam  General:  Well nourished    Airway/Jaw/Neck:  Airway Findings: Mouth Opening: Normal Tongue: Normal  General Airway Assessment: Adult  Mallampati: II  TM Distance: Normal, at least 6 cm  Jaw/Neck Findings:  Neck ROM: Normal ROM  Neck Findings:     Eyes/Ears/Nose:  Eyes/Ears/Nose Findings:    Dental:  Dental Findings: In tact, Upper partial dentures   Chest/Lungs:  Chest/Lungs Findings: Clear to auscultation, Normal Respiratory Rate     Heart/Vascular:  Heart Findings: Rate: Normal  Rhythm: Regular Rhythm  Sounds: Normal  Heart murmur: negative Vascular Findings: Normal    Abdomen:  Abdomen Findings: Normal    Musculoskeletal:  Musculoskeletal Findings: Normal   Skin:  Skin Findings: Normal    Mental Status:  Mental Status Findings:  Alert and Oriented         Anesthesia Plan  Type of Anesthesia, risks & benefits discussed:  Anesthesia Type:  MAC  Patient's Preference:   Intra-op Monitoring Plan: standard ASA monitors  Intra-op Monitoring Plan Comments:   Post Op Pain " Control Plan: per primary service following discharge from PACU, peripheral nerve block and multimodal analgesia  Post Op Pain Control Plan Comments:   Induction:   IV  Beta Blocker:  Patient is not currently on a Beta-Blocker (No further documentation required).       Informed Consent: Patient understands risks and agrees with Anesthesia plan.  Questions answered. Anesthesia consent signed with patient.  ASA Score: 2     Day of Surgery Review of History & Physical:    H&P update referred to the surgeon.         Ready For Surgery From Anesthesia Perspective.

## 2019-10-07 ENCOUNTER — ANESTHESIA (OUTPATIENT)
Dept: SURGERY | Facility: HOSPITAL | Age: 49
End: 2019-10-07
Payer: MEDICAID

## 2019-10-07 ENCOUNTER — HOSPITAL ENCOUNTER (OUTPATIENT)
Facility: HOSPITAL | Age: 49
Discharge: HOME | End: 2019-10-07
Attending: ORTHOPAEDIC SURGERY | Admitting: ORTHOPAEDIC SURGERY
Payer: MEDICAID

## 2019-10-07 ENCOUNTER — TELEPHONE (OUTPATIENT)
Dept: NEUROSURGERY | Facility: CLINIC | Age: 49
End: 2019-10-07

## 2019-10-07 DIAGNOSIS — G56.03 BILATERAL CARPAL TUNNEL SYNDROME: Primary | ICD-10-CM

## 2019-10-07 DIAGNOSIS — Z01.818 PREOPERATIVE TESTING: ICD-10-CM

## 2019-10-07 PROCEDURE — 25000003 PHARM REV CODE 250: Performed by: NURSE PRACTITIONER

## 2019-10-07 PROCEDURE — 64415 NJX AA&/STRD BRCH PLXS IMG: CPT | Mod: 59,LT,, | Performed by: ANESTHESIOLOGY

## 2019-10-07 PROCEDURE — 36000707: Performed by: ORTHOPAEDIC SURGERY

## 2019-10-07 PROCEDURE — 64415 NJX AA&/STRD BRCH PLXS IMG: CPT | Performed by: ANESTHESIOLOGY

## 2019-10-07 PROCEDURE — 37000009 HC ANESTHESIA EA ADD 15 MINS: Performed by: ORTHOPAEDIC SURGERY

## 2019-10-07 PROCEDURE — D9220A PRA ANESTHESIA: Mod: ANES,,, | Performed by: ANESTHESIOLOGY

## 2019-10-07 PROCEDURE — 36000706: Performed by: ORTHOPAEDIC SURGERY

## 2019-10-07 PROCEDURE — 76942 PR U/S GUIDANCE FOR NEEDLE GUIDANCE: ICD-10-PCS | Mod: 26,,, | Performed by: ANESTHESIOLOGY

## 2019-10-07 PROCEDURE — 64417 NJX AA&/STRD AX NERVE IMG: CPT | Performed by: ORTHOPAEDIC SURGERY

## 2019-10-07 PROCEDURE — D9220A PRA ANESTHESIA: Mod: CRNA,,, | Performed by: NURSE ANESTHETIST, CERTIFIED REGISTERED

## 2019-10-07 PROCEDURE — 64415 PR NERVE BLOCK INJ, ANES/STEROID, BRACHIAL PLEXUS, INCL IMAG GUIDANCE: ICD-10-PCS | Mod: 59,LT,, | Performed by: ANESTHESIOLOGY

## 2019-10-07 PROCEDURE — 63600175 PHARM REV CODE 636 W HCPCS: Performed by: NURSE ANESTHETIST, CERTIFIED REGISTERED

## 2019-10-07 PROCEDURE — 63600175 PHARM REV CODE 636 W HCPCS: Performed by: ORTHOPAEDIC SURGERY

## 2019-10-07 PROCEDURE — 01810 ANES PX NRV MUSC F/ARM WRST: CPT | Performed by: ORTHOPAEDIC SURGERY

## 2019-10-07 PROCEDURE — 37000008 HC ANESTHESIA 1ST 15 MINUTES: Performed by: ORTHOPAEDIC SURGERY

## 2019-10-07 PROCEDURE — 76942 ECHO GUIDE FOR BIOPSY: CPT | Mod: 26,,, | Performed by: ANESTHESIOLOGY

## 2019-10-07 PROCEDURE — 63600175 PHARM REV CODE 636 W HCPCS: Performed by: ANESTHESIOLOGY

## 2019-10-07 PROCEDURE — D9220A PRA ANESTHESIA: ICD-10-PCS | Mod: CRNA,,, | Performed by: NURSE ANESTHETIST, CERTIFIED REGISTERED

## 2019-10-07 PROCEDURE — 71000033 HC RECOVERY, INTIAL HOUR: Performed by: ORTHOPAEDIC SURGERY

## 2019-10-07 PROCEDURE — 64721 CARPAL TUNNEL SURGERY: CPT | Mod: LT,,, | Performed by: ORTHOPAEDIC SURGERY

## 2019-10-07 PROCEDURE — 64721 PR REVISE MEDIAN N/CARPAL TUNNEL SURG: ICD-10-PCS | Mod: LT,,, | Performed by: ORTHOPAEDIC SURGERY

## 2019-10-07 PROCEDURE — 63600175 PHARM REV CODE 636 W HCPCS: Performed by: NURSE PRACTITIONER

## 2019-10-07 PROCEDURE — 71000015 HC POSTOP RECOV 1ST HR: Performed by: ORTHOPAEDIC SURGERY

## 2019-10-07 PROCEDURE — D9220A PRA ANESTHESIA: ICD-10-PCS | Mod: ANES,,, | Performed by: ANESTHESIOLOGY

## 2019-10-07 PROCEDURE — 76942 ECHO GUIDE FOR BIOPSY: CPT | Performed by: ANESTHESIOLOGY

## 2019-10-07 RX ORDER — SODIUM CHLORIDE, SODIUM LACTATE, POTASSIUM CHLORIDE, CALCIUM CHLORIDE 600; 310; 30; 20 MG/100ML; MG/100ML; MG/100ML; MG/100ML
INJECTION, SOLUTION INTRAVENOUS
Status: COMPLETED | OUTPATIENT
Start: 2019-10-07 | End: 2019-10-07

## 2019-10-07 RX ORDER — LIDOCAINE HCL/PF 100 MG/5ML
SYRINGE (ML) INTRAVENOUS
Status: DISCONTINUED | OUTPATIENT
Start: 2019-10-07 | End: 2019-10-07

## 2019-10-07 RX ORDER — ONDANSETRON 2 MG/ML
4 INJECTION INTRAMUSCULAR; INTRAVENOUS ONCE AS NEEDED
Status: DISCONTINUED | OUTPATIENT
Start: 2019-10-07 | End: 2019-10-07 | Stop reason: HOSPADM

## 2019-10-07 RX ORDER — LIDOCAINE HYDROCHLORIDE 20 MG/ML
INJECTION, SOLUTION INFILTRATION; PERINEURAL
Status: DISCONTINUED
Start: 2019-10-07 | End: 2019-10-07 | Stop reason: WASHOUT

## 2019-10-07 RX ORDER — ROPIVACAINE HYDROCHLORIDE 5 MG/ML
INJECTION, SOLUTION EPIDURAL; INFILTRATION; PERINEURAL
Status: COMPLETED | OUTPATIENT
Start: 2019-10-07 | End: 2019-10-07

## 2019-10-07 RX ORDER — ONDANSETRON 4 MG/1
4 TABLET, FILM COATED ORAL EVERY 6 HOURS PRN
Qty: 1515 TABLET | Refills: 0 | Status: ON HOLD | OUTPATIENT
Start: 2019-10-07 | End: 2020-06-18 | Stop reason: HOSPADM

## 2019-10-07 RX ORDER — DIPHENHYDRAMINE HYDROCHLORIDE 50 MG/ML
25 INJECTION INTRAMUSCULAR; INTRAVENOUS EVERY 6 HOURS PRN
Status: DISCONTINUED | OUTPATIENT
Start: 2019-10-07 | End: 2019-10-07 | Stop reason: HOSPADM

## 2019-10-07 RX ORDER — LIDOCAINE HYDROCHLORIDE 10 MG/ML
1 INJECTION, SOLUTION EPIDURAL; INFILTRATION; INTRACAUDAL; PERINEURAL ONCE
Status: DISCONTINUED | OUTPATIENT
Start: 2019-10-07 | End: 2019-10-07 | Stop reason: HOSPADM

## 2019-10-07 RX ORDER — MEPERIDINE HYDROCHLORIDE 25 MG/ML
12.5 INJECTION INTRAMUSCULAR; INTRAVENOUS; SUBCUTANEOUS ONCE
Status: DISCONTINUED | OUTPATIENT
Start: 2019-10-07 | End: 2019-10-07 | Stop reason: HOSPADM

## 2019-10-07 RX ORDER — ALBUTEROL SULFATE 0.83 MG/ML
2.5 SOLUTION RESPIRATORY (INHALATION) EVERY 4 HOURS PRN
Status: DISCONTINUED | OUTPATIENT
Start: 2019-10-07 | End: 2019-10-07 | Stop reason: HOSPADM

## 2019-10-07 RX ORDER — FENTANYL CITRATE 50 UG/ML
INJECTION, SOLUTION INTRAMUSCULAR; INTRAVENOUS
Status: DISCONTINUED | OUTPATIENT
Start: 2019-10-07 | End: 2019-10-07

## 2019-10-07 RX ORDER — CHLORHEXIDINE GLUCONATE ORAL RINSE 1.2 MG/ML
10 SOLUTION DENTAL
Status: DISCONTINUED | OUTPATIENT
Start: 2019-10-07 | End: 2019-10-07 | Stop reason: HOSPADM

## 2019-10-07 RX ORDER — CHLORHEXIDINE GLUCONATE ORAL RINSE 1.2 MG/ML
10 SOLUTION DENTAL 2 TIMES DAILY
Status: DISCONTINUED | OUTPATIENT
Start: 2019-10-07 | End: 2019-10-07

## 2019-10-07 RX ORDER — HYDROCODONE BITARTRATE AND ACETAMINOPHEN 5; 325 MG/1; MG/1
1 TABLET ORAL EVERY 6 HOURS PRN
Qty: 15 TABLET | Refills: 0 | Status: ON HOLD | OUTPATIENT
Start: 2019-10-07 | End: 2019-11-04 | Stop reason: SDUPTHER

## 2019-10-07 RX ORDER — PROPOFOL 10 MG/ML
VIAL (ML) INTRAVENOUS
Status: DISCONTINUED | OUTPATIENT
Start: 2019-10-07 | End: 2019-10-07

## 2019-10-07 RX ORDER — ACETAMINOPHEN 500 MG
1000 TABLET ORAL
Status: COMPLETED | OUTPATIENT
Start: 2019-10-07 | End: 2019-10-07

## 2019-10-07 RX ORDER — KETOROLAC TROMETHAMINE 30 MG/ML
15 INJECTION, SOLUTION INTRAMUSCULAR; INTRAVENOUS EVERY 8 HOURS PRN
Status: DISCONTINUED | OUTPATIENT
Start: 2019-10-07 | End: 2019-10-07 | Stop reason: HOSPADM

## 2019-10-07 RX ORDER — MIDAZOLAM HYDROCHLORIDE 1 MG/ML
INJECTION, SOLUTION INTRAMUSCULAR; INTRAVENOUS
Status: DISCONTINUED | OUTPATIENT
Start: 2019-10-07 | End: 2019-10-07

## 2019-10-07 RX ORDER — SODIUM CHLORIDE, SODIUM LACTATE, POTASSIUM CHLORIDE, CALCIUM CHLORIDE 600; 310; 30; 20 MG/100ML; MG/100ML; MG/100ML; MG/100ML
INJECTION, SOLUTION INTRAVENOUS CONTINUOUS
Status: DISCONTINUED | OUTPATIENT
Start: 2019-10-07 | End: 2019-10-07 | Stop reason: HOSPADM

## 2019-10-07 RX ORDER — ONDANSETRON 8 MG/1
8 TABLET, ORALLY DISINTEGRATING ORAL EVERY 8 HOURS PRN
Status: DISCONTINUED | OUTPATIENT
Start: 2019-10-07 | End: 2019-10-07 | Stop reason: HOSPADM

## 2019-10-07 RX ORDER — FAMOTIDINE 20 MG/1
20 TABLET, FILM COATED ORAL
Status: COMPLETED | OUTPATIENT
Start: 2019-10-07 | End: 2019-10-07

## 2019-10-07 RX ORDER — FENTANYL CITRATE 50 UG/ML
25 INJECTION, SOLUTION INTRAMUSCULAR; INTRAVENOUS EVERY 5 MIN PRN
Status: DISCONTINUED | OUTPATIENT
Start: 2019-10-07 | End: 2019-10-07 | Stop reason: HOSPADM

## 2019-10-07 RX ORDER — CHLORHEXIDINE GLUCONATE ORAL RINSE 1.2 MG/ML
10 SOLUTION DENTAL 2 TIMES DAILY
Status: DISCONTINUED | OUTPATIENT
Start: 2019-10-07 | End: 2019-10-07 | Stop reason: HOSPADM

## 2019-10-07 RX ORDER — HYDROCODONE BITARTRATE AND ACETAMINOPHEN 5; 325 MG/1; MG/1
1 TABLET ORAL
Status: DISCONTINUED | OUTPATIENT
Start: 2019-10-07 | End: 2019-10-07 | Stop reason: HOSPADM

## 2019-10-07 RX ORDER — ALPRAZOLAM 0.25 MG/1
0.25 TABLET ORAL ONCE AS NEEDED
Status: COMPLETED | OUTPATIENT
Start: 2019-10-07 | End: 2019-10-07

## 2019-10-07 RX ORDER — CEFAZOLIN SODIUM 2 G/50ML
2 SOLUTION INTRAVENOUS
Status: COMPLETED | OUTPATIENT
Start: 2019-10-07 | End: 2019-10-07

## 2019-10-07 RX ADMIN — LIDOCAINE HYDROCHLORIDE 50 MG: 20 INJECTION, SOLUTION INTRAVENOUS at 08:10

## 2019-10-07 RX ADMIN — ROPIVACAINE HYDROCHLORIDE 25 ML: 5 INJECTION, SOLUTION EPIDURAL; INFILTRATION; PERINEURAL at 08:10

## 2019-10-07 RX ADMIN — FENTANYL CITRATE 50 MCG: 50 INJECTION, SOLUTION INTRAMUSCULAR; INTRAVENOUS at 08:10

## 2019-10-07 RX ADMIN — ALPRAZOLAM 0.25 MG: 0.25 TABLET ORAL at 06:10

## 2019-10-07 RX ADMIN — FAMOTIDINE 20 MG: 20 TABLET, FILM COATED ORAL at 06:10

## 2019-10-07 RX ADMIN — PROPOFOL 20 MG: 10 INJECTION, EMULSION INTRAVENOUS at 08:10

## 2019-10-07 RX ADMIN — MIDAZOLAM 2 MG: 1 INJECTION INTRAMUSCULAR; INTRAVENOUS at 08:10

## 2019-10-07 RX ADMIN — ACETAMINOPHEN 1000 MG: 500 TABLET ORAL at 06:10

## 2019-10-07 RX ADMIN — CEFAZOLIN SODIUM 2 G: 2 SOLUTION INTRAVENOUS at 08:10

## 2019-10-07 RX ADMIN — PROPOFOL 50 MG: 10 INJECTION, EMULSION INTRAVENOUS at 08:10

## 2019-10-07 RX ADMIN — SODIUM CHLORIDE, SODIUM LACTATE, POTASSIUM CHLORIDE, AND CALCIUM CHLORIDE: 600; 310; 30; 20 INJECTION, SOLUTION INTRAVENOUS at 08:10

## 2019-10-07 RX ADMIN — SODIUM CHLORIDE, SODIUM LACTATE, POTASSIUM CHLORIDE, AND CALCIUM CHLORIDE: .6; .31; .03; .02 INJECTION, SOLUTION INTRAVENOUS at 06:10

## 2019-10-07 NOTE — DISCHARGE INSTRUCTIONS
Ondansetron tablets  What is this medicine?  ONDANSETRON (on DAN se sloan) is used to treat nausea and vomiting caused by chemotherapy. It is also used to prevent or treat nausea and vomiting after surgery.  How should I use this medicine?  Take this medicine by mouth with a glass of water. Follow the directions on your prescription label. Take your doses at regular intervals. Do not take your medicine more often than directed.  Talk to your pediatrician regarding the use of this medicine in children. Special care may be needed.  What side effects may I notice from receiving this medicine?  Side effects that you should report to your doctor or health care professional as soon as possible:  · allergic reactions like skin rash, itching or hives, swelling of the face, lips or tongue  · breathing problems  · confusion  · dizziness  · fast or irregular heartbeat  · feeling faint or lightheaded, falls  · fever and chills  · loss of balance or coordination  · seizures  · sweating  · swelling of the hands or feet  · tightness in the chest  · tremors  · unusually weak or tired  Side effects that usually do not require medical attention (report to your doctor or health care professional if they continue or are bothersome):  · constipation or diarrhea  · headache  What may interact with this medicine?  Do not take this medicine with any of the following medications:  · apomorphine  · certain medicines for fungal infections like fluconazole, itraconazole, ketoconazole, posaconazole, voriconazole  · cisapride  · dofetilide  · dronedarone  · pimozide  · thioridazine  · ziprasidone  This medicine may also interact with the following medications:  · carbamazepine  · certain medicines for depression, anxiety, or psychotic disturbances  · fentanyl  · linezolid  · MAOIs like Carbex, Eldepryl, Marplan, Nardil, and Parnate  · methylene blue (injected into a vein)  · other medicines that prolong the QT interval (cause an abnormal heart  rhythm)  · phenytoin  · rifampicin  · tramadol  What if I miss a dose?  If you miss a dose, take it as soon as you can. If it is almost time for your next dose, take only that dose. Do not take double or extra doses.  Where should I keep my medicine?  Keep out of the reach of children.  Store between 2 and 30 degrees C (36 and 86 degrees F). Throw away any unused medicine after the expiration date.  What should I tell my health care provider before I take this medicine?  They need to know if you have any of these conditions:  · heart disease  · history of irregular heartbeat  · liver disease  · low levels of magnesium or potassium in the blood  · an unusual or allergic reaction to ondansetron, granisetron, other medicines, foods, dyes, or preservatives  · pregnant or trying to get pregnant  · breast-feeding  What should I watch for while using this medicine?  Check with your doctor or health care professional right away if you have any sign of an allergic reaction.  NOTE:This sheet is a summary. It may not cover all possible information. If you have questions about this medicine, talk to your doctor, pharmacist, or health care provider. Copyright© 2017 [employer]        Acetaminophen; Hydrocodone tablets or capsules  What is this medicine?  ACETAMINOPHEN; HYDROCODONE (a set a CRISTIN sneha fen; calixto droe KOE done) is a pain reliever. It is used to treat moderate to severe pain.  How should I use this medicine?  Take this medicine by mouth with a glass of water. Follow the directions on the prescription label. You can take it with or without food. If it upsets your stomach, take it with food. Do not take your medicine more often than directed.  A special MedGuide will be given to you by the pharmacist with each prescription and refill. Be sure to read this information carefully each time.  Talk to your pediatrician regarding the use of this medicine in children. Special care may be needed.  What side effects may I notice  from receiving this medicine?  Side effects that you should report to your doctor or health care professional as soon as possible:  · allergic reactions like skin rash, itching or hives, swelling of the face, lips, or tongue  · breathing problems  · confusion  · redness, blistering, peeling or loosening of the skin, including inside the mouth  · signs and symptoms of low blood pressure like dizziness; feeling faint or lightheaded, falls; unusually weak or tired  · trouble passing urine or change in the amount of urine  · yellowing of the eyes or skin  Side effects that usually do not require medical attention (report to your doctor or health care professional if they continue or are bothersome):  · constipation  · dry mouth  · nausea, vomiting  · tiredness  What may interact with this medicine?  This medicine may interact with the following medications:  · alcohol  · antiviral medicines for HIV or AIDS  · atropine  · antihistamines for allergy, cough and cold  · certain antibiotics like erythromycin, clarithromycin  · certain medicines for anxiety or sleep  · certain medicines for bladder problems like oxybutynin, tolterodine  · certain medicines for depression like amitriptyline, fluoxetine, sertraline  · certain medicines for fungal infections like ketoconazole and itraconazole  · certain medicines for Parkinson's disease like benztropine, trihexyphenidyl  · certain medicines for seizures like carbamazepine, phenobarbital, phenytoin, primidone  · certain medicines for stomach problems like dicyclomine, hyoscyamine  · certain medicines for travel sickness like scopolamine  · general anesthetics like halothane, isoflurane, methoxyflurane, propofol  · ipratropium  · local anesthetics like lidocaine, pramoxine, tetracaine  · MAOIs like Carbex, Eldepryl, Marplan, Nardil, and Parnate  · medicines that relax muscles for surgery  · other medicines with acetaminophen  · other narcotic medicines for pain or  cough  · phenothiazines like chlorpromazine, mesoridazine, prochlorperazine, thioridazine  · rifampin  What if I miss a dose?  If you miss a dose, take it as soon as you can. If it is almost time for your next dose, take only that dose. Do not take double or extra doses.  Where should I keep my medicine?  Keep out of the reach of children. This medicine can be abused. Keep your medicine in a safe place to protect it from theft. Do not share this medicine with anyone. Selling or giving away this medicine is dangerous and against the law.  This medicine may cause accidental overdose and death if it taken by other adults, children, or pets. Mix any unused medicine with a substance like cat litter or coffee grounds. Then throw the medicine away in a sealed container like a sealed bag or a coffee can with a lid. Do not use the medicine after the expiration date.  Store at room temperature between 15 and 30 degrees C (59 and 86 degrees F).  What should I tell my health care provider before I take this medicine?  They need to know if you have any of these conditions:  · brain tumor  · Crohn's disease, inflammatory bowel disease, or ulcerative colitis  · drug abuse or addiction  · head injury  · heart or circulation problems  · if you often drink alcohol  · kidney disease or problems going to the bathroom  · liver disease  · lung disease, asthma, or breathing problems  · an unusual or allergic reaction to acetaminophen, hydrocodone, other opioid analgesics, other medicines, foods, dyes, or preservatives  · pregnant or trying to get pregnant  · breast-feeding  What should I watch for while using this medicine?  Tell your doctor or health care professional if your pain does not go away, if it gets worse, or if you have new or a different type of pain. You may develop tolerance to the medicine. Tolerance means that you will need a higher dose of the medicine for pain relief. Tolerance is normal and is expected if you take the  medicine for a long time.  Do not suddenly stop taking your medicine because you may develop a severe reaction. Your body becomes used to the medicine. This does NOT mean you are addicted. Addiction is a behavior related to getting and using a drug for a non-medical reason. If you have pain, you have a medical reason to take pain medicine. Your doctor will tell you how much medicine to take. If your doctor wants you to stop the medicine, the dose will be slowly lowered over time to avoid any side effects.  There are different types of narcotic medicines (opiates). If you take more than one type at the same time or if you are taking another medicine that also causes drowsiness, you may have more side effects. Give your health care provider a list of all medicines you use. Your doctor will tell you how much medicine to take. Do not take more medicine than directed. Call emergency for help if you have problems breathing or unusual sleepiness.  Do not take other medicines that contain acetaminophen with this medicine. Always read labels carefully. If you have questions, ask your doctor or pharmacist.  If you take too much acetaminophen get medical help right away. Too much acetaminophen can be very dangerous and cause liver damage. Even if you do not have symptoms, it is important to get help right away.  You may get drowsy or dizzy. Do not drive, use machinery, or do anything that needs mental alertness until you know how this medicine affects you. Do not stand or sit up quickly, especially if you are an older patient. This reduces the risk of dizzy or fainting spells. Alcohol may interfere with the effect of this medicine. Avoid alcoholic drinks.  The medicine will cause constipation. Try to have a bowel movement at least every 2 to 3 days. If you do not have a bowel movement for 3 days, call your doctor or health care professional.  Your mouth may get dry. Chewing sugarless gum or sucking hard candy, and drinking  plenty of water may help. Contact your doctor if the problem does not go away or is severe.  NOTE:This sheet is a summary. It may not cover all possible information. If you have questions about this medicine, talk to your doctor, pharmacist, or health care provider. Copyright© 2017 [employer]        Discharge Instructions for Carpal Tunnel Release  You had a carpal tunnel release procedure to help relieve the symptoms of carpal tunnel syndrome. In carpal tunnel syndrome, a nerve in the wrist is compressed and irritated. This causes numbness and pain in the fingers and hand. Carpal tunnel release relieves the compression of the nerve. Here are instructions that will help you care for your arm and wrist when you are at home.  Home care  · Avoid gripping objects tightly or lifting with your affected arm.  · Wear your bandage, splint, or cast as directed by your doctor.  · Always keep the dressing, splint, or cast dry and clean.  · When showering, cover your hand and  wrist with plastic and use tape or rubberbands to keep the dressing, splint, or cast dry. Shower as necessary.    · Use an ice pack or bag of frozen peas -- or something similar -- wrapped in a thin towel on your wrist to reduce swelling for the first 48 hours. Leave the ice pack on for 20 minutes; then take it off for 20 minutes. Repeat as needed.  · Keep your arm elevated above your heart for 24 to 48 hours after surgery.  · Do the exercises you learned in the hospital, or as instructed by your doctor.  · Take pain medicine as directed.  · Dont drive until your doctor says its OK. Never drive while you are taking opioid pain medicine.  · Ask your doctor when you can return to work. If your job requires heavy lifting, you may not be able to begin working again for several weeks.  Follow-up care  Make a follow-up appointment as directed by your doctor.     When to seek medical care  Call 911 right away if you have any of the following:  · Chest  pain  · Shortness of breath  Otherwise, call your doctor immediately if you have any of the following:  · A splint, cast, or dressing that has gotten wet  · Increased bleeding or drainage from the incision (cut)  · Opening of the incision  · Fever above 100.4°F (38.9°C) taken by mouth, or shaking chills  · Any new numbness in the fingers or thumb  · Blue hand or fingers  · Increased pain with or without activity  · Increased redness, tenderness, or swelling of the incision   Date Last Reviewed: 11/15/2015  © 8638-8696 [employer]. [address] [place] [address]. All rights reserved. This information is not intended as a substitute for professional medical care. Always follow your healthcare professional's instructions.

## 2019-10-07 NOTE — TELEPHONE ENCOUNTER
Called patient as directed advised an 4 wk f/u appt is needed .    Patient verbalized understanding. Patient is scheduled for 11/8/19 at 1030am w/ MD Kelley.       ----- Message from Marian Monreal PA-C sent at 10/7/2019 10:11 AM CDT -----  Can we make this patient a follow up for Cervical Spine 4 weeks from today?     Thanks

## 2019-10-07 NOTE — OP NOTE
[facility]  Orthopedic Surgery  Operative Note    SUMMARY     Date of Procedure: 10/7/2019   Assistant: None    Procedure: Procedure(s) (LRB):  RELEASE, CARPAL TUNNEL (Left)       Surgeon(s) and Role:     * Delfino Cain MD - Primary    Assisting Surgeon: None    Pre-Operative Diagnosis: Carpal tunnel syndrome on left [G56.02]    Post-Operative Diagnosis: Post-Op Diagnosis Codes:     * Carpal tunnel syndrome on left [G56.02]    Anesthesia:  Local with sedation    Technical Procedures Used:  left carpal tunnel release    Description of the Findings of the Procedure:  The patient taken the operating room where 10 cc of 2% plain lidocaine use local anesthetic about the volar aspect of the left wrist. Satisfactory anesthesia had been achieved the left hand was prepped and draped usual sterile fashion.  After exsanguination of the extremity a proximal tourniquet was inflated to 250 mm of mercury after the patient received 2 g of Ancef intravenously preoperatively.  At this time a longitudinal incision was made in line with 4th ray over the transverse carpal ligament.  The incision extended using blunt sharp dissection using 3.5 loupe magnification.  The transverse carpal ligament identified and sharply incised under direct vision.  Complete release was performed and verified by the surgeon small finger both proximally distally. No additional pathology was encountered satisfactory release had been achieved a cc of Kenalog split topically skin was closed using interrupted 4-0 nylon suture.      Complications: No    Estimated Blood Loss (EBL): 5cc                        Condition: Good    Disposition: PACU - hemodynamically stable.    Attestation: I was present and scrubbed for the entire procedure.

## 2019-10-07 NOTE — TRANSFER OF CARE
"Anesthesia Transfer of Care Note    Patient: Luz Pelaez    Procedure(s) Performed: Procedure(s) (LRB):  RELEASE, CARPAL TUNNEL (Left)    Patient location: PACU    Anesthesia Type: MAC    Transport from OR: Transported from OR on room air with adequate spontaneous ventilation    Post pain: adequate analgesia    Post assessment: no apparent anesthetic complications    Post vital signs: stable    Level of consciousness: awake and alert    Nausea/Vomiting: no nausea/vomiting    Complications: none    Transfer of care protocol was followed      Last vitals:   Visit Vitals  /86 (BP Location: Right arm, Patient Position: Lying)   Pulse 76   Temp (P) 36.6 °C (97.9 °F) (Temporal)   Resp 19   Ht 5' 5" (1.651 m)   Wt 69 kg (152 lb 1.9 oz)   SpO2 (!) 94%   Breastfeeding? No   BMI 25.31 kg/m²     "

## 2019-10-07 NOTE — ANESTHESIA PROCEDURE NOTES
Peripheral Block    Patient location during procedure: pre-op   Block not for primary anesthetic.  Reason for block: at surgeon's request and post-op pain management   Post-op Pain Location: Left hand  Timeout: 10/7/2019 7:33 AM   End time: 10/7/2019 7:41 AM    Staffing  Authorizing Provider: Patricia Isaac MD  Performing Provider: Patricia Isaac MD    Staffing  Other anesthesia staff: Kathy Vogel CRNA  Preanesthetic Checklist  Completed: patient identified, site marked, surgical consent, pre-op evaluation, timeout performed, IV checked, risks and benefits discussed and monitors and equipment checked  Peripheral Block  Patient position: supine  Prep: ChloraPrep  Patient monitoring: heart rate, cardiac monitor, continuous pulse ox, continuous capnometry and frequent blood pressure checks  Block type: axillary  Laterality: left  Injection technique: single shot  Needle  Needle type: Stimuplex   Needle gauge: 21 G  Needle length: 4 in  Needle localization: anatomical landmarks and ultrasound guidance   -ultrasound image captured on disc.  Assessment  Injection assessment: negative aspiration, negative parasthesia and local visualized surrounding nerve  Paresthesia pain: none  Heart rate change: no  Slow fractionated injection: yes  Additional Notes  VSS.  [facility] RN monitoring vitals throughout procedure.  Patient tolerated procedure well.

## 2019-10-07 NOTE — DISCHARGE SUMMARY
[facility]  Discharge Note  Short Stay    OUTCOME: Patient tolerated treatment/procedure well without complication and is now ready for discharge.    DISPOSITION: Home or Self Care    FINAL DIAGNOSIS:  Left carpal tunnel syndrome    FOLLOWUP: In orthopedic clinic

## 2019-10-07 NOTE — PLAN OF CARE
Axillary nerve block complete at this time with Marian Florez CRNA, and myself at bedside. Cardiac monitoring in place, patient resting quietly. Will monitor.

## 2019-10-07 NOTE — ANESTHESIA POSTPROCEDURE EVALUATION
Anesthesia Post Evaluation    Patient: Luz Pelaez    Procedure(s) Performed: Procedure(s) (LRB):  RELEASE, CARPAL TUNNEL (Left)    Final Anesthesia Type: MAC  Patient location during evaluation: PACU  Patient participation: Yes- Able to Participate  Level of consciousness: awake and alert and oriented  Post-procedure vital signs: reviewed and stable  Pain management: adequate  Airway patency: patent  PONV status at discharge: No PONV  Anesthetic complications: no      Cardiovascular status: hemodynamically stable  Respiratory status: unassisted  Hydration status: euvolemic  Follow-up not needed.          Vitals Value Taken Time   /92 10/7/2019  9:15 AM   Temp 36.6 °C (97.9 °F) 10/7/2019  8:40 AM   Pulse 55 10/7/2019  9:18 AM   Resp 28 10/7/2019  9:18 AM   SpO2 99 % 10/7/2019  9:18 AM   Vitals shown include unvalidated device data.      No case tracking events are documented in the log.      Pain/Marvin Score: Pain Rating Prior to Med Admin: 7 (10/7/2019  6:31 AM)  Marvin Score: 9 (10/7/2019  9:00 AM)

## 2019-10-08 ENCOUNTER — TELEPHONE (OUTPATIENT)
Dept: NEUROSURGERY | Facility: CLINIC | Age: 49
End: 2019-10-08

## 2019-10-08 NOTE — TELEPHONE ENCOUNTER
Called patient and discussed abnormal findings on CT Chest. Discussed with patient refer to Pulm to better evaluate relative risk for patient. Patient verbalized understanding.    BM

## 2019-10-09 ENCOUNTER — OFFICE VISIT (OUTPATIENT)
Dept: FAMILY MEDICINE | Facility: CLINIC | Age: 49
End: 2019-10-09
Payer: MEDICAID

## 2019-10-09 VITALS
HEART RATE: 90 BPM | WEIGHT: 153.31 LBS | SYSTOLIC BLOOD PRESSURE: 128 MMHG | BODY MASS INDEX: 25.54 KG/M2 | TEMPERATURE: 98 F | HEIGHT: 65 IN | DIASTOLIC BLOOD PRESSURE: 88 MMHG

## 2019-10-09 DIAGNOSIS — Z12.11 COLON CANCER SCREENING: ICD-10-CM

## 2019-10-09 DIAGNOSIS — F17.219 CIGARETTE NICOTINE DEPENDENCE WITH NICOTINE-INDUCED DISORDER: ICD-10-CM

## 2019-10-09 DIAGNOSIS — Z86.010 HISTORY OF COLON POLYPS: ICD-10-CM

## 2019-10-09 DIAGNOSIS — R11.2 NAUSEA AND VOMITING, INTRACTABILITY OF VOMITING NOT SPECIFIED, UNSPECIFIED VOMITING TYPE: Primary | ICD-10-CM

## 2019-10-09 DIAGNOSIS — R91.8 ABNORMAL CT SCAN OF LUNG: ICD-10-CM

## 2019-10-09 DIAGNOSIS — Z12.31 ENCOUNTER FOR SCREENING MAMMOGRAM FOR BREAST CANCER: ICD-10-CM

## 2019-10-09 DIAGNOSIS — K20.90 ESOPHAGITIS: ICD-10-CM

## 2019-10-09 DIAGNOSIS — Z80.0 FAMILY HISTORY OF MALIGNANT NEOPLASM OF GASTROINTESTINAL TRACT: ICD-10-CM

## 2019-10-09 DIAGNOSIS — Z83.719 FAMILY HISTORY OF COLONIC POLYPS: ICD-10-CM

## 2019-10-09 PROCEDURE — 99999 PR PBB SHADOW E&M-EST. PATIENT-LVL IV: CPT | Mod: PBBFAC,,, | Performed by: FAMILY MEDICINE

## 2019-10-09 PROCEDURE — 99214 OFFICE O/P EST MOD 30 MIN: CPT | Mod: PBBFAC,PO,25 | Performed by: FAMILY MEDICINE

## 2019-10-09 PROCEDURE — 99214 OFFICE O/P EST MOD 30 MIN: CPT | Mod: S$PBB,,, | Performed by: FAMILY MEDICINE

## 2019-10-09 PROCEDURE — 90471 IMMUNIZATION ADMIN: CPT | Mod: PBBFAC,PO

## 2019-10-09 PROCEDURE — 99214 PR OFFICE/OUTPT VISIT, EST, LEVL IV, 30-39 MIN: ICD-10-PCS | Mod: S$PBB,,, | Performed by: FAMILY MEDICINE

## 2019-10-09 PROCEDURE — 99999 PR PBB SHADOW E&M-EST. PATIENT-LVL IV: ICD-10-PCS | Mod: PBBFAC,,, | Performed by: FAMILY MEDICINE

## 2019-10-09 RX ORDER — OMEPRAZOLE 40 MG/1
40 CAPSULE, DELAYED RELEASE ORAL DAILY
Qty: 30 CAPSULE | Refills: 11 | Status: SHIPPED | OUTPATIENT
Start: 2019-10-09 | End: 2020-10-13 | Stop reason: SDUPTHER

## 2019-10-09 RX ORDER — VARENICLINE TARTRATE 0.5 (11)-1
KIT ORAL
Qty: 1 PACKAGE | Refills: 0 | Status: SHIPPED | OUTPATIENT
Start: 2019-10-09 | End: 2021-02-17

## 2019-10-09 RX ORDER — ONDANSETRON 4 MG/1
TABLET, ORALLY DISINTEGRATING ORAL
Qty: 100 TABLET | Refills: 2 | Status: ON HOLD | OUTPATIENT
Start: 2019-10-09 | End: 2020-06-18 | Stop reason: HOSPADM

## 2019-10-09 NOTE — PROGRESS NOTES
Subjective:      Patient ID: Luz Pelaez is a 49 y.o. female.    Chief Complaint: Follow-up (carpal tunnel surgery and CT results)    Problem List Items Addressed This Visit     Abnormal CT scan of lung    Cigarette nicotine dependence with nicotine-induced disorder    Overview     The patient presents with a complaint of smoking.  She has a history of 31 pack years. She currently smokes 1 packs per day. She has attempted smoking cessation once. She has remained smoke free for up to 10 months..   The patient has been smoking 1 packs a day for 31  years.  Reasons given as to why the patient has not stopped smoking include under a lot of stress now.   She recently had an abnormality found on a CT and she has been scheduled with Dr. Mcknight for an evaluation.  When she quit in the past, she did this with chantix.  She had nausea with this and wants to get zofran to help.    CT Chest Without Contrast   Order: [id number]   Status:  Final result   Visible to patient:  Yes (Patient Portal) Next appt:  10/11/2019 at 10:00 AM in Orthopedics (Delfino Cain MD) Dx:  Carpal tunnel syndrome on both sides;...   Details     Reading Physician Reading Date Result Priority   Tarik Gallo III, MD 9/26/2019 Routine      Narrative     EXAMINATION:  CT CHEST WITHOUT CONTRAST    CLINICAL HISTORY:  abnormal x ray of cervial spine; Abnormal findings on diagnostic imaging of other parts of musculoskeletal system    TECHNIQUE:  Low dose axial images, sagittal and coronal reformations were obtained from the thoracic inlet to the lung bases. Contrast was not administered.    COMPARISON:  CXR March 26, 2014, CT C-spine September 5, 2019    FINDINGS:  Minimal apical pleuroparenchymal scarring noted bilaterally, slightly greater on the right.  Subpleural minimally spiculated nodule adjacent to the scarring noted measuring 6 mm.  This best visualized on series 603 image 224.  Per Fleischner society guidelines for single solid nodule in the  6-8 mm range: Low risk patient CT at 6-12 months then consider CT at 18-24 months.  For high risk patient CT at 6-12 months then CT at 18-24 months.    Several bilateral areas of ground-glass opacity with the central bronchiectasis or dilated bronchus noted.  These are more prominent in the upper lobes, right greater than left.  No dense consolidation, mass lesion or effusion.  No focal or diffuse pleural thickening.  Additional areas of the cylindrical bronchiectasis noted again with the upper lobe prominence, right greater than left.  Infectious or inflammatory processes within the differential.    Trachea and mainstem bronchi within normal limits.  Thyroid unremarkable.  Heart size normal without pericardial effusion.    No bulky or matted adenopathy within the axillary, mediastinal or hilar regions.  A few subcentimeter short axis nodes identified in these areas.  Postsurgical changes noted in the GE junction region with small hiatal hernia identified.    Included upper abdomen remarkable for prior cholecystectomy.  Degenerative changes noted throughout the spine.  No lytic or blastic lesion.      Impression       6 mm spiculated nodule identified in the right apex abutting adjacent pleuroparenchymal scarring.  Fleischner society guidelines for single solid nodule in the 6-8 mm range: Low risk patient CT at 6-12 months then consider CT at 18-24 months.  For high risk patient: CT at 6-12 months then again at 18-24 months.    Bilateral bronchiectasis most prominently involving the upper lobes, right greater than left.    Focal bronchiectasis with surrounding ground-glass opacity noted within both upper lobes, greater on the right.  Possible etiologies would include sequela from infectious or inflammatory process.  Per Fleischner society guidelines for ground-glass opacities greater than 6 mm: CT at 6-12 months to confirm presence then CT at 3 and 5 years.    Subcentimeter short axis axillary and mediastinal nodes  without bulky or matted adenopathy identified.    Postsurgical changes GE junction region with small to moderate size hiatal hernia.    Additional incidental findings as above.      Electronically signed by: Tarik Gallo MD  Date: 09/26/2019  Time: 16:15                    Colon cancer screening    Esophagitis    Overview     She had an EGD in 2014 and had esophagitis. She is on mobic, she smokes and she is on a low dose of the prilosec. She has had continued pain with this and would like more coverage.  She is about to quit smoking.         Family history of colonic polyps    Family history of malignant neoplasm of gastrointestinal tract    History of colon polyps      Other Visit Diagnoses     Nausea and vomiting, intractability of vomiting not specified, unspecified vomiting type    -  Primary    Encounter for screening mammogram for breast cancer              Past Medical History:  Past Medical History:   Diagnosis Date    Allergy     Asthma     Attention deficit     Cervical spine degeneration     GERD (gastroesophageal reflux disease)     Kidney stones      Past Surgical History:   Procedure Laterality Date    CARPAL TUNNEL RELEASE Left 10/7/2019    Procedure: RELEASE, CARPAL TUNNEL;  Surgeon: Delfino Cain MD;  Location: [facility];  Service: Orthopedics;  Laterality: Left;    CHOLECYSTECTOMY      COLONOSCOPY  4/1/2012    date is approximate-done by Dr. Naranjo due to chronic constipation    SLEEVE GASTROPLASTY      TOTAL REDUCTION MAMMOPLASTY       Review of patient's allergies indicates:   Allergen Reactions    Bupropion hcl Other (See Comments)     Mood swings  Mood swings     Current Outpatient Medications on File Prior to Visit   Medication Sig Dispense Refill    albuterol (PROAIR HFA) 90 mcg/actuation inhaler INHALE 1-2 PUFFS INTO THE LUNGS EVERY 6 HOURS AS NEEDED FOR WHEEZING (Patient taking differently: INHALE 1-2 PUFFS INTO THE LUNGS EVERY 6 HOURS AS NEEDED FOR WHEEZING  Take am of  surgery) 17 g 11    [START ON 10/12/2019] dextroamphetamine-amphetamine (ADDERALL) 30 mg Tab Take 1 tablet by mouth 2 (two) times daily. (Patient taking differently: Take 1 tablet by mouth 2 (two) times daily. Hold am of surgery) 60 tablet 0    fluticasone-salmeterol 250-50 mcg/dose (ADVAIR DISKUS) 250-50 mcg/dose diskus inhaler Use 1 inhalation into the  lungs two times daily (Patient taking differently: Use 1 inhalation into the  lungs two times daily  Take am of surgery) 60 each 11    HYDROcodone-acetaminophen (NORCO) 5-325 mg per tablet Take 1 tablet by mouth every 6 (six) hours as needed for Pain. 15 tablet 0    LINZESS 290 mcg Cap Take 1 capsule (290 mcg total) by mouth once daily. 30 capsule 6    meloxicam (MOBIC) 15 MG tablet Take 1 tablet (15 mg total) by mouth once daily. (Patient taking differently: Take 15 mg by mouth once daily. Hold 5-7 days prior to surgery) 90 tablet 3    ondansetron (ZOFRAN) 4 MG tablet Take 1 tablet (4 mg total) by mouth every 6 (six) hours as needed for Nausea. 1515 tablet 0    tiotropium (SPIRIVA WITH HANDIHALER) 18 mcg inhalation capsule Inhale the contents of 1    capsule via HandiHaler once daily (Patient taking differently: Inhale the contents of 1    capsule via HandiHaler once daily  Take am of surgery) 30 capsule 11    [DISCONTINUED] omeprazole (PRILOSEC) 20 MG capsule Take 1 capsule by mouth  twice daily (Patient taking differently: Take 1 capsule by mouth  twice daily  Take am of surgery) 180 capsule 3     No current facility-administered medications on file prior to visit.      Social History     Socioeconomic History    Marital status: Single     Spouse name: Not on file    Number of children: 2    Years of education: Not on file    Highest education level: Not on file   Occupational History    Occupation:      Employer: [employer]   Social Needs    Financial resource strain: Not on file    Food insecurity:      Worry: Not on file     Inability: Not on file    Transportation needs:     Medical: Not on file     Non-medical: Not on file   Tobacco Use    Smoking status: Current Every Day Smoker     Packs/day: 1.00     Years: 30.00     Pack years: 30.00     Types: Cigarettes    Smokeless tobacco: Never Used   Substance and Sexual Activity    Alcohol use: Yes     Frequency: 2-3 times a week     Drinks per session: 1 or 2     Comment: Socially    Drug use: No    Sexual activity: Yes     Partners: Male   Lifestyle    Physical activity:     Days per week: Not on file     Minutes per session: Not on file    Stress: Not on file   Relationships    Social connections:     Talks on phone: Not on file     Gets together: Not on file     Attends Episcopal service: Not on file     Active member of club or organization: Not on file     Attends meetings of clubs or organizations: Not on file     Relationship status: Not on file   Other Topics Concern    Not on file   Social History Narrative    Not on file     Family History   Problem Relation Age of Onset    Hypertension Mother     Diabetes Mother     Hyperlipidemia Mother     Heart disease Mother 58    Colon cancer Maternal Grandmother     Breast cancer Maternal Grandmother     Breast cancer Paternal Grandmother         Breast    Heart attack Father 70    Thyroid disease Sister     Hypertension Sister     Breast cancer Paternal Aunt     Stroke Neg Hx        Review of Systems   Constitutional: Negative for activity change and unexpected weight change.   HENT: Negative for hearing loss, rhinorrhea and trouble swallowing.    Eyes: Negative for discharge and visual disturbance.   Respiratory: Positive for chest tightness and wheezing.    Cardiovascular: Positive for chest pain. Negative for palpitations.   Gastrointestinal: Positive for constipation. Negative for blood in stool, diarrhea and vomiting.   Endocrine: Negative for polydipsia and polyuria.   Genitourinary:  "Negative for difficulty urinating, dysuria, hematuria and menstrual problem.   Musculoskeletal: Positive for arthralgias, joint swelling and neck pain.   Neurological: Negative for weakness and headaches.   Psychiatric/Behavioral: Positive for dysphoric mood. Negative for confusion.       Objective:     /88   Pulse 90   Temp 98.4 °F (36.9 °C) (Oral)   Ht 5' 5" (1.651 m)   Wt 69.5 kg (153 lb 4.8 oz)   BMI 25.51 kg/m²     Physical Exam   Constitutional: She appears well-developed and well-nourished. She is cooperative.   HENT:   Head: Normocephalic and atraumatic.   Right Ear: Tympanic membrane, external ear and ear canal normal.   Left Ear: Tympanic membrane, external ear and ear canal normal.   Nose: Nose normal.   Mouth/Throat: Uvula is midline and mucous membranes are normal. No oral lesions. No oropharyngeal exudate, posterior oropharyngeal edema or posterior oropharyngeal erythema.   Eyes: Pupils are equal, round, and reactive to light. EOM and lids are normal. Right eye exhibits no discharge. Left eye exhibits no discharge. Right conjunctiva is not injected. Right conjunctiva has no hemorrhage. Left conjunctiva is not injected. Left conjunctiva has no hemorrhage. No scleral icterus. Right eye exhibits no nystagmus. Left eye exhibits no nystagmus.   Neck: Normal range of motion and full passive range of motion without pain. Neck supple. No JVD present. No tracheal tenderness present. Carotid bruit is not present. No tracheal deviation present. No thyroid mass and no thyromegaly present.   Cardiovascular: Normal rate, regular rhythm, S1 normal and S2 normal.   No murmur heard.  Pulses:       Carotid pulses are 2+ on the right side, and 2+ on the left side.       Radial pulses are 2+ on the right side, and 2+ on the left side.        Posterior tibial pulses are 2+ on the right side, and 2+ on the left side.   Pulmonary/Chest: Effort normal and breath sounds normal. No respiratory distress. She has no " wheezes. She has no rhonchi. She has no rales.   Abdominal: Soft. Normal appearance, normal aorta and bowel sounds are normal. She exhibits no distension, no abdominal bruit, no pulsatile midline mass and no mass. There is no hepatosplenomegaly. There is no tenderness. There is no rebound.   Musculoskeletal:        Right knee: She exhibits no swelling. No tenderness found.        Left knee: She exhibits no swelling. No tenderness found.        Arms:  Lymphadenopathy:        Head (right side): No submental and no submandibular adenopathy present.        Head (left side): No submental and no submandibular adenopathy present.     She has no cervical adenopathy.   Neurological: She is alert. She has normal strength. No cranial nerve deficit or sensory deficit.   Skin: Skin is warm and dry. No rash noted. No cyanosis. Nails show no clubbing.   Psychiatric: She has a normal mood and affect. Her speech is normal and behavior is normal. Thought content normal. Cognition and memory are normal.     Assessment:     1. Nausea and vomiting, intractability of vomiting not specified, unspecified vomiting type    2. Cigarette nicotine dependence with nicotine-induced disorder    3. Abnormal CT scan of lung    4. Colon cancer screening    5. Family history of colonic polyps    6. Family history of malignant neoplasm of gastrointestinal tract    7. History of colon polyps    8. Encounter for screening mammogram for breast cancer    9. Esophagitis        Plan:     Problem List Items Addressed This Visit     Abnormal CT scan of lung    Cigarette nicotine dependence with nicotine-induced disorder    Relevant Medications    varenicline (CHANTIX STARTING MONTH BOX) 0.5 mg (11)- 1 mg (42) tablet    Colon cancer screening    Relevant Orders    Case request GI: COLONOSCOPY (Completed)    Esophagitis    Relevant Medications    omeprazole (PRILOSEC) 40 MG capsule    Family history of colonic polyps    Family history of malignant neoplasm of  gastrointestinal tract    History of colon polyps      Other Visit Diagnoses     Nausea and vomiting, intractability of vomiting not specified, unspecified vomiting type    -  Primary    Relevant Medications    ondansetron (ZOFRAN-ODT) 4 MG TbDL    Encounter for screening mammogram for breast cancer        Relevant Orders    Mammo Digital Screening Bilat      see Dr. Kapadia.  If she tolerates the chantix, in 1 month change to the 1 mg po bid RX for 2 months.  She will call me.  No follow-ups on file.

## 2019-10-10 ENCOUNTER — TELEPHONE (OUTPATIENT)
Dept: ENDOSCOPY | Facility: HOSPITAL | Age: 49
End: 2019-10-10

## 2019-10-10 NOTE — TELEPHONE ENCOUNTER
Endoscopy Scheduling Questionnaire:    1. Have you been admitted overnight to the hospital in the past 3 months? no  2. Have you had a cardiac stent placed in the past 12 months? no  3. Have you had a stroke or heart attack in the past 6 months? no  4. Have you had any chest pain in the past 3 months? no      If so, have you been evaluated by your PCP or Cardiologist? no  5. Do you take prescription weight loss medication? no  6. Have you been diagnosed with Diverticulitis within the past 3 months? no  7. Are you on dialysis? no  8. Are you diabetic? no Do you have an insulin pump? no  9. Do you have any other health issues that you feel might limit your ability to safely have the procedure and/or sedation? no  10. Is the patient over 79 yo? no        If so, has the patient been seen by their PCP or GI in the last 6 months? N/A       -I have reviewed the last colonoscopy for recommendations regarding surveillance and bowel prep  Yes  -I have reviewed the patient's medications and allergies. She is not on high risk medication, A clearance request NA  -I have verified the pharmacy information. The prep being used is Miralax. The patient's prep instructions were sent via [facility] patient portal..    Date Endoscopy Scheduled: Date: 11-14-19

## 2019-10-11 ENCOUNTER — OFFICE VISIT (OUTPATIENT)
Dept: ORTHOPEDICS | Facility: CLINIC | Age: 49
End: 2019-10-11
Payer: MEDICAID

## 2019-10-11 VITALS
DIASTOLIC BLOOD PRESSURE: 80 MMHG | HEART RATE: 73 BPM | HEIGHT: 65 IN | SYSTOLIC BLOOD PRESSURE: 131 MMHG | TEMPERATURE: 98 F | WEIGHT: 153 LBS | BODY MASS INDEX: 25.49 KG/M2

## 2019-10-11 DIAGNOSIS — G56.03 CARPAL TUNNEL SYNDROME ON BOTH SIDES: Primary | ICD-10-CM

## 2019-10-11 PROCEDURE — 99213 OFFICE O/P EST LOW 20 MIN: CPT | Mod: PBBFAC | Performed by: ORTHOPAEDIC SURGERY

## 2019-10-11 PROCEDURE — 99024 PR POST-OP FOLLOW-UP VISIT: ICD-10-PCS | Mod: ,,, | Performed by: ORTHOPAEDIC SURGERY

## 2019-10-11 PROCEDURE — 99999 PR PBB SHADOW E&M-EST. PATIENT-LVL III: CPT | Mod: PBBFAC,,, | Performed by: ORTHOPAEDIC SURGERY

## 2019-10-11 PROCEDURE — 99999 PR PBB SHADOW E&M-EST. PATIENT-LVL III: ICD-10-PCS | Mod: PBBFAC,,, | Performed by: ORTHOPAEDIC SURGERY

## 2019-10-11 PROCEDURE — 99024 POSTOP FOLLOW-UP VISIT: CPT | Mod: ,,, | Performed by: ORTHOPAEDIC SURGERY

## 2019-10-11 NOTE — H&P (VIEW-ONLY)
Subjective:     Patient ID: Luz Pelaez is a 49 y.o. female.    Chief Complaint: Pain and Post-op Evaluation of the Left Wrist    The patient is a 49-year-old female status post left carpal tunnel release date of surgery is 10/07/2019.  She is quite pleased with that result and wishes to have right carpal tunnel release scheduled.      Past Medical History:   Diagnosis Date    Allergy     Asthma     Attention deficit     Cervical spine degeneration     GERD (gastroesophageal reflux disease)     Kidney stones      Past Surgical History:   Procedure Laterality Date    CARPAL TUNNEL RELEASE Left 10/7/2019    Procedure: RELEASE, CARPAL TUNNEL;  Surgeon: Delfino Cain MD;  Location: [facility];  Service: Orthopedics;  Laterality: Left;    CHOLECYSTECTOMY      COLONOSCOPY  4/1/2012    date is approximate-done by Dr. Naranjo due to chronic constipation    SLEEVE GASTROPLASTY      TOTAL REDUCTION MAMMOPLASTY       Family History   Problem Relation Age of Onset    Hypertension Mother     Diabetes Mother     Hyperlipidemia Mother     Heart disease Mother 58    Colon cancer Maternal Grandmother     Breast cancer Maternal Grandmother     Breast cancer Paternal Grandmother         Breast    Heart attack Father 70    Thyroid disease Sister     Hypertension Sister     Breast cancer Paternal Aunt     Stroke Neg Hx      Social History     Socioeconomic History    Marital status: Single     Spouse name: Not on file    Number of children: 2    Years of education: Not on file    Highest education level: Not on file   Occupational History    Occupation:      Employer: [employer]   Social Needs    Financial resource strain: Not on file    Food insecurity:     Worry: Not on file     Inability: Not on file    Transportation needs:     Medical: Not on file     Non-medical: Not on file   Tobacco Use    Smoking status: Current Every Day Smoker     Packs/day: 1.00      Years: 30.00     Pack years: 30.00     Types: Cigarettes    Smokeless tobacco: Never Used   Substance and Sexual Activity    Alcohol use: Yes     Frequency: 2-3 times a week     Drinks per session: 1 or 2     Comment: Socially    Drug use: No    Sexual activity: Yes     Partners: Male   Lifestyle    Physical activity:     Days per week: Not on file     Minutes per session: Not on file    Stress: Not on file   Relationships    Social connections:     Talks on phone: Not on file     Gets together: Not on file     Attends Moravian service: Not on file     Active member of club or organization: Not on file     Attends meetings of clubs or organizations: Not on file     Relationship status: Not on file   Other Topics Concern    Not on file   Social History Narrative    Not on file     Medication List with Changes/Refills   Current Medications    ALBUTEROL (PROAIR HFA) 90 MCG/ACTUATION INHALER    INHALE 1-2 PUFFS INTO THE LUNGS EVERY 6 HOURS AS NEEDED FOR WHEEZING    DEXTROAMPHETAMINE-AMPHETAMINE (ADDERALL) 30 MG TAB    Take 1 tablet by mouth 2 (two) times daily.    FLUTICASONE-SALMETEROL 250-50 MCG/DOSE (ADVAIR DISKUS) 250-50 MCG/DOSE DISKUS INHALER    Use 1 inhalation into the  lungs two times daily    HYDROCODONE-ACETAMINOPHEN (NORCO) 5-325 MG PER TABLET    Take 1 tablet by mouth every 6 (six) hours as needed for Pain.    LINZESS 290 MCG CAP    Take 1 capsule (290 mcg total) by mouth once daily.    MELOXICAM (MOBIC) 15 MG TABLET    Take 1 tablet (15 mg total) by mouth once daily.    OMEPRAZOLE (PRILOSEC) 40 MG CAPSULE    Take 1 capsule (40 mg total) by mouth once daily.    ONDANSETRON (ZOFRAN) 4 MG TABLET    Take 1 tablet (4 mg total) by mouth every 6 (six) hours as needed for Nausea.    ONDANSETRON (ZOFRAN-ODT) 4 MG TBDL    Every 6 hours prn nausea    TIOTROPIUM (SPIRIVA WITH HANDIHALER) 18 MCG INHALATION CAPSULE    Inhale the contents of 1    capsule via HandiHaler once daily    VARENICLINE (CHANTIX  STARTING MONTH BOX) 0.5 MG (11)- 1 MG (42) TABLET    Take one 0.5mg tab by mouth once daily X3 days,then increase to one 0.5mg tab twice daily X4 days,then increase to one 1mg tab twice daily     Review of patient's allergies indicates:   Allergen Reactions    Bupropion hcl Other (See Comments)     Mood swings  Mood swings     Review of Systems   Constitution: Negative for malaise/fatigue.   HENT: Negative for hearing loss.    Eyes: Negative for double vision and visual disturbance.   Cardiovascular: Negative for chest pain.   Respiratory: Positive for wheezing. Negative for shortness of breath.    Endocrine: Negative for cold intolerance.   Hematologic/Lymphatic: Does not bruise/bleed easily.   Skin: Negative for poor wound healing and suspicious lesions.   Musculoskeletal: Positive for neck pain. Negative for gout, joint pain and joint swelling.   Gastrointestinal: Positive for heartburn. Negative for nausea and vomiting.   Genitourinary: Negative for dysuria.   Neurological: Positive for headaches, numbness, paresthesias and sensory change.   Psychiatric/Behavioral: Positive for altered mental status. Negative for depression, memory loss and substance abuse. The patient is not nervous/anxious.    Allergic/Immunologic: Negative for persistent infections.       Objective:   Body mass index is 25.46 kg/m².  Vitals:    10/11/19 1026   BP: 131/80   Pulse: 73   Temp: 97.5 °F (36.4 °C)                General    Constitutional: She is oriented to person, place, and time. She appears well-developed and well-nourished. No distress.   HENT:   Head: Normocephalic.   Eyes: EOM are normal.   Neck: Normal range of motion.   Pulmonary/Chest: Effort normal.   Neurological: She is oriented to person, place, and time.   Psychiatric: She has a normal mood and affect.             Right Hand/Wrist Exam     Pain   Wrist - The patient exhibits pain of the flexor/pronator group.    Tenderness   The patient is tender to palpation of the  [place] area.    Tests   Phalens sign: positive  Tinel's sign (median nerve): positive  Carpal Tunnel Compression Test: positive    Atrophy   Thenar:  negative  Intrinsic:  negative    Other     Neuorologic Exam    Median Distribution: abnormal  Ulnar Distribution: normal  Radial Distribution: normal    Comments:  The patient has a positive Tinel and positive Phalen sign.  There is no intrinsic atrophy noted.      Left Hand/Wrist Exam     Inspection   Scars: Wrist - present     Pain   Wrist - The patient exhibits pain of the flexor/pronator group.    Tenderness   The patient is tender to palpation of the [place] area.     Other     Sensory Exam  Median Distribution: normal  Ulnar Distribution: normal  Radial Distribution: normal    Comments:  The suture line is intact in left carpal tunnel incision.  There is no sign of infection.  There are no motor or sensory deficits.          Vascular Exam       Capillary Refill  Right Hand: normal capillary refill  Left Hand: normal capillary refill      Relevant imaging results reviewed and interpreted by me, discussed with the patient and / or family today radiographs were not obtained today.  Assessment:     Encounter Diagnosis   Name Primary?    Carpal tunnel syndrome on both sides Yes        Plan:     The patient had Band-Aids applied left wrist. Wound care was discussed. She wished to schedule a right carpal tunnel release.  Risk complications and alternatives were discussed including the risk of infection, anesthetic risk, injury to nerves and vessels, loss of motion, and possible need for additional surgeries were discussed.  She seems to understand and agree that surgery. All questions were answered.  She will return in 1 week for suture removal.                Disclaimer: This note was prepared using a voice recognition system and is likely to have sound alike errors within the text.

## 2019-10-11 NOTE — PROGRESS NOTES
Subjective:     Patient ID: Luz Pelaez is a 49 y.o. female.    Chief Complaint: Pain and Post-op Evaluation of the Left Wrist    The patient is a 49-year-old female status post left carpal tunnel release date of surgery is 10/07/2019.  She is quite pleased with that result and wishes to have right carpal tunnel release scheduled.      Past Medical History:   Diagnosis Date    Allergy     Asthma     Attention deficit     Cervical spine degeneration     GERD (gastroesophageal reflux disease)     Kidney stones      Past Surgical History:   Procedure Laterality Date    CARPAL TUNNEL RELEASE Left 10/7/2019    Procedure: RELEASE, CARPAL TUNNEL;  Surgeon: Delfino Cain MD;  Location: [facility];  Service: Orthopedics;  Laterality: Left;    CHOLECYSTECTOMY      COLONOSCOPY  4/1/2012    date is approximate-done by Dr. Naranjo due to chronic constipation    SLEEVE GASTROPLASTY      TOTAL REDUCTION MAMMOPLASTY       Family History   Problem Relation Age of Onset    Hypertension Mother     Diabetes Mother     Hyperlipidemia Mother     Heart disease Mother 58    Colon cancer Maternal Grandmother     Breast cancer Maternal Grandmother     Breast cancer Paternal Grandmother         Breast    Heart attack Father 70    Thyroid disease Sister     Hypertension Sister     Breast cancer Paternal Aunt     Stroke Neg Hx      Social History     Socioeconomic History    Marital status: Single     Spouse name: Not on file    Number of children: 2    Years of education: Not on file    Highest education level: Not on file   Occupational History    Occupation:      Employer: [employer]   Social Needs    Financial resource strain: Not on file    Food insecurity:     Worry: Not on file     Inability: Not on file    Transportation needs:     Medical: Not on file     Non-medical: Not on file   Tobacco Use    Smoking status: Current Every Day Smoker     Packs/day: 1.00      Years: 30.00     Pack years: 30.00     Types: Cigarettes    Smokeless tobacco: Never Used   Substance and Sexual Activity    Alcohol use: Yes     Frequency: 2-3 times a week     Drinks per session: 1 or 2     Comment: Socially    Drug use: No    Sexual activity: Yes     Partners: Male   Lifestyle    Physical activity:     Days per week: Not on file     Minutes per session: Not on file    Stress: Not on file   Relationships    Social connections:     Talks on phone: Not on file     Gets together: Not on file     Attends [employer] service: Not on file     Active member of club or organization: Not on file     Attends meetings of clubs or organizations: Not on file     Relationship status: Not on file   Other Topics Concern    Not on file   Social History Narrative    Not on file     Medication List with Changes/Refills   Current Medications    ALBUTEROL (PROAIR HFA) 90 MCG/ACTUATION INHALER    INHALE 1-2 PUFFS INTO THE LUNGS EVERY 6 HOURS AS NEEDED FOR WHEEZING    DEXTROAMPHETAMINE-AMPHETAMINE (ADDERALL) 30 MG TAB    Take 1 tablet by mouth 2 (two) times daily.    FLUTICASONE-SALMETEROL 250-50 MCG/DOSE (ADVAIR DISKUS) 250-50 MCG/DOSE DISKUS INHALER    Use 1 inhalation into the  lungs two times daily    HYDROCODONE-ACETAMINOPHEN (NORCO) 5-325 MG PER TABLET    Take 1 tablet by mouth every 6 (six) hours as needed for Pain.    LINZESS 290 MCG CAP    Take 1 capsule (290 mcg total) by mouth once daily.    MELOXICAM (MOBIC) 15 MG TABLET    Take 1 tablet (15 mg total) by mouth once daily.    OMEPRAZOLE (PRILOSEC) 40 MG CAPSULE    Take 1 capsule (40 mg total) by mouth once daily.    ONDANSETRON (ZOFRAN) 4 MG TABLET    Take 1 tablet (4 mg total) by mouth every 6 (six) hours as needed for Nausea.    ONDANSETRON (ZOFRAN-ODT) 4 MG TBDL    Every 6 hours prn nausea    TIOTROPIUM (SPIRIVA WITH HANDIHALER) 18 MCG INHALATION CAPSULE    Inhale the contents of 1    capsule via HandiHaler once daily    VARENICLINE (CHANTIX  STARTING MONTH BOX) 0.5 MG (11)- 1 MG (42) TABLET    Take one 0.5mg tab by mouth once daily X3 days,then increase to one 0.5mg tab twice daily X4 days,then increase to one 1mg tab twice daily     Review of patient's allergies indicates:   Allergen Reactions    Bupropion hcl Other (See Comments)     Mood swings  Mood swings     Review of Systems   Constitution: Negative for malaise/fatigue.   HENT: Negative for hearing loss.    Eyes: Negative for double vision and visual disturbance.   Cardiovascular: Negative for chest pain.   Respiratory: Positive for wheezing. Negative for shortness of breath.    Endocrine: Negative for cold intolerance.   Hematologic/Lymphatic: Does not bruise/bleed easily.   Skin: Negative for poor wound healing and suspicious lesions.   Musculoskeletal: Positive for neck pain. Negative for gout, joint pain and joint swelling.   Gastrointestinal: Positive for heartburn. Negative for nausea and vomiting.   Genitourinary: Negative for dysuria.   Neurological: Positive for headaches, numbness, paresthesias and sensory change.   Psychiatric/Behavioral: Positive for altered mental status. Negative for depression, memory loss and substance abuse. The patient is not nervous/anxious.    Allergic/Immunologic: Negative for persistent infections.       Objective:   Body mass index is 25.46 kg/m².  Vitals:    10/11/19 1026   BP: 131/80   Pulse: 73   Temp: 97.5 °F (36.4 °C)                General    Constitutional: She is oriented to person, place, and time. She appears well-developed and well-nourished. No distress.   HENT:   Head: Normocephalic.   Eyes: EOM are normal.   Neck: Normal range of motion.   Pulmonary/Chest: Effort normal.   Neurological: She is oriented to person, place, and time.   Psychiatric: She has a normal mood and affect.             Right Hand/Wrist Exam     Pain   Wrist - The patient exhibits pain of the flexor/pronator group.    Tenderness   The patient is tender to palpation of the  [place]ictor[place] area.    Tests   Phalens sign: positive  Tinel's sign (median nerve): positive  Carpal Tunnel Compression Test: positive    Atrophy   Thenar:  negative  Intrinsic:  negative    Other     Neuorologic Exam    Median Distribution: abnormal  Ulnar Distribution: normal  Radial Distribution: normal    Comments:  The patient has a positive Tinel and positive Phalen sign.  There is no intrinsic atrophy noted.      Left Hand/Wrist Exam     Inspection   Scars: Wrist - present     Pain   Wrist - The patient exhibits pain of the flexor/pronator group.    Tenderness   The patient is tender to palpation of the [place]oria area.     Other     Sensory Exam  Median Distribution: normal  Ulnar Distribution: normal  Radial Distribution: normal    Comments:  The suture line is intact in left carpal tunnel incision.  There is no sign of infection.  There are no motor or sensory deficits.          Vascular Exam       Capillary Refill  Right Hand: normal capillary refill  Left Hand: normal capillary refill      Relevant imaging results reviewed and interpreted by me, discussed with the patient and / or family today radiographs were not obtained today.  Assessment:     Encounter Diagnosis   Name Primary?    Carpal tunnel syndrome on both sides Yes        Plan:     The patient had Band-Aids applied left wrist. Wound care was discussed. She wished to schedule a right carpal tunnel release.  Risk complications and alternatives were discussed including the risk of infection, anesthetic risk, injury to nerves and vessels, loss of motion, and possible need for additional surgeries were discussed.  She seems to understand and agree that surgery. All questions were answered.  She will return in 1 week for suture removal.                Disclaimer: This note was prepared using a voice recognition system and is likely to have sound alike errors within the text.

## 2019-10-17 ENCOUNTER — TELEPHONE (OUTPATIENT)
Dept: PULMONOLOGY | Facility: CLINIC | Age: 49
End: 2019-10-17

## 2019-10-17 DIAGNOSIS — J45.909 ASTHMA, UNSPECIFIED ASTHMA SEVERITY, UNSPECIFIED WHETHER COMPLICATED, UNSPECIFIED WHETHER PERSISTENT: Primary | ICD-10-CM

## 2019-10-17 NOTE — PROGRESS NOTES
Subjective:       Patient ID: Luz Pelaez is a 49 y.o. female.  Patient Active Problem List   Diagnosis    Asthma    Attention deficit    GERD (gastroesophageal reflux disease)    Family history of colonic polyps    Family history of malignant neoplasm of gastrointestinal tract    Migraine with aura and without status migrainosus, not intractable    Cigarette nicotine dependence with nicotine-induced disorder    Colon cancer screening    History of colon polyps    Bilateral carpal tunnel syndrome    Cervical spine degeneration    Preoperative testing    Abnormal CT scan of lung    Esophagitis     Immunization History   Administered Date(s) Administered    Influenza - Quadrivalent 12/07/2016    Influenza - Quadrivalent - PF (6 months and older) 11/19/2018, 10/09/2019    Pneumococcal Polysaccharide - 23 Valent 03/26/2014    Tdap 03/26/2014     Social History     Tobacco Use   Smoking Status Current Every Day Smoker    Packs/day: 1.00    Years: 30.00    Pack years: 30.00    Types: Cigarettes   Smokeless Tobacco Never Used     Asthma Control Test  In the past 4  weeks, how much of the time did your asthma keep you from getting as much done at work, school or at home?: Most of the time  During the past 4 weeks, how often have you had shortness of breath?: More than once a day  During the past 4 weeks, how often did your asthma symptoms (wheezing, couging, shortness of breath, chest tightness or pain) wake you up at night or earlier than usual in the morning?: Once or twice  During the past 4 weeks, how often have you used your rescue inhaler or nebulizer medication (such as albuterol)?: 1 or 2 times per day  How would you rate your asthma control during the past 4 weeks?: Somewhat controlled  If your score is 19 or less, your asthma may not be under control: 12     Chief Complaint:   Luz Pelaez is 49 y.o.  Asked to see me by Dr Mcgarry,  Abn chest CT during w/u for neck pain  No  hemoptysis  Recent left carpal tunnel surgery and awaiting possible neck procedure by Dr Kelley  Await surgery to right wrist  Active smoker: @18 years. Works in sale  Quit in 2008  Has Asthma: regime Advair, Spiriva, nebuliser: No prior marci  No mold or asbestos exposure  Current spirometry: FEV1: 65.3%)  No occupational exposure  No travel        The following portions of the patient's history were reviewed and updated as appropriate:   She  has a past medical history of Allergy, Asthma, Attention deficit, Cervical spine degeneration, GERD (gastroesophageal reflux disease), and Kidney stones.  She does not have any pertinent problems on file.  She  has a past surgical history that includes Cholecystectomy; Sleeve Gastroplasty; Colonoscopy (4/1/2012); Total Reduction Mammoplasty; and Carpal tunnel release (Left, 10/7/2019).  Her family history includes Breast cancer in her maternal grandmother, paternal aunt, and paternal grandmother; Colon cancer in her maternal grandmother; Diabetes in her mother; Heart attack (age of onset: 70) in her father; Heart disease (age of onset: 58) in her mother; Hyperlipidemia in her mother; Hypertension in her mother and sister; Thyroid disease in her sister.  She  reports that she has been smoking cigarettes. She has a 30.00 pack-year smoking history. She has never used smokeless tobacco. She reports that she drinks alcohol. She reports that she does not use drugs.  She has a current medication list which includes the following prescription(s): albuterol, dextroamphetamine-amphetamine, fluticasone-salmeterol 250-50 mcg/dose, linzess, meloxicam, omeprazole, ondansetron, ondansetron, tiotropium, varenicline, and hydrocodone-acetaminophen.  Current Outpatient Medications on File Prior to Visit   Medication Sig Dispense Refill    albuterol (PROAIR HFA) 90 mcg/actuation inhaler INHALE 1-2 PUFFS INTO THE LUNGS EVERY 6 HOURS AS NEEDED FOR WHEEZING (Patient taking differently: INHALE 1-2  PUFFS INTO THE LUNGS EVERY 6 HOURS AS NEEDED FOR WHEEZING  Take am of surgery) 17 g 11    dextroamphetamine-amphetamine (ADDERALL) 30 mg Tab Take 1 tablet by mouth 2 (two) times daily. (Patient taking differently: Take 1 tablet by mouth 2 (two) times daily. Hold am of surgery) 60 tablet 0    fluticasone-salmeterol 250-50 mcg/dose (ADVAIR DISKUS) 250-50 mcg/dose diskus inhaler Use 1 inhalation into the  lungs two times daily (Patient taking differently: Use 1 inhalation into the  lungs two times daily  Take am of surgery) 60 each 11    LINZESS 290 mcg Cap Take 1 capsule (290 mcg total) by mouth once daily. 30 capsule 6    meloxicam (MOBIC) 15 MG tablet Take 1 tablet (15 mg total) by mouth once daily. (Patient taking differently: Take 15 mg by mouth once daily. Hold 5-7 days prior to surgery) 90 tablet 3    omeprazole (PRILOSEC) 40 MG capsule Take 1 capsule (40 mg total) by mouth once daily. 30 capsule 11    ondansetron (ZOFRAN) 4 MG tablet Take 1 tablet (4 mg total) by mouth every 6 (six) hours as needed for Nausea. 1515 tablet 0    ondansetron (ZOFRAN-ODT) 4 MG TbDL Every 6 hours prn nausea 100 tablet 2    tiotropium (SPIRIVA WITH HANDIHALER) 18 mcg inhalation capsule Inhale the contents of 1    capsule via HandiHaler once daily (Patient taking differently: Inhale the contents of 1    capsule via HandiHaler once daily  Take am of surgery) 30 capsule 11    varenicline (CHANTIX STARTING MONTH BOX) 0.5 mg (11)- 1 mg (42) tablet Take one 0.5mg tab by mouth once daily X3 days,then increase to one 0.5mg tab twice daily X4 days,then increase to one 1mg tab twice daily 1 Package 0    HYDROcodone-acetaminophen (NORCO) 5-325 mg per tablet Take 1 tablet by mouth every 6 (six) hours as needed for Pain. 15 tablet 0     No current facility-administered medications on file prior to visit.      She is allergic to bupropion hcl..    Review of Systems   Review of Systems   Constitutional: Negative.    HENT: Negative.   "  Eyes: Negative.    Respiratory: Negative for cough, hemoptysis, sputum production, shortness of breath and wheezing.    Cardiovascular: Negative.    Gastrointestinal: Negative.    Genitourinary: Negative.    Musculoskeletal: Positive for neck pain.   Skin: Negative.    All other systems reviewed and are negative.       Objective:        Vitals:    10/18/19 0759   BP: 122/78   Pulse: 90   Resp: 18   SpO2: 97%   Weight: 70.6 kg (155 lb 10.3 oz)   Height: 5' 5" (1.651 m)       Physical Exam   Constitutional: She is oriented to person, place, and time. She appears well-developed and well-nourished. She does not have a sickly appearance. She does not appear ill.       HENT:   Head: Normocephalic and atraumatic.   Nose: Nose normal.   Mouth/Throat: Oropharynx is clear and moist. No oropharyngeal exudate.   Eyes: Pupils are equal, round, and reactive to light. Conjunctivae and EOM are normal. No scleral icterus.   Neck: Normal range of motion. Neck supple. No JVD present. No tracheal deviation present. No thyromegaly present.   Cardiovascular: Normal rate, regular rhythm, S1 normal, S2 normal, normal heart sounds and intact distal pulses.   No murmur heard.  Pulmonary/Chest: Effort normal and breath sounds normal. No accessory muscle usage or stridor. No tachypnea. No respiratory distress.   Abdominal: Soft. Bowel sounds are normal. She exhibits no distension, no ascites and no mass. There is no hepatosplenomegaly, splenomegaly or hepatomegaly. There is no tenderness. There is no rebound, no guarding and no CVA tenderness.   Musculoskeletal: Normal range of motion. She exhibits no edema or tenderness.   Lymphadenopathy:     She has no axillary adenopathy.        Right: No supraclavicular adenopathy present.        Left: No supraclavicular adenopathy present.   Neurological: She is alert and oriented to person, place, and time. She has normal strength and normal reflexes. Coordination and gait normal.   Skin: Skin is warm " and dry. No rash noted. No cyanosis. Nails show no clubbing.   Psychiatric: She has a normal mood and affect.   Nursing note and vitals reviewed.      Data Review:   CBC:   Lab Results   Component Value Date    WBC 5.86 08/12/2019    RBC 4.60 08/12/2019    HGB 14.6 08/12/2019    HCT 47.6 08/12/2019     08/12/2019     BMP:   Lab Results   Component Value Date     09/19/2019     09/19/2019    K 4.0 09/19/2019     09/19/2019    CO2 25 09/19/2019    BUN 16 09/19/2019    CREATININE 0.7 09/19/2019    CALCIUM 9.2 09/19/2019     Radiology review: CHEST CT        Diagnostics: CT of chest performed    TECHNIQUE:  Low dose axial images, sagittal and coronal reformations were obtained from the thoracic inlet to the lung bases. Contrast was not administered.    COMPARISON:  CXR March 26, 2014, CT C-spine September 5, 2019    FINDINGS:  Minimal apical pleuroparenchymal scarring noted bilaterally, slightly greater on the right.  Subpleural minimally spiculated nodule adjacent to the scarring noted measuring 6 mm.  This best visualized on series 603 image 224.  Per Fleischner society guidelines for single solid nodule in the 6-8 mm range: Low risk patient CT at 6-12 months then consider CT at 18-24 months.  For high risk patient CT at 6-12 months then CT at 18-24 months.    Several bilateral areas of ground-glass opacity with the central bronchiectasis or dilated bronchus noted.  These are more prominent in the upper lobes, right greater than left.  No dense consolidation, mass lesion or effusion.  No focal or diffuse pleural thickening.  Additional areas of the cylindrical bronchiectasis noted again with the upper lobe prominence, right greater than left.  Infectious or inflammatory processes within the differential.    Trachea and mainstem bronchi within normal limits.  Thyroid unremarkable.  Heart size normal without pericardial effusion.    No bulky or matted adenopathy within the axillary, mediastinal  or hilar regions.  A few subcentimeter short axis nodes identified in these areas.  Postsurgical changes noted in the GE junction region with small hiatal hernia identified.    Included upper abdomen remarkable for prior cholecystectomy.  Degenerative changes noted throughout the spine.  No lytic or blastic lesion.      Impression       6 mm spiculated nodule identified in the right apex abutting adjacent pleuroparenchymal scarring.  Fleischner society guidelines for single solid nodule in the 6-8 mm range: Low risk patient CT at 6-12 months then consider CT at 18-24 months.  For high risk patient: CT at 6-12 months then again at 18-24 months.    Bilateral bronchiectasis most prominently involving the upper lobes, right greater than left.    Focal bronchiectasis with surrounding ground-glass opacity noted within both upper lobes, greater on the right.  Possible etiologies would include sequela from infectious or inflammatory process.  Per Fleischner society guidelines for ground-glass opacities greater than 6 mm: CT at 6-12 months to confirm presence then CT at 3 and 5 years.    Subcentimeter short axis axillary and mediastinal nodes without bulky or matted adenopathy identified.    Postsurgical changes GE junction region with small to moderate size hiatal hernia.    Additional incidental findings as above.        Spirometry  FEV1: 1.88L( 65.3%), FVC 3.07L ( 85.5%)  FEV1/FVC 61.13    Assessment:      Problem List Items Addressed This Visit     Asthma    Relevant Orders    Spirometry without Bronchodilator (Completed)    Alpha 1 Antitrypsin Phenotype    Alpha-1-antitrypsin    IgE    Ambulatory Referral to Pulmonary Disease Management    Cigarette nicotine dependence with nicotine-induced disorder    Relevant Orders    Spirometry without Bronchodilator (Completed)    Ambulatory referral to Smoking Cessation Program    Ambulatory Referral to Pulmonary Disease Management    Abnormal CT scan of lung    Relevant Orders     CT Chest Without Contrast    Ambulatory Referral to Pulmonary Disease Management      Other Visit Diagnoses     Pulmonary nodule, right    -  Primary    Relevant Orders    Spirometry without Bronchodilator (Completed)    CT Chest Without Contrast    Ambulatory Referral to Pulmonary Disease Management    FUNGAL IMMUNODIFFUSION - BLOOD    QUANTIFERON GOLD TB        Smoking cessation key: ref to program  PDM review inhale use  May have asthma COPD overlap  Nodule f/u imaging 4-6 months       Plan:          Problem List Items Addressed This Visit     Asthma    Relevant Orders    Spirometry without Bronchodilator (Completed)    Alpha 1 Antitrypsin Phenotype    Alpha-1-antitrypsin    IgE    Ambulatory Referral to Pulmonary Disease Management    Cigarette nicotine dependence with nicotine-induced disorder    Relevant Orders    Spirometry without Bronchodilator (Completed)    Ambulatory referral to Smoking Cessation Program    Ambulatory Referral to Pulmonary Disease Management    Abnormal CT scan of lung    Relevant Orders    CT Chest Without Contrast    Ambulatory Referral to Pulmonary Disease Management      Other Visit Diagnoses     Pulmonary nodule, right    -  Primary    Relevant Orders    Spirometry without Bronchodilator (Completed)    CT Chest Without Contrast    Ambulatory Referral to Pulmonary Disease Management    FUNGAL IMMUNODIFFUSION - BLOOD    QUANTIFERON GOLD TB          Orders Placed This Encounter   Procedures    CT Chest Without Contrast     Standing Status:   Future     Standing Expiration Date:   10/18/2020     Order Specific Question:   May the Radiologist modify the order per protocol to meet the clinical needs of the patient?     Answer:   Yes    Alpha 1 Antitrypsin Phenotype     Standing Status:   Future     Number of Occurrences:   1     Standing Expiration Date:   12/16/2020    Alpha-1-antitrypsin     Standing Status:   Future     Number of Occurrences:   1     Standing Expiration Date:    12/16/2020    IgE     Standing Status:   Future     Number of Occurrences:   1     Standing Expiration Date:   12/16/2020    FUNGAL IMMUNODIFFUSION - BLOOD     Standing Status:   Future     Number of Occurrences:   1     Standing Expiration Date:   12/16/2020    QUANTIFERON GOLD TB     Standing Status:   Future     Number of Occurrences:   1     Standing Expiration Date:   12/16/2020    Ambulatory referral to Smoking Cessation Program     Referral Priority:   Routine     Referral Type:   Consultation     Referral Reason:   Specialty Services Required     Requested Specialty:   CTTS     Number of Visits Requested:   1    Ambulatory Referral to Pulmonary Disease Management     Referral Priority:   Routine     Referral Type:   Consultation     Referral Reason:   Specialty Services Required     Requested Specialty:   Pulmonary Disease     Number of Visits Requested:   1    Spirometry without Bronchodilator     Standing Status:   Future     Number of Occurrences:   1     Standing Expiration Date:   10/17/2020       Follow up in about 4 months (around 2/18/2020), or marci today, labs today, chest ct in 4 months, , for Pulmonary disease management, Smoking cessation counselling and referal.    This note was prepared using voice recognition system and is likely to have sound alike errors that may have been overlooked even after proof reading.  Please call me with any questions    Discussed diagnosis, its evaluation, treatment and usual course. All questions answered.    Thank you for the courtesy of participating in the care of this patient    Antelmo Mcknight MD

## 2019-10-18 ENCOUNTER — TELEPHONE (OUTPATIENT)
Dept: ORTHOPEDICS | Facility: CLINIC | Age: 49
End: 2019-10-18

## 2019-10-18 ENCOUNTER — HOSPITAL ENCOUNTER (OUTPATIENT)
Dept: RADIOLOGY | Facility: HOSPITAL | Age: 49
Discharge: HOME | End: 2019-10-18
Attending: INTERNAL MEDICINE
Payer: MEDICAID

## 2019-10-18 ENCOUNTER — OFFICE VISIT (OUTPATIENT)
Dept: PULMONOLOGY | Facility: CLINIC | Age: 49
End: 2019-10-18
Payer: MEDICAID

## 2019-10-18 ENCOUNTER — OFFICE VISIT (OUTPATIENT)
Dept: ORTHOPEDICS | Facility: CLINIC | Age: 49
End: 2019-10-18
Payer: MEDICAID

## 2019-10-18 ENCOUNTER — CLINICAL SUPPORT (OUTPATIENT)
Dept: PULMONOLOGY | Facility: CLINIC | Age: 49
End: 2019-10-18
Payer: MEDICAID

## 2019-10-18 VITALS
DIASTOLIC BLOOD PRESSURE: 88 MMHG | HEART RATE: 73 BPM | HEIGHT: 65 IN | WEIGHT: 155 LBS | BODY MASS INDEX: 25.83 KG/M2 | SYSTOLIC BLOOD PRESSURE: 151 MMHG

## 2019-10-18 VITALS
SYSTOLIC BLOOD PRESSURE: 122 MMHG | HEART RATE: 90 BPM | OXYGEN SATURATION: 97 % | BODY MASS INDEX: 25.93 KG/M2 | WEIGHT: 155.63 LBS | HEIGHT: 65 IN | DIASTOLIC BLOOD PRESSURE: 78 MMHG | RESPIRATION RATE: 18 BRPM

## 2019-10-18 DIAGNOSIS — J45.909 SEVERE ASTHMA WITHOUT COMPLICATION, UNSPECIFIED WHETHER PERSISTENT: ICD-10-CM

## 2019-10-18 DIAGNOSIS — F17.219 CIGARETTE NICOTINE DEPENDENCE WITH NICOTINE-INDUCED DISORDER: ICD-10-CM

## 2019-10-18 DIAGNOSIS — R91.8 ABNORMAL CT SCAN OF LUNG: ICD-10-CM

## 2019-10-18 DIAGNOSIS — R91.1 PULMONARY NODULE, RIGHT: Primary | ICD-10-CM

## 2019-10-18 DIAGNOSIS — J45.909 ASTHMA, UNSPECIFIED ASTHMA SEVERITY, UNSPECIFIED WHETHER COMPLICATED, UNSPECIFIED WHETHER PERSISTENT: ICD-10-CM

## 2019-10-18 DIAGNOSIS — R91.1 PULMONARY NODULE, RIGHT: ICD-10-CM

## 2019-10-18 DIAGNOSIS — G56.03 CARPAL TUNNEL SYNDROME ON BOTH SIDES: Primary | ICD-10-CM

## 2019-10-18 LAB
BRPFT: ABNORMAL
FEF 25 75 LLN: 1.6
FEF 25 75 PRE REF: 33.3 %
FEF 25 75 REF: 2.82
FEV1 FVC LLN: 69
FEV1 FVC PRE REF: 75.9 %
FEV1 FVC REF: 81
FEV1 LLN: 2.25
FEV1 PRE REF: 65.3 %
FEV1 REF: 2.87
FVC LLN: 2.82
FVC PRE REF: 85.5 %
FVC REF: 3.59
PEF LLN: 5.15
PEF PRE REF: 57.3 %
PEF REF: 6.92
PRE FEF 25 75: 0.94 L/S (ref 1.6–4.05)
PRE FET 100: 8.54 SEC
PRE FEV1 FVC: 61.13 % (ref 69.42–91.6)
PRE FEV1: 1.88 L (ref 2.25–3.5)
PRE FVC: 3.07 L (ref 2.82–4.37)
PRE PEF: 3.96 L/S (ref 5.15–8.68)

## 2019-10-18 PROCEDURE — 99024 POSTOP FOLLOW-UP VISIT: CPT | Mod: ,,, | Performed by: ORTHOPAEDIC SURGERY

## 2019-10-18 PROCEDURE — 99213 OFFICE O/P EST LOW 20 MIN: CPT | Mod: PBBFAC,25 | Performed by: ORTHOPAEDIC SURGERY

## 2019-10-18 PROCEDURE — 99999 PR PBB SHADOW E&M-EST. PATIENT-LVL V: ICD-10-PCS | Mod: PBBFAC,,, | Performed by: INTERNAL MEDICINE

## 2019-10-18 PROCEDURE — 99999 PR PBB SHADOW E&M-EST. PATIENT-LVL III: CPT | Mod: PBBFAC,,, | Performed by: ORTHOPAEDIC SURGERY

## 2019-10-18 PROCEDURE — 94010 BREATHING CAPACITY TEST: CPT | Mod: 26,S$PBB,, | Performed by: INTERNAL MEDICINE

## 2019-10-18 PROCEDURE — 99999 PR PBB SHADOW E&M-EST. PATIENT-LVL V: CPT | Mod: PBBFAC,,, | Performed by: INTERNAL MEDICINE

## 2019-10-18 PROCEDURE — 99205 OFFICE O/P NEW HI 60 MIN: CPT | Mod: 25,S$PBB,, | Performed by: INTERNAL MEDICINE

## 2019-10-18 PROCEDURE — 94010 BREATHING CAPACITY TEST: CPT | Mod: PBBFAC

## 2019-10-18 PROCEDURE — 99205 PR OFFICE/OUTPT VISIT, NEW, LEVL V, 60-74 MIN: ICD-10-PCS | Mod: 25,S$PBB,, | Performed by: INTERNAL MEDICINE

## 2019-10-18 PROCEDURE — 94010 BREATHING CAPACITY TEST: ICD-10-PCS | Mod: 26,S$PBB,, | Performed by: INTERNAL MEDICINE

## 2019-10-18 PROCEDURE — 99999 PR PBB SHADOW E&M-EST. PATIENT-LVL III: ICD-10-PCS | Mod: PBBFAC,,, | Performed by: ORTHOPAEDIC SURGERY

## 2019-10-18 PROCEDURE — 99024 PR POST-OP FOLLOW-UP VISIT: ICD-10-PCS | Mod: ,,, | Performed by: ORTHOPAEDIC SURGERY

## 2019-10-18 PROCEDURE — 99215 OFFICE O/P EST HI 40 MIN: CPT | Mod: PBBFAC,27,25 | Performed by: INTERNAL MEDICINE

## 2019-10-18 NOTE — PATIENT INSTRUCTIONS
Pulmonary Nodule      A pulmonary nodule is a small, round spot in your lung. It is usually found when pictures of your lungs are taken for other reasons. A pulmonary nodule can be caused by an infection, injury, or some diseases. Tests will be done to see if the nodule is cancer ( malignant) or not cancer ( benign).  HOME CARE  Stop smoking if you smoke. Ask your doctor how to quit.   Treatment of the pulmonary nodule will depend on the test results.   Get regular pictures taken of your lungs to see if the pulmonary nodule changes. This is to see if the pulmonary nodule gets bigger.   Keep all your appointments with your doctor. If you cannot make an appointment, call your doctor and reschedule your appointment.   GET HELP RIGHT AWAY IF:  You cannot catch your breath or it is hard to breath.   You have a cough that does not go away.   You cough up blood.   You have sudden chest pain.   You have a fever.   MAKE SURE YOU:  Understand these instructions.   Will watch your condition.   Will get help right away if you are not doing well or get worse.       Please call office [phone] for any questions

## 2019-10-18 NOTE — TELEPHONE ENCOUNTER
----- Message from Dmitry Louise sent at 10/18/2019  2:43 PM CDT -----  Contact: pt   Pt checking on surgery that is supposed to be scheduled for 11/1. pls return call.         ..[phone]

## 2019-10-18 NOTE — LETTER
October 18, 2019      [place]  [id number] [place] [address] [address] [place] [address]           [facility]  [address] [facility]  [place] [address]  Phone: [phone]  Fax: [phone]          Patient: Luz Pelaez   MR Number: [id number]   YOB: 1970   Date of Visit: 10/18/2019       Dear Birdie Salazar:    Thank you for referring Luz Pelaez to me for evaluation. Attached you will find relevant portions of my assessment and plan of care.    If you have questions, please do not hesitate to call me. I look forward to following Luz Pelaez along with you.    Sincerely,    Antelmo Mcknight MD    Enclosure  CC:  No Recipients    If you would like to receive this communication electronically, please contact [email or web address] or [phone] to request more information on [employer] access.    For providers and/or their staff who would like to refer a patient to [facility], please contact us through our one-stop-shop provider referral line, [facility] , at [phone].    If you feel you have received this communication in error or would no longer like to receive these types of communications, please e-mail [email or web address]

## 2019-10-18 NOTE — PROGRESS NOTES
Subjective:     Patient ID: Luz Pelaez is a 49 y.o. female.    Chief Complaint: Post-op Evaluation of the Left Wrist    The patient is a 49-year-old female status post left carpal tunnel release date of surgery was 10/07/2019.  She is scheduled for the right carpal tunnel release next month.      Past Medical History:   Diagnosis Date    Allergy     Asthma     Attention deficit     Cervical spine degeneration     GERD (gastroesophageal reflux disease)     Kidney stones      Past Surgical History:   Procedure Laterality Date    CARPAL TUNNEL RELEASE Left 10/7/2019    Procedure: RELEASE, CARPAL TUNNEL;  Surgeon: Delfino Cain MD;  Location: [facility];  Service: Orthopedics;  Laterality: Left;    CHOLECYSTECTOMY      COLONOSCOPY  4/1/2012    date is approximate-done by Dr. Naranjo due to chronic constipation    SLEEVE GASTROPLASTY      TOTAL REDUCTION MAMMOPLASTY       Family History   Problem Relation Age of Onset    Hypertension Mother     Diabetes Mother     Hyperlipidemia Mother     Heart disease Mother 58    Colon cancer Maternal Grandmother     Breast cancer Maternal Grandmother     Breast cancer Paternal Grandmother         Breast    Heart attack Father 70    Thyroid disease Sister     Hypertension Sister     Breast cancer Paternal Aunt     Stroke Neg Hx      Social History     Socioeconomic History    Marital status: Single     Spouse name: Not on file    Number of children: 2    Years of education: Not on file    Highest education level: Not on file   Occupational History    Occupation:      Employer: [employer]   Social Needs    Financial resource strain: Not on file    Food insecurity:     Worry: Not on file     Inability: Not on file    Transportation needs:     Medical: Not on file     Non-medical: Not on file   Tobacco Use    Smoking status: Current Every Day Smoker     Packs/day: 1.00     Years: 30.00     Pack years: 30.00      Types: Cigarettes    Smokeless tobacco: Never Used   Substance and Sexual Activity    Alcohol use: Yes     Frequency: 2-3 times a week     Drinks per session: 1 or 2     Comment: Socially    Drug use: No    Sexual activity: Yes     Partners: Male   Lifestyle    Physical activity:     Days per week: Not on file     Minutes per session: Not on file    Stress: Not on file   Relationships    Social connections:     Talks on phone: Not on file     Gets together: Not on file     Attends Worship service: Not on file     Active member of club or organization: Not on file     Attends meetings of clubs or organizations: Not on file     Relationship status: Not on file   Other Topics Concern    Not on file   Social History Narrative    Not on file     Medication List with Changes/Refills   Current Medications    ALBUTEROL (PROAIR HFA) 90 MCG/ACTUATION INHALER    INHALE 1-2 PUFFS INTO THE LUNGS EVERY 6 HOURS AS NEEDED FOR WHEEZING    DEXTROAMPHETAMINE-AMPHETAMINE (ADDERALL) 30 MG TAB    Take 1 tablet by mouth 2 (two) times daily.    FLUTICASONE-SALMETEROL 250-50 MCG/DOSE (ADVAIR DISKUS) 250-50 MCG/DOSE DISKUS INHALER    Use 1 inhalation into the  lungs two times daily    HYDROCODONE-ACETAMINOPHEN (NORCO) 5-325 MG PER TABLET    Take 1 tablet by mouth every 6 (six) hours as needed for Pain.    LINZESS 290 MCG CAP    Take 1 capsule (290 mcg total) by mouth once daily.    MELOXICAM (MOBIC) 15 MG TABLET    Take 1 tablet (15 mg total) by mouth once daily.    OMEPRAZOLE (PRILOSEC) 40 MG CAPSULE    Take 1 capsule (40 mg total) by mouth once daily.    ONDANSETRON (ZOFRAN) 4 MG TABLET    Take 1 tablet (4 mg total) by mouth every 6 (six) hours as needed for Nausea.    ONDANSETRON (ZOFRAN-ODT) 4 MG TBDL    Every 6 hours prn nausea    TIOTROPIUM (SPIRIVA WITH HANDIHALER) 18 MCG INHALATION CAPSULE    Inhale the contents of 1    capsule via HandiHaler once daily    VARENICLINE (CHANTIX STARTING MONTH BOX) 0.5 MG (11)- 1 MG (42)  TABLET    Take one 0.5mg tab by mouth once daily X3 days,then increase to one 0.5mg tab twice daily X4 days,then increase to one 1mg tab twice daily     Review of patient's allergies indicates:   Allergen Reactions    Bupropion hcl Other (See Comments)     Mood swings  Mood swings     Review of Systems   Constitution: Negative for malaise/fatigue.   HENT: Negative for hearing loss.    Eyes: Negative for double vision and visual disturbance.   Cardiovascular: Negative for chest pain.   Respiratory: Positive for wheezing. Negative for shortness of breath.    Endocrine: Negative for cold intolerance.   Hematologic/Lymphatic: Does not bruise/bleed easily.   Skin: Negative for poor wound healing and suspicious lesions.   Musculoskeletal: Positive for neck pain. Negative for gout, joint pain and joint swelling.   Gastrointestinal: Positive for heartburn. Negative for nausea and vomiting.   Genitourinary: Negative for dysuria.   Neurological: Positive for headaches, numbness, paresthesias and sensory change.   Psychiatric/Behavioral: Positive for altered mental status. Negative for depression, memory loss and substance abuse. The patient is not nervous/anxious.    Allergic/Immunologic: Negative for persistent infections.       Objective:   Body mass index is 25.79 kg/m².  Vitals:    10/18/19 1109   BP: (!) 151/88   Pulse: 73                General    Constitutional: She is oriented to person, place, and time. She appears well-developed and well-nourished. No distress.   HENT:   Head: Normocephalic.   Eyes: EOM are normal.   Neck: Normal range of motion.   Pulmonary/Chest: Effort normal.   Neurological: She is oriented to person, place, and time.   Psychiatric: She has a normal mood and affect.             Right Hand/Wrist Exam     Inspection   Scars: Wrist - absent Hand -  absent  Effusion: Wrist - absent Hand -  absent    Tests   Phalens sign: positive  Tinel's sign (median nerve): positive  Carpal Tunnel Compression  Test: positive    Atrophy   Thenar:  negative  Intrinsic:  negative    Other     Neuorologic Exam    Median Distribution: abnormal  Ulnar Distribution: normal  Radial Distribution: normal    Comments:  The patient has a positive Tinel and positive Phalen sign.  There is no intrinsic atrophy noted.      Left Hand/Wrist Exam     Inspection   Scars: Wrist - present     Pain   Wrist - The patient exhibits pain of the flexor/pronator group.    Tenderness   The patient is tender to palpation of the victoria area.     Other     Sensory Exam  Median Distribution: normal  Ulnar Distribution: normal  Radial Distribution: normal    Comments:  The suture line is intact left wrist.  There is no sign of infection.  There are no motor or sensory deficits.          Vascular Exam       Capillary Refill  Right Hand: normal capillary refill  Left Hand: normal capillary refill      Relevant imaging results reviewed and interpreted by me, discussed with the patient and / or family today radiographs were not obtained today  Assessment:     Encounter Diagnosis   Name Primary?    Carpal tunnel syndrome on both sides Yes        Plan:         The patient had sutures removed left wrist.  Steri-Strips were applied.  Wound care was discussed.  She was again counseled regarding right carpal tunnel release.  The risk of infection, anesthetic risk, injury to nerves and vessels, loss of motion, and possible need for additional surgeries were discussed. She seems to understand and agree that surgery. She seems pleased with the result on the left wrist            Disclaimer: This note was prepared using a voice recognition system and is likely to have sound alike errors within the text.

## 2019-10-18 NOTE — H&P (VIEW-ONLY)
Subjective:     Patient ID: Luz Pelaez is a 49 y.o. female.    Chief Complaint: Post-op Evaluation of the Left Wrist    The patient is a 49-year-old female status post left carpal tunnel release date of surgery was 10/07/2019.  She is scheduled for the right carpal tunnel release next month.      Past Medical History:   Diagnosis Date    Allergy     Asthma     Attention deficit     Cervical spine degeneration     GERD (gastroesophageal reflux disease)     Kidney stones      Past Surgical History:   Procedure Laterality Date    CARPAL TUNNEL RELEASE Left 10/7/2019    Procedure: RELEASE, CARPAL TUNNEL;  Surgeon: Delfino Cain MD;  Location: [facility];  Service: Orthopedics;  Laterality: Left;    CHOLECYSTECTOMY      COLONOSCOPY  4/1/2012    date is approximate-done by Dr. Naranjo due to chronic constipation    SLEEVE GASTROPLASTY      TOTAL REDUCTION MAMMOPLASTY       Family History   Problem Relation Age of Onset    Hypertension Mother     Diabetes Mother     Hyperlipidemia Mother     Heart disease Mother 58    Colon cancer Maternal Grandmother     Breast cancer Maternal Grandmother     Breast cancer Paternal Grandmother         Breast    Heart attack Father 70    Thyroid disease Sister     Hypertension Sister     Breast cancer Paternal Aunt     Stroke Neg Hx      Social History     Socioeconomic History    Marital status: Single     Spouse name: Not on file    Number of children: 2    Years of education: Not on file    Highest education level: Not on file   Occupational History    Occupation:      Employer: [employer]   Social Needs    Financial resource strain: Not on file    Food insecurity:     Worry: Not on file     Inability: Not on file    Transportation needs:     Medical: Not on file     Non-medical: Not on file   Tobacco Use    Smoking status: Current Every Day Smoker     Packs/day: 1.00     Years: 30.00     Pack years: 30.00      Types: Cigarettes    Smokeless tobacco: Never Used   Substance and Sexual Activity    Alcohol use: Yes     Frequency: 2-3 times a week     Drinks per session: 1 or 2     Comment: Socially    Drug use: No    Sexual activity: Yes     Partners: Male   Lifestyle    Physical activity:     Days per week: Not on file     Minutes per session: Not on file    Stress: Not on file   Relationships    Social connections:     Talks on phone: Not on file     Gets together: Not on file     Attends Yazdanism service: Not on file     Active member of club or organization: Not on file     Attends meetings of clubs or organizations: Not on file     Relationship status: Not on file   Other Topics Concern    Not on file   Social History Narrative    Not on file     Medication List with Changes/Refills   Current Medications    ALBUTEROL (PROAIR HFA) 90 MCG/ACTUATION INHALER    INHALE 1-2 PUFFS INTO THE LUNGS EVERY 6 HOURS AS NEEDED FOR WHEEZING    DEXTROAMPHETAMINE-AMPHETAMINE (ADDERALL) 30 MG TAB    Take 1 tablet by mouth 2 (two) times daily.    FLUTICASONE-SALMETEROL 250-50 MCG/DOSE (ADVAIR DISKUS) 250-50 MCG/DOSE DISKUS INHALER    Use 1 inhalation into the  lungs two times daily    HYDROCODONE-ACETAMINOPHEN (NORCO) 5-325 MG PER TABLET    Take 1 tablet by mouth every 6 (six) hours as needed for Pain.    LINZESS 290 MCG CAP    Take 1 capsule (290 mcg total) by mouth once daily.    MELOXICAM (MOBIC) 15 MG TABLET    Take 1 tablet (15 mg total) by mouth once daily.    OMEPRAZOLE (PRILOSEC) 40 MG CAPSULE    Take 1 capsule (40 mg total) by mouth once daily.    ONDANSETRON (ZOFRAN) 4 MG TABLET    Take 1 tablet (4 mg total) by mouth every 6 (six) hours as needed for Nausea.    ONDANSETRON (ZOFRAN-ODT) 4 MG TBDL    Every 6 hours prn nausea    TIOTROPIUM (SPIRIVA WITH HANDIHALER) 18 MCG INHALATION CAPSULE    Inhale the contents of 1    capsule via HandiHaler once daily    VARENICLINE (CHANTIX STARTING MONTH BOX) 0.5 MG (11)- 1 MG (42)  TABLET    Take one 0.5mg tab by mouth once daily X3 days,then increase to one 0.5mg tab twice daily X4 days,then increase to one 1mg tab twice daily     Review of patient's allergies indicates:   Allergen Reactions    Bupropion hcl Other (See Comments)     Mood swings  Mood swings     Review of Systems   Constitution: Negative for malaise/fatigue.   HENT: Negative for hearing loss.    Eyes: Negative for double vision and visual disturbance.   Cardiovascular: Negative for chest pain.   Respiratory: Positive for wheezing. Negative for shortness of breath.    Endocrine: Negative for cold intolerance.   Hematologic/Lymphatic: Does not bruise/bleed easily.   Skin: Negative for poor wound healing and suspicious lesions.   Musculoskeletal: Positive for neck pain. Negative for gout, joint pain and joint swelling.   Gastrointestinal: Positive for heartburn. Negative for nausea and vomiting.   Genitourinary: Negative for dysuria.   Neurological: Positive for headaches, numbness, paresthesias and sensory change.   Psychiatric/Behavioral: Positive for altered mental status. Negative for depression, memory loss and substance abuse. The patient is not nervous/anxious.    Allergic/Immunologic: Negative for persistent infections.       Objective:   Body mass index is 25.79 kg/m².  Vitals:    10/18/19 1109   BP: (!) 151/88   Pulse: 73                General    Constitutional: She is oriented to person, place, and time. She appears well-developed and well-nourished. No distress.   HENT:   Head: Normocephalic.   Eyes: EOM are normal.   Neck: Normal range of motion.   Pulmonary/Chest: Effort normal.   Neurological: She is oriented to person, place, and time.   Psychiatric: She has a normal mood and affect.             Right Hand/Wrist Exam     Inspection   Scars: Wrist - absent Hand -  absent  Effusion: Wrist - absent Hand -  absent    Tests   Phalens sign: positive  Tinel's sign (median nerve): positive  Carpal Tunnel Compression  Test: positive    Atrophy   Thenar:  negative  Intrinsic:  negative    Other     Neuorologic Exam    Median Distribution: abnormal  Ulnar Distribution: normal  Radial Distribution: normal    Comments:  The patient has a positive Tinel and positive Phalen sign.  There is no intrinsic atrophy noted.      Left Hand/Wrist Exam     Inspection   Scars: Wrist - present     Pain   Wrist - The patient exhibits pain of the flexor/pronator group.    Tenderness   The patient is tender to palpation of the victoria area.     Other     Sensory Exam  Median Distribution: normal  Ulnar Distribution: normal  Radial Distribution: normal    Comments:  The suture line is intact left wrist.  There is no sign of infection.  There are no motor or sensory deficits.          Vascular Exam       Capillary Refill  Right Hand: normal capillary refill  Left Hand: normal capillary refill      Relevant imaging results reviewed and interpreted by me, discussed with the patient and / or family today radiographs were not obtained today  Assessment:     Encounter Diagnosis   Name Primary?    Carpal tunnel syndrome on both sides Yes        Plan:         The patient had sutures removed left wrist.  Steri-Strips were applied.  Wound care was discussed.  She was again counseled regarding right carpal tunnel release.  The risk of infection, anesthetic risk, injury to nerves and vessels, loss of motion, and possible need for additional surgeries were discussed. She seems to understand and agree that surgery. She seems pleased with the result on the left wrist            Disclaimer: This note was prepared using a voice recognition system and is likely to have sound alike errors within the text.

## 2019-10-21 DIAGNOSIS — M25.531 PAIN IN BOTH WRISTS: ICD-10-CM

## 2019-10-21 DIAGNOSIS — G56.02 CARPAL TUNNEL SYNDROME ON LEFT: Primary | ICD-10-CM

## 2019-10-21 DIAGNOSIS — M25.532 PAIN IN BOTH WRISTS: ICD-10-CM

## 2019-10-22 VITALS
OXYGEN SATURATION: 99 % | SYSTOLIC BLOOD PRESSURE: 148 MMHG | HEIGHT: 65 IN | TEMPERATURE: 98 F | RESPIRATION RATE: 19 BRPM | DIASTOLIC BLOOD PRESSURE: 84 MMHG | HEART RATE: 60 BPM | WEIGHT: 152.13 LBS | BODY MASS INDEX: 25.34 KG/M2

## 2019-11-01 ENCOUNTER — ANESTHESIA EVENT (OUTPATIENT)
Dept: SURGERY | Facility: HOSPITAL | Age: 49
End: 2019-11-01
Payer: MEDICAID

## 2019-11-01 PROBLEM — Z01.818 PREOPERATIVE TESTING: Status: RESOLVED | Noted: 2019-10-07 | Resolved: 2019-11-01

## 2019-11-01 PROBLEM — K44.9 HIATAL HERNIA: Status: ACTIVE | Noted: 2019-11-01

## 2019-11-01 NOTE — ANESTHESIA PREPROCEDURE EVALUATION
11/01/2019  Luz Pelaez is a 49 y.o., female.    Anesthesia Evaluation    I have reviewed the Patient Summary Reports.    I have reviewed the Nursing Notes.   I have reviewed the Medications.     Review of Systems  Anesthesia Hx:  History of prior surgery of interest to airway management or planning: Previous anesthesia: MAC  10/07/2019 - left carpal tunnel release with MAC.  Procedure performed at an [facility].   Social:  Smoker    Cardiovascular:   ECG has been reviewed.    Pulmonary:   Asthma    Renal/:   renal calculi    Hepatic/GI:   Hiatal Hernia, GERD    Musculoskeletal:  Spine Disorders: cervical    Neurological:   Neuromuscular Disease, Headaches Migraines.  CTS. Pain Etiology/Diagnosis, Carpal Tunnel Syndrome    Psych:  Attention Deficit Disorder.         Physical Exam  General:  Well nourished    Airway/Jaw/Neck:  Airway Findings: Mouth Opening: Normal Tongue: Normal  General Airway Assessment: Adult  Mallampati: II  TM Distance: Normal, at least 6 cm  Jaw/Neck Findings:  Neck ROM: Normal ROM  Neck Findings:     Eyes/Ears/Nose:  Eyes/Ears/Nose Findings:    Dental:  Dental Findings: In tact   Chest/Lungs:  Chest/Lungs Findings: Clear to auscultation, Normal Respiratory Rate     Heart/Vascular:  Heart Findings: Rate: Normal  Rhythm: Regular Rhythm  Sounds: Normal  Heart murmur: negative Vascular Findings: Normal    Abdomen:  Abdomen Findings: Normal    Musculoskeletal:  Musculoskeletal Findings: Normal   Skin:  Skin Findings: Normal    Mental Status:  Mental Status Findings:  Alert and Oriented         Anesthesia Plan  Type of Anesthesia, risks & benefits discussed:  Anesthesia Type:  MAC  Patient's Preference:   Intra-op Monitoring Plan: standard ASA monitors  Intra-op Monitoring Plan Comments:   Post Op Pain Control Plan: per primary service following discharge from PACU, multimodal  analgesia and peripheral nerve block  Post Op Pain Control Plan Comments:   Induction:   IV  Beta Blocker:  Patient is not currently on a Beta-Blocker (No further documentation required).       Informed Consent: Patient understands risks and agrees with Anesthesia plan.  Questions answered. Anesthesia consent signed with patient.  ASA Score: 2     Day of Surgery Review of History & Physical:    H&P update referred to the surgeon.         Ready For Surgery From Anesthesia Perspective.

## 2019-11-04 ENCOUNTER — HOSPITAL ENCOUNTER (OUTPATIENT)
Facility: HOSPITAL | Age: 49
Discharge: HOME | End: 2019-11-04
Attending: ORTHOPAEDIC SURGERY | Admitting: ORTHOPAEDIC SURGERY
Payer: MEDICAID

## 2019-11-04 ENCOUNTER — ANESTHESIA (OUTPATIENT)
Dept: SURGERY | Facility: HOSPITAL | Age: 49
End: 2019-11-04
Payer: MEDICAID

## 2019-11-04 DIAGNOSIS — G56.03 BILATERAL CARPAL TUNNEL SYNDROME: ICD-10-CM

## 2019-11-04 DIAGNOSIS — G56.01 RIGHT CARPAL TUNNEL SYNDROME: Primary | ICD-10-CM

## 2019-11-04 PROCEDURE — 71000033 HC RECOVERY, INTIAL HOUR: Performed by: ORTHOPAEDIC SURGERY

## 2019-11-04 PROCEDURE — 36000707: Performed by: ORTHOPAEDIC SURGERY

## 2019-11-04 PROCEDURE — 63600175 PHARM REV CODE 636 W HCPCS: Performed by: ANESTHESIOLOGY

## 2019-11-04 PROCEDURE — 63600175 PHARM REV CODE 636 W HCPCS: Performed by: ORTHOPAEDIC SURGERY

## 2019-11-04 PROCEDURE — D9220A PRA ANESTHESIA: Mod: ANES,,, | Performed by: ANESTHESIOLOGY

## 2019-11-04 PROCEDURE — 36000706: Performed by: ORTHOPAEDIC SURGERY

## 2019-11-04 PROCEDURE — 64721 CARPAL TUNNEL SURGERY: CPT | Mod: 79,RT,, | Performed by: ORTHOPAEDIC SURGERY

## 2019-11-04 PROCEDURE — 76942 ECHO GUIDE FOR BIOPSY: CPT | Mod: 26,,, | Performed by: ANESTHESIOLOGY

## 2019-11-04 PROCEDURE — 71000015 HC POSTOP RECOV 1ST HR: Performed by: ORTHOPAEDIC SURGERY

## 2019-11-04 PROCEDURE — 64415 NJX AA&/STRD BRCH PLXS IMG: CPT | Mod: 59,RT,, | Performed by: ANESTHESIOLOGY

## 2019-11-04 PROCEDURE — 25000003 PHARM REV CODE 250: Performed by: ANESTHESIOLOGY

## 2019-11-04 PROCEDURE — D9220A PRA ANESTHESIA: ICD-10-PCS | Mod: CRNA,,, | Performed by: NURSE ANESTHETIST, CERTIFIED REGISTERED

## 2019-11-04 PROCEDURE — 37000009 HC ANESTHESIA EA ADD 15 MINS: Performed by: ORTHOPAEDIC SURGERY

## 2019-11-04 PROCEDURE — 63600175 PHARM REV CODE 636 W HCPCS: Performed by: NURSE ANESTHETIST, CERTIFIED REGISTERED

## 2019-11-04 PROCEDURE — 76942 ECHO GUIDE FOR BIOPSY: CPT | Performed by: ANESTHESIOLOGY

## 2019-11-04 PROCEDURE — 01810 ANES PX NRV MUSC F/ARM WRST: CPT | Performed by: ORTHOPAEDIC SURGERY

## 2019-11-04 PROCEDURE — 37000008 HC ANESTHESIA 1ST 15 MINUTES: Performed by: ORTHOPAEDIC SURGERY

## 2019-11-04 PROCEDURE — 64417 NJX AA&/STRD AX NERVE IMG: CPT | Performed by: ORTHOPAEDIC SURGERY

## 2019-11-04 PROCEDURE — 64415 NJX AA&/STRD BRCH PLXS IMG: CPT | Performed by: ANESTHESIOLOGY

## 2019-11-04 PROCEDURE — D9220A PRA ANESTHESIA: ICD-10-PCS | Mod: ANES,,, | Performed by: ANESTHESIOLOGY

## 2019-11-04 PROCEDURE — 64415 PR NERVE BLOCK INJ, ANES/STEROID, BRACHIAL PLEXUS, INCL IMAG GUIDANCE: ICD-10-PCS | Mod: 59,RT,, | Performed by: ANESTHESIOLOGY

## 2019-11-04 PROCEDURE — D9220A PRA ANESTHESIA: Mod: CRNA,,, | Performed by: NURSE ANESTHETIST, CERTIFIED REGISTERED

## 2019-11-04 PROCEDURE — 64721 PR REVISE MEDIAN N/CARPAL TUNNEL SURG: ICD-10-PCS | Mod: 79,RT,, | Performed by: ORTHOPAEDIC SURGERY

## 2019-11-04 PROCEDURE — 76942 PR U/S GUIDANCE FOR NEEDLE GUIDANCE: ICD-10-PCS | Mod: 26,,, | Performed by: ANESTHESIOLOGY

## 2019-11-04 RX ORDER — TRIAMCINOLONE ACETONIDE 40 MG/ML
INJECTION, SUSPENSION INTRA-ARTICULAR; INTRAMUSCULAR
Status: DISCONTINUED
Start: 2019-11-04 | End: 2019-11-04 | Stop reason: HOSPADM

## 2019-11-04 RX ORDER — FENTANYL CITRATE 50 UG/ML
INJECTION, SOLUTION INTRAMUSCULAR; INTRAVENOUS
Status: DISCONTINUED | OUTPATIENT
Start: 2019-11-04 | End: 2019-11-04

## 2019-11-04 RX ORDER — LIDOCAINE HCL/PF 100 MG/5ML
SYRINGE (ML) INTRAVENOUS
Status: DISCONTINUED | OUTPATIENT
Start: 2019-11-04 | End: 2019-11-04

## 2019-11-04 RX ORDER — HYDROCODONE BITARTRATE AND ACETAMINOPHEN 5; 325 MG/1; MG/1
1 TABLET ORAL EVERY 6 HOURS PRN
Qty: 15 TABLET | Refills: 0 | Status: SHIPPED | OUTPATIENT
Start: 2019-11-04 | End: 2020-02-12

## 2019-11-04 RX ORDER — ALBUTEROL SULFATE 0.83 MG/ML
2.5 SOLUTION RESPIRATORY (INHALATION) EVERY 4 HOURS PRN
Status: DISCONTINUED | OUTPATIENT
Start: 2019-11-04 | End: 2019-11-04 | Stop reason: HOSPADM

## 2019-11-04 RX ORDER — FAMOTIDINE 20 MG/1
20 TABLET, FILM COATED ORAL ONCE
Status: COMPLETED | OUTPATIENT
Start: 2019-11-04 | End: 2019-11-04

## 2019-11-04 RX ORDER — FENTANYL CITRATE 50 UG/ML
25 INJECTION, SOLUTION INTRAMUSCULAR; INTRAVENOUS EVERY 5 MIN PRN
Status: DISCONTINUED | OUTPATIENT
Start: 2019-11-04 | End: 2019-11-04 | Stop reason: HOSPADM

## 2019-11-04 RX ORDER — ROPIVACAINE HYDROCHLORIDE 5 MG/ML
INJECTION, SOLUTION EPIDURAL; INFILTRATION; PERINEURAL
Status: COMPLETED
Start: 2019-11-04 | End: 2019-11-04

## 2019-11-04 RX ORDER — ACETAMINOPHEN 500 MG
1000 TABLET ORAL ONCE
Status: COMPLETED | OUTPATIENT
Start: 2019-11-04 | End: 2019-11-04

## 2019-11-04 RX ORDER — MEPERIDINE HYDROCHLORIDE 25 MG/ML
12.5 INJECTION INTRAMUSCULAR; INTRAVENOUS; SUBCUTANEOUS ONCE
Status: DISCONTINUED | OUTPATIENT
Start: 2019-11-04 | End: 2019-11-04 | Stop reason: HOSPADM

## 2019-11-04 RX ORDER — TRIAMCINOLONE ACETONIDE 40 MG/ML
INJECTION, SUSPENSION INTRA-ARTICULAR; INTRAMUSCULAR
Status: DISCONTINUED | OUTPATIENT
Start: 2019-11-04 | End: 2019-11-04 | Stop reason: HOSPADM

## 2019-11-04 RX ORDER — SODIUM CHLORIDE, SODIUM LACTATE, POTASSIUM CHLORIDE, CALCIUM CHLORIDE 600; 310; 30; 20 MG/100ML; MG/100ML; MG/100ML; MG/100ML
INJECTION, SOLUTION INTRAVENOUS CONTINUOUS
Status: DISCONTINUED | OUTPATIENT
Start: 2019-11-04 | End: 2019-11-04 | Stop reason: HOSPADM

## 2019-11-04 RX ORDER — HYDROCODONE BITARTRATE AND ACETAMINOPHEN 5; 325 MG/1; MG/1
1 TABLET ORAL
Status: DISCONTINUED | OUTPATIENT
Start: 2019-11-04 | End: 2019-11-04 | Stop reason: HOSPADM

## 2019-11-04 RX ORDER — CELECOXIB 100 MG/1
200 CAPSULE ORAL ONCE
Status: COMPLETED | OUTPATIENT
Start: 2019-11-04 | End: 2019-11-04

## 2019-11-04 RX ORDER — CHLORHEXIDINE GLUCONATE ORAL RINSE 1.2 MG/ML
10 SOLUTION DENTAL 2 TIMES DAILY
Status: DISCONTINUED | OUTPATIENT
Start: 2019-11-04 | End: 2019-11-04 | Stop reason: HOSPADM

## 2019-11-04 RX ORDER — LIDOCAINE HYDROCHLORIDE 10 MG/ML
0.5 INJECTION, SOLUTION EPIDURAL; INFILTRATION; INTRACAUDAL; PERINEURAL ONCE
Status: DISCONTINUED | OUTPATIENT
Start: 2019-11-04 | End: 2019-11-04 | Stop reason: HOSPADM

## 2019-11-04 RX ORDER — MIDAZOLAM HYDROCHLORIDE 1 MG/ML
INJECTION, SOLUTION INTRAMUSCULAR; INTRAVENOUS
Status: DISCONTINUED | OUTPATIENT
Start: 2019-11-04 | End: 2019-11-04

## 2019-11-04 RX ORDER — ROPIVACAINE HYDROCHLORIDE 5 MG/ML
INJECTION, SOLUTION EPIDURAL; INFILTRATION; PERINEURAL
Status: COMPLETED | OUTPATIENT
Start: 2019-11-04 | End: 2019-11-04

## 2019-11-04 RX ORDER — DIPHENHYDRAMINE HYDROCHLORIDE 50 MG/ML
25 INJECTION INTRAMUSCULAR; INTRAVENOUS EVERY 6 HOURS PRN
Status: DISCONTINUED | OUTPATIENT
Start: 2019-11-04 | End: 2019-11-04 | Stop reason: HOSPADM

## 2019-11-04 RX ORDER — KETOROLAC TROMETHAMINE 30 MG/ML
15 INJECTION, SOLUTION INTRAMUSCULAR; INTRAVENOUS EVERY 8 HOURS PRN
Status: DISCONTINUED | OUTPATIENT
Start: 2019-11-04 | End: 2019-11-04 | Stop reason: HOSPADM

## 2019-11-04 RX ORDER — CEFAZOLIN SODIUM 2 G/50ML
2 SOLUTION INTRAVENOUS
Status: CANCELLED | OUTPATIENT
Start: 2019-11-04

## 2019-11-04 RX ORDER — ALPRAZOLAM 0.5 MG/1
0.5 TABLET ORAL ONCE AS NEEDED
Status: DISCONTINUED | OUTPATIENT
Start: 2019-11-04 | End: 2019-11-04 | Stop reason: HOSPADM

## 2019-11-04 RX ORDER — CHLORHEXIDINE GLUCONATE ORAL RINSE 1.2 MG/ML
10 SOLUTION DENTAL
Status: CANCELLED | OUTPATIENT
Start: 2019-11-04

## 2019-11-04 RX ORDER — ONDANSETRON 2 MG/ML
4 INJECTION INTRAMUSCULAR; INTRAVENOUS ONCE AS NEEDED
Status: DISCONTINUED | OUTPATIENT
Start: 2019-11-04 | End: 2019-11-04 | Stop reason: HOSPADM

## 2019-11-04 RX ORDER — PROPOFOL 10 MG/ML
INJECTION, EMULSION INTRAVENOUS
Status: DISCONTINUED | OUTPATIENT
Start: 2019-11-04 | End: 2019-11-04

## 2019-11-04 RX ADMIN — FAMOTIDINE 20 MG: 20 TABLET, FILM COATED ORAL at 07:11

## 2019-11-04 RX ADMIN — ACETAMINOPHEN 1000 MG: 500 TABLET ORAL at 07:11

## 2019-11-04 RX ADMIN — SODIUM CHLORIDE, SODIUM LACTATE, POTASSIUM CHLORIDE, AND CALCIUM CHLORIDE: 600; 310; 30; 20 INJECTION, SOLUTION INTRAVENOUS at 07:11

## 2019-11-04 RX ADMIN — CELECOXIB 200 MG: 100 CAPSULE ORAL at 07:11

## 2019-11-04 RX ADMIN — FENTANYL CITRATE 100 MCG: 50 INJECTION, SOLUTION INTRAMUSCULAR; INTRAVENOUS at 09:11

## 2019-11-04 RX ADMIN — MIDAZOLAM 2 MG: 1 INJECTION INTRAMUSCULAR; INTRAVENOUS at 09:11

## 2019-11-04 RX ADMIN — LIDOCAINE HYDROCHLORIDE 40 MG: 20 INJECTION, SOLUTION INTRAVENOUS at 09:11

## 2019-11-04 RX ADMIN — SODIUM CHLORIDE 2 G: 9 INJECTION, SOLUTION INTRAVENOUS at 09:11

## 2019-11-04 RX ADMIN — ROPIVACAINE HYDROCHLORIDE 27 ML: 5 INJECTION, SOLUTION EPIDURAL; INFILTRATION; PERINEURAL at 09:11

## 2019-11-04 RX ADMIN — PROPOFOL 80 MG: 10 INJECTION, EMULSION INTRAVENOUS at 09:11

## 2019-11-04 NOTE — DISCHARGE SUMMARY
[facility]  Discharge Note  Short Stay    OUTCOME: Patient tolerated treatment/procedure well without complication and is now ready for discharge.    DISPOSITION: Home or Self Care    FINAL DIAGNOSIS:  Right carpal tunnel syndrome    FOLLOWUP: In orthopedic clinic

## 2019-11-04 NOTE — PLAN OF CARE
NERVE BLOCK COMPLETE, CONTINUOUS CARDIAC MONITORING IN PLACE, PATIENT RESTING QUIETLY. SOPHIE COOPER, RN, AND MYSELF AT BEDSIDE.

## 2019-11-04 NOTE — PLAN OF CARE
Reviewed and completed all discharge orders. Printed AVS and educated patient and family member of its entirety, including physician's orders, follow-up appt, medications, when to call, and when to report to the emergency room. Reviewed prescriptions, pharmacy information, and made sure there were no conflicts preventing the patient from obtaining the newly prescribed medications. I encouraged questions, answered them thoroughly, and evaluated my instructions via teach-back method. Patient has met all hospital discharge criteria at this point.

## 2019-11-04 NOTE — TRANSFER OF CARE
"Anesthesia Transfer of Care Note    Patient: Luz Pelaez    Procedure(s) Performed: Procedure(s) (LRB):  RELEASE, CARPAL TUNNEL (Right)    Patient location: PACU    Anesthesia Type: MAC and regional    Transport from OR: Transported from OR on room air with adequate spontaneous ventilation    Post pain: adequate analgesia    Post assessment: no apparent anesthetic complications    Post vital signs: stable    Level of consciousness: awake    Nausea/Vomiting: no nausea/vomiting    Complications: none    Transfer of care protocol was followed      Last vitals:   Visit Vitals  /68 (BP Location: Left arm, Patient Position: Lying)   Pulse 70   Temp 36.4 °C (97.6 °F) (Oral)   Resp 18   Ht 5' 5" (1.651 m)   Wt 69 kg (152 lb 1.9 oz)   SpO2 98%   Breastfeeding? No   BMI 25.31 kg/m²     "

## 2019-11-04 NOTE — ANESTHESIA POSTPROCEDURE EVALUATION
Anesthesia Post Evaluation    Patient: Luz Pelaez    Procedure(s) Performed: Procedure(s) (LRB):  RELEASE, CARPAL TUNNEL (Right)    Final Anesthesia Type: MAC  Patient location during evaluation: PACU  Patient participation: Yes- Able to Participate  Level of consciousness: awake and alert and oriented  Post-procedure vital signs: reviewed and stable  Pain management: adequate  Airway patency: patent  PONV status at discharge: No PONV  Anesthetic complications: no      Cardiovascular status: hemodynamically stable  Respiratory status: unassisted  Hydration status: euvolemic  Follow-up not needed.          Vitals Value Taken Time   /79 11/4/2019 10:30 AM   Temp 36.6 °C (97.9 °F) 11/4/2019 10:30 AM   Pulse 72 11/4/2019 10:30 AM   Resp 18 11/4/2019 10:30 AM   SpO2 97 % 11/4/2019 10:30 AM         Event Time     Out of Recovery 10:21:00          Pain/Marvin Score: Pain Rating Prior to Med Admin: 6 (11/4/2019  7:37 AM)  Marvin Score: 10 (11/4/2019 10:30 AM)

## 2019-11-04 NOTE — OP NOTE
[facility]  Orthopedic Surgery  Operative Note    SUMMARY     Date of Procedure: 11/4/2019   Assistant: None    Procedure: Procedure(s) (LRB):  RELEASE, CARPAL TUNNEL (Right)       Surgeon(s) and Role:     * Delfino Cain MD - Primary    Assisting Surgeon: None    Pre-Operative Diagnosis: Carpal tunnel syndrome on left [G56.02]  Pain in both wrists [M25.531, M25.532]    Post-Operative Diagnosis: Post-Op Diagnosis Codes:     * Carpal tunnel syndrome on right[G56.02]     * Pain in both wrists [M25.531, M25.532]    Anesthesia:  Local with sedation    Technical Procedures Used:  right carpal tunnel release    Description of the Findings of the Procedure:  The patient taken the operating room where 10 cc of 2% plain lidocaine use local anesthetic about the volar aspect of the right wrist. Satisfactory anesthesia had been achieved the right hand was prepped and draped usual sterile fashion.  After exsanguination of the extremity a proximal tourniquet was inflated to 250 mm of mercury after the patient received 2 g of Ancef intravenously preoperatively.  At this time a longitudinal incision was made in line with 4th ray over the transverse carpal ligament.  The incision extended using blunt sharp dissection using 3.5 loupe magnification.  The transverse carpal ligament identified and sharply incised under direct vision.  Complete release was performed and verified by the surgeon small finger both proximally distally. No additional pathology was encountered satisfactory release had been achieved a cc of Kenalog was applied topically skin was closed using interrupted 4-0 nylon suture. Xeroform gauze 4x4s and 2 ABD pads over wrapped with 3 in gauze dressing.  The patient tolerated procedure well and was transferred to the recovery room in satisfactory condition.      Complications: No    Estimated Blood Loss (EBL): 5cc                        Condition: Good    Disposition: PACU - hemodynamically  stable.    Attestation: I was present and scrubbed for the entire procedure.

## 2019-11-04 NOTE — ANESTHESIA PROCEDURE NOTES
Peripheral Block    Patient location during procedure: pre-op   Block not for primary anesthetic.  Reason for block: at surgeon's request and post-op pain management   Post-op Pain Location: Right wrist  Start time: 11/4/2019 8:54 AM  Timeout: 11/4/2019 8:49 AM   End time: 11/4/2019 8:58 AM    Staffing  Authorizing Provider: Patricia Isaac MD  Performing Provider: Patricia Isaac MD    Preanesthetic Checklist  Completed: patient identified, site marked, surgical consent, pre-op evaluation, timeout performed, IV checked, risks and benefits discussed and monitors and equipment checked  Peripheral Block  Patient position: supine  Prep: ChloraPrep  Patient monitoring: heart rate, cardiac monitor, continuous pulse ox, continuous capnometry and frequent blood pressure checks  Block type: axillary  Laterality: right  Injection technique: single shot  Needle  Needle type: Stimuplex   Needle gauge: 21 G  Needle length: 4 in  Needle localization: anatomical landmarks, ultrasound guidance and nerve stimulator   -ultrasound image captured on disc.  Assessment  Injection assessment: negative aspiration, negative parasthesia and local visualized surrounding nerve  Paresthesia pain: none  Heart rate change: no  Slow fractionated injection: yes  Additional Notes  VSS.  [facility] RN monitoring vitals throughout procedure.  Patient tolerated procedure well.

## 2019-11-08 ENCOUNTER — OFFICE VISIT (OUTPATIENT)
Dept: ORTHOPEDICS | Facility: CLINIC | Age: 49
End: 2019-11-08
Payer: MEDICAID

## 2019-11-08 ENCOUNTER — OFFICE VISIT (OUTPATIENT)
Dept: NEUROSURGERY | Facility: CLINIC | Age: 49
End: 2019-11-08
Payer: MEDICAID

## 2019-11-08 VITALS
HEIGHT: 65 IN | RESPIRATION RATE: 18 BRPM | WEIGHT: 154.75 LBS | DIASTOLIC BLOOD PRESSURE: 96 MMHG | BODY MASS INDEX: 25.78 KG/M2 | HEART RATE: 82 BPM | SYSTOLIC BLOOD PRESSURE: 150 MMHG

## 2019-11-08 VITALS
BODY MASS INDEX: 25.66 KG/M2 | SYSTOLIC BLOOD PRESSURE: 136 MMHG | HEIGHT: 65 IN | DIASTOLIC BLOOD PRESSURE: 90 MMHG | HEART RATE: 78 BPM | WEIGHT: 154 LBS

## 2019-11-08 DIAGNOSIS — G56.03 CARPAL TUNNEL SYNDROME ON BOTH SIDES: ICD-10-CM

## 2019-11-08 DIAGNOSIS — M50.30 DEGENERATIVE DISC DISEASE, CERVICAL: Primary | ICD-10-CM

## 2019-11-08 DIAGNOSIS — G56.02 CARPAL TUNNEL SYNDROME ON LEFT: Primary | ICD-10-CM

## 2019-11-08 PROCEDURE — 99024 POSTOP FOLLOW-UP VISIT: CPT | Mod: ,,, | Performed by: ORTHOPAEDIC SURGERY

## 2019-11-08 PROCEDURE — 99999 PR PBB SHADOW E&M-EST. PATIENT-LVL III: ICD-10-PCS | Mod: PBBFAC,,, | Performed by: NEUROLOGICAL SURGERY

## 2019-11-08 PROCEDURE — 99213 OFFICE O/P EST LOW 20 MIN: CPT | Mod: PBBFAC,27 | Performed by: ORTHOPAEDIC SURGERY

## 2019-11-08 PROCEDURE — 99213 OFFICE O/P EST LOW 20 MIN: CPT | Mod: PBBFAC,PO | Performed by: NEUROLOGICAL SURGERY

## 2019-11-08 PROCEDURE — 99999 PR PBB SHADOW E&M-EST. PATIENT-LVL III: CPT | Mod: PBBFAC,,, | Performed by: NEUROLOGICAL SURGERY

## 2019-11-08 PROCEDURE — 99213 OFFICE O/P EST LOW 20 MIN: CPT | Mod: S$PBB,,, | Performed by: NEUROLOGICAL SURGERY

## 2019-11-08 PROCEDURE — 99999 PR PBB SHADOW E&M-EST. PATIENT-LVL III: CPT | Mod: PBBFAC,,, | Performed by: ORTHOPAEDIC SURGERY

## 2019-11-08 PROCEDURE — 99999 PR PBB SHADOW E&M-EST. PATIENT-LVL III: ICD-10-PCS | Mod: PBBFAC,,, | Performed by: ORTHOPAEDIC SURGERY

## 2019-11-08 PROCEDURE — 99024 PR POST-OP FOLLOW-UP VISIT: ICD-10-PCS | Mod: ,,, | Performed by: ORTHOPAEDIC SURGERY

## 2019-11-08 PROCEDURE — 99213 PR OFFICE/OUTPT VISIT, EST, LEVL III, 20-29 MIN: ICD-10-PCS | Mod: S$PBB,,, | Performed by: NEUROLOGICAL SURGERY

## 2019-11-08 NOTE — PROGRESS NOTES
Subjective:     Patient ID: Luz Pelaez is a 49 y.o. female.    Chief Complaint: Pain and Post-op Evaluation of the Right Hand    The patient is a 49-year-old female seen in follow-up after right carpal tunnel release date of surgery is 11/04/2019.  She seems to be doing well.      Past Medical History:   Diagnosis Date    Allergy     Asthma     Attention deficit     Carpal tunnel syndrome     Cervical spine degeneration     GERD (gastroesophageal reflux disease)     Hiatal hernia 11/1/2019    Kidney stones      Past Surgical History:   Procedure Laterality Date    CARPAL TUNNEL RELEASE Left 10/7/2019    Procedure: RELEASE, CARPAL TUNNEL;  Surgeon: Delfino Cain MD;  Location: [facility] OR;  Service: Orthopedics;  Laterality: Left;    CARPAL TUNNEL RELEASE Right 11/4/2019    Procedure: RELEASE, CARPAL TUNNEL;  Surgeon: Delfino Cain MD;  Location: [facility] OR;  Service: Orthopedics;  Laterality: Right;    CHOLECYSTECTOMY      COLONOSCOPY  4/1/2012    date is approximate-done by Dr. Naranjo due to chronic constipation    SLEEVE GASTROPLASTY      TOTAL REDUCTION MAMMOPLASTY       Family History   Problem Relation Age of Onset    Hypertension Mother     Diabetes Mother     Hyperlipidemia Mother     Heart disease Mother 58    Colon cancer Maternal Grandmother     Breast cancer Maternal Grandmother     Breast cancer Paternal Grandmother         Breast    Heart attack Father 70    Thyroid disease Sister     Hypertension Sister     Breast cancer Paternal Aunt     Stroke Neg Hx      Social History     Socioeconomic History    Marital status: Single     Spouse name: Not on file    Number of children: 2    Years of education: Not on file    Highest education level: Not on file   Occupational History    Occupation:      Employer: [employer]   Social Needs    Financial resource strain: Not on file    Food insecurity:     Worry: Not on file      Inability: Not on file    Transportation needs:     Medical: Not on file     Non-medical: Not on file   Tobacco Use    Smoking status: Current Every Day Smoker     Packs/day: 1.00     Years: 30.00     Pack years: 30.00     Types: Cigarettes    Smokeless tobacco: Never Used   Substance and Sexual Activity    Alcohol use: Yes     Frequency: 2-3 times a week     Drinks per session: 1 or 2     Comment: Socially    Drug use: No    Sexual activity: Yes     Partners: Male   Lifestyle    Physical activity:     Days per week: Not on file     Minutes per session: Not on file    Stress: Only a little   Relationships    Social connections:     Talks on phone: Not on file     Gets together: Not on file     Attends [employer] service: Not on file     Active member of club or organization: Not on file     Attends meetings of clubs or organizations: Not on file     Relationship status: Not on file   Other Topics Concern    Not on file   Social History Narrative    Live w/ spouse no pets      Medication List with Changes/Refills   Current Medications    ALBUTEROL (PROAIR HFA) 90 MCG/ACTUATION INHALER    INHALE 1-2 PUFFS INTO THE LUNGS EVERY 6 HOURS AS NEEDED FOR WHEEZING    DEXTROAMPHETAMINE-AMPHETAMINE (ADDERALL) 30 MG TAB    Take 1 tablet by mouth 2 (two) times daily.    FLUTICASONE-SALMETEROL 250-50 MCG/DOSE (ADVAIR DISKUS) 250-50 MCG/DOSE DISKUS INHALER    Use 1 inhalation into the  lungs two times daily    HYDROCODONE-ACETAMINOPHEN (NORCO) 5-325 MG PER TABLET    Take 1 tablet by mouth every 6 (six) hours as needed for Pain.    LINZESS 290 MCG CAP    Take 1 capsule (290 mcg total) by mouth once daily.    MELOXICAM (MOBIC) 15 MG TABLET    Take 1 tablet (15 mg total) by mouth once daily.    OMEPRAZOLE (PRILOSEC) 40 MG CAPSULE    Take 1 capsule (40 mg total) by mouth once daily.    ONDANSETRON (ZOFRAN) 4 MG TABLET    Take 1 tablet (4 mg total) by mouth every 6 (six) hours as needed for Nausea.    ONDANSETRON (ZOFRAN-ODT) 4  MG TBDL    Every 6 hours prn nausea    TIOTROPIUM (SPIRIVA WITH HANDIHALER) 18 MCG INHALATION CAPSULE    Inhale the contents of 1    capsule via HandiHaler once daily    VARENICLINE (CHANTIX STARTING MONTH BOX) 0.5 MG (11)- 1 MG (42) TABLET    Take one 0.5mg tab by mouth once daily X3 days,then increase to one 0.5mg tab twice daily X4 days,then increase to one 1mg tab twice daily     Review of patient's allergies indicates:   Allergen Reactions    Bupropion hcl Other (See Comments)     Mood swings  Mood swings     Review of Systems   Constitution: Negative for malaise/fatigue.   HENT: Negative for hearing loss.    Eyes: Negative for double vision and visual disturbance.   Cardiovascular: Negative for chest pain.   Respiratory: Positive for wheezing. Negative for shortness of breath.    Endocrine: Negative for cold intolerance.   Hematologic/Lymphatic: Does not bruise/bleed easily.   Skin: Negative for poor wound healing and suspicious lesions.   Musculoskeletal: Positive for neck pain. Negative for gout, joint pain and joint swelling.   Gastrointestinal: Positive for heartburn. Negative for nausea and vomiting.   Genitourinary: Negative for dysuria.   Neurological: Positive for headaches, numbness, paresthesias and sensory change.   Psychiatric/Behavioral: Positive for altered mental status. Negative for depression, memory loss and substance abuse. The patient is not nervous/anxious.    Allergic/Immunologic: Negative for persistent infections.       Objective:   Body mass index is 25.63 kg/m².  Vitals:    11/08/19 1140   BP: (!) 136/90   Pulse: 78                General    Constitutional: She is oriented to person, place, and time. She appears well-developed and well-nourished. No distress.   HENT:   Head: Normocephalic.   Eyes: EOM are normal.   Neck: Normal range of motion.   Pulmonary/Chest: Effort normal.   Neurological: She is oriented to person, place, and time.   Psychiatric: She has a normal mood and  affect.             Right Hand/Wrist Exam     Inspection   Scars: Wrist - present   Effusion: Wrist - absent     Pain   Wrist - The patient exhibits pain of the flexor/pronator group.    Tenderness   The patient is tender to palpation of the victoria area.    Other     Neuorologic Exam    Median Distribution: normal  Ulnar Distribution: normal  Radial Distribution: normal    Comments:  The carpal tunnel incision looks good on the right wrist.  The suture lines intact.  There is no sign of infection.  There are no motor or sensory deficits.          Vascular Exam       Capillary Refill  Right Hand: normal capillary refill      Relevant imaging results reviewed and interpreted by me, discussed with the patient and / or family today new x-rays were not obtained today  Assessment:     Encounter Diagnosis   Name Primary?    Carpal tunnel syndrome on left Yes        Plan:     The patient had a Band-Aid applied.  Wound care was discussed.  She will return in 1 week for suture removal.              Disclaimer: This note was prepared using a voice recognition system and is likely to have sound alike errors within the text.

## 2019-11-08 NOTE — PROGRESS NOTES
Subjective:      Patient ID: Luz Pelaez is a 49 y.o. female.    Chief Complaint: Cervical Spine Pain (C-spine) (Neck pain that radiates bilateral shoulders )    Patient is here today for evaluation of neck and upper extremity she is here today as a follow-up    Patient had originally seen me for neck pain and upper extremity symptoms  We did an EMG and she was found have bilateral carpal tunnel syndrome  She had a left-sided carpal tunnel release which he did well from  One week ago she had a right-sided carpal tunnel release with orthopedics  She is having her stitches out today today by their service  She has some pain in her hand but overall she feels the symptoms are improved  Her neck pain is stable    Follow-up   Associated symptoms include myalgias, neck pain, numbness and weakness. Pertinent negatives include no abdominal pain, arthralgias, chest pain, chills or congestion.        Review of Systems   Constitutional: Negative for activity change, appetite change and chills.   HENT: Negative for congestion and ear pain.    Eyes: Negative for photophobia, redness and visual disturbance.   Respiratory: Negative for apnea and chest tightness.    Cardiovascular: Negative for chest pain.   Gastrointestinal: Negative for abdominal distention and abdominal pain.   Endocrine: Negative for cold intolerance.   Genitourinary: Negative for difficulty urinating.   Musculoskeletal: Positive for myalgias, neck pain and neck stiffness. Negative for arthralgias.   Skin: Negative for color change.   Allergic/Immunologic: Negative for environmental allergies.   Neurological: Positive for weakness and numbness. Negative for dizziness.   Hematological: Negative for adenopathy. Does not bruise/bleed easily.   Psychiatric/Behavioral: Negative for agitation, behavioral problems and confusion.         Objective:       Neurosurgery Physical Exam  Ortho/SPM Exam    Nursing note and vitals reviewed  Gen:Oriented to person, place, and  time.             Appears stated age   Head:Normocephalic and atraumatic.  Nose: Nose normal.    Eyes: EOM are normal. Pupils are equal, round, and reactive to light.   Neck: Neck supple. No masses or lesions palpated  Cardiovascular: Intact distal pulses.    Abdominal: Soft.   Neurological: Alert and oriented to person, place, and time.  No cranial nerve deficit.  Coordination normal. Normal muscle tone  Psychiatric: Normal mood and affect. Behavior is normal.    Neck:   Tenderness bilaterally Paraspinal tenderness    No spasms noted Paraspinal muscle spasms    None Pain with flexion and extention    Full range of motion noted Range of motion    Neg  Spurling's sign     Motor please note I did not assess the motor function on her right upper extremity is she has a recent surgical incision with sutures in place  Incision cdi    Right Right Left Left  Level Group   5  5  C5 Deltoid   5  5  C6 Bicep   5  5   Wrist extension    5  5  C7 Triceps   5  5   Wrist flexion   5  5  C8    5  5  T1 Interossei    Sensation  NL Decreased (R/L/BL) Level Sensation    X  C5 Lateral upper arm   X  C6 Thumb and index finger, lat forearm   X  C7 Middle finger   X  C8 Ring and little finger   X  T1 Medial arm      Reflex  2+  Bicep tendon   2+  Brachioradialis   2+  Triceps tendon   Not present  Barney's   none  Clonus    Present bilaterally Tinel's     I  reviewed all pertinent imaging regarding this case.    MRI cervical spine  1. No malalignment.  2. Degenerative change at several upper cervical levels contributing to severe neural foraminal narrowing on the right at C3-4 and C4-5.  No high-grade spinal canal stenosis.    CT scan of the cervical spine shows mild degenerative changes at C4-5 without any major foraminal or central stenosis noted.  There is a mention of right lung apex opacity.  CT chest is recommended  Assessment:     1. Degenerative disc disease, cervical    2. Carpal tunnel syndrome on both sides      Plan:      Degenerative disc disease, cervical    Carpal tunnel syndrome on both sides         Patient is one-week status post carpal tunnel release on the right  Would not make any recsabout her neck until she is cleared from their service and fully healed from the surgery  I will see her back in 8 weeks to see how she is doing to see if there is any further treatment needed for cervical spine  Fortunately her symptoms seem to have improved somewhat    Thank you for the referral   Please call with any questions    Renny Kelley MD  Neurosurgery

## 2019-11-11 ENCOUNTER — TELEPHONE (OUTPATIENT)
Dept: PREADMISSION TESTING | Facility: HOSPITAL | Age: 49
End: 2019-11-11

## 2019-11-11 NOTE — TELEPHONE ENCOUNTER
Pre-admission call attempted with patient, no answer. Left detailed VM with colonoscopy instructions, clear liquid diet and prep times. Miralax prep instructions sent to "[employer]

## 2019-11-12 ENCOUNTER — PATIENT MESSAGE (OUTPATIENT)
Dept: PREADMISSION TESTING | Facility: HOSPITAL | Age: 49
End: 2019-11-12

## 2019-11-12 ENCOUNTER — TELEPHONE (OUTPATIENT)
Dept: FAMILY MEDICINE | Facility: CLINIC | Age: 49
End: 2019-11-12

## 2019-11-12 NOTE — TELEPHONE ENCOUNTER
Spoke with pt and let her know to contact pulmonologist to see what else to prescribe since advair is no longer covered.

## 2019-11-12 NOTE — TELEPHONE ENCOUNTER
Please have her contact her pulmonologist and let him know that her Advair is no longer covered on her insurance and see what he would like to change it to

## 2019-11-12 NOTE — TELEPHONE ENCOUNTER
PA was done for Fluticasone-Salmeterol 250-50 and was denied, denial was because something generic wasn't tried previously. Please advise.

## 2019-11-13 ENCOUNTER — OFFICE VISIT (OUTPATIENT)
Dept: FAMILY MEDICINE | Facility: CLINIC | Age: 49
End: 2019-11-13
Payer: MEDICAID

## 2019-11-13 VITALS
HEART RATE: 78 BPM | TEMPERATURE: 98 F | HEIGHT: 65 IN | BODY MASS INDEX: 25.83 KG/M2 | WEIGHT: 155 LBS | SYSTOLIC BLOOD PRESSURE: 138 MMHG | DIASTOLIC BLOOD PRESSURE: 89 MMHG

## 2019-11-13 VITALS
HEIGHT: 65 IN | DIASTOLIC BLOOD PRESSURE: 79 MMHG | TEMPERATURE: 98 F | OXYGEN SATURATION: 97 % | SYSTOLIC BLOOD PRESSURE: 133 MMHG | RESPIRATION RATE: 18 BRPM | WEIGHT: 152.13 LBS | HEART RATE: 72 BPM | BODY MASS INDEX: 25.34 KG/M2

## 2019-11-13 DIAGNOSIS — G56.01 RIGHT CARPAL TUNNEL SYNDROME: ICD-10-CM

## 2019-11-13 DIAGNOSIS — R41.840 ATTENTION DEFICIT: Primary | Chronic | ICD-10-CM

## 2019-11-13 DIAGNOSIS — M54.12 CERVICAL RADICULOPATHY: ICD-10-CM

## 2019-11-13 PROCEDURE — 99999 PR PBB SHADOW E&M-EST. PATIENT-LVL III: CPT | Mod: PBBFAC,,, | Performed by: NURSE PRACTITIONER

## 2019-11-13 PROCEDURE — 99213 OFFICE O/P EST LOW 20 MIN: CPT | Mod: PBBFAC,PO | Performed by: NURSE PRACTITIONER

## 2019-11-13 PROCEDURE — 99999 PR PBB SHADOW E&M-EST. PATIENT-LVL III: ICD-10-PCS | Mod: PBBFAC,,, | Performed by: NURSE PRACTITIONER

## 2019-11-13 PROCEDURE — 99214 PR OFFICE/OUTPT VISIT, EST, LEVL IV, 30-39 MIN: ICD-10-PCS | Mod: S$PBB,,, | Performed by: NURSE PRACTITIONER

## 2019-11-13 PROCEDURE — 99214 OFFICE O/P EST MOD 30 MIN: CPT | Mod: S$PBB,,, | Performed by: NURSE PRACTITIONER

## 2019-11-13 RX ORDER — MELOXICAM 15 MG/1
15 TABLET ORAL DAILY
Qty: 90 TABLET | Refills: 3 | Status: ON HOLD | OUTPATIENT
Start: 2019-11-13 | End: 2020-06-18 | Stop reason: HOSPADM

## 2019-11-13 RX ORDER — DEXTROAMPHETAMINE SACCHARATE, AMPHETAMINE ASPARTATE, DEXTROAMPHETAMINE SULFATE AND AMPHETAMINE SULFATE 7.5; 7.5; 7.5; 7.5 MG/1; MG/1; MG/1; MG/1
1 TABLET ORAL 2 TIMES DAILY
Qty: 60 TABLET | Refills: 0 | Status: SHIPPED | OUTPATIENT
Start: 2019-12-13 | End: 2020-02-12 | Stop reason: SDUPTHER

## 2019-11-13 RX ORDER — DEXTROAMPHETAMINE SACCHARATE, AMPHETAMINE ASPARTATE, DEXTROAMPHETAMINE SULFATE AND AMPHETAMINE SULFATE 7.5; 7.5; 7.5; 7.5 MG/1; MG/1; MG/1; MG/1
1 TABLET ORAL 2 TIMES DAILY
Qty: 60 TABLET | Refills: 0 | Status: SHIPPED | OUTPATIENT
Start: 2020-01-13 | End: 2020-02-12 | Stop reason: SDUPTHER

## 2019-11-13 RX ORDER — DEXTROAMPHETAMINE SACCHARATE, AMPHETAMINE ASPARTATE, DEXTROAMPHETAMINE SULFATE AND AMPHETAMINE SULFATE 7.5; 7.5; 7.5; 7.5 MG/1; MG/1; MG/1; MG/1
1 TABLET ORAL 2 TIMES DAILY
Qty: 60 TABLET | Refills: 0 | Status: SHIPPED | OUTPATIENT
Start: 2019-11-13 | End: 2020-02-12 | Stop reason: SDUPTHER

## 2019-11-13 NOTE — PROGRESS NOTES
Subjective:       Patient ID: Luz Pelaez is a 49 y.o. female.    Chief Complaint: Medication Refill    Medication Refill   This is a chronic (Adderall, Mobic) problem. The current episode started more than 1 year ago. The problem occurs daily. The problem has been unchanged. Associated symptoms include arthralgias and neck pain. Pertinent negatives include no abdominal pain, chest pain, coughing, fatigue, fever, headaches, joint swelling, myalgias, rash, sore throat, vomiting or weakness.   Wound Check   She was originally treated 3 to 5 days ago (right palm/ wrist incision from carpel tunnel surgery). There has been no drainage from the wound. There is no redness present. There is no swelling present. The pain has improved.       Review of Systems   Constitutional: Negative for activity change, fatigue, fever and unexpected weight change.   HENT: Positive for rhinorrhea. Negative for ear pain, hearing loss, sore throat and trouble swallowing.    Eyes: Negative for pain, discharge and visual disturbance.   Respiratory: Negative for cough, chest tightness and shortness of breath.    Cardiovascular: Negative for chest pain and palpitations.   Gastrointestinal: Negative for abdominal pain, blood in stool, diarrhea and vomiting.   Endocrine: Negative for polydipsia and polyuria.   Genitourinary: Negative for difficulty urinating, dysuria, hematuria and menstrual problem.   Musculoskeletal: Positive for arthralgias and neck pain. Negative for joint swelling and myalgias.   Skin: Negative for color change and rash.   Neurological: Negative for dizziness, weakness and headaches.   Psychiatric/Behavioral: Positive for dysphoric mood. Negative for confusion and sleep disturbance. The patient is not nervous/anxious.        Vitals:    11/13/19 1451   BP: 138/89   Pulse: 78   Temp: 98.1 °F (36.7 °C)       Objective:     Current Outpatient Medications   Medication Sig Dispense Refill    albuterol (PROAIR HFA) 90  mcg/actuation inhaler INHALE 1-2 PUFFS INTO THE LUNGS EVERY 6 HOURS AS NEEDED FOR WHEEZING (Patient taking differently: INHALE 1-2 PUFFS INTO THE LUNGS EVERY 6 HOURS AS NEEDED FOR WHEEZING  Take am of surgery) 17 g 11    [START ON 1/13/2020] dextroamphetamine-amphetamine (ADDERALL) 30 mg Tab Take 1 tablet by mouth 2 (two) times daily. 60 tablet 0    fluticasone-salmeterol 250-50 mcg/dose (ADVAIR DISKUS) 250-50 mcg/dose diskus inhaler Use 1 inhalation into the  lungs two times daily (Patient taking differently: Use 1 inhalation into the  lungs two times daily  Take am of surgery) 60 each 11    LINZESS 290 mcg Cap Take 1 capsule (290 mcg total) by mouth once daily. 30 capsule 6    meloxicam (MOBIC) 15 MG tablet Take 1 tablet (15 mg total) by mouth once daily. 90 tablet 3    omeprazole (PRILOSEC) 40 MG capsule Take 1 capsule (40 mg total) by mouth once daily. 30 capsule 11    ondansetron (ZOFRAN) 4 MG tablet Take 1 tablet (4 mg total) by mouth every 6 (six) hours as needed for Nausea. 1515 tablet 0    ondansetron (ZOFRAN-ODT) 4 MG TbDL Every 6 hours prn nausea 100 tablet 2    tiotropium (SPIRIVA WITH HANDIHALER) 18 mcg inhalation capsule Inhale the contents of 1    capsule via HandiHaler once daily (Patient taking differently: Inhale the contents of 1    capsule via HandiHaler once daily  Take am of surgery) 30 capsule 11    varenicline (CHANTIX STARTING MONTH BOX) 0.5 mg (11)- 1 mg (42) tablet Take one 0.5mg tab by mouth once daily X3 days,then increase to one 0.5mg tab twice daily X4 days,then increase to one 1mg tab twice daily 1 Package 0    [START ON 12/13/2019] dextroamphetamine-amphetamine (ADDERALL) 30 mg Tab Take 1 tablet by mouth 2 (two) times daily. 60 tablet 0    dextroamphetamine-amphetamine (ADDERALL) 30 mg Tab Take 1 tablet by mouth 2 (two) times daily. 60 tablet 0    HYDROcodone-acetaminophen (NORCO) 5-325 mg per tablet Take 1 tablet by mouth every 6 (six) hours as needed for Pain. 15 tablet  0     No current facility-administered medications for this visit.        Physical Exam   Constitutional: She is oriented to person, place, and time. She appears well-developed and well-nourished. No distress.   HENT:   Head: Normocephalic and atraumatic.   Eyes: Pupils are equal, round, and reactive to light. EOM are normal.   Neck: Normal range of motion. Neck supple.   Cardiovascular: Normal rate and regular rhythm.   Pulmonary/Chest: Effort normal and breath sounds normal.   Musculoskeletal: Normal range of motion.        Hands:  Incision with sutures in place, no erythema, no drainage   Neurological: She is alert and oriented to person, place, and time.   Skin: Skin is warm and dry. No rash noted.   Psychiatric: She has a normal mood and affect. Judgment normal.   Nursing note and vitals reviewed.      Assessment:       1. Attention deficit    2. Cervical radiculopathy    3. Right carpal tunnel syndrome        Plan:   Attention deficit    Cervical radiculopathy  -     meloxicam (MOBIC) 15 MG tablet; Take 1 tablet (15 mg total) by mouth once daily.  Dispense: 90 tablet; Refill: 3    Right carpal tunnel syndrome    Other orders  -     dextroamphetamine-amphetamine (ADDERALL) 30 mg Tab; Take 1 tablet by mouth 2 (two) times daily.  Dispense: 60 tablet; Refill: 0  -     dextroamphetamine-amphetamine (ADDERALL) 30 mg Tab; Take 1 tablet by mouth 2 (two) times daily.  Dispense: 60 tablet; Refill: 0  -     dextroamphetamine-amphetamine (ADDERALL) 30 mg Tab; Take 1 tablet by mouth 2 (two) times daily.  Dispense: 60 tablet; Refill: 0        No follow-ups on file.    There are no Patient Instructions on file for this visit.

## 2019-11-15 ENCOUNTER — OFFICE VISIT (OUTPATIENT)
Dept: ORTHOPEDICS | Facility: CLINIC | Age: 49
End: 2019-11-15
Payer: MEDICAID

## 2019-11-15 VITALS
DIASTOLIC BLOOD PRESSURE: 89 MMHG | BODY MASS INDEX: 25.83 KG/M2 | SYSTOLIC BLOOD PRESSURE: 139 MMHG | HEART RATE: 74 BPM | HEIGHT: 65 IN | WEIGHT: 155 LBS

## 2019-11-15 DIAGNOSIS — G56.03 CARPAL TUNNEL SYNDROME ON BOTH SIDES: Primary | ICD-10-CM

## 2019-11-15 PROCEDURE — 99999 PR PBB SHADOW E&M-EST. PATIENT-LVL III: CPT | Mod: PBBFAC,,, | Performed by: ORTHOPAEDIC SURGERY

## 2019-11-15 PROCEDURE — 99213 OFFICE O/P EST LOW 20 MIN: CPT | Mod: PBBFAC | Performed by: ORTHOPAEDIC SURGERY

## 2019-11-15 PROCEDURE — 99999 PR PBB SHADOW E&M-EST. PATIENT-LVL III: ICD-10-PCS | Mod: PBBFAC,,, | Performed by: ORTHOPAEDIC SURGERY

## 2019-11-15 PROCEDURE — 99024 PR POST-OP FOLLOW-UP VISIT: ICD-10-PCS | Mod: ,,, | Performed by: ORTHOPAEDIC SURGERY

## 2019-11-15 PROCEDURE — 99024 POSTOP FOLLOW-UP VISIT: CPT | Mod: ,,, | Performed by: ORTHOPAEDIC SURGERY

## 2019-11-15 RX ORDER — SULFAMETHOXAZOLE AND TRIMETHOPRIM 800; 160 MG/1; MG/1
1 TABLET ORAL 2 TIMES DAILY
Qty: 10 TABLET | Refills: 0 | Status: SHIPPED | OUTPATIENT
Start: 2019-11-15 | End: 2019-11-20

## 2019-11-15 NOTE — PROGRESS NOTES
Subjective:     Patient ID: Luz Pelaez is a 49 y.o. female.    Chief Complaint: Pain and Post-op Evaluation of the Right Hand    The patient is a 49-year-old female status post right carpal tunnel release date of surgery is 11/04/2019.  She has slight redness but otherwise is doing well.      Past Medical History:   Diagnosis Date    Allergy     Asthma     Attention deficit     Carpal tunnel syndrome     Cervical spine degeneration     GERD (gastroesophageal reflux disease)     Hiatal hernia 11/1/2019    Kidney stones      Past Surgical History:   Procedure Laterality Date    CARPAL TUNNEL RELEASE Left 10/7/2019    Procedure: RELEASE, CARPAL TUNNEL;  Surgeon: Delfino Cain MD;  Location: [facility] OR;  Service: Orthopedics;  Laterality: Left;    CARPAL TUNNEL RELEASE Right 11/4/2019    Procedure: RELEASE, CARPAL TUNNEL;  Surgeon: Delfino Cain MD;  Location: [facility] OR;  Service: Orthopedics;  Laterality: Right;    CHOLECYSTECTOMY      COLONOSCOPY  4/1/2012    date is approximate-done by Dr. Naranjo due to chronic constipation    SLEEVE GASTROPLASTY      TOTAL REDUCTION MAMMOPLASTY       Family History   Problem Relation Age of Onset    Hypertension Mother     Diabetes Mother     Hyperlipidemia Mother     Heart disease Mother 58    Colon cancer Maternal Grandmother     Breast cancer Maternal Grandmother     Breast cancer Paternal Grandmother         Breast    Heart attack Father 70    Thyroid disease Sister     Hypertension Sister     Breast cancer Paternal Aunt     Stroke Neg Hx      Social History     Socioeconomic History    Marital status: Single     Spouse name: Not on file    Number of children: 2    Years of education: Not on file    Highest education level: Not on file   Occupational History    Occupation:      Employer: [employer]   Social Needs    Financial resource strain: Not on file    Food insecurity:     Worry: Not on  file     Inability: Not on file    Transportation needs:     Medical: Not on file     Non-medical: Not on file   Tobacco Use    Smoking status: Current Every Day Smoker     Packs/day: 1.00     Years: 30.00     Pack years: 30.00     Types: Cigarettes    Smokeless tobacco: Never Used   Substance and Sexual Activity    Alcohol use: Yes     Frequency: 2-3 times a week     Drinks per session: 1 or 2     Comment: Socially    Drug use: No    Sexual activity: Yes     Partners: Male   Lifestyle    Physical activity:     Days per week: Not on file     Minutes per session: Not on file    Stress: Only a little   Relationships    Social connections:     Talks on phone: Not on file     Gets together: Not on file     Attends Restorationism service: Not on file     Active member of club or organization: Not on file     Attends meetings of clubs or organizations: Not on file     Relationship status: Not on file   Other Topics Concern    Not on file   Social History Narrative    Live w/ spouse no pets      Medication List with Changes/Refills   Current Medications    ALBUTEROL (PROAIR HFA) 90 MCG/ACTUATION INHALER    INHALE 1-2 PUFFS INTO THE LUNGS EVERY 6 HOURS AS NEEDED FOR WHEEZING    DEXTROAMPHETAMINE-AMPHETAMINE (ADDERALL) 30 MG TAB    Take 1 tablet by mouth 2 (two) times daily.    DEXTROAMPHETAMINE-AMPHETAMINE (ADDERALL) 30 MG TAB    Take 1 tablet by mouth 2 (two) times daily.    DEXTROAMPHETAMINE-AMPHETAMINE (ADDERALL) 30 MG TAB    Take 1 tablet by mouth 2 (two) times daily.    FLUTICASONE-SALMETEROL 250-50 MCG/DOSE (ADVAIR DISKUS) 250-50 MCG/DOSE DISKUS INHALER    Use 1 inhalation into the  lungs two times daily    HYDROCODONE-ACETAMINOPHEN (NORCO) 5-325 MG PER TABLET    Take 1 tablet by mouth every 6 (six) hours as needed for Pain.    LINZESS 290 MCG CAP    Take 1 capsule (290 mcg total) by mouth once daily.    MELOXICAM (MOBIC) 15 MG TABLET    Take 1 tablet (15 mg total) by mouth once daily.    OMEPRAZOLE (PRILOSEC) 40  MG CAPSULE    Take 1 capsule (40 mg total) by mouth once daily.    ONDANSETRON (ZOFRAN) 4 MG TABLET    Take 1 tablet (4 mg total) by mouth every 6 (six) hours as needed for Nausea.    ONDANSETRON (ZOFRAN-ODT) 4 MG TBDL    Every 6 hours prn nausea    TIOTROPIUM (SPIRIVA WITH HANDIHALER) 18 MCG INHALATION CAPSULE    Inhale the contents of 1    capsule via HandiHaler once daily    VARENICLINE (CHANTIX STARTING MONTH BOX) 0.5 MG (11)- 1 MG (42) TABLET    Take one 0.5mg tab by mouth once daily X3 days,then increase to one 0.5mg tab twice daily X4 days,then increase to one 1mg tab twice daily     Review of patient's allergies indicates:   Allergen Reactions    Bupropion hcl Other (See Comments)     Mood swings  Mood swings     Review of Systems   Constitution: Negative for malaise/fatigue.   HENT: Negative for hearing loss.    Eyes: Negative for double vision and visual disturbance.   Cardiovascular: Negative for chest pain.   Respiratory: Positive for wheezing. Negative for shortness of breath.    Endocrine: Negative for cold intolerance.   Hematologic/Lymphatic: Does not bruise/bleed easily.   Skin: Negative for poor wound healing and suspicious lesions.   Musculoskeletal: Positive for neck pain. Negative for gout, joint pain and joint swelling.   Gastrointestinal: Positive for heartburn. Negative for nausea and vomiting.   Genitourinary: Negative for dysuria.   Neurological: Positive for headaches, numbness, paresthesias and sensory change.   Psychiatric/Behavioral: Positive for altered mental status. Negative for depression, memory loss and substance abuse. The patient is not nervous/anxious.    Allergic/Immunologic: Negative for persistent infections.       Objective:   Body mass index is 25.79 kg/m².  Vitals:    11/15/19 1119   BP: 139/89   Pulse: 74                General    Constitutional: She is oriented to person, place, and time. She appears well-developed and well-nourished. No distress.   HENT:   Head:  Normocephalic.   Eyes: EOM are normal.   Neck: Normal range of motion.   Pulmonary/Chest: Effort normal.   Neurological: She is oriented to person, place, and time.   Psychiatric: She has a normal mood and affect.             Right Hand/Wrist Exam     Inspection   Scars: Wrist - present   Effusion: Wrist - absent     Pain   Wrist - The patient exhibits pain of the flexor/pronator group.    Other     Neuorologic Exam    Median Distribution: normal  Ulnar Distribution: normal  Radial Distribution: normal    Comments:  The suture line is intact right wrist.  There is no sign of infection.  There are no motor sensory deficits.  There is slight erythema.  There is no lymphangitis lymphadenopathy.  She has minimal tenderness.          Vascular Exam       Capillary Refill  Right Hand: normal capillary refill      Relevant imaging results reviewed and interpreted by me, discussed with the patient and / or family today radiographs were not obtained today.  Assessment:     Encounter Diagnosis   Name Primary?    Carpal tunnel syndrome on both sides Yes        Plan:     The patient had the sutures removed today.  Steri-Strips were applied.  Wound care was discussed. She seems to be doing well will return on a as needed basis.  She will be given Bactrim DS # 10.  One p.o. b.i.d. for 5 days.  She will call me if she has any problem.                Disclaimer: This note was prepared using a voice recognition system and is likely to have sound alike errors within the text.

## 2019-11-19 ENCOUNTER — TELEPHONE (OUTPATIENT)
Dept: ADMINISTRATIVE | Facility: HOSPITAL | Age: 49
End: 2019-11-19

## 2019-11-20 ENCOUNTER — HOSPITAL ENCOUNTER (OUTPATIENT)
Dept: RADIOLOGY | Facility: HOSPITAL | Age: 49
Discharge: HOME | End: 2019-11-20
Attending: FAMILY MEDICINE
Payer: MEDICAID

## 2019-11-20 VITALS — BODY MASS INDEX: 25.83 KG/M2 | HEIGHT: 65 IN | WEIGHT: 155 LBS

## 2019-11-20 DIAGNOSIS — Z12.31 ENCOUNTER FOR SCREENING MAMMOGRAM FOR BREAST CANCER: ICD-10-CM

## 2019-11-20 PROCEDURE — 77067 SCR MAMMO BI INCL CAD: CPT | Mod: 26,,, | Performed by: RADIOLOGY

## 2019-11-20 PROCEDURE — 77063 BREAST TOMOSYNTHESIS BI: CPT | Mod: 26,,, | Performed by: RADIOLOGY

## 2019-11-20 PROCEDURE — 77063 BREAST TOMOSYNTHESIS BI: CPT | Mod: TC,PO

## 2019-11-20 PROCEDURE — 77063 MAMMO DIGITAL SCREENING BILAT WITH TOMOSYNTHESIS_CAD: ICD-10-PCS | Mod: 26,,, | Performed by: RADIOLOGY

## 2019-11-20 PROCEDURE — 77067 MAMMO DIGITAL SCREENING BILAT WITH TOMOSYNTHESIS_CAD: ICD-10-PCS | Mod: 26,,, | Performed by: RADIOLOGY

## 2019-11-29 ENCOUNTER — TELEPHONE (OUTPATIENT)
Dept: PULMONOLOGY | Facility: CLINIC | Age: 49
End: 2019-11-29

## 2019-11-29 NOTE — TELEPHONE ENCOUNTER
Called patient to schedule initial pulmonary disease management appointment.   No answer. Left message.

## 2019-12-18 ENCOUNTER — TELEPHONE (OUTPATIENT)
Dept: FAMILY MEDICINE | Facility: CLINIC | Age: 49
End: 2019-12-18

## 2019-12-18 DIAGNOSIS — K59.00 CONSTIPATION, UNSPECIFIED CONSTIPATION TYPE: ICD-10-CM

## 2019-12-18 RX ORDER — LINACLOTIDE 290 UG/1
290 CAPSULE, GELATIN COATED ORAL DAILY
Qty: 30 CAPSULE | Refills: 11 | Status: SHIPPED | OUTPATIENT
Start: 2019-12-18 | End: 2020-12-10

## 2019-12-18 NOTE — TELEPHONE ENCOUNTER
----- Message from Lola Mcguire sent at 12/18/2019  4:05 PM CST -----  Contact: Patient   Type:  RX Refill Request    Who Called: Patient   Refill or New Rx: Refill   RX Name and Strength: Linzess 290 mcg  How is the patient currently taking it? (ex. 1XDay): 1x day  Is this a 30 day or 90 day RX: 90 if possible   Preferred Pharmacy with phone number:   [facility] [place], [place] - [address] [facility]  [address] [address] [address]  Phone: [phone] Fax: [phone]  Local or Mail Order: Local  Ordering Provider: Dr. Mcgarry  Would the patient rather a call back or a response via [facility]? Call back   Best Call Back Number: Please call her at [phone]  Additional Information: n/a

## 2020-02-12 ENCOUNTER — OFFICE VISIT (OUTPATIENT)
Dept: FAMILY MEDICINE | Facility: CLINIC | Age: 50
End: 2020-02-12
Payer: MEDICAID

## 2020-02-12 VITALS
DIASTOLIC BLOOD PRESSURE: 97 MMHG | HEIGHT: 65 IN | SYSTOLIC BLOOD PRESSURE: 142 MMHG | HEART RATE: 92 BPM | TEMPERATURE: 99 F | BODY MASS INDEX: 26.72 KG/M2 | WEIGHT: 160.38 LBS

## 2020-02-12 DIAGNOSIS — N95.1 POST MENOPAUSAL SYNDROME: ICD-10-CM

## 2020-02-12 DIAGNOSIS — J45.40 MODERATE PERSISTENT ASTHMA WITHOUT COMPLICATION: ICD-10-CM

## 2020-02-12 DIAGNOSIS — R41.840 ATTENTION DEFICIT: Primary | Chronic | ICD-10-CM

## 2020-02-12 PROCEDURE — 99214 PR OFFICE/OUTPT VISIT, EST, LEVL IV, 30-39 MIN: ICD-10-PCS | Mod: S$PBB,,, | Performed by: NURSE PRACTITIONER

## 2020-02-12 PROCEDURE — 99213 OFFICE O/P EST LOW 20 MIN: CPT | Mod: PBBFAC,PO | Performed by: NURSE PRACTITIONER

## 2020-02-12 PROCEDURE — 99999 PR PBB SHADOW E&M-EST. PATIENT-LVL III: ICD-10-PCS | Mod: PBBFAC,,, | Performed by: NURSE PRACTITIONER

## 2020-02-12 PROCEDURE — 99214 OFFICE O/P EST MOD 30 MIN: CPT | Mod: S$PBB,,, | Performed by: NURSE PRACTITIONER

## 2020-02-12 PROCEDURE — 99999 PR PBB SHADOW E&M-EST. PATIENT-LVL III: CPT | Mod: PBBFAC,,, | Performed by: NURSE PRACTITIONER

## 2020-02-12 RX ORDER — TIOTROPIUM BROMIDE 18 UG/1
CAPSULE ORAL; RESPIRATORY (INHALATION)
Qty: 30 CAPSULE | Refills: 2 | Status: SHIPPED | OUTPATIENT
Start: 2020-02-12 | End: 2020-02-21

## 2020-02-12 RX ORDER — DEXTROAMPHETAMINE SACCHARATE, AMPHETAMINE ASPARTATE, DEXTROAMPHETAMINE SULFATE AND AMPHETAMINE SULFATE 7.5; 7.5; 7.5; 7.5 MG/1; MG/1; MG/1; MG/1
1 TABLET ORAL 2 TIMES DAILY
Qty: 60 TABLET | Refills: 0 | Status: SHIPPED | OUTPATIENT
Start: 2020-02-12 | End: 2020-05-11 | Stop reason: ALTCHOICE

## 2020-02-12 RX ORDER — DEXTROAMPHETAMINE SACCHARATE, AMPHETAMINE ASPARTATE, DEXTROAMPHETAMINE SULFATE AND AMPHETAMINE SULFATE 7.5; 7.5; 7.5; 7.5 MG/1; MG/1; MG/1; MG/1
1 TABLET ORAL 2 TIMES DAILY
Qty: 60 TABLET | Refills: 0 | Status: SHIPPED | OUTPATIENT
Start: 2020-03-12 | End: 2020-05-11 | Stop reason: ALTCHOICE

## 2020-02-12 RX ORDER — FLUTICASONE PROPIONATE AND SALMETEROL 250; 50 UG/1; UG/1
POWDER RESPIRATORY (INHALATION)
Qty: 60 EACH | Refills: 2 | Status: SHIPPED | OUTPATIENT
Start: 2020-02-12 | End: 2020-02-21

## 2020-02-12 RX ORDER — ALBUTEROL SULFATE 90 UG/1
AEROSOL, METERED RESPIRATORY (INHALATION)
Qty: 17 G | Refills: 2 | Status: SHIPPED | OUTPATIENT
Start: 2020-02-12 | End: 2021-01-08

## 2020-02-12 RX ORDER — DEXTROAMPHETAMINE SACCHARATE, AMPHETAMINE ASPARTATE, DEXTROAMPHETAMINE SULFATE AND AMPHETAMINE SULFATE 7.5; 7.5; 7.5; 7.5 MG/1; MG/1; MG/1; MG/1
1 TABLET ORAL 2 TIMES DAILY
Qty: 60 TABLET | Refills: 0 | Status: SHIPPED | OUTPATIENT
Start: 2020-04-11 | End: 2020-05-11 | Stop reason: ALTCHOICE

## 2020-02-12 NOTE — PROGRESS NOTES
Subjective:       Patient ID: Luz Pelaez is a 49 y.o. female.    Chief Complaint: Medication Refill    Medication Refill   Chronicity: Adderall. The current episode started more than 1 year ago. The problem occurs daily. The problem has been unchanged. Pertinent negatives include no abdominal pain, chest pain, coughing, fatigue, fever, headaches, myalgias, rash, sore throat, vomiting or weakness. The treatment provided significant relief.   Asthma   There is no cough, shortness of breath or wheezing. This is a chronic problem. The current episode started more than 1 year ago. Asthma causes daytime symptoms monthly. Asthma causes nighttime symptoms monthly. The problem has been waxing and waning. Pertinent negatives include no chest pain, ear pain, fever, headaches, myalgias, rhinorrhea, sore throat or trouble swallowing. Her symptoms are alleviated by beta-agonist and steroid inhaler. She reports significant improvement on treatment. Her past medical history is significant for asthma.     Pt is concerned about her hormones. Reports symptoms of sweats, weight gain, increased blood pressure. She is due for pap smear.       Review of Systems   Constitutional: Negative for activity change, fatigue, fever and unexpected weight change.   HENT: Negative for ear pain, hearing loss, rhinorrhea, sore throat and trouble swallowing.    Eyes: Negative for pain, discharge and visual disturbance.   Respiratory: Negative for cough, chest tightness, shortness of breath and wheezing.    Cardiovascular: Negative for chest pain and palpitations.   Gastrointestinal: Negative for abdominal pain, blood in stool, constipation, diarrhea and vomiting.   Endocrine: Negative for polydipsia and polyuria.   Genitourinary: Negative for difficulty urinating, dysuria, hematuria and menstrual problem.   Musculoskeletal: Negative for myalgias.   Skin: Negative for color change and rash.   Neurological: Negative for dizziness, weakness and  headaches.   Psychiatric/Behavioral: Positive for dysphoric mood. Negative for confusion and sleep disturbance. The patient is not nervous/anxious.        Vitals:    02/12/20 1444   BP: (!) 142/97   Pulse: 92   Temp: 98.6 °F (37 °C)       Objective:     Current Outpatient Medications   Medication Sig Dispense Refill    albuterol (PROAIR HFA) 90 mcg/actuation inhaler INHALE 1-2 PUFFS INTO THE LUNGS EVERY 6 HOURS AS NEEDED FOR WHEEZING 17 g 2    [START ON 4/11/2020] dextroamphetamine-amphetamine (ADDERALL) 30 mg Tab Take 1 tablet (30 mg total) by mouth 2 (two) times daily. 60 tablet 0    [START ON 3/12/2020] dextroamphetamine-amphetamine (ADDERALL) 30 mg Tab Take 1 tablet (30 mg total) by mouth 2 (two) times daily. 60 tablet 0    dextroamphetamine-amphetamine (ADDERALL) 30 mg Tab Take 1 tablet (30 mg total) by mouth 2 (two) times daily. 60 tablet 0    fluticasone-salmeterol diskus inhaler 250-50 mcg Use 1 inhalation into the  lungs two times daily 60 each 2    LINZESS 290 mcg Cap capsule Take 1 capsule (290 mcg total) by mouth once daily. 30 capsule 11    meloxicam (MOBIC) 15 MG tablet Take 1 tablet (15 mg total) by mouth once daily. 90 tablet 3    omeprazole (PRILOSEC) 40 MG capsule Take 1 capsule (40 mg total) by mouth once daily. 30 capsule 11    ondansetron (ZOFRAN) 4 MG tablet Take 1 tablet (4 mg total) by mouth every 6 (six) hours as needed for Nausea. 1515 tablet 0    ondansetron (ZOFRAN-ODT) 4 MG TbDL Every 6 hours prn nausea 100 tablet 2    tiotropium (SPIRIVA WITH HANDIHALER) 18 mcg inhalation capsule Inhale the contents of 1    capsule via HandiHaler once daily 30 capsule 2    varenicline (CHANTIX STARTING MONTH BOX) 0.5 mg (11)- 1 mg (42) tablet Take one 0.5mg tab by mouth once daily X3 days,then increase to one 0.5mg tab twice daily X4 days,then increase to one 1mg tab twice daily 1 Package 0     No current facility-administered medications for this visit.        Physical Exam    Constitutional: She is oriented to person, place, and time. She appears well-developed and well-nourished. No distress.   HENT:   Head: Normocephalic and atraumatic.   Eyes: Pupils are equal, round, and reactive to light. EOM are normal.   Neck: Normal range of motion. Neck supple.   Cardiovascular: Normal rate and regular rhythm.   Pulmonary/Chest: Effort normal and breath sounds normal.   Musculoskeletal: Normal range of motion.   Neurological: She is alert and oriented to person, place, and time.   Skin: Skin is warm and dry. No rash noted.   Psychiatric: She has a normal mood and affect. Judgment normal.   Nursing note and vitals reviewed.      Assessment:       1. Attention deficit    2. Post menopausal syndrome    3. Moderate persistent asthma without complication        Plan:   Attention deficit  -     dextroamphetamine-amphetamine (ADDERALL) 30 mg Tab; Take 1 tablet (30 mg total) by mouth 2 (two) times daily.  Dispense: 60 tablet; Refill: 0  -     dextroamphetamine-amphetamine (ADDERALL) 30 mg Tab; Take 1 tablet (30 mg total) by mouth 2 (two) times daily.  Dispense: 60 tablet; Refill: 0  -     dextroamphetamine-amphetamine (ADDERALL) 30 mg Tab; Take 1 tablet (30 mg total) by mouth 2 (two) times daily.  Dispense: 60 tablet; Refill: 0    Post menopausal syndrome  -     Ambulatory referral/consult to Gynecology; Future; Expected date: 02/19/2020    Moderate persistent asthma without complication  -     tiotropium (SPIRIVA WITH HANDIHALER) 18 mcg inhalation capsule; Inhale the contents of 1    capsule via HandiHaler once daily  Dispense: 30 capsule; Refill: 2  -     fluticasone-salmeterol diskus inhaler 250-50 mcg; Use 1 inhalation into the  lungs two times daily  Dispense: 60 each; Refill: 2  -     albuterol (PROAIR HFA) 90 mcg/actuation inhaler; INHALE 1-2 PUFFS INTO THE LUNGS EVERY 6 HOURS AS NEEDED FOR WHEEZING  Dispense: 17 g; Refill: 2    She is going to schedule her next appointment for annual with   Zeferino in 3 months    No follow-ups on file.    There are no Patient Instructions on file for this visit.

## 2020-02-21 ENCOUNTER — HOSPITAL ENCOUNTER (OUTPATIENT)
Dept: RADIOLOGY | Facility: HOSPITAL | Age: 50
Discharge: HOME | End: 2020-02-21
Attending: INTERNAL MEDICINE
Payer: MEDICAID

## 2020-02-21 ENCOUNTER — PROCEDURE VISIT (OUTPATIENT)
Dept: PULMONOLOGY | Facility: CLINIC | Age: 50
End: 2020-02-21
Payer: MEDICAID

## 2020-02-21 ENCOUNTER — OFFICE VISIT (OUTPATIENT)
Dept: PULMONOLOGY | Facility: CLINIC | Age: 50
End: 2020-02-21
Payer: MEDICAID

## 2020-02-21 VITALS
BODY MASS INDEX: 26.45 KG/M2 | HEIGHT: 65 IN | RESPIRATION RATE: 17 BRPM | DIASTOLIC BLOOD PRESSURE: 80 MMHG | OXYGEN SATURATION: 100 % | HEART RATE: 78 BPM | SYSTOLIC BLOOD PRESSURE: 130 MMHG | WEIGHT: 158.75 LBS

## 2020-02-21 DIAGNOSIS — R91.1 PULMONARY NODULE: ICD-10-CM

## 2020-02-21 DIAGNOSIS — R91.8 ABNORMAL CT SCAN OF LUNG: Primary | ICD-10-CM

## 2020-02-21 DIAGNOSIS — R91.1 PULMONARY NODULE, RIGHT: ICD-10-CM

## 2020-02-21 DIAGNOSIS — R91.8 ABNORMAL CT SCAN OF LUNG: ICD-10-CM

## 2020-02-21 DIAGNOSIS — R91.8 GROUND GLASS OPACITY PRESENT ON IMAGING OF LUNG: ICD-10-CM

## 2020-02-21 DIAGNOSIS — J44.89 ASTHMA-COPD OVERLAP SYNDROME: ICD-10-CM

## 2020-02-21 DIAGNOSIS — J44.89 ASTHMA-COPD OVERLAP SYNDROME: Primary | ICD-10-CM

## 2020-02-21 DIAGNOSIS — F17.219 CIGARETTE NICOTINE DEPENDENCE WITH NICOTINE-INDUCED DISORDER: ICD-10-CM

## 2020-02-21 PROCEDURE — 99999 PR PBB SHADOW E&M-EST. PATIENT-LVL V: CPT | Mod: PBBFAC,,, | Performed by: INTERNAL MEDICINE

## 2020-02-21 PROCEDURE — 71250 CT CHEST WITHOUT CONTRAST: ICD-10-PCS | Mod: 26,,, | Performed by: RADIOLOGY

## 2020-02-21 PROCEDURE — 99214 OFFICE O/P EST MOD 30 MIN: CPT | Mod: S$PBB,,, | Performed by: INTERNAL MEDICINE

## 2020-02-21 PROCEDURE — 71250 CT THORAX DX C-: CPT | Mod: TC

## 2020-02-21 PROCEDURE — 99999 PR PBB SHADOW E&M-EST. PATIENT-LVL V: ICD-10-PCS | Mod: PBBFAC,,, | Performed by: INTERNAL MEDICINE

## 2020-02-21 PROCEDURE — 99214 PR OFFICE/OUTPT VISIT, EST, LEVL IV, 30-39 MIN: ICD-10-PCS | Mod: S$PBB,,, | Performed by: INTERNAL MEDICINE

## 2020-02-21 PROCEDURE — 71250 CT THORAX DX C-: CPT | Mod: 26,,, | Performed by: RADIOLOGY

## 2020-02-21 PROCEDURE — 99215 OFFICE O/P EST HI 40 MIN: CPT | Mod: PBBFAC,25 | Performed by: INTERNAL MEDICINE

## 2020-02-21 NOTE — PATIENT INSTRUCTIONS
CT-Guided Lung Biopsy  CT-guided lung biopsy is a procedure to collect small tissue samples from an abnormal area in your lung. During the procedure, an imaging method called CT (computed tomography) is used to show live pictures of your lung. Then a thin needle is used to remove the tissue samples. The samples are tested in a lab for cancer and other problems.     For the biopsy, a thin needle is used to remove samples of tissue from an abnormal area in the lung.   Getting ready for your procedure  Follow any instructions from your healthcare provider.  Tell your provider about any medicines you are taking. It is important for your provider to know if you are taking any blood-thinning medicines or have a bleeding disorder.  You may need to stop taking all or some medicine before the procedure. This includes:  · All prescription medicines  · Blood-thinning medicines (anticoagulants)  · Over-the-counter medicines such as aspirin or ibuprofen  · Street drugs  · Herbs, vitamins, and other supplements  Also tell your provider if you:  · Are pregnant or think you may be pregnant  · Are breastfeeding  · Are allergic to or have intolerances to any medicines  · Have a chronic cough, new cough, or other illness  · Use oxygen therapy at home  · Smoke or drink alcohol on a regular basis  Follow any directions youre given for not eating or drinking before the procedure.  The day of your procedure  The procedure takes about 60 minutes. The entire procedure (including time to prepare and recover) takes several hours. Youll likely go home the same day.  Before the procedure begins:  · An IV (intravenous) line may be put into a vein in your hand or arm. This line supplies fluids and medicines.  · To keep you free of pain during the procedure, you may be given anesthesia. Depending on the type of anesthesia used, you may be awake, drowsy, or in a deep sleep for the procedure.  During the procedure:  · Youll lie on a CT scan  table. You may be on your back, side, or stomach. Pictures of your lung are then taken using the CT scanner. This helps your provider find the best place to position the needle in your lungs.  · A yulisa is made on your skin where the needle will be inserted (biopsy site). The site is injected with numbing medicine.  · Using the CT pictures as a guide, the needle is passed through the numbed skin between your ribs and into your lung. Samples of tissue are then removed from the abnormal area in your lung. The samples are sent to a lab to be checked for problems.  · When the procedure is complete, the needle is removed. Pressure is applied to the biopsy site to help stop any bleeding. The site is then bandaged.  After the procedure:  · Youll be taken to a room to rest until the anesthesia wears off.  · A chest X-ray may be done. This is to make sure there was no damage to your lungs or the area where the needle was placed.  · When its time for you to go home, have an adult family member or friend ready to drive you.  Recovering at home  You may cough up a small amount of blood shortly after the procedure. Later, you may also have some soreness around the biopsy site. Once at home, follow any instructions youre given. Be sure to:  · Take all medicines as directed.  · Care for the biopsy site as instructed.  · Check for signs of infection at the biopsy site (see below).  · Do not bathe or shower until your provider says it's OK. If you wish, you may wash with a sponge or washcloth.  · Avoid heavy lifting and other strenuous activities as directed.  · If you plan any air travel, ask your provider when you can do so. You may be told not to fly for a few weeks. This is because pressure changes may affect your lungs.  When to call your provider  Call 911 or your local emergency number if you have sudden, severe difficulty breathing. This could be a life-threatening emergency.  Call your healthcare provider for less severe  symptoms that are still concerning. If you are unable to speak with your provider, go to the emergency room if you have any of the following symptoms:  · Fever of 100.4°F (38°C) or higher, or as directed by your provider  · Sudden chest pain, shortness of breath, or fainting  · Coughing up increasing amounts of blood  · Signs of infection at the biopsy site, such as increased redness or swelling, warmth, more pain, bleeding, or bad-smelling drainage   Follow-up  The provider who ordered your test will discuss the biopsy results with you during a follow-up visit. Results are usually ready within 1 to 2 weeks. If more tests or treatments are needed, your provider will discuss these with you.  Risks and possible complications  All procedures have some risk. Possible risks of this procedure include:  · An air leak in your lung (pneumothorax). This may require a stay in the hospital and treatment to re-inflate the lung.  · Bleeding into or around the lung  · Infection in the skin or lung  · Injury to other structures in the chest  · Risks of anesthesia. This will be discussed with you before the procedure.   Date Last Reviewed: 6/11/2015  © 5404-6644 [employer]. [address] [place] [address], [place] [address]. All rights reserved. This information is not intended as a substitute for professional medical care. Always follow your healthcare professional's instructions.

## 2020-02-21 NOTE — PROGRESS NOTES
Pulmonary Disease Management  [facility]  Initial Visit  Chronic Care Management    Luz Pelaez  was seen 2/21/2020  11:30 AM in the Pulmonary Disease management clinic for evaluation, education, reinforcement of self management techniques and exacerbation action plan.     Elijah Alicia    Past Medical History:   Diagnosis Date    Allergy     Asthma     Attention deficit     Carpal tunnel syndrome     Cervical spine degeneration     GERD (gastroesophageal reflux disease)     Hiatal hernia 11/1/2019    Kidney stones        Patient's Medications   New Prescriptions    FLUTICASONE-UMECLIDIN-VILANTER (TRELEGY ELLIPTA) 100-62.5-25 MCG DSDV    Inhale 1 puff into the lungs once daily.   Previous Medications    ALBUTEROL (PROAIR HFA) 90 MCG/ACTUATION INHALER    INHALE 1-2 PUFFS INTO THE LUNGS EVERY 6 HOURS AS NEEDED FOR WHEEZING    DEXTROAMPHETAMINE-AMPHETAMINE (ADDERALL) 30 MG TAB    Take 1 tablet (30 mg total) by mouth 2 (two) times daily.    DEXTROAMPHETAMINE-AMPHETAMINE (ADDERALL) 30 MG TAB    Take 1 tablet (30 mg total) by mouth 2 (two) times daily.    DEXTROAMPHETAMINE-AMPHETAMINE (ADDERALL) 30 MG TAB    Take 1 tablet (30 mg total) by mouth 2 (two) times daily.    LINZESS 290 MCG CAP CAPSULE    Take 1 capsule (290 mcg total) by mouth once daily.    MELOXICAM (MOBIC) 15 MG TABLET    Take 1 tablet (15 mg total) by mouth once daily.    OMEPRAZOLE (PRILOSEC) 40 MG CAPSULE    Take 1 capsule (40 mg total) by mouth once daily.    ONDANSETRON (ZOFRAN) 4 MG TABLET    Take 1 tablet (4 mg total) by mouth every 6 (six) hours as needed for Nausea.    ONDANSETRON (ZOFRAN-ODT) 4 MG TBDL    Every 6 hours prn nausea    VARENICLINE (CHANTIX STARTING MONTH BOX) 0.5 MG (11)- 1 MG (42) TABLET    Take one 0.5mg tab by mouth once daily X3 days,then increase to one 0.5mg tab twice daily X4 days,then increase to one 1mg tab twice daily   Modified Medications    No medications on file   Discontinued Medications     FLUTICASONE-SALMETEROL DISKUS INHALER 250-50 MCG    Use 1 inhalation into the  lungs two times daily    TIOTROPIUM (SPIRIVA WITH HANDIHALER) 18 MCG INHALATION CAPSULE    Inhale the contents of 1    capsule via HandiHaler once daily       Educational assessment:   [x]            Good  []            Fair  []            Poor    Readiness to learn:   [x]            Good  []            Fair  []            Poor    Vision Status:   [x]            Good  []            Fair  []            Poor    Reading Ability:  [x]            Good  []            Fair  []            Poor    Knowledge of condition:   [x]            Good  []            Fair  []            Poor    Language Barriers:   []            Good  []            Fair  []            Poor  [x]            None    Cognitive/ Physical Barriers:   []            Good  []            Fair  []            Poor  [x]            None    Learning best by:                       [x]            Seeing  []            Hearing  []            Reading                         [x]            Doing    Describe any barrier /Limitation or financial implications of care choices identified     []            Financial  []            Emotional  []            Education  []            Vision/Hearing  []            Physical  [x]            None                 TOPIC /CONTENT FOR TODAY:    [x]            MDI with or without spacer  [x]            Dry powder inhaler  []            Acapella   []           Peak Flow meter  []            COPD action plan  []            Nebulizer use  []            Oxygen use safety  []            Breathing and cough techniques  []            Energy conservation  []            Infection prevention      Learner:    [x]            Patient   []            Caregiver    Method:    [x]            Verbal explanation  []            Audio visual    [x]            Literature  [x]            Teach back      Evaluation:    [x]            Teach back  [x]            Demonstrate  [x]             Follow up phone call    []            2 weeks     [x]            4 weeks   []            PRN    Additional comments:    Patient was seen today to review respiratory medication purpose and proper technique for use of inhalers. Patient practiced proper technique using MDI with valved holding chamber (spacer) and DPI inhalers. Provided patient with spacer. Patient understands the difference between maintenance and rescue medications.  Patient demonstrated understanding. Literature was given to patient.

## 2020-02-24 ENCOUNTER — TELEPHONE (OUTPATIENT)
Dept: PULMONOLOGY | Facility: CLINIC | Age: 50
End: 2020-02-24

## 2020-02-24 NOTE — TELEPHONE ENCOUNTER
Initiated prior authorization request with patient's insurance for Trelegy Ellipta 100-62.5-25MCG/INH aerosol powder prescription. Submitted online via [email or web address] (request key [id number]). Awaiting decision -- PA case ID None provided.     Most recent pulmonary clinic note included with prior auth request.

## 2020-02-24 NOTE — TELEPHONE ENCOUNTER
Approved per payor. PA case [id number] per CoverMyMeds.    Faxed this documentation to pharmacy as proof.

## 2020-02-28 ENCOUNTER — PATIENT MESSAGE (OUTPATIENT)
Dept: SLEEP MEDICINE | Facility: CLINIC | Age: 50
End: 2020-02-28

## 2020-03-02 ENCOUNTER — TELEPHONE (OUTPATIENT)
Dept: RADIOLOGY | Facility: HOSPITAL | Age: 50
End: 2020-03-02

## 2020-03-10 ENCOUNTER — TELEPHONE (OUTPATIENT)
Dept: NEUROSURGERY | Facility: CLINIC | Age: 50
End: 2020-03-10

## 2020-03-10 NOTE — TELEPHONE ENCOUNTER
Pt is scheduled to see PA on 3/27/2020 at 10:30 AM.    Understanding verbalized     ----- Message from Kristina Montgomery sent at 3/10/2020  2:43 PM CDT -----  Contact: Patient  .Type:  Patient Returning Call    Who Called:Patient  Who Left Message for Patient: WENDY  Does the patient know what this is regarding?: appointment access  Would the patient rather a call back or a response via [facility]? Call  Best Call Back Number: .[phone] (home)   Additional Information:

## 2020-03-10 NOTE — TELEPHONE ENCOUNTER
Phoned pt to schedule a cervical f/u appt    Pt did not answer. I did leave a brief message and a call back number

## 2020-03-13 ENCOUNTER — HOSPITAL ENCOUNTER (OUTPATIENT)
Dept: RADIOLOGY | Facility: HOSPITAL | Age: 50
Discharge: HOME | End: 2020-03-13
Attending: INTERNAL MEDICINE
Payer: MEDICAID

## 2020-03-13 DIAGNOSIS — R91.1 PULMONARY NODULE: Primary | ICD-10-CM

## 2020-03-13 LAB — POCT GLUCOSE: 92 MG/DL (ref 70–110)

## 2020-03-13 PROCEDURE — 78815 PET IMAGE W/CT SKULL-THIGH: CPT | Mod: TC

## 2020-03-13 PROCEDURE — A9552 F18 FDG: HCPCS

## 2020-03-13 PROCEDURE — 78815 NM PET CT ROUTINE: ICD-10-PCS | Mod: 26,PS,, | Performed by: RADIOLOGY

## 2020-03-13 PROCEDURE — 78815 PET IMAGE W/CT SKULL-THIGH: CPT | Mod: 26,PS,, | Performed by: RADIOLOGY

## 2020-03-13 NOTE — PROGRESS NOTES
Orders Placed This Encounter   Procedures    CT Chest Without Contrast     Standing Status:   Future     Standing Expiration Date:   3/13/2021     Order Specific Question:   May the Radiologist modify the order per protocol to meet the clinical needs of the patient?     Answer:   Yes

## 2020-03-25 ENCOUNTER — TELEPHONE (OUTPATIENT)
Dept: NEUROSURGERY | Facility: CLINIC | Age: 50
End: 2020-03-25

## 2020-03-25 NOTE — TELEPHONE ENCOUNTER
Spoke with pt in reference to appt date and time change, pt's appt has move from 3/27 to 3/26//ns

## 2020-03-26 ENCOUNTER — OFFICE VISIT (OUTPATIENT)
Dept: NEUROSURGERY | Facility: CLINIC | Age: 50
End: 2020-03-26
Payer: MEDICAID

## 2020-03-26 ENCOUNTER — PATIENT OUTREACH (OUTPATIENT)
Dept: ADMINISTRATIVE | Facility: OTHER | Age: 50
End: 2020-03-26

## 2020-03-26 ENCOUNTER — PATIENT MESSAGE (OUTPATIENT)
Dept: NEUROSURGERY | Facility: CLINIC | Age: 50
End: 2020-03-26

## 2020-03-26 DIAGNOSIS — Z98.890 STATUS POST CARPAL TUNNEL RELEASE OF BOTH WRISTS: ICD-10-CM

## 2020-03-26 DIAGNOSIS — M47.812 CERVICAL SPONDYLOSIS: ICD-10-CM

## 2020-03-26 DIAGNOSIS — M50.30 DEGENERATIVE DISC DISEASE, CERVICAL: Primary | ICD-10-CM

## 2020-03-26 DIAGNOSIS — M54.12 CERVICAL RADICULOPATHY: Primary | ICD-10-CM

## 2020-03-26 PROCEDURE — 99214 OFFICE O/P EST MOD 30 MIN: CPT | Mod: 95,,, | Performed by: PHYSICIAN ASSISTANT

## 2020-03-26 PROCEDURE — 99214 PR OFFICE/OUTPT VISIT, EST, LEVL IV, 30-39 MIN: ICD-10-PCS | Mod: 95,,, | Performed by: PHYSICIAN ASSISTANT

## 2020-03-26 RX ORDER — METHOCARBAMOL 750 MG/1
750 TABLET, FILM COATED ORAL 3 TIMES DAILY PRN
Qty: 60 TABLET | Refills: 0 | Status: SHIPPED | OUTPATIENT
Start: 2020-03-26 | End: 2021-02-17

## 2020-03-26 RX ORDER — PREDNISONE 10 MG/1
TABLET ORAL 2 TIMES DAILY
Qty: 14 TABLET | Refills: 0 | Status: ON HOLD | OUTPATIENT
Start: 2020-03-26 | End: 2020-06-18 | Stop reason: HOSPADM

## 2020-03-26 NOTE — PROGRESS NOTES
Subjective:      Patient ID: Luz Pelaez is a 49 y.o. female.    Chief Complaint: No chief complaint on file.    HPI  The patient location is: Home  The chief complaint leading to consultation is: follow-up cervical radiculopathy  Visit type: Virtual visit with synchronous audio and video  Total time spent with patient: 10-15 minutes  Each patient to whom he or she provides medical services by telemedicine is:  (1) informed of the relationship between the physician and patient and the respective role of any other health care provider with respect to management of the patient; and (2) notified that he or she may decline to receive medical services by telemedicine and may withdraw from such care at any time.    Notes: Patient continues to have R sided neck and R shoulder pain. She had bilateral CTR and reports numbness and tingling resolved in LUE but she still has residual numbness/tingling of R long, ring and little fingers.   Her symptoms are not worse since her last office visit with Dr. Kelley. She is only taking Meloxicam for her symptoms.     ROS   Constitutional: Negative for activity change, appetite change and chills.   HENT: Negative for congestion and ear pain.    Eyes: Negative for photophobia, redness and visual disturbance.   Respiratory: Negative for apnea and chest tightness.    Cardiovascular: Negative for chest pain.   Gastrointestinal: Negative for abdominal distention and abdominal pain.   Endocrine: Negative for cold intolerance.   Genitourinary: Negative for difficulty urinating.   Musculoskeletal: Positive for myalgias, neck pain and neck stiffness. Negative for arthralgias.   Skin: Negative for color change.   Allergic/Immunologic: Negative for environmental allergies.   Neurological: Positive for weakness and numbness. Negative for dizziness.   Hematological: Negative for adenopathy. Does not bruise/bleed easily.   Psychiatric/Behavioral: Negative for agitation, behavioral problems and  confusion.       Objective:            Ortho/SPM Exam      Nursing note and vitals reviewed  Gen:Oriented to person, place, and time.             Appears stated age   Head:Normocephalic and atraumatic.  Nose: Nose normal.    Eyes: EOM are normal. Pupils are equal, round, and reactive to light.   Neck: Neck supple. No masses or lesions palpated  Cardiovascular: Intact distal pulses.    Abdominal: Soft.   Neurological: Alert and oriented to person, place, and time.  No cranial nerve deficit.  Coordination normal. Normal muscle tone  Psychiatric: Normal mood and affect. Behavior is normal.     Neck:    Tenderness posterior and R side Paraspinal tenderness     Unable to assess via televisit Paraspinal muscle spasms     Present Pain with flexion and extention     Full range of motion noted Range of motion    Neg   Spurling's sign      Motor - unable to fully assess due to telemed visit. Patient able to perform cervical myotome testing.    Right Right Left Left  Level Group   5   5   C5 Deltoid   5   5   C6 Bicep   5   5     Wrist extension    5   5   C7 Triceps   5   5     Wrist flexion   5   5   C8    5   5   T1 Interossei    Sensation  NL Decreased (R/L/BL) Level Sensation    X   C5 Lateral upper arm   X   C6 Thumb and index finger, lat forearm   X Decreased R  C7 Middle finger   X Decreased R  C8 Ring and little finger   X   T1 Medial arm                       No new imaging.    Assessment:       Encounter Diagnoses   Name Primary?    Degenerative disc disease, cervical Yes    Status post carpal tunnel release of both wrists           Plan:       Diagnoses and all orders for this visit:    Degenerative disc disease, cervical    Status post carpal tunnel release of both wrists    Other orders  -     methocarbamoL (ROBAXIN) 750 MG Tab; Take 1 tablet (750 mg total) by mouth 3 (three) times daily as needed (muscle spasms).  -     predniSONE (DELTASONE) 10 mg tablet pack; Take by mouth 2 (two) times daily.        The  patient will be prescribed Steroids for short term and Muscle relaxant.   I would like her to participate in therapy at home. Will send exercises via portal or e-mail.  Patient will be referred to pain management for JUAN at C3/4, C4/5 R side if and when they are able to schedule outpatient procedures.  She will follow-up with us after injections if she continues to be symptomatic.          Sunita Kang PA-C

## 2020-05-05 ENCOUNTER — TELEPHONE (OUTPATIENT)
Dept: FAMILY MEDICINE | Facility: CLINIC | Age: 50
End: 2020-05-05

## 2020-05-05 NOTE — TELEPHONE ENCOUNTER
----- Message from Caitlin Solares sent at 5/5/2020  3:20 PM CDT -----  Pt is requesting a call back to reschedule appt. Would like an appt before July due to refill. Please call back at [phone]

## 2020-05-11 ENCOUNTER — OFFICE VISIT (OUTPATIENT)
Dept: FAMILY MEDICINE | Facility: CLINIC | Age: 50
End: 2020-05-11
Payer: MEDICAID

## 2020-05-11 DIAGNOSIS — Z13.220 ENCOUNTER FOR LIPID SCREENING FOR CARDIOVASCULAR DISEASE: ICD-10-CM

## 2020-05-11 DIAGNOSIS — R41.840 ATTENTION DEFICIT: Chronic | ICD-10-CM

## 2020-05-11 DIAGNOSIS — Z86.010 HISTORY OF COLON POLYPS: ICD-10-CM

## 2020-05-11 DIAGNOSIS — Z12.11 COLON CANCER SCREENING: ICD-10-CM

## 2020-05-11 DIAGNOSIS — Z13.6 ENCOUNTER FOR LIPID SCREENING FOR CARDIOVASCULAR DISEASE: ICD-10-CM

## 2020-05-11 DIAGNOSIS — K21.9 GASTROESOPHAGEAL REFLUX DISEASE, ESOPHAGITIS PRESENCE NOT SPECIFIED: Primary | ICD-10-CM

## 2020-05-11 DIAGNOSIS — Z13.1 SCREENING FOR DIABETES MELLITUS: ICD-10-CM

## 2020-05-11 PROCEDURE — 99214 PR OFFICE/OUTPT VISIT, EST, LEVL IV, 30-39 MIN: ICD-10-PCS | Mod: 95,,, | Performed by: FAMILY MEDICINE

## 2020-05-11 PROCEDURE — 99214 OFFICE O/P EST MOD 30 MIN: CPT | Mod: 95,,, | Performed by: FAMILY MEDICINE

## 2020-05-11 RX ORDER — DEXTROAMPHETAMINE SACCHARATE, AMPHETAMINE ASPARTATE, DEXTROAMPHETAMINE SULFATE AND AMPHETAMINE SULFATE 7.5; 7.5; 7.5; 7.5 MG/1; MG/1; MG/1; MG/1
1 TABLET ORAL 2 TIMES DAILY
Qty: 60 TABLET | Refills: 0 | Status: SHIPPED | OUTPATIENT
Start: 2020-05-11 | End: 2020-06-10

## 2020-05-11 RX ORDER — DEXTROAMPHETAMINE SACCHARATE, AMPHETAMINE ASPARTATE, DEXTROAMPHETAMINE SULFATE AND AMPHETAMINE SULFATE 7.5; 7.5; 7.5; 7.5 MG/1; MG/1; MG/1; MG/1
1 TABLET ORAL 2 TIMES DAILY
Qty: 60 TABLET | Refills: 0 | Status: SHIPPED | OUTPATIENT
Start: 2020-06-10 | End: 2020-07-10

## 2020-05-11 RX ORDER — SODIUM, POTASSIUM,MAG SULFATES 17.5-3.13G
SOLUTION, RECONSTITUTED, ORAL ORAL
Qty: 354 ML | Refills: 0 | Status: SHIPPED | OUTPATIENT
Start: 2020-05-11 | End: 2020-05-25 | Stop reason: SDUPTHER

## 2020-05-11 RX ORDER — DEXTROAMPHETAMINE SACCHARATE, AMPHETAMINE ASPARTATE, DEXTROAMPHETAMINE SULFATE AND AMPHETAMINE SULFATE 7.5; 7.5; 7.5; 7.5 MG/1; MG/1; MG/1; MG/1
1 TABLET ORAL 2 TIMES DAILY
Qty: 60 TABLET | Refills: 0 | Status: SHIPPED | OUTPATIENT
Start: 2020-07-10 | End: 2020-08-11 | Stop reason: SDUPTHER

## 2020-05-11 NOTE — PROGRESS NOTES
Primary Care Telemedicine Note   The patient location is:  Patient Home    The chief complaint leading to consultation is:annual   Total time spent with patient: 20 min   Visit type: Virtual visit with synchronous audio only and video   Each patient to whom he or she provides medical services by telemedicine is:  (1) informed of the relationship between the physician and patient and the respective role of any other health care provider with respect to management of the patient; and (2) notified that he or she may decline to receive medical services by telemedicine and may withdraw from such care at any time.       CC:As Above   HPI:   Problem List Items Addressed This Visit     Attention deficit (Chronic)    Overview     Luz Pelaez has ADD.  Medication has been used since 2000 and has been stable on the current dose of medicine.  She reports being compliant on the medicine and is not receiving narcotics from any other physician.   The patient was checked in the [facility] Board of Pharmacy's  Prescription Monitoring Program. There appears to be no incongruities with the patient's verbalized history.   She denies any complications from taking the medicine.  The patient's weight and apatite has been stable and has not had difficulties with agitation.  She reports improvement in the symptoms with the current dose.               Colon cancer screening    Gastroesophageal reflux disease    Overview     The patient complains of heartburn.  The symptoms began 6 months. Denies chest pressure, diaphoresis, left arm and neck pain, vomiting, shortness of breath and palpitations.  Associated symptoms include abdominal pain.  The symptoms occur a few minutes after meals.  Things that worsen the symptoms include many different foods.  The symptoms are alleviated by Prilosec somewhat.  She has been on prilosec for many years and it has worsened.             History of colon polyps      Other Visit Diagnoses     Annual  physical exam    -  Primary             The patient's Health Maintenance was reviewed and the following appears to be due at this time:   Health Maintenance Due   Topic Date Due    Colonoscopy  07/09/2017    Pap Smear  06/28/2020          Outpatient Medications Prior to Visit   Medication Sig Dispense Refill    albuterol (PROAIR HFA) 90 mcg/actuation inhaler INHALE 1-2 PUFFS INTO THE LUNGS EVERY 6 HOURS AS NEEDED FOR WHEEZING 17 g 2    dextroamphetamine-amphetamine (ADDERALL) 30 mg Tab Take 1 tablet (30 mg total) by mouth 2 (two) times daily. 60 tablet 0    dextroamphetamine-amphetamine (ADDERALL) 30 mg Tab Take 1 tablet (30 mg total) by mouth 2 (two) times daily. 60 tablet 0    dextroamphetamine-amphetamine (ADDERALL) 30 mg Tab Take 1 tablet (30 mg total) by mouth 2 (two) times daily. 60 tablet 0    LINZESS 290 mcg Cap capsule Take 1 capsule (290 mcg total) by mouth once daily. 30 capsule 11    meloxicam (MOBIC) 15 MG tablet Take 1 tablet (15 mg total) by mouth once daily. 90 tablet 3    methocarbamoL (ROBAXIN) 750 MG Tab Take 1 tablet (750 mg total) by mouth 3 (three) times daily as needed (muscle spasms). 60 tablet 0    omeprazole (PRILOSEC) 40 MG capsule Take 1 capsule (40 mg total) by mouth once daily. 30 capsule 11    ondansetron (ZOFRAN) 4 MG tablet Take 1 tablet (4 mg total) by mouth every 6 (six) hours as needed for Nausea. 1515 tablet 0    ondansetron (ZOFRAN-ODT) 4 MG TbDL Every 6 hours prn nausea 100 tablet 2    predniSONE (DELTASONE) 10 mg tablet pack Take by mouth 2 (two) times daily. 14 tablet 0    varenicline (CHANTIX STARTING MONTH BOX) 0.5 mg (11)- 1 mg (42) tablet Take one 0.5mg tab by mouth once daily X3 days,then increase to one 0.5mg tab twice daily X4 days,then increase to one 1mg tab twice daily 1 Package 0     No facility-administered medications prior to visit.        PMH:  Past Medical History:   Diagnosis Date    Allergy     Asthma     Attention deficit     Carpal tunnel  syndrome     Cervical spine degeneration     GERD (gastroesophageal reflux disease)     Hiatal hernia 11/1/2019    Kidney stones      Past Surgical History:   Procedure Laterality Date    CARPAL TUNNEL RELEASE Left 10/7/2019    Procedure: RELEASE, CARPAL TUNNEL;  Surgeon: Delfino Cain MD;  Location: [facility];  Service: Orthopedics;  Laterality: Left;    CARPAL TUNNEL RELEASE Right 11/4/2019    Procedure: RELEASE, CARPAL TUNNEL;  Surgeon: Delfino Cain MD;  Location: [facility];  Service: Orthopedics;  Laterality: Right;    CHOLECYSTECTOMY      COLONOSCOPY  4/1/2012    date is approximate-done by Dr. Naranjo due to chronic constipation    SLEEVE GASTROPLASTY      TOTAL REDUCTION MAMMOPLASTY       Review of patient's allergies indicates:   Allergen Reactions    Bupropion hcl Other (See Comments)     Mood swings  Mood swings     Social History     Socioeconomic History    Marital status: Single     Spouse name: Not on file    Number of children: 2    Years of education: Not on file    Highest education level: Not on file   Occupational History    Occupation:      Employer: [employer]   Social Needs    Financial resource strain: Not on file    Food insecurity:     Worry: Not on file     Inability: Not on file    Transportation needs:     Medical: Not on file     Non-medical: Not on file   Tobacco Use    Smoking status: Current Every Day Smoker     Packs/day: 1.00     Years: 30.00     Pack years: 30.00     Types: Cigarettes    Smokeless tobacco: Never Used   Substance and Sexual Activity    Alcohol use: Yes     Frequency: 2-3 times a week     Drinks per session: 1 or 2     Comment: Socially    Drug use: No    Sexual activity: Yes     Partners: Male   Lifestyle    Physical activity:     Days per week: Not on file     Minutes per session: Not on file    Stress: Only a little   Relationships    Social connections:     Talks on phone: Not on file      Gets together: Not on file     Attends Voodoo service: Not on file     Active member of club or organization: Not on file     Attends meetings of clubs or organizations: Not on file     Relationship status: Not on file   Other Topics Concern    Not on file   Social History Narrative    Live w/ spouse no pets      Family History   Problem Relation Age of Onset    Hypertension Mother     Diabetes Mother     Hyperlipidemia Mother     Heart disease Mother 58    Colon cancer Maternal Grandmother     Breast cancer Maternal Grandmother     Breast cancer Paternal Grandmother         Breast    Heart attack Father 70    Thyroid disease Sister     Hypertension Sister     Breast cancer Paternal Aunt     Stroke Neg Hx            Review of Systems   HENT: Negative for hearing loss.    Eyes: Negative for discharge.   Respiratory: Negative for wheezing.    Cardiovascular: Positive for chest pain. Negative for palpitations.   Gastrointestinal: Positive for constipation, heartburn and nausea. Negative for blood in stool, diarrhea and vomiting.   Genitourinary: Negative for dysuria and hematuria.   Musculoskeletal: Positive for neck pain.   Neurological: Negative for weakness and headaches.   Endo/Heme/Allergies: Negative for polydipsia.      Constitutional: Negative.  Negative for chills, diaphoresis and fever.    HENT: Negative for congestion, hearing loss, mouth sores, postnasal drip and sore throat.     Eyes: Negative for pain and visual disturbance.    Respiratory: Negative for cough, chest tightness, shortness of breath and wheezing.     Cardiovascular: Negative for chest pain.    Gastrointestinal: Negative for abdominal pain, anal bleeding, blood in stool, constipation, diarrhea, nausea and vomiting.    Genitourinary: Negative for dysuria and hematuria.    Musculoskeletal: Negative for back pain, neck pain and neck stiffness.    Skin: Negative for rash.    Neurological: Negative for dizziness and weakness.         Physical Exam    (Vitals taken at home and reported to us and documented)   Pulse If Self Reported:No flowsheet data found.    Last 5 Patient Entered Readings                                      Current 30 Day Average:      There is no flowsheet data to display.         BP If Self Reported:No flowsheet data found.   Pulse Readings from Last 3 Encounters:   02/21/20 78   02/12/20 92   11/15/19 74      Weight If Self Reported:No flowsheet data found.   Glucose If Self Reported:No flowsheet data found.   Last 6 Patient Entered Readings                                          Most Recent A1c:      There is no flowsheet data to display.         There were no vitals taken for this visit. if verbally reported and entered.    Constitutional: He is oriented to person, place, and time. He appears well-developed and well-nourished. No distress.    Eyes: EOM are normal.    Pulmonary/Chest: Effort normal. No respiratory distress.    Neurological: He is alert and oriented to person, place, and time.    Skin: He is not diaphoretic.    Psychiatric: He has a normal mood and affect. His behavior is normal. Judgment and thought content normal.        DIAGNOSIS  Encounter Diagnoses   Name Primary?    Gastroesophageal reflux disease, esophagitis presence not specified     History of colon polyps     Annual physical exam Yes    Colon cancer screening     Attention deficit           PLAN:     I am having Megan Pelaez maintain her ondansetron, varenicline, ondansetron, omeprazole, meloxicam, LINZESS, dextroamphetamine-amphetamine, dextroamphetamine-amphetamine, dextroamphetamine-amphetamine, albuterol, methocarbamoL, predniSONE, and fluticasone-umeclidin-vilanter.       Diagnoses and all orders for this visit:    Gastroesophageal reflux disease, esophagitis presence not specified  -     Case request GI: ESOPHAGOGASTRODUODENOSCOPY (EGD), COLONOSCOPY  -     Amylase; Future  -     Lipase; Future    History of colon polyps  -      Case request GI: ESOPHAGOGASTRODUODENOSCOPY (EGD), COLONOSCOPY  -     sodium,potassium,mag sulfates (SUPREP BOWEL PREP KIT) 17.5-3.13-1.6 gram SolR; Take as instructed on prep sheet    Colon cancer screening  -     Case request GI: ESOPHAGOGASTRODUODENOSCOPY (EGD), COLONOSCOPY  -     sodium,potassium,mag sulfates (SUPREP BOWEL PREP KIT) 17.5-3.13-1.6 gram SolR; Take as instructed on prep sheet    Attention deficit  -     dextroamphetamine-amphetamine 30 mg Tab; Take 1 tablet (30 mg total) by mouth 2 (two) times daily.  -     dextroamphetamine-amphetamine 30 mg Tab; Take 1 tablet (30 mg total) by mouth 2 (two) times daily.  -     dextroamphetamine-amphetamine 30 mg Tab; Take 1 tablet (30 mg total) by mouth 2 (two) times daily.    Encounter for lipid screening for cardiovascular disease  -     Lipid Panel; Future    Screening for diabetes mellitus  -     Comprehensive metabolic panel; Future               Orders Placed This Encounter   Procedures    Lipid Panel     Standing Status:   Future     Standing Expiration Date:   5/11/2021    Comprehensive metabolic panel     Standing Status:   Future     Standing Expiration Date:   5/11/2021    Amylase     Standing Status:   Future     Standing Expiration Date:   7/10/2021    Lipase     Standing Status:   Future     Standing Expiration Date:   7/10/2021    Case request GI: ESOPHAGOGASTRODUODENOSCOPY (EGD), COLONOSCOPY     Order Specific Question:   CPT Code:     Answer:   DC EGD, FLEX, DIAGNOSTIC [53323]     Order Specific Question:   CPT Code:     Answer:   DC COLONOSCOPY,DIAGNOSTIC [03254]     Order Specific Question:   Case Referring Provider     Answer:   LANI DESIR [[id number]]     Order Specific Question:   Medical Necessity:     Answer:   Medically Non-Urgent [100]     Order Specific Question:   Is an on-site pathologist required for this procedure?     Answer:   N/A          Answers for HPI/ROS submitted by the patient on 5/4/2020   activity change:  No  unexpected weight change: No  rhinorrhea: No  trouble swallowing: No  visual disturbance: No  chest tightness: No  polyuria: No  difficulty urinating: No  menstrual problem: No  joint swelling: Yes  arthralgias: Yes  confusion: No  dysphoric mood: Yes

## 2020-05-24 ENCOUNTER — TELEPHONE (OUTPATIENT)
Dept: FAMILY MEDICINE | Facility: CLINIC | Age: 50
End: 2020-05-24

## 2020-05-24 DIAGNOSIS — Z86.010 HISTORY OF COLON POLYPS: ICD-10-CM

## 2020-05-24 DIAGNOSIS — Z12.11 COLON CANCER SCREENING: ICD-10-CM

## 2020-05-25 ENCOUNTER — TELEPHONE (OUTPATIENT)
Dept: PAIN MEDICINE | Facility: CLINIC | Age: 50
End: 2020-05-25

## 2020-05-25 ENCOUNTER — TELEPHONE (OUTPATIENT)
Dept: ENDOSCOPY | Facility: HOSPITAL | Age: 50
End: 2020-05-25

## 2020-05-25 DIAGNOSIS — Z86.010 HISTORY OF COLON POLYPS: Primary | ICD-10-CM

## 2020-05-25 RX ORDER — SODIUM, POTASSIUM,MAG SULFATES 17.5-3.13G
SOLUTION, RECONSTITUTED, ORAL ORAL
Qty: 354 ML | Refills: 0 | Status: ON HOLD | OUTPATIENT
Start: 2020-05-25 | End: 2020-06-18 | Stop reason: HOSPADM

## 2020-05-25 NOTE — TELEPHONE ENCOUNTER
COVID Screening     1. Have you had a fever in the last 7 days or have you used fever reducing medicines for a fever in the last 7 days?  no    2. Are you experiencing shortness of breath, cough, muscle aches, loss of taste or loss of smell?  no    3. Are you residing with anyone who has tested positive for Covid?  no    If answered yes to any of the above questions, the pt must be scheduled for an appointment with their PCP.    A message also needs to be sent to the endoscopist to ensure the patient gets rescheduled at a later date.     ENDO screening    1. Have you been admitted overnight to the hospital in the past 3 months? no   If yes, schedule an appointment with PCP before scheduling endoscopic procedure.     2. Have you had a stent placed in the last 12 months? no   If yes, for a screening visit, cancel and message the ordering provider.  The patient will need a new order when the time is appropriate.     3. Have you had a stroke or heart attack in the past 6 months? no   If yes, cancel and refer patient to ordering provider for clearance, also message ordering provider to inform.     4. Have you had any chest pain in the past 3 months? no   If yes, Have you been evaluated by your PCP and/or cardiologist and it was determined to not be heart related? not applicable   If No, Pt needs to be seen by PCP or Cardiologist .  Pt can be scheduled once clearance obtained by either of those providers.     5. Do you take prescription weight loss medications?  no   If yes, must stop for 2 weeks prior to procedure.     6. Have you been diagnosed with diverticulitis within the past 3 months? no   If yes, must have been seen by GI within the last 3 months, if not schedule with GI KARI.    If pt has been seen by GI, schedule procedure 8-12 weeks post antibiotic treatment.     7. Are you on Dialysis? no  If yes, schedule procedure for the day AFTER dialysis.  Appt time should be 9am or later, patient arrival time is 2 hours  "prior.  Nulytely or    miralax prep for all patients with kidney disease.     8. Are you diabetic?  no   If yes, schedule morning appt. Advise pt to hold all diabetic meds day of procedure.     9. If pt is older than 80 years of age and HAS NOT been seen by GI or PCP within the last 6 months, needs appt with GI KARI.   If pt has been seen by the GI provider or PCP within the past 6  months AND meets criteria, schedule procedure AND send message to the endoscopist.     10. Is patient on a "high risk" medication (blood thinner/antiplatelet agent)?  no   If yes, has cardiac clearance been obtained within the last 60 days? N/A   If no, a new clearance needs to be obtained.       I have reviewed the last colonoscopy for recommendations regarding next procedure bowel prep.  yes  I have reviewed medications and allergies.  yes  I have verified the pharmacy information and appropriate prep sent if needed. yes  Prep instructions have been mailed or sent to portal per patient request. yes    If answers yes to any of the following, schedule at [facility] ONLY. If No, OK for either location.     Is BMI over 45?   Any complaints of chest pain, new onset or at rest?  Does pt have an AICD?  Is there a diagnosis of heart failure?  Does patient have an insulin pump?  If procedure for esophageal banding?  "

## 2020-05-25 NOTE — TELEPHONE ENCOUNTER
Joana Potts Staff             Good Morning,     This message is to inform your office that the request for Ms Pelaez   was Denied because is is not a covered Benefit by her insurance.   Peer to Peer is offered by calling [phone] ext [phone]   to speak to the Medical Director. Please share this information with   the patient. Due to the Holiday their office is closed today.     Thank You   Joana Rhodes LPN    Auth/Cert   Auth/Cert # [id number]   Auth/Cert # [id number]      Creation Date/Time Creation Department Service Area Created By   5/18/2020 10:40 AM [facility]   Visit Counts     Requested Visits Authorized Visits Scheduled Visits Completed Visits   1 1       [employer] / [employer] [place] [place]     Authorization/Certification Information     Pre-cert Required? Pre-cert Status Pre-cert Agency Phone Contact   Yes Pending- Peer to Peer         Fax Date Called Pre-cert Number Auth Number Authorized From           5/27/2020   Authorized To Date Received Receiving User     5/25/2020 AWA GAYLE PACHECO   Benefit Information     Check Required? Check Status Checked With? Phone             Contact Fax Date Checked User             Coinsurance Deductible Clinical Contact Clinical Phone             Other Bed Days     Day Type Start Date End Date Estimated Override Raw Converted Reason Comment   None           Procedure Information     Procedure Details   Procedure Modifiers Provider Requested Approved   41189 (CPT®) - [place] INJECT ANES/STEROID FORAMEN CERV/THORACIC W IMG GUIDE ,1 LEVEL None  1 1   Free Text Procedure Description    Injection,steroid,epidural,transforaminal approach Right C4-5 TFESI with RN IV sedation -RAPID COVID            Diagnosis Information     Diagnosis   M47.812 (ICD-10-CM) - Cervical spondylosis   M54.12 (ICD-10-CM) - Cervical radiculopathy   Diagnosis Description   Cervical spondylosis [M47.812] Cervical radiculopathy  "[M54.12]   Referral Notes   Number of Notes: 4   Type Date User Summary Attachment   Authorization Information 05/25/2020  8:39 AM Joana Rhodes - -   Note    REQUEST REMAIN PENDING PEER TO PEER  DR MCCABE OFFICE SENT IN BASKET MESSAGE     Good Morning,     This message is to inform your office that the request for Ms Pelaez  was Denied because is is not a covered Benefit by her insurance.  Peer to Peer is offered by calling [phone] ext [phone]  to speak to the Medical Director. Please share this information with  the patient. Due to the Holiday their office is closed today.     Thank You  Joana Rhodes LPN               Type Date User Summary Attachment   General 05/21/2020  2:39 PM Brynn Guillen LPN - -   Note    DENIED:  Reason: Pain Management is a non covered benefit     Peer to Peer call [phone] ext [phone] within 10 business days        Notified Dr. Mccabe's staff via in basket message     Called [phone] and left a voice message for the patient with request to call me back. Callback # given            Type Date User Summary Attachment   General 05/18/2020 10:55 AM Brynn Guillen LPN - -   Note    Fax confirmation:  Your fax has been successfully sent to [employer] at [phone].  ------------------------------------------------------------  5/18/2020 10:29:06 AM Transmission Record              Sent to [id number] with remote ID "[email or web address]"              Result: (0/339;0/0) Success              Page record: 1 - 8              Elapsed time: 03:45 on channel 23               Type Date User Summary Attachment   Authorization Information 05/18/2020 10:39 AM Brynn Guillen LPN CLINICAL REVIEW PENDING -   Note       CLINICAL REVIEW PENDING   Primary Insurance: MEDICA[employer] [place] [place]   Cervical spondylosis [M47.812]  Cervical radiculopathy [M54.12]    80785 - [place] INJECT ANES/STEROID FORAMEN CERV/THORACIC W IMG GUIDE ,1 LEVEL  Right C4-5   Precert " required? yes   Outpatient   Call Reference/Case #: NA      Insurance Co. and Rep Name (n/a if none): [employer]/n/a                        Secondary Insurance:    No diagnoses       No Procedures   Precert required?   Is patient Inpatient or Outpatient?   Call Reference/Case #:   Information Obtained Via:  Phone: (___) ___-___   Voice Response:   Insurance Co. and Rep Name:________  Fax: (___) ___-___  Web Portal:      Financial Clearance Policy:     Medically urgent?  48 hour pending protocol:  Patient or responsible notified?  Phone number (___) ___-___  Provider notified?         Effective Date:   Pre-Existing:   Pre-Existing Period:      Deductible:  Deductible Met:  Deductible Remaining:   Out of Pocket:  Out of Pocket Met:  Out of Pocket Remaining:   Coinsurance:   Copay:      Type of Service and admission: outpatient Procedures   Location of Service:       [facility]   DOS: 5/27/2020   Notes: Request submitted via fax @ [phone]

## 2020-05-26 ENCOUNTER — TELEPHONE (OUTPATIENT)
Dept: PAIN MEDICINE | Facility: CLINIC | Age: 50
End: 2020-05-26

## 2020-05-26 NOTE — TELEPHONE ENCOUNTER
Peer to Peer scheduled 5/27/2020 4:30pm.     Contacted pt. Informed pt approval pending peer to peer so pt rescheduled to 5/28/2020. All questions answered.//lp

## 2020-05-27 ENCOUNTER — TELEPHONE (OUTPATIENT)
Dept: PULMONOLOGY | Facility: CLINIC | Age: 50
End: 2020-05-27

## 2020-05-27 ENCOUNTER — PATIENT MESSAGE (OUTPATIENT)
Dept: PAIN MEDICINE | Facility: HOSPITAL | Age: 50
End: 2020-05-27

## 2020-05-27 NOTE — TELEPHONE ENCOUNTER
completed peer to peer today and it has been denied d/t pain management not a covered benefit. Informed pt I will let referring provider know to see what other options she has. Pt was able to verbalize all understanding.All questions answered.//lp

## 2020-05-27 NOTE — TELEPHONE ENCOUNTER
Left vm informing pt that upcoming appt has been rescheduled due to provider not being in clinic. Return call with any questions.

## 2020-06-16 ENCOUNTER — LAB VISIT (OUTPATIENT)
Dept: LAB | Facility: HOSPITAL | Age: 50
End: 2020-06-16
Attending: FAMILY MEDICINE
Payer: MEDICAID

## 2020-06-16 DIAGNOSIS — Z13.1 SCREENING FOR DIABETES MELLITUS: ICD-10-CM

## 2020-06-16 DIAGNOSIS — Z13.6 ENCOUNTER FOR LIPID SCREENING FOR CARDIOVASCULAR DISEASE: ICD-10-CM

## 2020-06-16 DIAGNOSIS — Z13.220 ENCOUNTER FOR LIPID SCREENING FOR CARDIOVASCULAR DISEASE: ICD-10-CM

## 2020-06-16 DIAGNOSIS — K21.9 GASTROESOPHAGEAL REFLUX DISEASE, ESOPHAGITIS PRESENCE NOT SPECIFIED: ICD-10-CM

## 2020-06-16 LAB
ALBUMIN SERPL BCP-MCNC: 4.1 G/DL (ref 3.5–5.2)
ALP SERPL-CCNC: 74 U/L (ref 55–135)
ALT SERPL W/O P-5'-P-CCNC: 20 U/L (ref 10–44)
AMYLASE SERPL-CCNC: 36 U/L (ref 20–110)
ANION GAP SERPL CALC-SCNC: 8 MMOL/L (ref 8–16)
AST SERPL-CCNC: 21 U/L (ref 10–40)
BILIRUB SERPL-MCNC: 0.9 MG/DL (ref 0.1–1)
BUN SERPL-MCNC: 16 MG/DL (ref 6–20)
CALCIUM SERPL-MCNC: 9.3 MG/DL (ref 8.7–10.5)
CHLORIDE SERPL-SCNC: 110 MMOL/L (ref 95–110)
CHOLEST SERPL-MCNC: 176 MG/DL (ref 120–199)
CHOLEST/HDLC SERPL: 2.3 {RATIO} (ref 2–5)
CO2 SERPL-SCNC: 24 MMOL/L (ref 23–29)
CREAT SERPL-MCNC: 0.6 MG/DL (ref 0.5–1.4)
EST. GFR  (AFRICAN AMERICAN): >60 ML/MIN/1.73 M^2
EST. GFR  (NON AFRICAN AMERICAN): >60 ML/MIN/1.73 M^2
GLUCOSE SERPL-MCNC: 90 MG/DL (ref 70–110)
HDLC SERPL-MCNC: 75 MG/DL (ref 40–75)
HDLC SERPL: 42.6 % (ref 20–50)
LDLC SERPL CALC-MCNC: 89 MG/DL (ref 63–159)
LIPASE SERPL-CCNC: 11 U/L (ref 4–60)
NONHDLC SERPL-MCNC: 101 MG/DL
POTASSIUM SERPL-SCNC: 4.2 MMOL/L (ref 3.5–5.1)
PROT SERPL-MCNC: 7 G/DL (ref 6–8.4)
SODIUM SERPL-SCNC: 142 MMOL/L (ref 136–145)
TRIGL SERPL-MCNC: 60 MG/DL (ref 30–150)

## 2020-06-16 PROCEDURE — 80053 COMPREHEN METABOLIC PANEL: CPT

## 2020-06-16 PROCEDURE — 80061 LIPID PANEL: CPT

## 2020-06-16 PROCEDURE — 36415 COLL VENOUS BLD VENIPUNCTURE: CPT | Mod: PO

## 2020-06-16 PROCEDURE — 82150 ASSAY OF AMYLASE: CPT

## 2020-06-16 PROCEDURE — 83690 ASSAY OF LIPASE: CPT

## 2020-06-18 ENCOUNTER — ANESTHESIA (OUTPATIENT)
Dept: ENDOSCOPY | Facility: HOSPITAL | Age: 50
End: 2020-06-18
Payer: MEDICAID

## 2020-06-18 ENCOUNTER — ANESTHESIA EVENT (OUTPATIENT)
Dept: ENDOSCOPY | Facility: HOSPITAL | Age: 50
End: 2020-06-18
Payer: MEDICAID

## 2020-06-18 ENCOUNTER — HOSPITAL ENCOUNTER (OUTPATIENT)
Facility: HOSPITAL | Age: 50
Discharge: HOME | End: 2020-06-18
Attending: FAMILY MEDICINE | Admitting: FAMILY MEDICINE
Payer: MEDICAID

## 2020-06-18 VITALS
BODY MASS INDEX: 25.83 KG/M2 | RESPIRATION RATE: 18 BRPM | DIASTOLIC BLOOD PRESSURE: 85 MMHG | OXYGEN SATURATION: 98 % | HEART RATE: 70 BPM | WEIGHT: 155 LBS | TEMPERATURE: 98 F | HEIGHT: 65 IN | SYSTOLIC BLOOD PRESSURE: 140 MMHG

## 2020-06-18 DIAGNOSIS — Z86.010 HISTORY OF COLON POLYPS: ICD-10-CM

## 2020-06-18 DIAGNOSIS — K22.9 IRREGULAR Z LINE OF ESOPHAGUS: ICD-10-CM

## 2020-06-18 DIAGNOSIS — K44.9 HIATAL HERNIA: ICD-10-CM

## 2020-06-18 DIAGNOSIS — K21.00 GASTROESOPHAGEAL REFLUX DISEASE WITH ESOPHAGITIS: ICD-10-CM

## 2020-06-18 DIAGNOSIS — Z12.11 COLON CANCER SCREENING: ICD-10-CM

## 2020-06-18 DIAGNOSIS — K21.9 GASTROESOPHAGEAL REFLUX DISEASE, ESOPHAGITIS PRESENCE NOT SPECIFIED: Primary | ICD-10-CM

## 2020-06-18 DIAGNOSIS — K63.5 POLYP OF COLON, UNSPECIFIED PART OF COLON, UNSPECIFIED TYPE: ICD-10-CM

## 2020-06-18 DIAGNOSIS — Z98.84 H/O BARIATRIC SURGERY: ICD-10-CM

## 2020-06-18 DIAGNOSIS — K29.70 GASTRITIS, PRESENCE OF BLEEDING UNSPECIFIED, UNSPECIFIED CHRONICITY, UNSPECIFIED GASTRITIS TYPE: ICD-10-CM

## 2020-06-18 PROCEDURE — 37000008 HC ANESTHESIA 1ST 15 MINUTES: Performed by: FAMILY MEDICINE

## 2020-06-18 PROCEDURE — 45385 PR COLONOSCOPY,REMV LESN,SNARE: ICD-10-PCS | Mod: ,,, | Performed by: FAMILY MEDICINE

## 2020-06-18 PROCEDURE — 88305 TISSUE EXAM BY PATHOLOGIST: CPT | Mod: 26,,, | Performed by: PATHOLOGY

## 2020-06-18 PROCEDURE — 00740 HC ANESTHESIA EA ADD 15 MINS: CPT | Performed by: FAMILY MEDICINE

## 2020-06-18 PROCEDURE — 43239 EGD BIOPSY SINGLE/MULTIPLE: CPT | Performed by: FAMILY MEDICINE

## 2020-06-18 PROCEDURE — 45385 COLONOSCOPY W/LESION REMOVAL: CPT | Mod: ,,, | Performed by: FAMILY MEDICINE

## 2020-06-18 PROCEDURE — 88305 TISSUE EXAM BY PATHOLOGIST: CPT | Performed by: PATHOLOGY

## 2020-06-18 PROCEDURE — 37000009 HC ANESTHESIA EA ADD 15 MINS: Performed by: FAMILY MEDICINE

## 2020-06-18 PROCEDURE — 00813 ANES UPR LWR GI NDSC PX: CPT | Performed by: FAMILY MEDICINE

## 2020-06-18 PROCEDURE — 27201089 HC SNARE, DISP (ANY): Performed by: FAMILY MEDICINE

## 2020-06-18 PROCEDURE — 00740 HC ANESTHESIA 1ST 15 MINUTES: CPT | Performed by: FAMILY MEDICINE

## 2020-06-18 PROCEDURE — 45385 COLONOSCOPY W/LESION REMOVAL: CPT | Performed by: FAMILY MEDICINE

## 2020-06-18 PROCEDURE — 27201012 HC FORCEPS, HOT/COLD, DISP: Performed by: FAMILY MEDICINE

## 2020-06-18 PROCEDURE — 43239 EGD BIOPSY SINGLE/MULTIPLE: CPT | Mod: 51,,, | Performed by: FAMILY MEDICINE

## 2020-06-18 PROCEDURE — 63600175 PHARM REV CODE 636 W HCPCS: Performed by: NURSE ANESTHETIST, CERTIFIED REGISTERED

## 2020-06-18 PROCEDURE — 25000003 PHARM REV CODE 250: Performed by: NURSE ANESTHETIST, CERTIFIED REGISTERED

## 2020-06-18 PROCEDURE — 88305 TISSUE EXAM BY PATHOLOGIST: ICD-10-PCS | Mod: 26,,, | Performed by: PATHOLOGY

## 2020-06-18 PROCEDURE — 43239 PR EGD, FLEX, W/BIOPSY, SGL/MULTI: ICD-10-PCS | Mod: 51,,, | Performed by: FAMILY MEDICINE

## 2020-06-18 RX ORDER — METOCLOPRAMIDE 10 MG/1
10 TABLET ORAL 4 TIMES DAILY
Qty: 120 TABLET | Refills: 0 | Status: SHIPPED | OUTPATIENT
Start: 2020-06-18 | End: 2021-02-17

## 2020-06-18 RX ORDER — SODIUM CHLORIDE, SODIUM LACTATE, POTASSIUM CHLORIDE, CALCIUM CHLORIDE 600; 310; 30; 20 MG/100ML; MG/100ML; MG/100ML; MG/100ML
INJECTION, SOLUTION INTRAVENOUS CONTINUOUS PRN
Status: DISCONTINUED | OUTPATIENT
Start: 2020-06-18 | End: 2020-06-18

## 2020-06-18 RX ORDER — PROPOFOL 10 MG/ML
VIAL (ML) INTRAVENOUS
Status: DISCONTINUED | OUTPATIENT
Start: 2020-06-18 | End: 2020-06-18

## 2020-06-18 RX ORDER — SODIUM CHLORIDE 0.9 % (FLUSH) 0.9 %
10 SYRINGE (ML) INJECTION
Status: CANCELLED | OUTPATIENT
Start: 2020-06-18

## 2020-06-18 RX ORDER — LIDOCAINE HYDROCHLORIDE 10 MG/ML
INJECTION, SOLUTION EPIDURAL; INFILTRATION; INTRACAUDAL; PERINEURAL
Status: DISCONTINUED | OUTPATIENT
Start: 2020-06-18 | End: 2020-06-18

## 2020-06-18 RX ADMIN — PROPOFOL 50 MG: 10 INJECTION, EMULSION INTRAVENOUS at 08:06

## 2020-06-18 RX ADMIN — SODIUM CHLORIDE, SODIUM LACTATE, POTASSIUM CHLORIDE, AND CALCIUM CHLORIDE: .6; .31; .03; .02 INJECTION, SOLUTION INTRAVENOUS at 07:06

## 2020-06-18 RX ADMIN — PROPOFOL 100 MG: 10 INJECTION, EMULSION INTRAVENOUS at 07:06

## 2020-06-18 RX ADMIN — LIDOCAINE HYDROCHLORIDE 50 MG: 10 INJECTION, SOLUTION EPIDURAL; INFILTRATION; INTRACAUDAL; PERINEURAL at 07:06

## 2020-06-18 RX ADMIN — PROPOFOL 50 MG: 10 INJECTION, EMULSION INTRAVENOUS at 07:06

## 2020-06-18 NOTE — H&P
Short Stay Endoscopy History and Physical    PCP - Kb Mcgarry MD    Procedure - EGD and colonoscopy  ASA - 2 man  Mallampati - per anesthesia  History of Anesthesia problems - no  Family history Anesthesia problems -  no     HPI:  This is a 49 y.o. female here for evaluation of :   Active Hospital Problems    Diagnosis  POA    *Gastroesophageal reflux disease [K21.9]  Yes     The patient complains of heartburn.  The symptoms began 6 months. Denies chest pressure, diaphoresis, left arm and neck pain, vomiting, shortness of breath and palpitations.  Associated symptoms include abdominal pain.  The symptoms occur a few minutes after meals.  Things that worsen the symptoms include many different foods.  The symptoms are alleviated by Prilosec somewhat.  She has been on prilosec for many years and it has worsened.          History of colon polyps [Z86.010]  Not Applicable    Colon cancer screening [Z12.11]  Not Applicable      Resolved Hospital Problems   No resolved problems to display.         Health Maintenance       Date Due Completion Date    HIV Screening 07/17/1985 ---    Colonoscopy 07/09/2017 7/9/2014    Override on 12/11/2009: Done (Per Dr. Anand, recommend repeat in 5 yrs)    Pap Smear 06/28/2020 6/28/2017    Mammogram 11/20/2020 11/20/2019    TETANUS VACCINE 03/26/2024 3/26/2014    Lipid Panel 06/16/2025 6/16/2020          EGD  Reflux - yes  Dysphagia - no  Abdominal pain - no  Diarrhea - no  Anemia - no  GI bleeding - no  Other - no    Colonoscopy  Screening - yes  History of polyps - yes her the which she about  Diarrhea - no  Anemia - no  Blood in stools - no  Abdominal pain - no  Other - no    ROS:  CONSTITUTIONAL: Denies weight change,  fatigue, fevers, chills, night sweats.  CARDIOVASCULAR: Denies chest pain, shortness of breath, orthopnea and edema.  RESPIRATORY: Denies cough, hemoptysis, dyspnea, and wheezing.  GI: See HPI.    Medical History:   Past Medical History:   Diagnosis Date     Allergy     Asthma     Attention deficit     Carpal tunnel syndrome     Cervical spine degeneration     GERD (gastroesophageal reflux disease)     Hiatal hernia 11/1/2019    Kidney stones        Surgical History:   Past Surgical History:   Procedure Laterality Date    CARPAL TUNNEL RELEASE Left 10/7/2019    Procedure: RELEASE, CARPAL TUNNEL;  Surgeon: Delfino Cain MD;  Location: [facility];  Service: Orthopedics;  Laterality: Left;    CARPAL TUNNEL RELEASE Right 11/4/2019    Procedure: RELEASE, CARPAL TUNNEL;  Surgeon: Delfino Cain MD;  Location: [facility];  Service: Orthopedics;  Laterality: Right;    CHOLECYSTECTOMY      COLONOSCOPY  4/1/2012    date is approximate-done by Dr. Naranjo due to chronic constipation    SLEEVE GASTROPLASTY      TOTAL REDUCTION MAMMOPLASTY         Family History:   Family History   Problem Relation Age of Onset    Hypertension Mother     Diabetes Mother     Hyperlipidemia Mother     Heart disease Mother 58    Colon cancer Maternal Grandmother     Breast cancer Maternal Grandmother     Breast cancer Paternal Grandmother         Breast    Heart attack Father 70    Thyroid disease Sister     Hypertension Sister     Breast cancer Paternal Aunt     Stroke Neg Hx        Social History:   Social History     Tobacco Use    Smoking status: Current Every Day Smoker     Packs/day: 1.00     Years: 30.00     Pack years: 30.00     Types: Cigarettes    Smokeless tobacco: Never Used   Substance Use Topics    Alcohol use: Yes     Frequency: 2-3 times a week     Drinks per session: 1 or 2     Comment: Socially    Drug use: No       Allergies:   Review of patient's allergies indicates:   Allergen Reactions    Bupropion hcl Other (See Comments)     Mood swings  Mood swings       Medications:   No current facility-administered medications on file prior to encounter.      Current Outpatient Medications on File Prior to Encounter   Medication Sig Dispense Refill     albuterol (PROAIR HFA) 90 mcg/actuation inhaler INHALE 1-2 PUFFS INTO THE LUNGS EVERY 6 HOURS AS NEEDED FOR WHEEZING 17 g 2    dextroamphetamine-amphetamine 30 mg Tab Take 1 tablet (30 mg total) by mouth 2 (two) times daily. 60 tablet 0    [START ON 7/10/2020] dextroamphetamine-amphetamine 30 mg Tab Take 1 tablet (30 mg total) by mouth 2 (two) times daily. 60 tablet 0    fluticasone-umeclidin-vilanter (TRELEGY ELLIPTA) 100-62.5-25 mcg DsDv Inhale 1 puff into the lungs once daily.      LINZESS 290 mcg Cap capsule Take 1 capsule (290 mcg total) by mouth once daily. 30 capsule 11    meloxicam (MOBIC) 15 MG tablet Take 1 tablet (15 mg total) by mouth once daily. 90 tablet 3    omeprazole (PRILOSEC) 40 MG capsule Take 1 capsule (40 mg total) by mouth once daily. 30 capsule 11    varenicline (CHANTIX STARTING MONTH BOX) 0.5 mg (11)- 1 mg (42) tablet Take one 0.5mg tab by mouth once daily X3 days,then increase to one 0.5mg tab twice daily X4 days,then increase to one 1mg tab twice daily 1 Package 0    methocarbamoL (ROBAXIN) 750 MG Tab Take 1 tablet (750 mg total) by mouth 3 (three) times daily as needed (muscle spasms). 60 tablet 0    ondansetron (ZOFRAN) 4 MG tablet Take 1 tablet (4 mg total) by mouth every 6 (six) hours as needed for Nausea. (Patient not taking: Reported on 5/11/2020) 1515 tablet 0    ondansetron (ZOFRAN-ODT) 4 MG TbDL Every 6 hours prn nausea (Patient not taking: Reported on 5/11/2020) 100 tablet 2    predniSONE (DELTASONE) 10 mg tablet pack Take by mouth 2 (two) times daily. (Patient not taking: Reported on 5/11/2020) 14 tablet 0       Physical Exam:  Vital Signs:   Vitals:    06/18/20 0729   BP: (!) 142/84   Pulse: 74   Resp: 20   Temp: 98.1 °F (36.7 °C)     General Appearance: Well appearing in no acute distress  ENT: OP clear  Chest: CTA B  CV: RRR, no m/r/g  Abd: s/nt/nd/nabs  Ext: no edema    Labs:Reviewed    IMP:  Active Hospital Problems    Diagnosis  POA    *Gastroesophageal reflux  disease [K21.9]  Yes     The patient complains of heartburn.  The symptoms began 6 months. Denies chest pressure, diaphoresis, left arm and neck pain, vomiting, shortness of breath and palpitations.  Associated symptoms include abdominal pain.  The symptoms occur a few minutes after meals.  Things that worsen the symptoms include many different foods.  The symptoms are alleviated by Prilosec somewhat.  She has been on prilosec for many years and it has worsened.          History of colon polyps [Z86.010]  Not Applicable    Colon cancer screening [Z12.11]  Not Applicable      Resolved Hospital Problems   No resolved problems to display.         Plan:   I have explained the risks and benefits of upper endoscopy and colonoscopy to the patient including but not limited to bleeding, perforation, infection, and death. The patient wishes to proceed.

## 2020-06-18 NOTE — DISCHARGE SUMMARY
Endoscopy Discharge Summary      Admit Date: 6/18/2020    Discharge Date and Time:  6/18/2020 8:37 AM    Attending Physician: Kb Mcgarry MD     Discharge Physician: Kb Mcgarry MD     Principal Admitting Diagnoses: Gastroesophageal reflux disease         Discharge Diagnosis: The primary encounter diagnosis was Gastroesophageal reflux disease, esophagitis presence not specified. Diagnoses of Colon cancer screening, History of colon polyps, Gastroesophageal reflux disease with esophagitis, Hiatal hernia, Irregular Z line of esophagus, H/O bariatric surgery, Gastritis, presence of bleeding unspecified, unspecified chronicity, unspecified gastritis type, and Polyp of colon, unspecified part of colon, unspecified type were also pertinent to this visit.     Discharged Condition: Good    Indication for Admission: Gastroesophageal reflux disease     Hospital Course: Patient was admitted for an inpatient procedure and tolerated the procedure well with no complications.    Significant Diagnostic Studies: EGD with cold biopsy and Colonoscopy with hot snare polypectomy    Pathology (if any):  Specimen (12h ago, onward)    None          Estimated Blood Loss: 1 ml.    Discussed with: patient and family.    Disposition: Home.    Follow Up/Patient Instructions:   Current Discharge Medication List      START taking these medications    Details   metoclopramide HCl (REGLAN) 10 MG tablet Take 1 tablet (10 mg total) by mouth 4 (four) times daily.  Qty: 120 tablet, Refills: 0         CONTINUE these medications which have NOT CHANGED    Details   albuterol (PROAIR HFA) 90 mcg/actuation inhaler INHALE 1-2 PUFFS INTO THE LUNGS EVERY 6 HOURS AS NEEDED FOR WHEEZING  Qty: 17 g, Refills: 2    Comments: PT REQUEST REFILL  08/31/2017 2:39:08 PM     N O T I C E    Last quantity doesn't match original quantity  Associated Diagnoses: Moderate persistent asthma without complication      !! dextroamphetamine-amphetamine 30 mg Tab Take 1  tablet (30 mg total) by mouth 2 (two) times daily.  Qty: 60 tablet, Refills: 0    Associated Diagnoses: Attention deficit      !! dextroamphetamine-amphetamine 30 mg Tab Take 1 tablet (30 mg total) by mouth 2 (two) times daily.  Qty: 60 tablet, Refills: 0    Associated Diagnoses: Attention deficit      fluticasone-umeclidin-vilanter (TRELEGY ELLIPTA) 100-62.5-25 mcg DsDv Inhale 1 puff into the lungs once daily.      LINZESS 290 mcg Cap capsule Take 1 capsule (290 mcg total) by mouth once daily.  Qty: 30 capsule, Refills: 11    Associated Diagnoses: Constipation, unspecified constipation type      omeprazole (PRILOSEC) 40 MG capsule Take 1 capsule (40 mg total) by mouth once daily.  Qty: 30 capsule, Refills: 11    Associated Diagnoses: Esophagitis      varenicline (CHANTIX STARTING MONTH BOX) 0.5 mg (11)- 1 mg (42) tablet Take one 0.5mg tab by mouth once daily X3 days,then increase to one 0.5mg tab twice daily X4 days,then increase to one 1mg tab twice daily  Qty: 1 Package, Refills: 0    Associated Diagnoses: Cigarette nicotine dependence with nicotine-induced disorder      methocarbamoL (ROBAXIN) 750 MG Tab Take 1 tablet (750 mg total) by mouth 3 (three) times daily as needed (muscle spasms).  Qty: 60 tablet, Refills: 0       !! - Potential duplicate medications found. Please discuss with provider.      STOP taking these medications       meloxicam (MOBIC) 15 MG tablet Comments:   Reason for Stopping:         ondansetron (ZOFRAN) 4 MG tablet Comments:   Reason for Stopping:         ondansetron (ZOFRAN-ODT) 4 MG TbDL Comments:   Reason for Stopping:         predniSONE (DELTASONE) 10 mg tablet pack Comments:   Reason for Stopping:         sodium,potassium,mag sulfates (SUPREP BOWEL PREP KIT) 17.5-3.13-1.6 gram SolR Comments:   Reason for Stopping:               Discharge Procedure Orders   Diet general     Call MD for:  temperature >100.4     Call MD for:  persistent nausea and vomiting     Call MD for:  severe  uncontrolled pain     Call MD for:  difficulty breathing, headache or visual disturbances     Call MD for:  redness, tenderness, or signs of infection (pain, swelling, redness, odor or green/yellow discharge around incision site)     Call MD for:  hives     Call MD for:  persistent dizziness or light-headedness     No dressing needed       Follow-up Information     Kb Mcgarry MD. Go in 1 month.    Specialty: Family Medicine  Contact information:  [address] [facility] [place] [address] [phone]

## 2020-06-18 NOTE — [employer] PATIENT INSTRUCTIONS
Discharge Summary/Instructions after an Endoscopic Procedure  Patient Name: Luz Pelaez  Patient MRN: [id number]  Patient YOB: 1970 Thursday, June 18, 2020 Kb Mcgarry MD  RESTRICTIONS:  During your procedure today, you received medications for sedation.  These   medications may affect your judgment, balance and coordination.  Therefore,   for 24 hours, you have the following restrictions:   - DO NOT drive a car, operate machinery, make legal/financial decisions,   sign important papers or drink alcohol.    ACTIVITY:  Today: no heavy lifting, straining or running due to procedural   sedation/anesthesia.  The following day: return to full activity including work.  DIET:  Eat and drink normally unless instructed otherwise.     TREATMENT FOR COMMON SIDE EFFECTS:  - Mild abdominal pain, nausea, belching, bloating or excessive gas:  rest,   eat lightly and use a heating pad.  - Sore Throat: treat with throat lozenges and/or gargle with warm salt   water.  - Because air was used during the procedure, expelling large amounts of air   from your rectum or belching is normal.  - If a bowel prep was taken, you may not have a bowel movement for 1-3 days.    This is normal.  SYMPTOMS TO WATCH FOR AND REPORT TO YOUR PHYSICIAN:  1. Abdominal pain or bloating, other than gas cramps.  2. Chest pain.  3. Back pain.  4. Signs of infection such as: chills or fever occurring within 24 hours   after the procedure.  5. Rectal bleeding, which would show as bright red, maroon, or black stools.   (A tablespoon of blood from the rectum is not serious, especially if   hemorrhoids are present.)  6. Vomiting.  7. Weakness or dizziness.  GO DIRECTLY TO THE NEAREST EMERGENCY ROOM IF YOU HAVE ANY OF THE FOLLOWING:      Difficulty breathing              Chills and/or fever over 101 F   Persistent vomiting and/or vomiting blood   Severe abdominal pain   Severe chest pain   Black, tarry stools   Bleeding- more than one tablespoon   Any  other symptom or condition that you feel may need urgent attention  Your doctor recommends these additional instructions:  If any biopsies were taken, your doctors clinic will contact you in 1 to 2   weeks with any results.  - Patient has a contact number available for emergencies.  The signs and   symptoms of potential delayed complications were discussed with the   patient.  Return to normal activities tomorrow.  Written discharge   instructions were provided to the patient.   - The patient should eat a bland diet and avoid caffeine, carbonation,   alcohol, nicotine, aspirin and NSAID's including ibuprofen and advil.    - Follow an antireflux regimen.   - Use metoclopramide (Reglan) 10 mg PO QID; 30 min AC and HS for 1 month.   - Discharge patient to home (via wheelchair).  For questions, problems or results please call your physician Kb Mcgarry MD at Work:  [phone]  If you have any questions about the above instructions, call the GI   department at [phone] or call the endoscopy unit at [phone]   from 7am until 3 pm.  [facility], EMERGENCY ROOM PHONE NUMBER:   [phone]  IF A COMPLICATION OR EMERGENCY SITUATION ARISES AND YOU ARE UNABLE TO REACH   YOUR PHYSICIAN - GO DIRECTLY TO THE EMERGENCY ROOM.  I have read or have had read to me these discharge instructions for my   procedure and have received a written copy.  I understand these   instructions and will follow-up with my physician if I have any questions.     __________________________________       _____________________________________  Nurse Signature                                          Patient/Designated   Responsible Party Signature  Kb Mcgarry MD  6/18/2020 8:30:39 AM  This report has been verified and signed electronically.  [employer]

## 2020-06-18 NOTE — TRANSFER OF CARE
"Anesthesia Transfer of Care Note    Patient: Luz Pelaez    Procedure(s) Performed: Procedure(s) (LRB):  ESOPHAGOGASTRODUODENOSCOPY (EGD) (N/A)  COLONOSCOPY (N/A)    Patient location: GI    Anesthesia Type: MAC    Transport from OR: Transported from OR on room air with adequate spontaneous ventilation    Post pain: adequate analgesia    Post assessment: no apparent anesthetic complications    Post vital signs: stable    Level of consciousness: awake    Nausea/Vomiting: no nausea/vomiting    Complications: none    Transfer of care protocol was followed      Last vitals:   Visit Vitals  BP (!) 142/84 (BP Location: Left arm, Patient Position: Lying)   Pulse 74   Temp 36.7 °C (98.1 °F) (Temporal)   Resp 20   Ht 5' 5" (1.651 m)   Wt 70.3 kg (154 lb 15.7 oz)   SpO2 97%   Breastfeeding No   BMI 25.79 kg/m²     "

## 2020-06-18 NOTE — DISCHARGE INSTRUCTIONS
Upper GI Endoscopy     During endoscopy, a long, flexible tube is used to view the inside of your upper GI tract.      Upper GI endoscopy allows your healthcare provider to look directly into the beginning of your gastrointestinal (GI) tract. The esophagus, stomach, and duodenum (the first part of the small intestine) make up the upper GI tract.   Before the exam  Follow these and any other instructions you are given before your endoscopy. If you dont follow the healthcare providers instructions carefully, the test may need to be canceled or done over:  · Don't eat or drink anything after midnight the night before your exam. If your exam is in the afternoon, drink only clear liquids in the morning. Don't eat or drink anything for 8 hours before the exam. In some cases, you may be able to take medicines with sips of water until 2 hours before the procedure. Speak with your healthcare provider about this.   · Bring your X-rays and any other test results you have.  · Because you will be sedated, arrange for an adult to drive you home after the exam.  · Tell your healthcare provider before the exam if you are taking any medicines or have any medical problems.  The procedure  Here is what to expect:  · You will lie on the endoscopy table. Usually patients lie on the left side.  · You will be monitored and given oxygen.  · Your throat may be numbed with a spray or gargle. You are given medicine through an intravenous (IV) line that will help you relax and remain comfortable. You may be awake or asleep during the procedure.  · The healthcare provider will put the endoscope in your mouth and down your esophagus. It is thinner than most pieces of food that you swallow. It will not affect your breathing. The medicine helps keep you from gagging.  · Air is put into your GI tract to expand it. It can make you burp.  · During the procedure, the healthcare provider can take biopsies (tissue samples), remove abnormalities,  such as polyps, or treat abnormalities through a variety of devices placed through the endoscope. You will not feel this.   · The endoscope carries images of your upper GI tract to a video screen. If you are awake, you may be able to look at the images.  · After the procedure is done, you will rest for a time. An adult must drive you home.  When to call your healthcare provider  Contact your healthcare provider if you have:  · Black or tarry stools, or blood in your stool  · Fever  · Pain in your belly that does not go away  · Nausea and vomiting, or vomiting blood   Date Last Reviewed: 7/1/2016 © 2000-2017 [employer]. [address] [place] [address], [place] [address]. All rights reserved. This information is not intended as a substitute for professional medical care. Always follow your healthcare professional's instructions.        What Is a Hiatal Hernia?    Hiatal hernia is when the area where the stomach and esophagus meet bulges up through the diaphragm into the chest cavity. In some cases, part of the stomach may bulge above the diaphragm. Stomach acid may move up into the esophagus and cause symptoms. The symptoms are often blamed on gastroesophageal reflux disease (GERD). You may only know about the hernia when it shows up on an X-ray taken for other reasons.   What you may feel  The hiatus is a normal hole in the diaphragm. The esophagus passes through this hole and leads to the stomach. In some cases, part of the stomach may bulge above the diaphragm. This bulge is called a hernia. Stomach acid may move up into the esophagus and cause symptoms.  When you eat, the muscle at the hiatus relaxes to allow food to pass into the stomach. It tightens again to keep food and digestive acids in the stomach.  Many people with hiatal hernias have mild symptoms. You may notice the following GERD symptoms:  · Heartburn or other chest discomfort  · A feeling of chest fullness after a meal  · Frequent  burping  · Acid taste in the mouth  · Trouble swallowing  Treating symptoms  If you have been diagnosed with hiatal hernia, these suggestions may help improve symptoms:  · Lose excess weight. Extra weight puts pressure on the stomach and esophagus.  · Dont lie down after eating. Sit up for at least an hour after eating. Lying down after eating can increase symptoms.  · Avoid certain foods and drinks. These include fatty foods, chocolate, coffee, mint, and other foods that cause symptoms for you.  · Dont smoke or drink alcohol. These can worsen symptoms.  · Look at your medicines. Discuss your medicines with your healthcare provider. Many medicines can cause symptoms.  · Consider an antacid medicine. Ask your healthcare provider about over-the-counter and prescription medicines that may help.  · Ask about surgery, if needed. Surgery is a treatment choice for some people. Your healthcare provider can determine if surgery is an option for you.    Date Last Reviewed: 10/1/2016  © 0605-7076 [employer]. [address] [place] [address], [place] [address]. All rights reserved. This information is not intended as a substitute for professional medical care. Always follow your healthcare professional's instructions.

## 2020-06-18 NOTE — ANESTHESIA POSTPROCEDURE EVALUATION
Anesthesia Post Evaluation    Patient: Luz Pelaez    Procedure(s) Performed: Procedure(s) (LRB):  ESOPHAGOGASTRODUODENOSCOPY (EGD) (N/A)  COLONOSCOPY (N/A)    Final Anesthesia Type: MAC    Patient location during evaluation: GI PACU  Patient participation: Yes- Able to Participate  Level of consciousness: awake and alert  Post-procedure vital signs: reviewed and stable  Pain management: adequate  Airway patency: patent    PONV status at discharge: No PONV  Anesthetic complications: no      Cardiovascular status: blood pressure returned to baseline and hemodynamically stable  Respiratory status: unassisted, spontaneous ventilation and room air  Hydration status: euvolemic  Follow-up needed           Pain/Marvin Score: No data recorded

## 2020-06-18 NOTE — ANESTHESIA PREPROCEDURE EVALUATION
06/18/2020  Luz Pelaez is a 49 y.o., female.    Anesthesia Evaluation    I have reviewed the Patient Summary Reports.    I have reviewed the Nursing Notes.    I have reviewed the Medications.     Review of Systems  Anesthesia Hx:  History of prior surgery of interest to airway management or planning: Previous anesthesia: MAC  10/07/2019 - left carpal tunnel release with MAC.  Procedure performed at an [facility].   Social:  Smoker    Cardiovascular:   ECG has been reviewed.    Pulmonary:   Asthma    Renal/:   renal calculi    Hepatic/GI:   Hiatal Hernia, GERD    Musculoskeletal:  Spine Disorders: cervical    Neurological:   Neuromuscular Disease, Headaches Migraines.  CTS. Pain Etiology/Diagnosis, Carpal Tunnel Syndrome    Psych:  Attention Deficit Disorder.         Physical Exam  General:  Well nourished    Airway/Jaw/Neck:  Airway Findings: Mouth Opening: Normal Tongue: Normal  General Airway Assessment: Adult  Mallampati: II  TM Distance: Normal, at least 6 cm  Jaw/Neck Findings:  Neck ROM: Normal ROM  Neck Findings:     Eyes/Ears/Nose:  Eyes/Ears/Nose Findings:    Dental:  Dental Findings: In tact, Upper partial dentures   Chest/Lungs:  Chest/Lungs Findings:    Heart/Vascular:  Heart Findings: Heart murmur: negative Vascular Findings: Normal    Abdomen:  Abdomen Findings: Normal    Musculoskeletal:  Musculoskeletal Findings: Normal   Skin:  Skin Findings: Normal    Mental Status:  Mental Status Findings:  Alert and Oriented         Anesthesia Plan  Type of Anesthesia, risks & benefits discussed:  Anesthesia Type:  MAC  Patient's Preference:   Intra-op Monitoring Plan: standard ASA monitors  Intra-op Monitoring Plan Comments:   Post Op Pain Control Plan:   Post Op Pain Control Plan Comments:   Induction:    Beta Blocker:  Patient is not currently on a Beta-Blocker (No further documentation  required).       Informed Consent: Patient understands risks and agrees with Anesthesia plan.  Questions answered. Anesthesia consent signed with patient.  ASA Score: 2     Day of Surgery Review of History & Physical:  There are no significant changes.  H&P update referred to the surgeon.         Ready For Surgery From Anesthesia Perspective.

## 2020-06-19 LAB
FINAL PATHOLOGIC DIAGNOSIS: NORMAL
GROSS: NORMAL

## 2020-07-09 ENCOUNTER — OFFICE VISIT (OUTPATIENT)
Dept: FAMILY MEDICINE | Facility: CLINIC | Age: 50
End: 2020-07-09
Payer: MEDICAID

## 2020-07-09 VITALS — RESPIRATION RATE: 20 BRPM

## 2020-07-09 DIAGNOSIS — R05.9 COUGH: Primary | ICD-10-CM

## 2020-07-09 DIAGNOSIS — J44.89 ASTHMA-COPD OVERLAP SYNDROME: ICD-10-CM

## 2020-07-09 DIAGNOSIS — F17.219 CIGARETTE NICOTINE DEPENDENCE WITH NICOTINE-INDUCED DISORDER: ICD-10-CM

## 2020-07-09 DIAGNOSIS — R05.9 COUGH: ICD-10-CM

## 2020-07-09 DIAGNOSIS — Z20.822 CLOSE EXPOSURE TO COVID-19 VIRUS: ICD-10-CM

## 2020-07-09 PROCEDURE — 99214 OFFICE O/P EST MOD 30 MIN: CPT | Mod: 95,,, | Performed by: FAMILY MEDICINE

## 2020-07-09 PROCEDURE — 99214 PR OFFICE/OUTPT VISIT, EST, LEVL IV, 30-39 MIN: ICD-10-PCS | Mod: 95,,, | Performed by: FAMILY MEDICINE

## 2020-07-09 PROCEDURE — U0003 INFECTIOUS AGENT DETECTION BY NUCLEIC ACID (DNA OR RNA); SEVERE ACUTE RESPIRATORY SYNDROME CORONAVIRUS 2 (SARS-COV-2) (CORONAVIRUS DISEASE [COVID-19]), AMPLIFIED PROBE TECHNIQUE, MAKING USE OF HIGH THROUGHPUT TECHNOLOGIES AS DESCRIBED BY CMS-2020-01-R: HCPCS

## 2020-07-09 RX ORDER — CODEINE PHOSPHATE AND GUAIFENESIN 10; 100 MG/5ML; MG/5ML
10 SOLUTION ORAL 4 TIMES DAILY PRN
Qty: 240 ML | Refills: 0 | Status: SHIPPED | OUTPATIENT
Start: 2020-07-09 | End: 2020-07-19

## 2020-07-09 NOTE — PROGRESS NOTES
Primary Care Telemedicine Note    The patient location is:  Patient Home   The chief complaint leading to consultation is: concern if she has covid or not.  Total time spent with patient: 15 min      Visit type: Virtual visit with synchronous audio only and video  Each patient to whom he or she provides medical services by telemedicine is:  (1) informed of the relationship between the physician and patient and the respective role of any other health care provider with respect to management of the patient; and (2) notified that he or she may decline to receive medical services by telemedicine and may withdraw from such care at any time.    Subjective:      Patient ID: Luz Pelaez is a 49 y.o. female.    Chief Complaint: cough  HPI she has a cough and tigntess in the chest in the last week. She has had exposure to her son who has covid.  She has some dyspnea but it is common to her usual cough that she gets in the summer she is a smoker at 1 ppd.  She was last exposed to her son 12 days ago.  She kept his kids as late as 8 days ago.  She got symptoms starting last week and it has progressed.  sheis coughing up clear sputum.  NO fever.  Nothing makes this worse or better. She has used albuterol for this and it has not resolved it.  We discussed her smoking and stopping. She has the chantix. I counseled with her mother present.    Health Maintenance Due   Topic Date Due    HIV Screening  07/17/1985    Pap Smear  06/28/2020       Past Medical History:  Past Medical History:   Diagnosis Date    Allergy     Asthma     Attention deficit     Carpal tunnel syndrome     Cervical spine degeneration     GERD (gastroesophageal reflux disease)     Hiatal hernia 11/1/2019    Kidney stones      Past Surgical History:   Procedure Laterality Date    CARPAL TUNNEL RELEASE Left 10/7/2019    Procedure: RELEASE, CARPAL TUNNEL;  Surgeon: Delfino Cain MD;  Location: [facility];  Service: Orthopedics;  Laterality: Left;     CARPAL TUNNEL RELEASE Right 11/4/2019    Procedure: RELEASE, CARPAL TUNNEL;  Surgeon: Delfino Cain MD;  [facility];  Service: Orthopedics;  Laterality: Right;    CHOLECYSTECTOMY      COLONOSCOPY  4/1/2012    date is approximate-done by Dr. Naranjo due to chronic constipation    COLONOSCOPY N/A 6/18/2020    Procedure: COLONOSCOPY;  Surgeon: Kb Mcgarry MD;  Location: [facility] ENDO;  Service: Endoscopy;  Laterality: N/A;    ESOPHAGOGASTRODUODENOSCOPY N/A 6/18/2020    Procedure: ESOPHAGOGASTRODUODENOSCOPY (EGD);  Surgeon: Kb Mcgarry MD;  Location: [facility] ENDO;  Service: Endoscopy;  Laterality: N/A;    SLEEVE GASTROPLASTY      TOTAL REDUCTION MAMMOPLASTY       Review of patient's allergies indicates:   Allergen Reactions    Bupropion hcl Other (See Comments)     Mood swings  Mood swings     Current Outpatient Medications on File Prior to Visit   Medication Sig Dispense Refill    albuterol (PROAIR HFA) 90 mcg/actuation inhaler INHALE 1-2 PUFFS INTO THE LUNGS EVERY 6 HOURS AS NEEDED FOR WHEEZING 17 g 2    dextroamphetamine-amphetamine 30 mg Tab Take 1 tablet (30 mg total) by mouth 2 (two) times daily. 60 tablet 0    [START ON 7/10/2020] dextroamphetamine-amphetamine 30 mg Tab Take 1 tablet (30 mg total) by mouth 2 (two) times daily. 60 tablet 0    fluticasone-umeclidin-vilanter (TRELEGY ELLIPTA) 100-62.5-25 mcg DsDv Inhale 1 puff into the lungs once daily.      LINZESS 290 mcg Cap capsule Take 1 capsule (290 mcg total) by mouth once daily. 30 capsule 11    methocarbamoL (ROBAXIN) 750 MG Tab Take 1 tablet (750 mg total) by mouth 3 (three) times daily as needed (muscle spasms). 60 tablet 0    metoclopramide HCl (REGLAN) 10 MG tablet Take 1 tablet (10 mg total) by mouth 4 (four) times daily. 120 tablet 0    omeprazole (PRILOSEC) 40 MG capsule Take 1 capsule (40 mg total) by mouth once daily. 30 capsule 11    varenicline (CHANTIX STARTING MONTH BOX) 0.5 mg (11)- 1 mg (42) tablet Take  one 0.5mg tab by mouth once daily X3 days,then increase to one 0.5mg tab twice daily X4 days,then increase to one 1mg tab twice daily 1 Package 0     No current facility-administered medications on file prior to visit.      Social History     Socioeconomic History    Marital status:      Spouse name: Not on file    Number of children: 2    Years of education: Not on file    Highest education level: Not on file   Occupational History    Occupation:      Employer: [employer]   Social Needs    Financial resource strain: Not on file    Food insecurity     Worry: Not on file     Inability: Not on file    Transportation needs     Medical: Not on file     Non-medical: Not on file   Tobacco Use    Smoking status: Current Every Day Smoker     Packs/day: 1.00     Years: 30.00     Pack years: 30.00     Types: Cigarettes    Smokeless tobacco: Never Used   Substance and Sexual Activity    Alcohol use: Yes     Frequency: 2-3 times a week     Drinks per session: 1 or 2     Comment: Socially    Drug use: No    Sexual activity: Yes     Partners: Male   Lifestyle    Physical activity     Days per week: Not on file     Minutes per session: Not on file    Stress: Only a little   Relationships    Social connections     Talks on phone: Not on file     Gets together: Not on file     Attends [employer] service: Not on file     Active member of club or organization: Not on file     Attends meetings of clubs or organizations: Not on file     Relationship status: Not on file   Other Topics Concern    Not on file   Social History Narrative    Live w/ spouse no pets      Family History   Problem Relation Age of Onset    Hypertension Mother     Diabetes Mother     Hyperlipidemia Mother     Heart disease Mother 58    Colon cancer Maternal Grandmother     Breast cancer Maternal Grandmother     Breast cancer Paternal Grandmother         Breast    Heart attack Father 70     Thyroid disease Sister     Hypertension Sister     Breast cancer Paternal Aunt     Stroke Neg Hx            Review of Systems   Constitutional: Negative for chills and fever.   HENT: Positive for postnasal drip and rhinorrhea. Negative for ear pain and sore throat.    Respiratory: Positive for cough, shortness of breath and wheezing.    Cardiovascular: Negative for chest pain.   Musculoskeletal: Negative for myalgias.   Skin: Negative for rash.   Allergic/Immunologic: Negative for environmental allergies.   Neurological: Negative for headaches.       Objective:      Physical Exam  Constitutional:       General: She is not in acute distress.     Appearance: She is well-developed. She is not diaphoretic.   Pulmonary:      Effort: Pulmonary effort is normal. No respiratory distress.      Comments: She has a coarse cough.  Neurological:      Mental Status: She is alert and oriented to person, place, and time.   Psychiatric:         Behavior: Behavior normal.         Thought Content: Thought content normal.         Judgment: Judgment normal.         Assessment:       1. Cough    2. Close Exposure to Covid-19 Virus    3. Asthma-COPD overlap syndrome    4. Cigarette nicotine dependence with nicotine-induced disorder        Plan:       Diagnoses and all orders for this visit:    Cough  -     COVID-19 Routine Screening; Future  -     COVID-19 Home Symptom Monitoring  - Duration (days): 14  -     guaifenesin-codeine 100-10 mg/5 ml (TUSSI-ORGANIDIN NR)  mg/5 mL syrup; Take 10 mLs by mouth 4 (four) times daily as needed for Cough.    Close Exposure to Covid-19 Virus  -     COVID-19 Routine Screening; Future  -     COVID-19 Home Symptom Monitoring  - Duration (days): 14  -     guaifenesin-codeine 100-10 mg/5 ml (TUSSI-ORGANIDIN NR)  mg/5 mL syrup; Take 10 mLs by mouth 4 (four) times daily as needed for Cough.    Asthma-COPD overlap syndrome    Cigarette nicotine dependence with nicotine-induced disorder      She  is to notify us if she worsens.  I told her about the pulse ox tracking program.   She is to try to quit smoking and use the chantix.

## 2020-07-10 LAB — SARS-COV-2 RNA RESP QL NAA+PROBE: NOT DETECTED

## 2020-07-30 ENCOUNTER — TELEPHONE (OUTPATIENT)
Dept: FAMILY MEDICINE | Facility: CLINIC | Age: 50
End: 2020-07-30

## 2020-07-30 NOTE — TELEPHONE ENCOUNTER
----- Message from Malka Hudson sent at 7/30/2020  2:37 PM CDT -----  Contact: pt  Pt is calling to see if she can be workd in to earlier to see Ms Cavazos. Would like to come in before the 11th but did schedule a virtual appt on 08/14 and can be reached at [phone]//[email or web address]

## 2020-08-03 ENCOUNTER — PATIENT OUTREACH (OUTPATIENT)
Dept: ADMINISTRATIVE | Facility: HOSPITAL | Age: 50
End: 2020-08-03

## 2020-08-03 NOTE — LETTER
August 3, 2020      We are seeing Luz Pelaez, 1970, at [facility]. Kb Mcgarry MD is their primary care physician. To help with our [place] maintenance records could you please send the following:     Most recent pap smear    Please fax to [facility] at [phone], attention Leda Brantley.     Thank-you in advance for your assistance. If you have any questions or concerns please contact me at [phone].     Leda MACHUCA LPN  Care Coordination Department  [facility]  [phone]

## 2020-08-04 NOTE — PROGRESS NOTES
Received fax from Dr. Naylor's office. Patient's chart shredded and has not been to clinic since 2013.

## 2020-08-07 ENCOUNTER — OFFICE VISIT (OUTPATIENT)
Dept: FAMILY MEDICINE | Facility: CLINIC | Age: 50
End: 2020-08-07
Payer: MEDICAID

## 2020-08-07 DIAGNOSIS — R45.86 MOOD CHANGE: ICD-10-CM

## 2020-08-07 DIAGNOSIS — G43.109 MIGRAINE WITH AURA AND WITHOUT STATUS MIGRAINOSUS, NOT INTRACTABLE: ICD-10-CM

## 2020-08-07 DIAGNOSIS — R41.840 ATTENTION DEFICIT: Primary | Chronic | ICD-10-CM

## 2020-08-07 PROCEDURE — 99214 OFFICE O/P EST MOD 30 MIN: CPT | Mod: 95,,, | Performed by: NURSE PRACTITIONER

## 2020-08-07 PROCEDURE — 99214 PR OFFICE/OUTPT VISIT, EST, LEVL IV, 30-39 MIN: ICD-10-PCS | Mod: 95,,, | Performed by: NURSE PRACTITIONER

## 2020-08-07 RX ORDER — DESVENLAFAXINE SUCCINATE 50 MG/1
50 TABLET, EXTENDED RELEASE ORAL DAILY
Qty: 30 TABLET | Refills: 5 | Status: SHIPPED | OUTPATIENT
Start: 2020-08-07 | End: 2020-11-10 | Stop reason: ALTCHOICE

## 2020-08-07 NOTE — PROGRESS NOTES
Subjective:       Patient ID: Luz Pelaez is a 50 y.o. female.    Chief Complaint: No chief complaint on file.  Primary Care Telemedicine Note    The patient location is:  Patient Home   The chief complaint leading to consultation is: medication refill  Total time spent with patient: 10 mins    Visit type: Virtual visit with synchronous audio only and video  Each patient to whom he or she provides medical services by telemedicine is:  (1) informed of the relationship between the physician and patient and the respective role of any other health care provider with respect to management of the patient; and (2) notified that he or she may decline to receive medical services by telemedicine and may withdraw from such care at any time.      Medication Refill  This is a chronic (Adderall) problem. The current episode started more than 1 year ago. The problem occurs daily. The problem has been unchanged. Associated symptoms include arthralgias, joint swelling and neck pain. Pertinent negatives include no abdominal pain, chest pain, coughing, fatigue, fever, headaches, myalgias, rash, sore throat, vomiting or weakness. The treatment provided significant relief.   Anxiety  Presents for follow-up visit. Symptoms include irritability and malaise. Patient reports no chest pain, confusion, dizziness, nervous/anxious behavior, palpitations or shortness of breath. Primary symptoms comment: headaches. Symptoms occur most days. The severity of symptoms is moderate.           Review of Systems   Constitutional: Positive for irritability. Negative for activity change, fatigue, fever and unexpected weight change.   HENT: Negative for ear pain, hearing loss, rhinorrhea, sore throat and trouble swallowing.    Eyes: Negative for pain, discharge and visual disturbance.   Respiratory: Positive for wheezing. Negative for cough, chest tightness and shortness of breath.    Cardiovascular: Negative for chest pain and palpitations.    Gastrointestinal: Negative for abdominal pain, blood in stool, constipation, diarrhea and vomiting.   Endocrine: Negative for polydipsia and polyuria.   Genitourinary: Negative for difficulty urinating, dysuria, hematuria and menstrual problem.   Musculoskeletal: Positive for arthralgias, joint swelling and neck pain. Negative for myalgias.   Skin: Negative for color change and rash.   Neurological: Negative for dizziness, weakness and headaches.   Psychiatric/Behavioral: Positive for dysphoric mood. Negative for confusion and sleep disturbance. The patient is not nervous/anxious.            Objective:     Current Outpatient Medications   Medication Sig Dispense Refill    albuterol (PROAIR HFA) 90 mcg/actuation inhaler INHALE 1-2 PUFFS INTO THE LUNGS EVERY 6 HOURS AS NEEDED FOR WHEEZING 17 g 2    desvenlafaxine succinate (PRISTIQ) 50 MG Tb24 Take 1 tablet (50 mg total) by mouth once daily. 30 tablet 5    [START ON 9/10/2020] dextroamphetamine-amphetamine (ADDERALL) 30 mg Tab Take 1 tablet (30 mg total) by mouth 2 (two) times a day. 60 tablet 0    dextroamphetamine-amphetamine (ADDERALL) 30 mg Tab Take 1 tablet (30 mg total) by mouth 2 (two) times a day. 60 tablet 0    [START ON 10/9/2020] dextroamphetamine-amphetamine 30 mg Tab Take 1 tablet (30 mg total) by mouth 2 (two) times daily. 60 tablet 0    fluticasone-umeclidin-vilanter (TRELEGY ELLIPTA) 100-62.5-25 mcg DsDv Inhale 1 puff into the lungs once daily.      LINZESS 290 mcg Cap capsule Take 1 capsule (290 mcg total) by mouth once daily. 30 capsule 11    methocarbamoL (ROBAXIN) 750 MG Tab Take 1 tablet (750 mg total) by mouth 3 (three) times daily as needed (muscle spasms). 60 tablet 0    metoclopramide HCl (REGLAN) 10 MG tablet Take 1 tablet (10 mg total) by mouth 4 (four) times daily. 120 tablet 0    omeprazole (PRILOSEC) 40 MG capsule Take 1 capsule (40 mg total) by mouth once daily. 30 capsule 11    varenicline (CHANTIX STARTING MONTH BOX) 0.5  mg (11)- 1 mg (42) tablet Take one 0.5mg tab by mouth once daily X3 days,then increase to one 0.5mg tab twice daily X4 days,then increase to one 1mg tab twice daily 1 Package 0     No current facility-administered medications for this visit.        Physical Exam  Constitutional:       Appearance: She is well-developed.   HENT:      Head: Normocephalic.   Neck:      Musculoskeletal: Normal range of motion.   Pulmonary:      Effort: Pulmonary effort is normal. No respiratory distress.   Neurological:      Mental Status: She is alert and oriented to person, place, and time.   Psychiatric:         Thought Content: Thought content normal.         Judgment: Judgment normal.         Assessment:       1. Attention deficit    2. Mood change    3. Migraine with aura and without status migrainosus, not intractable        Plan:   Attention deficit  -     dextroamphetamine-amphetamine 30 mg Tab; Take 1 tablet (30 mg total) by mouth 2 (two) times daily.  Dispense: 60 tablet; Refill: 0    Mood change    Migraine with aura and without status migrainosus, not intractable    Other orders  -     desvenlafaxine succinate (PRISTIQ) 50 MG Tb24; Take 1 tablet (50 mg total) by mouth once daily.  Dispense: 30 tablet; Refill: 5  -     dextroamphetamine-amphetamine (ADDERALL) 30 mg Tab; Take 1 tablet (30 mg total) by mouth 2 (two) times a day.  Dispense: 60 tablet; Refill: 0  -     dextroamphetamine-amphetamine (ADDERALL) 30 mg Tab; Take 1 tablet (30 mg total) by mouth 2 (two) times a day.  Dispense: 60 tablet; Refill: 0        Follow up if symptoms worsen or fail to improve.    There are no Patient Instructions on file for this visit.

## 2020-08-11 RX ORDER — DEXTROAMPHETAMINE SACCHARATE, AMPHETAMINE ASPARTATE, DEXTROAMPHETAMINE SULFATE AND AMPHETAMINE SULFATE 7.5; 7.5; 7.5; 7.5 MG/1; MG/1; MG/1; MG/1
1 TABLET ORAL 2 TIMES DAILY
Qty: 60 TABLET | Refills: 0 | Status: SHIPPED | OUTPATIENT
Start: 2020-08-11 | End: 2020-11-10 | Stop reason: SDUPTHER

## 2020-08-11 RX ORDER — DEXTROAMPHETAMINE SACCHARATE, AMPHETAMINE ASPARTATE, DEXTROAMPHETAMINE SULFATE AND AMPHETAMINE SULFATE 7.5; 7.5; 7.5; 7.5 MG/1; MG/1; MG/1; MG/1
1 TABLET ORAL 2 TIMES DAILY
Qty: 60 TABLET | Refills: 0 | Status: SHIPPED | OUTPATIENT
Start: 2020-09-10 | End: 2020-11-10 | Stop reason: SDUPTHER

## 2020-08-11 RX ORDER — DEXTROAMPHETAMINE SACCHARATE, AMPHETAMINE ASPARTATE, DEXTROAMPHETAMINE SULFATE AND AMPHETAMINE SULFATE 7.5; 7.5; 7.5; 7.5 MG/1; MG/1; MG/1; MG/1
1 TABLET ORAL 2 TIMES DAILY
Qty: 60 TABLET | Refills: 0 | Status: SHIPPED | OUTPATIENT
Start: 2020-10-09 | End: 2020-11-08

## 2020-08-19 ENCOUNTER — TELEPHONE (OUTPATIENT)
Dept: PULMONOLOGY | Facility: CLINIC | Age: 50
End: 2020-08-19

## 2020-08-19 NOTE — TELEPHONE ENCOUNTER
----- Message from No Hudson sent at 8/19/2020  4:10 PM CDT -----  Contact: PAPITO  .Type:  Patient Returning Call    Who Called:ILDEFONSO  Who Left Message for Patient:ILDEFONSO  Does the patient know what this is regarding? YES  Would the patient rather a call back or a response via [facility]? CALL BACK  Best Call Back Number:[phone]  Additional Information:

## 2020-08-19 NOTE — TELEPHONE ENCOUNTER
----- Message from Tiffany Russo sent at 8/19/2020 11:25 AM CDT -----  Regarding: appt  Good morning,        Pt would like to put all appointments together on 9/17/20 and can be reached at [phone]      Thanks,  Tiffany Russo

## 2020-08-19 NOTE — TELEPHONE ENCOUNTER
Spoke with pt.Pt was calling to see if we can schedule her appt tomorrow till 09/17 with her ct scan. She stated that she is driving from an hour away and would like to do both appts on the same day. Appt scheduled.

## 2020-09-16 ENCOUNTER — TELEPHONE (OUTPATIENT)
Dept: PULMONOLOGY | Facility: CLINIC | Age: 50
End: 2020-09-16

## 2020-09-16 NOTE — TELEPHONE ENCOUNTER
Chronic Disease Management:   Called patient to confirm Pulmonary Disease Management appointment.   No answer. Left message.

## 2020-09-16 NOTE — TELEPHONE ENCOUNTER
Patient returned call to [facility] to confirm appointment. Reminded patient to bring respiratory inhalers to visit. Patient verbalized understanding.

## 2020-09-17 ENCOUNTER — HOSPITAL ENCOUNTER (OUTPATIENT)
Dept: RADIOLOGY | Facility: HOSPITAL | Age: 50
Discharge: HOME | End: 2020-09-17
Attending: INTERNAL MEDICINE
Payer: MEDICAID

## 2020-09-17 ENCOUNTER — CLINICAL SUPPORT (OUTPATIENT)
Dept: PULMONOLOGY | Facility: CLINIC | Age: 50
End: 2020-09-17
Payer: MEDICAID

## 2020-09-17 ENCOUNTER — OFFICE VISIT (OUTPATIENT)
Dept: SLEEP MEDICINE | Facility: CLINIC | Age: 50
End: 2020-09-17
Payer: MEDICAID

## 2020-09-17 ENCOUNTER — PATIENT OUTREACH (OUTPATIENT)
Dept: ADMINISTRATIVE | Facility: OTHER | Age: 50
End: 2020-09-17

## 2020-09-17 VITALS
BODY MASS INDEX: 26.15 KG/M2 | HEART RATE: 72 BPM | RESPIRATION RATE: 16 BRPM | OXYGEN SATURATION: 99 % | WEIGHT: 156.94 LBS | HEIGHT: 65 IN

## 2020-09-17 VITALS
BODY MASS INDEX: 26.33 KG/M2 | OXYGEN SATURATION: 96 % | WEIGHT: 158.06 LBS | SYSTOLIC BLOOD PRESSURE: 136 MMHG | RESPIRATION RATE: 16 BRPM | HEIGHT: 65 IN | DIASTOLIC BLOOD PRESSURE: 80 MMHG | HEART RATE: 82 BPM

## 2020-09-17 DIAGNOSIS — R91.1 PULMONARY NODULE: Primary | ICD-10-CM

## 2020-09-17 DIAGNOSIS — R91.1 PULMONARY NODULE: ICD-10-CM

## 2020-09-17 DIAGNOSIS — J44.89 ASTHMA-COPD OVERLAP SYNDROME: Primary | ICD-10-CM

## 2020-09-17 DIAGNOSIS — F17.219 CIGARETTE NICOTINE DEPENDENCE WITH NICOTINE-INDUCED DISORDER: ICD-10-CM

## 2020-09-17 DIAGNOSIS — J44.89 ASTHMA-COPD OVERLAP SYNDROME: ICD-10-CM

## 2020-09-17 DIAGNOSIS — R91.8 ABNORMAL CT SCAN OF LUNG: ICD-10-CM

## 2020-09-17 PROCEDURE — 99999 PR PBB SHADOW E&M-EST. PATIENT-LVL IV: CPT | Mod: PBBFAC,,, | Performed by: INTERNAL MEDICINE

## 2020-09-17 PROCEDURE — 99999 PR PBB SHADOW E&M-EST. PATIENT-LVL IV: ICD-10-PCS | Mod: PBBFAC,,, | Performed by: INTERNAL MEDICINE

## 2020-09-17 PROCEDURE — 71250 CT THORAX DX C-: CPT | Mod: 26,,, | Performed by: RADIOLOGY

## 2020-09-17 PROCEDURE — 99999 PR PBB SHADOW E&M-EST. PATIENT-LVL II: ICD-10-PCS | Mod: PBBFAC,,,

## 2020-09-17 PROCEDURE — 99212 OFFICE O/P EST SF 10 MIN: CPT | Mod: PBBFAC,25

## 2020-09-17 PROCEDURE — 99214 OFFICE O/P EST MOD 30 MIN: CPT | Mod: S$PBB,,, | Performed by: INTERNAL MEDICINE

## 2020-09-17 PROCEDURE — 99214 PR OFFICE/OUTPT VISIT, EST, LEVL IV, 30-39 MIN: ICD-10-PCS | Mod: S$PBB,,, | Performed by: INTERNAL MEDICINE

## 2020-09-17 PROCEDURE — 71250 CT THORAX DX C-: CPT | Mod: TC

## 2020-09-17 PROCEDURE — 71250 CT CHEST WITHOUT CONTRAST: ICD-10-PCS | Mod: 26,,, | Performed by: RADIOLOGY

## 2020-09-17 PROCEDURE — 99214 OFFICE O/P EST MOD 30 MIN: CPT | Mod: PBBFAC,25,27 | Performed by: INTERNAL MEDICINE

## 2020-09-17 PROCEDURE — 99999 PR PBB SHADOW E&M-EST. PATIENT-LVL II: CPT | Mod: PBBFAC,,,

## 2020-09-17 NOTE — PROGRESS NOTES
Pulmonary Disease Management  [facility]  Follow up Visit  Chronic Care Management    Luz Pelaez  was seen 9/17/2020  11:00 AM in the [facility] for evaluation, education, reinforcement of self management techniques and exacerbation action plan.    Elijah Alicia    Past Medical History:   Diagnosis Date    Allergy     Asthma     Attention deficit     Carpal tunnel syndrome     Cervical spine degeneration     GERD (gastroesophageal reflux disease)     Hiatal hernia 11/1/2019    Kidney stones        Patient's Medications   New Prescriptions    No medications on file   Previous Medications    ALBUTEROL (PROAIR HFA) 90 MCG/ACTUATION INHALER    INHALE 1-2 PUFFS INTO THE LUNGS EVERY 6 HOURS AS NEEDED FOR WHEEZING    DESVENLAFAXINE SUCCINATE (PRISTIQ) 50 MG TB24    Take 1 tablet (50 mg total) by mouth once daily.    DEXTROAMPHETAMINE-AMPHETAMINE (ADDERALL) 30 MG TAB    Take 1 tablet (30 mg total) by mouth 2 (two) times a day.    DEXTROAMPHETAMINE-AMPHETAMINE (ADDERALL) 30 MG TAB    Take 1 tablet (30 mg total) by mouth 2 (two) times a day.    DEXTROAMPHETAMINE-AMPHETAMINE 30 MG TAB    Take 1 tablet (30 mg total) by mouth 2 (two) times daily.    FLUTICASONE-UMECLIDIN-VILANTER (TRELEGY ELLIPTA) 100-62.5-25 MCG DSDV    Inhale 1 puff into the lungs once daily.    LINZESS 290 MCG CAP CAPSULE    Take 1 capsule (290 mcg total) by mouth once daily.    METHOCARBAMOL (ROBAXIN) 750 MG TAB    Take 1 tablet (750 mg total) by mouth 3 (three) times daily as needed (muscle spasms).    METOCLOPRAMIDE HCL (REGLAN) 10 MG TABLET    Take 1 tablet (10 mg total) by mouth 4 (four) times daily.    OMEPRAZOLE (PRILOSEC) 40 MG CAPSULE    Take 1 capsule (40 mg total) by mouth once daily.    VARENICLINE (CHANTIX STARTING MONTH BOX) 0.5 MG (11)- 1 MG (42) TABLET    Take one 0.5mg tab by mouth once daily X3 days,then increase to one 0.5mg tab twice daily X4 days,then increase to one 1mg tab twice daily    Modified Medications    No medications on file   Discontinued Medications    No medications on file             Educational assessment:   [x]            Good  []            Fair  []            Poor    Readiness to learn:   [x]            Good  []            Fair  []            Poor    Vision Status:   [x]            Good  []            Fair  []            Poor    Reading Ability:  [x]            Good  []            Fair  []            Poor    Knowledge of condition:   [x]            Good  []            Fair  []            Poor    Language Barriers:   []            Good  []            Fair  []            Poor  [x]            None    Cognitive/ Physical Barriers:   []            Good  []            Fair  []            Poor  [x]            None    Learning best by:                       [x]            Seeing  []            Hearing  []            Reading                         [x]            Doing    Describe any barrier /Limitation or financial implications of care choices identified     []            Financial  []            Emotional  []            Education  []            Vision/Hearing  []            Physical  [x]            None  []                TOPIC /CONTENT FOR TODAY:    [x]            MDI with or without spacer  [x]            Dry powder inhaler  []            Acapella   []           Peak Flow meter  [x]            COPD/Asthma action plan  [x]            Nebulizer use  []            Oxygen use safety  [x]            Breathing and cough techniques  [x]            Energy conservation  [x]            Infection prevention      Learner:    [x]            Patient   []            Caregiver    Method:    [x]            Verbal explanation  [x]            Audio visual    [x]            Literature  [x]            Teach back      Evaluation:    [x]            Teach back  [x]            Demonstrate  [x]            Follow up phone call    []            2 weeks     [x]            4 weeks   [x]             PRN    Additional comments:  Patient was seen today to review respiratory medication purpose and proper technique for use of inhalers. Patient practiced proper technique using MDI with valved holding chamber (spacer) and DPI inhalers. Patient demonstrated understanding. Literature was given to patient. Patient adheres to prescribed respiratory therapy regimen.    Patient is a current every day smoker. Patient reports that she smoked approximately 1/2 ppd. Patient is currently taking Chantix. Provided patient with information pertaining to the Tobacco Cessation Program. Patient is not ready to participate in the program at this time.     Reviewed breathing techniques such as pursed-lip breathing. Patient practiced proper technique and verbalized understanding. Literature given to patient.      Asthma trigger checklist was verbally reviewed and literature given to patient.     Infection prevention was discussed. Patient was reminded to get influenza vaccine. Hand hygiene and cleaning of respiratory equipment was also discussed. Patient verbalized understanding.      COPD/Asthma action plan was reviewed and literature was given to patient. Patient verbalized understanding.      Plan:  Monthly Pulmonary Disease Management Questionnaire  Reinforce education  Meds: LABA, LAMA, ICS, KATIE  DME Needs: N/a  Action Plan: COPD/Asthma  Immunization: Pneumococcal- current, Flu-DUE  Next Provider Visit: 9/17/20  Next Spirometry/CPFT: Not scheduled  Approximate time spent with patient: 45 mins

## 2020-09-17 NOTE — PROGRESS NOTES
Health Maintenance Due   Topic Date Due    Hepatitis C Screening  1970    HIV Screening  07/17/1985    Pap Smear  06/28/2020    Shingles Vaccine (1 of 2) 07/17/2020    Influenza Vaccine (1) 08/01/2020     Updates were requested from care everywhere.  Chart was reviewed for overdue Proactive [facility] Encounters ([facility]) topics (CRS, Breast Cancer Screening, Eye exam)  Health Maintenance has been updated.  [facility] immunization registry triggered.  Immunizations were reconciled.

## 2020-09-17 NOTE — PATIENT INSTRUCTIONS
What is COPD?  COPD stands for chronic obstructive pulmonary disease. It means the airways in your lungs are blocked (obstructed). Because of this, it is hard to breathe. You may have trouble with daily activities because of shortness of breath. Over time the shortness of breath usually worsens making it more and more difficult to take care of yourself and take part in activities. Chronic bronchitis and emphysema are two common types of COPD.  What happens in chronic bronchitis?  The cells in the airways make more mucus than normal. The mucus builds up, narrowing the airways. This means less air travels into and out of the lungs. The lining of the airways may also become inflamed (swollen) and causes the airways to narrow even more.            What happens in emphysema?  The small airways are damaged and lose their stretchiness. The airways collapse when you exhale, causing air to get trapped in the air sacs. This means that less oxygen enters the blood vessels and less oxygen is delivered to all of the cells of your body. This makes it hard to breathe.         Damage to cilia  Cilia are small hairs that line and protect the airways. Smoking damages the cilia. Damaged cilia can't sweep mucus and particles away. Some of the cilia are destroyed. This damage worsens COPD.      How did I get COPD?  Most people get COPD from smoking. Cigarette smoke damages lungs, which can develop into COPD over many years.  How COPD affects you  COPD makes you work harder to breathe. Air may get trapped in the lungs, which prevents your lungs from filling completely with fresh oxygen-filled air when you inhale (breathe in). It's harder to take deep breaths especially when you are active and start breathing faster. Over time, your lungs may become enlarged filling the lung with air that does not transfer oxygen into the blood. These problems cause you to have shortness of breath (also called dyspnea). Wheezing (hoarse, whistling  breathing), chronic cough, and fatigue (feeling tired and worn out) are also common.   © 8701-5931 [employer]. [address] [place] [address], [place] [address]. All rights reserved. This information is not intended as a substitute for professional medical care. Always follow your healthcare professional's instructions.            Asthma Trigger Checklist  Allergens, irritants, and other things may trigger your asthma. Check the box next to each of your triggers. After each trigger is a list of ways to avoid it.   Dust mites. Dust mites live in mattresses, bedding, carpets, curtains, and indoor dust.   To kill dust mites, wash bedding in hot water (130°F) each week.   Cover mattress and pillows with special dust-mite-proof cases.   Don't use upholstered furniture like sofas or chairs in the bedroom.   Use allergy-proof filters for air conditioners and furnaces. Replace or clean them as instructed.   If you can, replace carpeting with wood or tile becki, especially in the bedroom.  Animals. Animals with fur or feathers shed dander (allergens).   It's best to choose a pet that doesn't have fur or feathers, such as a fish or a reptile.   If you have pets, keep them off your bed and out of your bedroom.   Wash your hands and clothes after handling pets.    Mold. Mold grows in damp places, such as bathrooms, basements, and closets.   Ask someone to clean damp areas in your home every week. Or try wearing a face mask while you clean.   Run an exhaust fan while bathing. Or leave a window open in the bathroom.   Repair water leaks in or around your home.   Have someone else cut grass or rake leaves, if possible.   Don't use vaporizers or humidifiers. They encourage mold growth.    Pollen. Pollen from trees, grasses, and weeds is a common allergen. (Flower pollens are generally not a problem).   Try to learn what types of pollen affect you most. Pollen levels vary depending on the plant, the season,  and the time of day.   If possible, use air conditioning instead of opening the windows in your home or car.   Have someone else do yard work, if possible.    Cockroaches. Roaches are found in many homes and produce allergens.   Keep your kitchen clean and dry. A leaky faucet or drain can attract roaches.   Remove garbage from your home daily.   Store food in tightly sealed containers. Wash dishes as soon as they are used.   Use bait stations or traps to control roaches. Avoid using chemical sprays.    Smoke. Smoke may be from cigarettes, cigars, pipes, incense or candles, barbecues or grills, and fireplaces.   Don't smoke. And don't let people smoke in your home or car.   When you travel, ask for nonsmoking rental cars and hotel rooms.   Avoid fireplaces and wood stoves. If you can't, sit away from them. Make sure the smoke is directed outside.   Don't burn incense or use candles.   Move away from smoky outdoor cooking grills.    Smog.  Smog is from car exhaust and other pollution.   Read or listen to local air-quality reports. These let you know when air quality is poor.   Stay indoors as much as you can on smoggy days. If possible, use air conditioning instead of opening the windows.   In your car, set air conditioning to recirculate air, so less pollution gets in.    Strong odors. These include air fresheners, deodorizers, and cleaning products; perfume, deodorant, and other beauty products; incense and candles; and insect sprays and other sprays.   Use scent-free products like deodorant or body lotion.   Avoid using cleaning products with bleach and ammonia. Make your own cleaning solution with white vinegar, baking soda, or mild dish soap.   Use exhaust fans while cooking. Or open a window, if possible.    Avoid perfumes, air fresheners, potpourri, and other scented products.          Other irritants. These include dust, aerosol sprays, and powders.   Wear a face mask while doing tasks like  sanding, dusting, sweeping, and yard work. Open doors and windows if working indoors.   Use pump spray bottles instead of aerosols.   Pour liquid  onto a rag or cloth instead of spraying them.    Weather. Weather conditions can trigger symptoms or make them worse.   Watch for very high or low temperatures, very humid conditions, or a lot of wind, as these conditions can make symptoms worse.   Limit outdoor activity during the type of weather that affects you.   Wear a scarf over your mouth and nose in cold weather.    Colds, flu, and sinus infections. Upper respiratory infections can trigger asthma.   Wash your hands often with soap and warm water or use a hand  containing alcohol.   Get a yearly flu shot. And ask if you should get a pneumonia vaccine.   Take care of your general health. Get plenty of sleep. And eat a variety of healthy foods.    Food additives. Food additives can trigger asthma flare-ups in some people.   Check food labels for sulfites or other similar ingredients. These are often found in foods such as wine, beer, and dried fruits.   Avoid foods that contain these additives.    Medicine. Aspirin, NSAIDS like ibuprofen and naproxen, and heart medicines like beta-blockers may be triggers.   Tell your health care provider if you think certain medicines trigger symptoms.    Be sure to read the labels on over-the-counter medicines. They may have ingredients that cause symptoms for you.   , Emotions. Laughing, crying, or feeling excited are triggers for some people.    To help you stay calm: Try breathing in slowly through your nose for a count of 2 seconds. Close your lips and breathe out for 4 seconds. Repeat.   Try to focus on a soothing image in your mind. This will help relax you and calm your breathing.   Remember to take your daily controller medicines. When you're upset or under stress, it's easy to forget.    Exercise. For some people, exercise can trigger  symptoms. Don't let this keep you from being active.    If you have not been exercising regularly, start slow and work up gradually.   Take all of your medicines as prescribed.   If you use quick-relief medicine, make sure you have it with you when you exercise.   Stop if you have any symptoms. Make sure you talk with your provider about these symptoms.  © 5330-1696 [employer]. [address] [place] [address], [place] [address]. All rights reserved. This information is not intended as a substitute for professional medical care. Always follow your healthcare professional's instructions.            [address]  Shortness of Breath: Maximizing Your Energy    Fear of shortness of breath may stop you from being as active as you once were. You don't have to live this way. Managing your time and pacing yourself can help you conserve energy and do more. It's even OK if you're short of breath sometimes. You can learn to work through this without limiting your activities.  Manage your time  Shortness of breath can make everyday tasks take longer. This means there's less time to do the things you enjoy. You can help prevent this by managing your time. Try these tips:   Plan ahead so your tasks are spaced throughout the day. As you plan, keep in mind the times of day you tend to have the most energy.   Do only one thing at a time. Finish one task before starting another.   Gather everything you need before you start a task. This cuts out unneeded steps while you're working.   Think about what you really need to do. Be realistic about what you can get done in a day.      Balance activity and rest  When you're tired, your activities will take longer. Fatigue also makes you more likely to get an infection. Plan your day so that your tasks are spaced throughout the day. To have more energy:   Stop and rest when you need to. Don't wait until you're overtired.   Switch back and forth between hard tasks and easy  ones.   Give yourself plenty of time for each task, so you don't have to hurry.   Take 20- to 30-minute rest breaks after meals and throughout the day.   If an activity takes a lot of energy, break it into smaller parts. For instance, fold the laundry first. Then take a break before putting it away.   Try not to exert yourself in extreme cold or heat.       Find ways to conserve energy  Conserving your energy can help you stay active and breathe better. The way you use your body during a task can help you conserve energy. Think of ways to make things easier, and take your time to ease shortness of breath.  For some tasks, you can also use special aids designed to reduce the amount of energy needed. Here are some tips:   Sit whenever possible, and keep your arms close to your body. Use slow, smooth motions.   Sit to dress and undress, shave, brush your teeth, and comb your hair. Use a long-handled reacher to pull on socks and shoes, and long-handled items like shoe horns.   Sit on a bench to shower. Use warm water, not hot. (Steam can make it harder to breathe.) Dry off by wrapping yourself in a terrycloth robe.   Use energy-saving appliances, such as an electric can opener, a power toothbrush, and a .   Use a cart with wheels to move groceries, laundry, dishes, and other items around the house. Some carts have seats so you can rest when you need to.   Use lightweight, nonstick pots and pans to cook. Soak dirty dishes instead of scrubbing them. Air-dry dishes, or use a .   Mix, cook, serve, and store foods in the same dish.   Keep the things you use most at waist level, so you can get them without reaching or bending.   Use steps slowly, pausing at each step. If you have steps outside or in your home, think about adding ramps or stair lifts.   Think about ways that others can help you. You might get help from friends, family members, or home health aides.      Remember to  breathe  Sometimes people with an illness or condition that affects the lungs try to rush through tasks so they won't get short of breath. This uses more energy and can actually increase shortness of breath. Instead, slow down and pace your breathing. These tips may help:   Move slowly during tasks that take a lot of effort, such as climbing stairs or pushing a shopping cart.   Use pursed-lip and diaphragmatic breathing while you go about a task.   Breathe out (exhale) when you exert effort. For example, breathe out as you lift up a grocery bag. Once you're holding the bag, breathe in. Ask the  at the [employer] to pack your grocery bags so they are light and easy to carry.   Focus on taking slow, deep breaths. If your breathing is shallow, you don't take in as much air.   Remember that it's OK to be short of breath. Just pace yourself and do pursed-lip breathing.      Talk with your healthcare provider about:   If you should use supplemental oxygen   If you need a referral to occupational and physical therapy. Therapists can help you with exercise and daily activities, and how to make things easier.      Pursed-lip breathing  This type of breathing helps you exhale better. Breathing this way during activity will help you reduce shortness of breath:   Relax your neck and shoulder muscles. Breathe in (inhale) slowly through your nose for 2 counts or more.  Pucker your lips as if you are going to blow out a candle. Breathe out slowly and gently through your lips for at least twice as long as you inhaled.Chronic Lung Disease: Preventing Lung Infections  Chronic lung diseases include chronic obstructive pulmonary disease (COPD), which includes chronic bronchitis and emphysema. Other chronic lung diseases include pulmonary fibrosis, sarcoidosis, and other conditions. When you have chronic lung diseases, it's very important to protect yourself from respiratory infections, like colds, the flu, and lung  infections. Infections may cause your lung condition to worsen. Although you can't completely avoid them, there are things you can do to lessen the chance of infections.    Take precautions  Taking the following precautions can help you avoid illness:   Remember to keep your hands away from your nose and mouth. Germs on your hands get into your respiratory system this way.   Wash your hands often. When you wash them:  o Use soap and warm water.  o Rub your hands together well for at least 20 seconds.  o Make sure to rinse them well.  o Dry your hands using clean towels or air-dry them.   Use hand  containing alcohol, if you are unable to wash your hands. Use the  after touching doorknobs, handles, and supermarket carts, for example, since lots of people touch them. Then wash your hands as soon as you can.   To help prevent the flu, get a flu vaccination every year. This may be given at your healthcare provider's office, a drugstore, or pharmacy, or at work. Get your flu shot as soon as the vaccines are available in your area. This is usually around September each year.   To help prevent pneumococcal pneumonia, get pneumonia vaccinations. Talk with your healthcare provider about which pneumococcal vaccinations you need.   Try to stay away from people with respiratory infections, such as colds or the flu. Stay away from crowded places, like shopping centers or movie theatres during cold and flu season.   If you smoke, think about quitting. In addition to causing or worsening many lung conditions, the lung damage from smoking increases your risk of infections. Stay away from others who smoke, too. This is also harmful and increases your chance of infections.  © 6500-2957 [employer]. [address] [place] [address], [place] [address]. All rights reserved. This information is not intended as a substitute for professional medical care. Always follow your healthcare professional's  instructions. Using an Inhaler with a Spacer  To control asthma, you need to use your medicines the right way. Some medicines are inhaled using a device called a metered-dose inhaler (MDI). Metered-dose inhalers deliver medicine with a fine spray. You may be asked to use a spacer (holding tube) with your inhaler. The spacer helps make sure all the medicine you need goes into your lungs.   Steps for using an inhaler with a spacer  Step 1:  · Remove the caps from the inhaler and spacer.  · Shake the inhaler well and attach the spacer. If the inhaler is being used for the first time or has not been used for a while, prime it as directed by the product maker.  Step 2:  · Breathe out normally.  · Put the spacer between your teeth. Close your lips tightly around it.  · Keep your chin up.  Step 3:  · Spray 1 puff into the spacer by pressing down on the inhaler.  · Then breathe in through your mouth as slowly and deeply as you can. This should take about 5-10 seconds. If you breathe too quickly, you may hear a whistling sound in certain spacers.  Step 4:  · Take the spacer out of your mouth.  · Hold your breath for a count of 10.  · Then hold your lips together and slowly breathe out through your mouth.          Step-by-Step     If youre prescribed more than 1 puff of medicine at a time, wait at least 30 seconds between puffs. This number may be different for different medicines. Shake the inhaler again. Then repeat steps 2 to 4.   ACTION PLAN    GREEN ZONE   My sputum is clear/white/usual color and easily cleared.   My breathing is no harder than usual.   I can do my usual activities.   I can think clearly.   Take your usual medicines, including oxygen, as you are told to do so by your health care provider.   YELLOW ZONE   My sputum has change (color, thickness, amount).   I am more short of breath than usual.   I cough or wheeze more.   I weigh more and my legs/feet swell.   I cannot do my usual activities  without resting.   Call your health care provider. You will probably be told to begin taking an antibiotic and prednisone. Have your pharmacy phone number available.   RED ZONE   I cannot cough out my sputum.   I am much more short of breath than normal.   I need to sit up to breathe   I cannot do my usual activities.   I am unable to speak more than one or two words at a time.   I am confused.   Call your health care provider. You may be asked to come in to be seen, told to go to the emergency room, or told to call [id number].

## 2020-09-17 NOTE — PROGRESS NOTES
Subjective:       Patient ID: Luz Pelaez is a 50 y.o. female.  Patient Active Problem List   Diagnosis    Asthma-COPD overlap syndrome    Attention deficit    Family history of colonic polyps    Family history of malignant neoplasm of gastrointestinal tract    Migraine with aura and without status migrainosus, not intractable    Cigarette nicotine dependence with nicotine-induced disorder    Colon cancer screening    History of colon polyps    Bilateral carpal tunnel syndrome    Cervical spine degeneration    Abnormal CT scan of lung    Gastroesophageal reflux disease with esophagitis    Hiatal hernia    Right carpal tunnel syndrome    Pulmonary nodule    Gastroesophageal reflux disease    Irregular Z line of esophagus    H/O bariatric surgery    Gastritis    Colon polyp     Immunization History   Administered Date(s) Administered    Influenza - Quadrivalent 12/07/2016    Influenza - Quadrivalent - PF *Preferred* (6 months and older) 11/19/2018, 10/09/2019    Pneumococcal Polysaccharide - 23 Valent 03/26/2014    Tdap 03/26/2014     Social History     Tobacco Use   Smoking Status Current Every Day Smoker    Packs/day: 1.00    Years: 30.00    Pack years: 30.00    Types: Cigarettes   Smokeless Tobacco Never Used     COPD Questionnaire  How often do you cough?: Most of the time  How often do you have phlegm (mucus) in your chest?: Some of the time  How often does your chest feel tight?: A little of the time  When you walk up a hill or one flight of stairs, how often are you breathless?: A little of the time  How often are you limited doing any activities at home?: Some of the time  How often are you confident leaving the house despite your lung condition?: All of the time  How often do you sleep soundly?: Almost never  How often do you have energy?: A little of the time  Total score: 21     Asthma Control Test  In the past 4  weeks, how much of the time did your asthma keep you from  getting as much done at work, school or at home?: A little of the time  During the past 4 weeks, how often have you had shortness of breath?: Once a day  During the past 4 weeks, how often did your asthma symptoms (wheezing, couging, shortness of breath, chest tightness or pain) wake you up at night or earlier than usual in the morning?: 4 or more nights a week  During the past 4 weeks, how often have you used your rescue inhaler or nebulizer medication (such as albuterol)?: Once a week or less  How would you rate your asthma control during the past 4 weeks?: Somewhat controlled  If your score is 19 or less, your asthma may not be under control: 14      Chief Complaint:   Luz Pelaez is 50 y.o.  Asked to see me by Dr Mcgarry,  Abn chest CT during w/u for neck pain  Had PET CT which did not show any FDG relevant uptake in concerning area, was some uptake in neck where she has pain  Stable on TRELEGY  No cough, No wheezing, No SOB  Active smoker: @18 years. Works in [employer], cleans homes  On chantix, cut down to 1/2 PPD  Last spirometry: FEV1: 65.3%)  No occupational exposure  No travel  CHEST CT today: Stable, Resolution of GGO, Prob were inflammatory or infectious.  Still has nodule opacity RUL: decreased in size,  Remains high risk due to smoking  Will repeat imaging 12 months        The following portions of the patient's history were reviewed and updated as appropriate:   She  has a past medical history of Allergy, Asthma, Attention deficit, Carpal tunnel syndrome, Cervical spine degeneration, GERD (gastroesophageal reflux disease), Hiatal hernia (11/1/2019), and Kidney stones.  She does not have any pertinent problems on file.  She  has a past surgical history that includes Cholecystectomy; Sleeve Gastroplasty; Colonoscopy (4/1/2012); Total Reduction Mammoplasty; Carpal tunnel release (Left, 10/7/2019); Carpal tunnel release (Right, 11/4/2019); Esophagogastroduodenoscopy (N/A, 6/18/2020); and Colonoscopy (N/A,  6/18/2020).  Her family history includes Breast cancer in her maternal grandmother, paternal aunt, and paternal grandmother; Colon cancer in her maternal grandmother; Diabetes in her mother; Heart attack (age of onset: 70) in her father; Heart disease (age of onset: 58) in her mother; Hyperlipidemia in her mother; Hypertension in her mother and sister; Thyroid disease in her sister.  She  reports that she has been smoking cigarettes. She has a 30.00 pack-year smoking history. She has never used smokeless tobacco. She reports current alcohol use. She reports that she does not use drugs.  She has a current medication list which includes the following prescription(s): albuterol, desvenlafaxine succinate, dextroamphetamine-amphetamine, dextroamphetamine-amphetamine, dextroamphetamine-amphetamine, fluticasone-umeclidin-vilanter, linzess, methocarbamol, metoclopramide hcl, omeprazole, and varenicline.  Current Outpatient Medications on File Prior to Visit   Medication Sig Dispense Refill    albuterol (PROAIR HFA) 90 mcg/actuation inhaler INHALE 1-2 PUFFS INTO THE LUNGS EVERY 6 HOURS AS NEEDED FOR WHEEZING 17 g 2    desvenlafaxine succinate (PRISTIQ) 50 MG Tb24 Take 1 tablet (50 mg total) by mouth once daily. 30 tablet 5    dextroamphetamine-amphetamine (ADDERALL) 30 mg Tab Take 1 tablet (30 mg total) by mouth 2 (two) times a day. 60 tablet 0    dextroamphetamine-amphetamine (ADDERALL) 30 mg Tab Take 1 tablet (30 mg total) by mouth 2 (two) times a day. 60 tablet 0    [START ON 10/9/2020] dextroamphetamine-amphetamine 30 mg Tab Take 1 tablet (30 mg total) by mouth 2 (two) times daily. 60 tablet 0    fluticasone-umeclidin-vilanter (TRELEGY ELLIPTA) 100-62.5-25 mcg DsDv Inhale 1 puff into the lungs once daily.      LINZESS 290 mcg Cap capsule Take 1 capsule (290 mcg total) by mouth once daily. 30 capsule 11    methocarbamoL (ROBAXIN) 750 MG Tab Take 1 tablet (750 mg total) by mouth 3 (three) times daily as needed  "(muscle spasms). 60 tablet 0    metoclopramide HCl (REGLAN) 10 MG tablet Take 1 tablet (10 mg total) by mouth 4 (four) times daily. 120 tablet 0    omeprazole (PRILOSEC) 40 MG capsule Take 1 capsule (40 mg total) by mouth once daily. 30 capsule 11    varenicline (CHANTIX STARTING MONTH BOX) 0.5 mg (11)- 1 mg (42) tablet Take one 0.5mg tab by mouth once daily X3 days,then increase to one 0.5mg tab twice daily X4 days,then increase to one 1mg tab twice daily 1 Package 0     No current facility-administered medications on file prior to visit.      She is allergic to bupropion hcl..    Review of Systems   Review of Systems   Constitutional: Negative.    HENT: Negative.    Eyes: Negative.    Respiratory: Negative for cough, hemoptysis, sputum production, shortness of breath and wheezing.    Cardiovascular: Negative.    Gastrointestinal: Negative.    Genitourinary: Negative.    Musculoskeletal: Positive for neck pain.   Skin: Negative.    All other systems reviewed and are negative.       Objective:        Vitals:    09/17/20 1330   BP: 136/80   Pulse: 82   Resp: 16   SpO2: 96%   Weight: 71.7 kg (158 lb 1.1 oz)   Height: 5' 5" (1.651 m)       Physical Exam  Vitals signs and nursing note reviewed.   Constitutional:       Appearance: She is well-developed. She is not ill-appearing.       HENT:      Head: Normocephalic and atraumatic.      Nose: Nose normal.      Mouth/Throat:      Pharynx: No oropharyngeal exudate.   Eyes:      General: No scleral icterus.     Conjunctiva/sclera: Conjunctivae normal.      Pupils: Pupils are equal, round, and reactive to light.   Neck:      Musculoskeletal: Normal range of motion and neck supple.      Thyroid: No thyromegaly.      Vascular: No JVD.      Trachea: No tracheal deviation.   Cardiovascular:      Rate and Rhythm: Normal rate and regular rhythm.      Heart sounds: Normal heart sounds, S1 normal and S2 normal. No murmur.   Pulmonary:      Effort: Pulmonary effort is normal. No " tachypnea, accessory muscle usage or respiratory distress.      Breath sounds: Normal breath sounds. No stridor.   Abdominal:      General: Bowel sounds are normal. There is no distension.      Palpations: Abdomen is soft. There is no hepatomegaly, splenomegaly or mass.      Tenderness: There is no abdominal tenderness. There is no guarding or rebound.   Musculoskeletal: Normal range of motion.         General: No tenderness.   Lymphadenopathy:      Upper Body:      Right upper body: No supraclavicular adenopathy.      Left upper body: No supraclavicular adenopathy.   Skin:     General: Skin is warm and dry.      Findings: No rash.      Nails: There is no clubbing.     Neurological:      Mental Status: She is alert and oriented to person, place, and time.      Coordination: Coordination normal.      Gait: Gait normal.      Deep Tendon Reflexes: Reflexes are normal and symmetric.         Data Review:   CBC:   Lab Results   Component Value Date    WBC 5.86 08/12/2019    RBC 4.60 08/12/2019    HGB 14.6 08/12/2019    HCT 47.6 08/12/2019     08/12/2019     BMP:   Lab Results   Component Value Date    GLU 90 06/16/2020     06/16/2020    K 4.2 06/16/2020     06/16/2020    CO2 24 06/16/2020    BUN 16 06/16/2020    CREATININE 0.6 06/16/2020    CALCIUM 9.3 06/16/2020     Radiology review: CHEST CT        Diagnostics: CT of chest   EXAMINATION:  CT CHEST WITHOUT CONTRAST     CLINICAL HISTORY:  pul nodule; Solitary pulmonary nodule     TECHNIQUE:  Low dose axial images, sagittal and coronal reformations were obtained from the thoracic inlet to the lung bases. Contrast was not administered.     COMPARISON:  CT from 02/21/2020     FINDINGS:  No infiltrates or pleural effusions are identified.  Tiny areas of probable nodular scarring noted within the right lung apex unchanged in appearance when compared to the prior study.  The previously noted nodular density within the more inferior right upper lobe has  decreased in size and now demonstrates lucency and measures a maximum of 7.4 x 4.0 cm.  The central lucencies likely representative an area of bronchiectasis.  Findings may be sequela of an infectious or inflammatory etiology.  Scattered areas of vague ground-glass type opacity are also seen within either lung particularly the bilateral upper lung zones.  Minimal areas of bronchiectasis also seen scattered throughout either lung.     The aorta is unremarkable in appearance. There is no pericardial effusion.  No enlarged mediastinal, hilar or axillary lymph nodes are identified.     There is evidence for prior cholecystectomy.  There are postoperative changes noted to be associated with the stomach and also in the region of the GE junction where there appears to be a small hiatal hernia.     No suspicious appearing osseus abnormalities are identified.     Impression:     1. Interval decrease in size of the previously noted nodular opacity within the right upper lobe.  Findings are favored to be sequela of an infectious or inflammatory etiology.  2. Mild diffuse nonspecific ground-glass opacities within either mid to upper lung zone not significantly changed in appearance.  3. Remaining findings as discussed above and also stable.               Spirometry  FEV1: 1.88L( 65.3%), FVC 3.07L ( 85.5%)  FEV1/FVC 61.13    Assessment:      Problem List Items Addressed This Visit     Pulmonary nodule - Primary    Relevant Orders    CT Chest Without Contrast    Asthma-COPD overlap syndrome     Asthma score 14  COPD score 21  REGIME TRELEGY  FEV1:  1.88 L( 65.3%)  Needs flu shot           Relevant Orders    Spirometry without Bronchodilator    Stress test, pulmonary    Cigarette nicotine dependence with nicotine-induced disorder     Has cut down to 1/2 PPD  Continue Chantix         Abnormal CT scan of lung     RUL nodule stable  Smoker: 30 pack year  Follow scan 12 months  No FDG avidity on PET CT         Relevant Orders    CT  Chest Without Contrast        Adhrence to smoking cessation  Continue CHANTIX  PDM review inhale use  Continue TRELEGY  Nodule f/u imaging 12 months       Plan:          Problem List Items Addressed This Visit     Pulmonary nodule - Primary    Relevant Orders    CT Chest Without Contrast    Asthma-COPD overlap syndrome     Asthma score 14  COPD score 21  REGIME TRELEGY  FEV1:  1.88 L( 65.3%)  Needs flu shot           Relevant Orders    Spirometry without Bronchodilator    Stress test, pulmonary    Cigarette nicotine dependence with nicotine-induced disorder     Has cut down to 1/2 PPD  Continue Chantix         Abnormal CT scan of lung     RUL nodule stable  Smoker: 30 pack year  Follow scan 12 months  No FDG avidity on PET CT         Relevant Orders    CT Chest Without Contrast          Orders Placed This Encounter   Procedures    CT Chest Without Contrast     Standing Status:   Future     Standing Expiration Date:   9/17/2021     Order Specific Question:   May the Radiologist modify the order per protocol to meet the clinical needs of the patient?     Answer:   Yes    Spirometry without Bronchodilator     Standing Status:   Future     Standing Expiration Date:   9/17/2021    Stress test, pulmonary     Standing Status:   Future     Standing Expiration Date:   9/17/2021     Order Specific Question:   Reason for study     Answer:   Functional status       Follow up in about 1 year (around 9/17/2021), or chest CT, [place], [facility], for nurse schedule flu shot schedule.    This note was prepared using voice recognition system and is likely to have sound alike errors that may have been overlooked even after proof reading.  Please call me with any questions    Discussed diagnosis, its evaluation, treatment and usual course. All questions answered.    Thank you for the courtesy of participating in the care of this patient    Antelmo Mcknight MD

## 2020-10-14 ENCOUNTER — PATIENT MESSAGE (OUTPATIENT)
Dept: FAMILY MEDICINE | Facility: CLINIC | Age: 50
End: 2020-10-14

## 2020-10-23 ENCOUNTER — TELEPHONE (OUTPATIENT)
Dept: FAMILY MEDICINE | Facility: CLINIC | Age: 50
End: 2020-10-23

## 2020-10-23 NOTE — TELEPHONE ENCOUNTER
----- Message from Heide Andres sent at 10/23/2020 11:13 AM CDT -----  Regarding: missed call  Contact: pt  Returning call to Naila. [phone]

## 2020-10-27 ENCOUNTER — PATIENT OUTREACH (OUTPATIENT)
Dept: PULMONOLOGY | Facility: CLINIC | Age: 50
End: 2020-10-27

## 2020-11-10 ENCOUNTER — OFFICE VISIT (OUTPATIENT)
Dept: FAMILY MEDICINE | Facility: CLINIC | Age: 50
End: 2020-11-10
Payer: MEDICAID

## 2020-11-10 VITALS
HEIGHT: 65 IN | WEIGHT: 156.63 LBS | TEMPERATURE: 98 F | DIASTOLIC BLOOD PRESSURE: 89 MMHG | SYSTOLIC BLOOD PRESSURE: 138 MMHG | BODY MASS INDEX: 26.1 KG/M2 | HEART RATE: 85 BPM

## 2020-11-10 DIAGNOSIS — Z23 IMMUNIZATION DUE: ICD-10-CM

## 2020-11-10 DIAGNOSIS — R41.840 ATTENTION DEFICIT: Primary | Chronic | ICD-10-CM

## 2020-11-10 DIAGNOSIS — F41.8 ANXIETY ASSOCIATED WITH DEPRESSION: ICD-10-CM

## 2020-11-10 PROCEDURE — 99999 PR PBB SHADOW E&M-EST. PATIENT-LVL III: ICD-10-PCS | Mod: PBBFAC,,, | Performed by: NURSE PRACTITIONER

## 2020-11-10 PROCEDURE — 99214 PR OFFICE/OUTPT VISIT, EST, LEVL IV, 30-39 MIN: ICD-10-PCS | Mod: S$PBB,,, | Performed by: NURSE PRACTITIONER

## 2020-11-10 PROCEDURE — 99999 PR PBB SHADOW E&M-EST. PATIENT-LVL III: CPT | Mod: PBBFAC,,, | Performed by: NURSE PRACTITIONER

## 2020-11-10 PROCEDURE — 90686 IIV4 VACC NO PRSV 0.5 ML IM: CPT | Mod: PBBFAC,PO

## 2020-11-10 PROCEDURE — 99213 OFFICE O/P EST LOW 20 MIN: CPT | Mod: PBBFAC,PO,25 | Performed by: NURSE PRACTITIONER

## 2020-11-10 PROCEDURE — 99214 OFFICE O/P EST MOD 30 MIN: CPT | Mod: S$PBB,,, | Performed by: NURSE PRACTITIONER

## 2020-11-10 RX ORDER — DEXTROAMPHETAMINE SACCHARATE, AMPHETAMINE ASPARTATE, DEXTROAMPHETAMINE SULFATE AND AMPHETAMINE SULFATE 7.5; 7.5; 7.5; 7.5 MG/1; MG/1; MG/1; MG/1
1 TABLET ORAL 2 TIMES DAILY
Qty: 60 TABLET | Refills: 0 | Status: SHIPPED | OUTPATIENT
Start: 2021-01-08 | End: 2021-02-17 | Stop reason: SDUPTHER

## 2020-11-10 RX ORDER — DEXTROAMPHETAMINE SACCHARATE, AMPHETAMINE ASPARTATE, DEXTROAMPHETAMINE SULFATE AND AMPHETAMINE SULFATE 7.5; 7.5; 7.5; 7.5 MG/1; MG/1; MG/1; MG/1
1 TABLET ORAL 2 TIMES DAILY
Qty: 60 TABLET | Refills: 0 | Status: SHIPPED | OUTPATIENT
Start: 2020-12-09 | End: 2021-02-17 | Stop reason: SDUPTHER

## 2020-11-10 RX ORDER — ESCITALOPRAM OXALATE 10 MG/1
10 TABLET ORAL DAILY
Qty: 30 TABLET | Refills: 1 | Status: SHIPPED | OUTPATIENT
Start: 2020-11-10 | End: 2021-01-11 | Stop reason: SDUPTHER

## 2020-11-10 RX ORDER — DEXTROAMPHETAMINE SACCHARATE, AMPHETAMINE ASPARTATE, DEXTROAMPHETAMINE SULFATE AND AMPHETAMINE SULFATE 7.5; 7.5; 7.5; 7.5 MG/1; MG/1; MG/1; MG/1
1 TABLET ORAL 2 TIMES DAILY
Qty: 60 TABLET | Refills: 0 | Status: SHIPPED | OUTPATIENT
Start: 2020-11-10 | End: 2021-02-17 | Stop reason: SDUPTHER

## 2020-11-10 NOTE — PROGRESS NOTES
Subjective:       Patient ID: Luz Pelaez is a 50 y.o. female.    Chief Complaint: Medication Refill    Medication Refill  This is a chronic (Adderall) problem. The current episode started more than 1 year ago. The problem occurs daily. The problem has been unchanged. Pertinent negatives include no abdominal pain, arthralgias, chest pain, coughing, fatigue, fever, headaches, myalgias, rash, sore throat or vomiting. The treatment provided significant relief.   Anxiety  Presents for follow-up visit. Symptoms include excessive worry, malaise and nervous/anxious behavior. Patient reports no chest pain, dizziness, palpitations or shortness of breath. Symptoms occur most days. The severity of symptoms is moderate. The quality of sleep is fair. Nighttime awakenings: occasional.       She is due for flu vaccine    Review of Systems   Constitutional: Negative for fatigue, fever and unexpected weight change.   HENT: Negative for ear pain and sore throat.    Eyes: Negative for pain and visual disturbance.   Respiratory: Negative for cough and shortness of breath.    Cardiovascular: Negative for chest pain and palpitations.   Gastrointestinal: Negative for abdominal pain, diarrhea and vomiting.   Musculoskeletal: Negative for arthralgias and myalgias.   Skin: Negative for color change and rash.   Neurological: Negative for dizziness and headaches.   Psychiatric/Behavioral: Negative for dysphoric mood and sleep disturbance. The patient is nervous/anxious.        Vitals:    11/10/20 1425   BP: 138/89   Pulse: 85   Temp: 98.1 °F (36.7 °C)       Objective:     Current Outpatient Medications   Medication Sig Dispense Refill    albuterol (PROAIR HFA) 90 mcg/actuation inhaler INHALE 1-2 PUFFS INTO THE LUNGS EVERY 6 HOURS AS NEEDED FOR WHEEZING 17 g 2    [START ON 1/8/2021] dextroamphetamine-amphetamine (ADDERALL) 30 mg Tab Take 1 tablet (30 mg total) by mouth 2 (two) times a day. 60 tablet 0    [START ON 12/9/2020]  dextroamphetamine-amphetamine (ADDERALL) 30 mg Tab Take 1 tablet (30 mg total) by mouth 2 (two) times a day. 60 tablet 0    fluticasone-umeclidin-vilanter (TRELEGY ELLIPTA) 100-62.5-25 mcg DsDv Inhale 1 puff into the lungs once daily.      LINZESS 290 mcg Cap capsule Take 1 capsule (290 mcg total) by mouth once daily. 30 capsule 11    omeprazole (PRILOSEC) 40 MG capsule TAKE 1 CAPSULE (40 MG TOTAL) BY MOUTH ONCE DAILY. 30 capsule 10    dextroamphetamine-amphetamine (ADDERALL) 30 mg Tab Take 1 tablet (30 mg total) by mouth 2 (two) times a day. 60 tablet 0    escitalopram oxalate (LEXAPRO) 10 MG tablet Take 1 tablet (10 mg total) by mouth once daily. 30 tablet 1    methocarbamoL (ROBAXIN) 750 MG Tab Take 1 tablet (750 mg total) by mouth 3 (three) times daily as needed (muscle spasms). (Patient not taking: Reported on 11/10/2020) 60 tablet 0    metoclopramide HCl (REGLAN) 10 MG tablet Take 1 tablet (10 mg total) by mouth 4 (four) times daily. (Patient not taking: Reported on 11/10/2020) 120 tablet 0    varenicline (CHANTIX STARTING MONTH BOX) 0.5 mg (11)- 1 mg (42) tablet Take one 0.5mg tab by mouth once daily X3 days,then increase to one 0.5mg tab twice daily X4 days,then increase to one 1mg tab twice daily (Patient not taking: Reported on 11/10/2020) 1 Package 0     No current facility-administered medications for this visit.        Physical Exam  Vitals signs and nursing note reviewed.   Constitutional:       General: She is not in acute distress.     Appearance: She is well-developed.   HENT:      Head: Normocephalic and atraumatic.   Eyes:      Pupils: Pupils are equal, round, and reactive to light.   Neck:      Musculoskeletal: Normal range of motion and neck supple.   Cardiovascular:      Rate and Rhythm: Normal rate and regular rhythm.   Pulmonary:      Effort: Pulmonary effort is normal.      Breath sounds: Normal breath sounds.   Musculoskeletal: Normal range of motion.   Skin:     General: Skin is  warm and dry.      Findings: No rash.   Neurological:      Mental Status: She is alert and oriented to person, place, and time.   Psychiatric:         Judgment: Judgment normal.         Assessment:       1. Attention deficit    2. Anxiety associated with depression    3. Immunization due        Plan:   Attention deficit    Anxiety associated with depression    Immunization due  -     Influenza - Quadrivalent (PF)    Other orders  -     dextroamphetamine-amphetamine (ADDERALL) 30 mg Tab; Take 1 tablet (30 mg total) by mouth 2 (two) times a day.  Dispense: 60 tablet; Refill: 0  -     dextroamphetamine-amphetamine (ADDERALL) 30 mg Tab; Take 1 tablet (30 mg total) by mouth 2 (two) times a day.  Dispense: 60 tablet; Refill: 0  -     escitalopram oxalate (LEXAPRO) 10 MG tablet; Take 1 tablet (10 mg total) by mouth once daily.  Dispense: 30 tablet; Refill: 1  -     dextroamphetamine-amphetamine (ADDERALL) 30 mg Tab; Take 1 tablet (30 mg total) by mouth 2 (two) times a day.  Dispense: 60 tablet; Refill: 0        No follow-ups on file.    There are no Patient Instructions on file for this visit.

## 2020-11-12 ENCOUNTER — PATIENT OUTREACH (OUTPATIENT)
Dept: ADMINISTRATIVE | Facility: HOSPITAL | Age: 50
End: 2020-11-12

## 2020-12-08 ENCOUNTER — PATIENT OUTREACH (OUTPATIENT)
Dept: PULMONOLOGY | Facility: CLINIC | Age: 50
End: 2020-12-08

## 2020-12-09 DIAGNOSIS — Z12.31 OTHER SCREENING MAMMOGRAM: ICD-10-CM

## 2020-12-15 ENCOUNTER — TELEPHONE (OUTPATIENT)
Dept: ADMINISTRATIVE | Facility: HOSPITAL | Age: 50
End: 2020-12-15

## 2021-01-06 ENCOUNTER — TELEPHONE (OUTPATIENT)
Dept: ADMINISTRATIVE | Facility: HOSPITAL | Age: 51
End: 2021-01-06

## 2021-01-07 ENCOUNTER — HOSPITAL ENCOUNTER (OUTPATIENT)
Dept: RADIOLOGY | Facility: HOSPITAL | Age: 51
Discharge: HOME | End: 2021-01-07
Attending: FAMILY MEDICINE
Payer: MEDICAID

## 2021-01-07 VITALS — HEIGHT: 65 IN | WEIGHT: 156.5 LBS | BODY MASS INDEX: 26.08 KG/M2

## 2021-01-07 DIAGNOSIS — Z12.31 OTHER SCREENING MAMMOGRAM: ICD-10-CM

## 2021-01-07 PROCEDURE — 77063 BREAST TOMOSYNTHESIS BI: CPT | Mod: 26,,, | Performed by: RADIOLOGY

## 2021-01-07 PROCEDURE — 77067 MAMMO DIGITAL SCREENING BILAT WITH TOMO: ICD-10-PCS | Mod: 26,,, | Performed by: RADIOLOGY

## 2021-01-07 PROCEDURE — 77063 MAMMO DIGITAL SCREENING BILAT WITH TOMO: ICD-10-PCS | Mod: 26,,, | Performed by: RADIOLOGY

## 2021-01-07 PROCEDURE — 77067 SCR MAMMO BI INCL CAD: CPT | Mod: TC,PO

## 2021-01-07 PROCEDURE — 77067 SCR MAMMO BI INCL CAD: CPT | Mod: 26,,, | Performed by: RADIOLOGY

## 2021-01-11 DIAGNOSIS — J45.40 MODERATE PERSISTENT ASTHMA WITHOUT COMPLICATION: ICD-10-CM

## 2021-01-11 RX ORDER — ALBUTEROL SULFATE 90 UG/1
AEROSOL, METERED RESPIRATORY (INHALATION)
Qty: 8.5 G | Refills: 1 | Status: SHIPPED | OUTPATIENT
Start: 2021-01-11 | End: 2022-05-12 | Stop reason: SDUPTHER

## 2021-01-11 RX ORDER — ESCITALOPRAM OXALATE 10 MG/1
10 TABLET ORAL DAILY
Qty: 30 TABLET | Refills: 1 | Status: SHIPPED | OUTPATIENT
Start: 2021-01-11 | End: 2021-02-17

## 2021-02-12 ENCOUNTER — PATIENT MESSAGE (OUTPATIENT)
Dept: FAMILY MEDICINE | Facility: CLINIC | Age: 51
End: 2021-02-12

## 2021-02-16 ENCOUNTER — PATIENT MESSAGE (OUTPATIENT)
Dept: FAMILY MEDICINE | Facility: CLINIC | Age: 51
End: 2021-02-16

## 2021-02-17 ENCOUNTER — PATIENT MESSAGE (OUTPATIENT)
Dept: FAMILY MEDICINE | Facility: CLINIC | Age: 51
End: 2021-02-17

## 2021-02-17 ENCOUNTER — OFFICE VISIT (OUTPATIENT)
Dept: FAMILY MEDICINE | Facility: CLINIC | Age: 51
End: 2021-02-17
Payer: MEDICAID

## 2021-02-17 VITALS — SYSTOLIC BLOOD PRESSURE: 133 MMHG | DIASTOLIC BLOOD PRESSURE: 98 MMHG

## 2021-02-17 DIAGNOSIS — N95.1 MENOPAUSAL SYMPTOMS: ICD-10-CM

## 2021-02-17 DIAGNOSIS — R41.840 ATTENTION DEFICIT: Primary | Chronic | ICD-10-CM

## 2021-02-17 DIAGNOSIS — F41.9 ANXIETY: ICD-10-CM

## 2021-02-17 PROCEDURE — 99214 OFFICE O/P EST MOD 30 MIN: CPT | Mod: 95,,, | Performed by: NURSE PRACTITIONER

## 2021-02-17 PROCEDURE — 99214 PR OFFICE/OUTPT VISIT, EST, LEVL IV, 30-39 MIN: ICD-10-PCS | Mod: 95,,, | Performed by: NURSE PRACTITIONER

## 2021-02-17 RX ORDER — DEXTROAMPHETAMINE SACCHARATE, AMPHETAMINE ASPARTATE, DEXTROAMPHETAMINE SULFATE AND AMPHETAMINE SULFATE 7.5; 7.5; 7.5; 7.5 MG/1; MG/1; MG/1; MG/1
1 TABLET ORAL 2 TIMES DAILY
Qty: 60 TABLET | Refills: 0 | Status: SHIPPED | OUTPATIENT
Start: 2021-04-16 | End: 2021-05-12 | Stop reason: SDUPTHER

## 2021-02-17 RX ORDER — DEXTROAMPHETAMINE SACCHARATE, AMPHETAMINE ASPARTATE, DEXTROAMPHETAMINE SULFATE AND AMPHETAMINE SULFATE 7.5; 7.5; 7.5; 7.5 MG/1; MG/1; MG/1; MG/1
1 TABLET ORAL 2 TIMES DAILY
Qty: 60 TABLET | Refills: 0 | Status: SHIPPED | OUTPATIENT
Start: 2021-02-17 | End: 2021-05-12 | Stop reason: SDUPTHER

## 2021-02-17 RX ORDER — DEXTROAMPHETAMINE SACCHARATE, AMPHETAMINE ASPARTATE, DEXTROAMPHETAMINE SULFATE AND AMPHETAMINE SULFATE 7.5; 7.5; 7.5; 7.5 MG/1; MG/1; MG/1; MG/1
1 TABLET ORAL 2 TIMES DAILY
Qty: 60 TABLET | Refills: 0 | Status: SHIPPED | OUTPATIENT
Start: 2021-03-17 | End: 2021-05-12 | Stop reason: SDUPTHER

## 2021-02-17 RX ORDER — VORTIOXETINE 10 MG/1
10 TABLET, FILM COATED ORAL DAILY
Qty: 30 TABLET | Refills: 11 | Status: SHIPPED | OUTPATIENT
Start: 2021-02-17 | End: 2021-05-12

## 2021-02-22 ENCOUNTER — PATIENT MESSAGE (OUTPATIENT)
Dept: FAMILY MEDICINE | Facility: CLINIC | Age: 51
End: 2021-02-22

## 2021-02-22 ENCOUNTER — OFFICE VISIT (OUTPATIENT)
Dept: FAMILY MEDICINE | Facility: CLINIC | Age: 51
End: 2021-02-22
Payer: MEDICAID

## 2021-02-22 DIAGNOSIS — L03.90 CELLULITIS, UNSPECIFIED CELLULITIS SITE: Primary | ICD-10-CM

## 2021-02-22 PROCEDURE — 99213 PR OFFICE/OUTPT VISIT, EST, LEVL III, 20-29 MIN: ICD-10-PCS | Mod: 95,,, | Performed by: FAMILY MEDICINE

## 2021-02-22 PROCEDURE — 99213 OFFICE O/P EST LOW 20 MIN: CPT | Mod: 95,,, | Performed by: FAMILY MEDICINE

## 2021-02-22 RX ORDER — CEPHALEXIN 500 MG/1
500 CAPSULE ORAL EVERY 12 HOURS
Qty: 14 CAPSULE | Refills: 0 | Status: SHIPPED | OUTPATIENT
Start: 2021-02-22 | End: 2021-08-11

## 2021-02-22 RX ORDER — MUPIROCIN 20 MG/G
OINTMENT TOPICAL 3 TIMES DAILY
Qty: 30 G | Refills: 0 | Status: SHIPPED | OUTPATIENT
Start: 2021-02-22 | End: 2021-11-10 | Stop reason: SDUPTHER

## 2021-03-15 ENCOUNTER — PATIENT MESSAGE (OUTPATIENT)
Dept: FAMILY MEDICINE | Facility: CLINIC | Age: 51
End: 2021-03-15

## 2021-03-17 ENCOUNTER — TELEPHONE (OUTPATIENT)
Dept: PULMONOLOGY | Facility: CLINIC | Age: 51
End: 2021-03-17

## 2021-03-24 ENCOUNTER — PATIENT MESSAGE (OUTPATIENT)
Dept: FAMILY MEDICINE | Facility: CLINIC | Age: 51
End: 2021-03-24

## 2021-04-15 ENCOUNTER — PATIENT MESSAGE (OUTPATIENT)
Dept: FAMILY MEDICINE | Facility: CLINIC | Age: 51
End: 2021-04-15

## 2021-05-12 ENCOUNTER — OFFICE VISIT (OUTPATIENT)
Dept: FAMILY MEDICINE | Facility: CLINIC | Age: 51
End: 2021-05-12
Payer: MEDICAID

## 2021-05-12 VITALS — SYSTOLIC BLOOD PRESSURE: 137 MMHG | DIASTOLIC BLOOD PRESSURE: 85 MMHG | HEART RATE: 79 BPM

## 2021-05-12 DIAGNOSIS — R41.840 ATTENTION DEFICIT: Primary | ICD-10-CM

## 2021-05-12 DIAGNOSIS — M54.12 CERVICAL RADICULOPATHY: ICD-10-CM

## 2021-05-12 PROCEDURE — 99214 PR OFFICE/OUTPT VISIT, EST, LEVL IV, 30-39 MIN: ICD-10-PCS | Mod: 95,,, | Performed by: NURSE PRACTITIONER

## 2021-05-12 PROCEDURE — 99214 OFFICE O/P EST MOD 30 MIN: CPT | Mod: 95,,, | Performed by: NURSE PRACTITIONER

## 2021-05-12 RX ORDER — MELOXICAM 15 MG/1
15 TABLET ORAL DAILY
Qty: 30 TABLET | Refills: 11 | Status: SHIPPED | OUTPATIENT
Start: 2021-05-12 | End: 2021-08-11 | Stop reason: SDUPTHER

## 2021-05-12 RX ORDER — DEXTROAMPHETAMINE SACCHARATE, AMPHETAMINE ASPARTATE, DEXTROAMPHETAMINE SULFATE AND AMPHETAMINE SULFATE 7.5; 7.5; 7.5; 7.5 MG/1; MG/1; MG/1; MG/1
1 TABLET ORAL 2 TIMES DAILY
Qty: 60 TABLET | Refills: 0 | Status: SHIPPED | OUTPATIENT
Start: 2021-05-12 | End: 2021-08-11 | Stop reason: SDUPTHER

## 2021-05-12 RX ORDER — DEXTROAMPHETAMINE SACCHARATE, AMPHETAMINE ASPARTATE, DEXTROAMPHETAMINE SULFATE AND AMPHETAMINE SULFATE 7.5; 7.5; 7.5; 7.5 MG/1; MG/1; MG/1; MG/1
1 TABLET ORAL 2 TIMES DAILY
Qty: 60 TABLET | Refills: 0 | Status: SHIPPED | OUTPATIENT
Start: 2021-07-12 | End: 2021-08-13 | Stop reason: SDUPTHER

## 2021-05-12 RX ORDER — DEXTROAMPHETAMINE SACCHARATE, AMPHETAMINE ASPARTATE, DEXTROAMPHETAMINE SULFATE AND AMPHETAMINE SULFATE 7.5; 7.5; 7.5; 7.5 MG/1; MG/1; MG/1; MG/1
1 TABLET ORAL 2 TIMES DAILY
Qty: 60 TABLET | Refills: 0 | Status: SHIPPED | OUTPATIENT
Start: 2021-06-12 | End: 2021-08-13 | Stop reason: SDUPTHER

## 2021-08-11 ENCOUNTER — PATIENT MESSAGE (OUTPATIENT)
Dept: FAMILY MEDICINE | Facility: CLINIC | Age: 51
End: 2021-08-11

## 2021-08-11 ENCOUNTER — TELEPHONE (OUTPATIENT)
Dept: FAMILY MEDICINE | Facility: CLINIC | Age: 51
End: 2021-08-11

## 2021-08-11 ENCOUNTER — OFFICE VISIT (OUTPATIENT)
Dept: FAMILY MEDICINE | Facility: CLINIC | Age: 51
End: 2021-08-11
Payer: MEDICAID

## 2021-08-11 DIAGNOSIS — K59.00 CONSTIPATION, UNSPECIFIED CONSTIPATION TYPE: ICD-10-CM

## 2021-08-11 DIAGNOSIS — G89.29 CHRONIC RIGHT SHOULDER PAIN: ICD-10-CM

## 2021-08-11 DIAGNOSIS — M25.511 CHRONIC RIGHT SHOULDER PAIN: ICD-10-CM

## 2021-08-11 DIAGNOSIS — M54.12 CERVICAL RADICULOPATHY: ICD-10-CM

## 2021-08-11 DIAGNOSIS — R41.840 ATTENTION DEFICIT: ICD-10-CM

## 2021-08-11 DIAGNOSIS — K20.90 ESOPHAGITIS: ICD-10-CM

## 2021-08-11 DIAGNOSIS — M25.50 ARTHRALGIA, UNSPECIFIED JOINT: ICD-10-CM

## 2021-08-11 DIAGNOSIS — B35.1 FUNGAL TOENAIL INFECTION: ICD-10-CM

## 2021-08-11 PROCEDURE — 99214 OFFICE O/P EST MOD 30 MIN: CPT | Mod: 95,,, | Performed by: NURSE PRACTITIONER

## 2021-08-11 PROCEDURE — 99214 PR OFFICE/OUTPT VISIT, EST, LEVL IV, 30-39 MIN: ICD-10-PCS | Mod: 95,,, | Performed by: NURSE PRACTITIONER

## 2021-08-11 RX ORDER — DEXTROAMPHETAMINE SACCHARATE, AMPHETAMINE ASPARTATE, DEXTROAMPHETAMINE SULFATE AND AMPHETAMINE SULFATE 7.5; 7.5; 7.5; 7.5 MG/1; MG/1; MG/1; MG/1
1 TABLET ORAL 2 TIMES DAILY
Qty: 60 TABLET | Refills: 0 | Status: SHIPPED | OUTPATIENT
Start: 2021-08-11 | End: 2022-01-12 | Stop reason: SDUPTHER

## 2021-08-11 RX ORDER — KETOCONAZOLE 200 MG/1
200 TABLET ORAL DAILY
Qty: 30 TABLET | Refills: 2 | Status: SHIPPED | OUTPATIENT
Start: 2021-08-11 | End: 2021-09-14

## 2021-08-11 RX ORDER — OMEPRAZOLE 40 MG/1
40 CAPSULE, DELAYED RELEASE ORAL DAILY
Qty: 30 CAPSULE | Refills: 5 | Status: SHIPPED | OUTPATIENT
Start: 2021-08-11 | End: 2021-11-10 | Stop reason: SDUPTHER

## 2021-08-11 RX ORDER — MELOXICAM 15 MG/1
15 TABLET ORAL DAILY
Qty: 30 TABLET | Refills: 5 | Status: SHIPPED | OUTPATIENT
Start: 2021-08-11 | End: 2021-11-10 | Stop reason: SDUPTHER

## 2021-08-13 VITALS — SYSTOLIC BLOOD PRESSURE: 152 MMHG | HEART RATE: 85 BPM | DIASTOLIC BLOOD PRESSURE: 99 MMHG

## 2021-08-13 RX ORDER — DEXTROAMPHETAMINE SACCHARATE, AMPHETAMINE ASPARTATE, DEXTROAMPHETAMINE SULFATE AND AMPHETAMINE SULFATE 7.5; 7.5; 7.5; 7.5 MG/1; MG/1; MG/1; MG/1
1 TABLET ORAL 2 TIMES DAILY
Qty: 60 TABLET | Refills: 0 | Status: SHIPPED | OUTPATIENT
Start: 2021-09-10 | End: 2022-02-11 | Stop reason: SDUPTHER

## 2021-08-13 RX ORDER — DEXTROAMPHETAMINE SACCHARATE, AMPHETAMINE ASPARTATE, DEXTROAMPHETAMINE SULFATE AND AMPHETAMINE SULFATE 7.5; 7.5; 7.5; 7.5 MG/1; MG/1; MG/1; MG/1
1 TABLET ORAL 2 TIMES DAILY
Qty: 60 TABLET | Refills: 0 | Status: SHIPPED | OUTPATIENT
Start: 2021-10-09 | End: 2022-02-11 | Stop reason: SDUPTHER

## 2021-09-13 ENCOUNTER — PATIENT MESSAGE (OUTPATIENT)
Dept: FAMILY MEDICINE | Facility: CLINIC | Age: 51
End: 2021-09-13

## 2021-09-14 RX ORDER — TERBINAFINE HYDROCHLORIDE 250 MG/1
250 TABLET ORAL DAILY
Qty: 30 TABLET | Refills: 2 | Status: SHIPPED | OUTPATIENT
Start: 2021-09-14 | End: 2021-10-14

## 2021-10-14 ENCOUNTER — PATIENT MESSAGE (OUTPATIENT)
Dept: FAMILY MEDICINE | Facility: CLINIC | Age: 51
End: 2021-10-14
Payer: MEDICAID

## 2021-10-19 ENCOUNTER — HOSPITAL ENCOUNTER (OUTPATIENT)
Dept: RADIOLOGY | Facility: HOSPITAL | Age: 51
Discharge: HOME | End: 2021-10-19
Attending: NURSE PRACTITIONER
Payer: MEDICAID

## 2021-10-19 DIAGNOSIS — G89.29 CHRONIC RIGHT SHOULDER PAIN: ICD-10-CM

## 2021-10-19 DIAGNOSIS — M25.511 CHRONIC RIGHT SHOULDER PAIN: ICD-10-CM

## 2021-10-19 PROCEDURE — 73030 X-RAY EXAM OF SHOULDER: CPT | Mod: 26,RT,, | Performed by: RADIOLOGY

## 2021-10-19 PROCEDURE — 73030 XR SHOULDER COMPLETE 2 OR MORE VIEWS RIGHT: ICD-10-PCS | Mod: 26,RT,, | Performed by: RADIOLOGY

## 2021-10-19 PROCEDURE — 73030 X-RAY EXAM OF SHOULDER: CPT | Mod: TC,PO,RT

## 2021-10-20 ENCOUNTER — PATIENT MESSAGE (OUTPATIENT)
Dept: FAMILY MEDICINE | Facility: CLINIC | Age: 51
End: 2021-10-20
Payer: MEDICAID

## 2021-11-10 ENCOUNTER — OFFICE VISIT (OUTPATIENT)
Dept: FAMILY MEDICINE | Facility: CLINIC | Age: 51
End: 2021-11-10
Payer: MEDICAID

## 2021-11-10 VITALS
DIASTOLIC BLOOD PRESSURE: 80 MMHG | TEMPERATURE: 98 F | WEIGHT: 151 LBS | SYSTOLIC BLOOD PRESSURE: 136 MMHG | BODY MASS INDEX: 25.16 KG/M2 | HEIGHT: 65 IN | HEART RATE: 81 BPM

## 2021-11-10 DIAGNOSIS — R23.0 CYANOSIS OF SKIN: Primary | ICD-10-CM

## 2021-11-10 DIAGNOSIS — R41.840 ATTENTION DEFICIT: ICD-10-CM

## 2021-11-10 DIAGNOSIS — L30.9 ECZEMA, UNSPECIFIED TYPE: ICD-10-CM

## 2021-11-10 DIAGNOSIS — K59.00 CONSTIPATION, UNSPECIFIED CONSTIPATION TYPE: ICD-10-CM

## 2021-11-10 DIAGNOSIS — K20.90 ESOPHAGITIS: ICD-10-CM

## 2021-11-10 DIAGNOSIS — M54.12 CERVICAL RADICULOPATHY: ICD-10-CM

## 2021-11-10 PROCEDURE — 99214 OFFICE O/P EST MOD 30 MIN: CPT | Mod: S$PBB,,, | Performed by: FAMILY MEDICINE

## 2021-11-10 PROCEDURE — 99999 PR PBB SHADOW E&M-EST. PATIENT-LVL III: ICD-10-PCS | Mod: PBBFAC,,, | Performed by: FAMILY MEDICINE

## 2021-11-10 PROCEDURE — 99999 PR PBB SHADOW E&M-EST. PATIENT-LVL III: CPT | Mod: PBBFAC,,, | Performed by: FAMILY MEDICINE

## 2021-11-10 PROCEDURE — 90686 IIV4 VACC NO PRSV 0.5 ML IM: CPT | Mod: PBBFAC,PO

## 2021-11-10 PROCEDURE — 99213 OFFICE O/P EST LOW 20 MIN: CPT | Mod: PBBFAC,PO | Performed by: FAMILY MEDICINE

## 2021-11-10 PROCEDURE — 99214 PR OFFICE/OUTPT VISIT, EST, LEVL IV, 30-39 MIN: ICD-10-PCS | Mod: S$PBB,,, | Performed by: FAMILY MEDICINE

## 2021-11-10 RX ORDER — DEXTROAMPHETAMINE SACCHARATE, AMPHETAMINE ASPARTATE, DEXTROAMPHETAMINE SULFATE AND AMPHETAMINE SULFATE 7.5; 7.5; 7.5; 7.5 MG/1; MG/1; MG/1; MG/1
1 TABLET ORAL DAILY
Qty: 30 TABLET | Refills: 0 | Status: SHIPPED | OUTPATIENT
Start: 2022-01-10 | End: 2022-02-09

## 2021-11-10 RX ORDER — MELOXICAM 15 MG/1
15 TABLET ORAL DAILY
Qty: 30 TABLET | Refills: 5 | Status: SHIPPED | OUTPATIENT
Start: 2021-11-10 | End: 2022-05-12 | Stop reason: SDUPTHER

## 2021-11-10 RX ORDER — MUPIROCIN 20 MG/G
OINTMENT TOPICAL 3 TIMES DAILY
Qty: 30 G | Refills: 0 | Status: SHIPPED | OUTPATIENT
Start: 2021-11-10 | End: 2023-08-02 | Stop reason: SDUPTHER

## 2021-11-10 RX ORDER — DEXTROAMPHETAMINE SACCHARATE, AMPHETAMINE ASPARTATE, DEXTROAMPHETAMINE SULFATE AND AMPHETAMINE SULFATE 7.5; 7.5; 7.5; 7.5 MG/1; MG/1; MG/1; MG/1
1 TABLET ORAL 2 TIMES DAILY
Qty: 60 TABLET | Refills: 0 | Status: SHIPPED | OUTPATIENT
Start: 2021-12-11 | End: 2022-01-10

## 2021-11-10 RX ORDER — OMEPRAZOLE 40 MG/1
40 CAPSULE, DELAYED RELEASE ORAL DAILY
Qty: 30 CAPSULE | Refills: 5 | Status: SHIPPED | OUTPATIENT
Start: 2021-11-10 | End: 2022-05-12 | Stop reason: SDUPTHER

## 2021-11-10 RX ORDER — DEXTROAMPHETAMINE SACCHARATE, AMPHETAMINE ASPARTATE, DEXTROAMPHETAMINE SULFATE AND AMPHETAMINE SULFATE 7.5; 7.5; 7.5; 7.5 MG/1; MG/1; MG/1; MG/1
1 TABLET ORAL 2 TIMES DAILY
Qty: 60 TABLET | Refills: 0 | Status: SHIPPED | OUTPATIENT
Start: 2021-11-11 | End: 2021-12-11

## 2021-11-10 RX ORDER — TRIAMCINOLONE ACETONIDE 5 MG/G
CREAM TOPICAL 2 TIMES DAILY
Qty: 454 G | Refills: 0 | Status: SHIPPED | OUTPATIENT
Start: 2021-11-10 | End: 2022-11-09

## 2021-11-24 ENCOUNTER — HOSPITAL ENCOUNTER (OUTPATIENT)
Dept: RADIOLOGY | Facility: HOSPITAL | Age: 51
Discharge: HOME | End: 2021-11-24
Attending: FAMILY MEDICINE
Payer: MEDICAID

## 2021-11-24 DIAGNOSIS — R23.0 CYANOSIS OF SKIN: ICD-10-CM

## 2021-11-24 PROCEDURE — 93925 US LOWER EXTREMITY ARTERIES BILATERAL: ICD-10-PCS | Mod: 26,,, | Performed by: RADIOLOGY

## 2021-11-24 PROCEDURE — 93925 LOWER EXTREMITY STUDY: CPT | Mod: TC,PO

## 2021-11-24 PROCEDURE — 93925 LOWER EXTREMITY STUDY: CPT | Mod: 26,,, | Performed by: RADIOLOGY

## 2022-01-12 ENCOUNTER — TELEPHONE (OUTPATIENT)
Dept: FAMILY MEDICINE | Facility: CLINIC | Age: 52
End: 2022-01-12
Payer: MEDICAID

## 2022-01-12 DIAGNOSIS — R41.840 ATTENTION DEFICIT: ICD-10-CM

## 2022-01-12 RX ORDER — DEXTROAMPHETAMINE SACCHARATE, AMPHETAMINE ASPARTATE, DEXTROAMPHETAMINE SULFATE AND AMPHETAMINE SULFATE 7.5; 7.5; 7.5; 7.5 MG/1; MG/1; MG/1; MG/1
1 TABLET ORAL 2 TIMES DAILY
Qty: 60 TABLET | Refills: 0 | Status: SHIPPED | OUTPATIENT
Start: 2022-01-12 | End: 2022-02-11 | Stop reason: SDUPTHER

## 2022-01-12 NOTE — TELEPHONE ENCOUNTER
----- Message from Marquita Rain sent at 1/12/2022 10:11 AM CST -----  Contact: [facility]   Pharmacy is calling to clarify an RX.  RX name:  dextroamphetamine-amphetamine (ADDERALL) 30 mg Tab  What do they need to clarify:  directions  Comments:

## 2022-01-12 NOTE — TELEPHONE ENCOUNTER
That is my error.  Did the patient  the 30 tablets already and needs another 30 sent in or did the prescription get refused and she needs a completely new one sent in?

## 2022-01-12 NOTE — TELEPHONE ENCOUNTER
Normally takes aderrall 30mg twice a day but last rx was sent for one a day. Needing to verify if that is correct or should it still be twice a day

## 2022-01-12 NOTE — TELEPHONE ENCOUNTER
I have signed for the following orders AND/OR meds.  Please call the patient and ask the patient to schedule the testing AND/OR inform about any medications that were sent.      No orders of the defined types were placed in this encounter.      Medications Ordered This Encounter   Medications    dextroamphetamine-amphetamine (ADDERALL) 30 mg Tab     Sig: Take 1 tablet (30 mg total) by mouth 2 (two) times a day.     Dispense:  60 tablet     Refill:  0

## 2022-01-19 DIAGNOSIS — Z12.31 OTHER SCREENING MAMMOGRAM: ICD-10-CM

## 2022-02-09 ENCOUNTER — PATIENT MESSAGE (OUTPATIENT)
Dept: FAMILY MEDICINE | Facility: CLINIC | Age: 52
End: 2022-02-09
Payer: MEDICAID

## 2022-02-11 ENCOUNTER — OFFICE VISIT (OUTPATIENT)
Dept: FAMILY MEDICINE | Facility: CLINIC | Age: 52
End: 2022-02-11
Payer: MEDICAID

## 2022-02-11 ENCOUNTER — TELEPHONE (OUTPATIENT)
Dept: FAMILY MEDICINE | Facility: CLINIC | Age: 52
End: 2022-02-11
Payer: MEDICAID

## 2022-02-11 ENCOUNTER — PATIENT MESSAGE (OUTPATIENT)
Dept: FAMILY MEDICINE | Facility: CLINIC | Age: 52
End: 2022-02-11
Payer: MEDICAID

## 2022-02-11 VITALS — SYSTOLIC BLOOD PRESSURE: 135 MMHG | HEART RATE: 102 BPM | DIASTOLIC BLOOD PRESSURE: 81 MMHG

## 2022-02-11 DIAGNOSIS — R41.840 ATTENTION DEFICIT: ICD-10-CM

## 2022-02-11 PROCEDURE — 1159F MED LIST DOCD IN RCRD: CPT | Mod: CPTII,95,, | Performed by: NURSE PRACTITIONER

## 2022-02-11 PROCEDURE — 3075F SYST BP GE 130 - 139MM HG: CPT | Mod: CPTII,95,, | Performed by: NURSE PRACTITIONER

## 2022-02-11 PROCEDURE — 3075F PR MOST RECENT SYSTOLIC BLOOD PRESS GE 130-139MM HG: ICD-10-PCS | Mod: CPTII,95,, | Performed by: NURSE PRACTITIONER

## 2022-02-11 PROCEDURE — 1159F PR MEDICATION LIST DOCUMENTED IN MEDICAL RECORD: ICD-10-PCS | Mod: CPTII,95,, | Performed by: NURSE PRACTITIONER

## 2022-02-11 PROCEDURE — 1160F PR REVIEW ALL MEDS BY PRESCRIBER/CLIN PHARMACIST DOCUMENTED: ICD-10-PCS | Mod: CPTII,95,, | Performed by: NURSE PRACTITIONER

## 2022-02-11 PROCEDURE — 99213 OFFICE O/P EST LOW 20 MIN: CPT | Mod: 95,,, | Performed by: NURSE PRACTITIONER

## 2022-02-11 PROCEDURE — 3079F DIAST BP 80-89 MM HG: CPT | Mod: CPTII,95,, | Performed by: NURSE PRACTITIONER

## 2022-02-11 PROCEDURE — 3079F PR MOST RECENT DIASTOLIC BLOOD PRESSURE 80-89 MM HG: ICD-10-PCS | Mod: CPTII,95,, | Performed by: NURSE PRACTITIONER

## 2022-02-11 PROCEDURE — 1160F RVW MEDS BY RX/DR IN RCRD: CPT | Mod: CPTII,95,, | Performed by: NURSE PRACTITIONER

## 2022-02-11 PROCEDURE — 99213 PR OFFICE/OUTPT VISIT, EST, LEVL III, 20-29 MIN: ICD-10-PCS | Mod: 95,,, | Performed by: NURSE PRACTITIONER

## 2022-02-11 RX ORDER — DEXTROAMPHETAMINE SACCHARATE, AMPHETAMINE ASPARTATE, DEXTROAMPHETAMINE SULFATE AND AMPHETAMINE SULFATE 7.5; 7.5; 7.5; 7.5 MG/1; MG/1; MG/1; MG/1
1 TABLET ORAL 2 TIMES DAILY
Qty: 60 TABLET | Refills: 0 | Status: SHIPPED | OUTPATIENT
Start: 2022-04-09 | End: 2022-05-10 | Stop reason: SDUPTHER

## 2022-02-11 RX ORDER — DEXTROAMPHETAMINE SACCHARATE, AMPHETAMINE ASPARTATE, DEXTROAMPHETAMINE SULFATE AND AMPHETAMINE SULFATE 7.5; 7.5; 7.5; 7.5 MG/1; MG/1; MG/1; MG/1
1 TABLET ORAL 2 TIMES DAILY
Qty: 60 TABLET | Refills: 0 | Status: SHIPPED | OUTPATIENT
Start: 2022-02-11 | End: 2022-05-10 | Stop reason: SDUPTHER

## 2022-02-11 RX ORDER — DEXTROAMPHETAMINE SACCHARATE, AMPHETAMINE ASPARTATE, DEXTROAMPHETAMINE SULFATE AND AMPHETAMINE SULFATE 7.5; 7.5; 7.5; 7.5 MG/1; MG/1; MG/1; MG/1
1 TABLET ORAL 2 TIMES DAILY
Qty: 60 TABLET | Refills: 0 | Status: SHIPPED | OUTPATIENT
Start: 2022-03-10 | End: 2022-05-10 | Stop reason: SDUPTHER

## 2022-02-11 NOTE — PROGRESS NOTES
Established Patient - Audio Only Telehealth Visit     The patient location is: home  The chief complaint leading to consultation is: Adderall refill  Visit type: Virtual visit with audio only (telephone)  Total time spent with patient: 11 mins       The reason for the audio only service rather than synchronous audio and video virtual visit was related to technical difficulties or patient preference/necessity.     Each patient to whom I provide medical services by telemedicine is:  (1) informed of the relationship between the physician and patient and the respective role of any other health care provider with respect to management of the patient; and (2) notified that they may decline to receive medical services by telemedicine and may withdraw from such care at any time. Patient verbally consented to receive this service via voice-only telephone call.       HPI:   Problem List Items Addressed This Visit        Neuro    Attention deficit (Chronic)    Overview     Luz Pelaez has ADD.  Medication has been used since 2000 and has been stable on the current dose of medicine.  She reports being compliant on the medicine and is not receiving narcotics from any other physician.   The patient was checked in the [facility]acy's  Prescription Monitoring Program. There appears to be no incongruities with the patient's verbalized history.   She denies any complications from taking the medicine.  The patient's weight and apatite has been stable and has not had difficulties with agitation.  She reports improvement in the symptoms with the current dose.               Relevant Medications    dextroamphetamine-amphetamine (ADDERALL) 30 mg Tab (Start on 4/9/2022)    dextroamphetamine-amphetamine (ADDERALL) 30 mg Tab (Start on 3/10/2022)    dextroamphetamine-amphetamine (ADDERALL) 30 mg Tab             Assessment and plan:    1. Attention deficit  dextroamphetamine-amphetamine (ADDERALL) 30 mg Tab     dextroamphetamine-amphetamine (ADDERALL) 30 mg Tab    dextroamphetamine-amphetamine (ADDERALL) 30 mg Tab          Medications Ordered This Encounter   Medications    dextroamphetamine-amphetamine (ADDERALL) 30 mg Tab     Sig: Take 1 tablet (30 mg total) by mouth 2 (two) times a day.     Dispense:  60 tablet     Refill:  0    dextroamphetamine-amphetamine (ADDERALL) 30 mg Tab     Sig: Take 1 tablet (30 mg total) by mouth 2 (two) times a day.     Dispense:  60 tablet     Refill:  0    dextroamphetamine-amphetamine (ADDERALL) 30 mg Tab     Sig: Take 1 tablet (30 mg total) by mouth 2 (two) times a day.     Dispense:  60 tablet     Refill:  0                      This service was not originating from a related E/M service provided within the previous 7 days nor will  to an E/M service or procedure within the next 24 hours or my soonest available appointment.  Prevailing standard of care was able to be met in this audio-only visit.

## 2022-02-14 ENCOUNTER — PATIENT MESSAGE (OUTPATIENT)
Dept: SLEEP MEDICINE | Facility: CLINIC | Age: 52
End: 2022-02-14
Payer: MEDICAID

## 2022-02-14 DIAGNOSIS — J44.89 COPD WITH CHRONIC BRONCHITIS: ICD-10-CM

## 2022-03-29 ENCOUNTER — OFFICE VISIT (OUTPATIENT)
Dept: FAMILY MEDICINE | Facility: CLINIC | Age: 52
End: 2022-03-29
Payer: MEDICAID

## 2022-03-29 ENCOUNTER — PATIENT MESSAGE (OUTPATIENT)
Dept: FAMILY MEDICINE | Facility: CLINIC | Age: 52
End: 2022-03-29

## 2022-03-29 VITALS
BODY MASS INDEX: 24.51 KG/M2 | SYSTOLIC BLOOD PRESSURE: 117 MMHG | RESPIRATION RATE: 18 BRPM | HEIGHT: 65 IN | HEART RATE: 94 BPM | TEMPERATURE: 98 F | DIASTOLIC BLOOD PRESSURE: 76 MMHG | WEIGHT: 147.13 LBS | OXYGEN SATURATION: 98 %

## 2022-03-29 DIAGNOSIS — M70.22 OLECRANON BURSITIS OF LEFT ELBOW: Primary | ICD-10-CM

## 2022-03-29 PROCEDURE — 1159F PR MEDICATION LIST DOCUMENTED IN MEDICAL RECORD: ICD-10-PCS | Mod: CPTII,,, | Performed by: NURSE PRACTITIONER

## 2022-03-29 PROCEDURE — 20605 INTERMEDIATE JOINT ASPIRATION/INJECTION: L ELBOW: ICD-10-PCS | Mod: S$PBB,LT,, | Performed by: NURSE PRACTITIONER

## 2022-03-29 PROCEDURE — 3074F PR MOST RECENT SYSTOLIC BLOOD PRESSURE < 130 MM HG: ICD-10-PCS | Mod: CPTII,,, | Performed by: NURSE PRACTITIONER

## 2022-03-29 PROCEDURE — 20605 DRAIN/INJ JOINT/BURSA W/O US: CPT | Mod: PBBFAC,PO | Performed by: NURSE PRACTITIONER

## 2022-03-29 PROCEDURE — 3008F PR BODY MASS INDEX (BMI) DOCUMENTED: ICD-10-PCS | Mod: CPTII,,, | Performed by: NURSE PRACTITIONER

## 2022-03-29 PROCEDURE — 99999 PR PBB SHADOW E&M-EST. PATIENT-LVL IV: ICD-10-PCS | Mod: PBBFAC,,, | Performed by: NURSE PRACTITIONER

## 2022-03-29 PROCEDURE — 3078F DIAST BP <80 MM HG: CPT | Mod: CPTII,,, | Performed by: NURSE PRACTITIONER

## 2022-03-29 PROCEDURE — 99213 PR OFFICE/OUTPT VISIT, EST, LEVL III, 20-29 MIN: ICD-10-PCS | Mod: S$PBB,25,, | Performed by: NURSE PRACTITIONER

## 2022-03-29 PROCEDURE — 3078F PR MOST RECENT DIASTOLIC BLOOD PRESSURE < 80 MM HG: ICD-10-PCS | Mod: CPTII,,, | Performed by: NURSE PRACTITIONER

## 2022-03-29 PROCEDURE — 99213 OFFICE O/P EST LOW 20 MIN: CPT | Mod: S$PBB,25,, | Performed by: NURSE PRACTITIONER

## 2022-03-29 PROCEDURE — 3074F SYST BP LT 130 MM HG: CPT | Mod: CPTII,,, | Performed by: NURSE PRACTITIONER

## 2022-03-29 PROCEDURE — 99214 OFFICE O/P EST MOD 30 MIN: CPT | Mod: PBBFAC,PO | Performed by: NURSE PRACTITIONER

## 2022-03-29 PROCEDURE — 99999 PR PBB SHADOW E&M-EST. PATIENT-LVL IV: CPT | Mod: PBBFAC,,, | Performed by: NURSE PRACTITIONER

## 2022-03-29 PROCEDURE — 20605 DRAIN/INJ JOINT/BURSA W/O US: CPT | Mod: S$PBB,LT,, | Performed by: NURSE PRACTITIONER

## 2022-03-29 PROCEDURE — 1159F MED LIST DOCD IN RCRD: CPT | Mod: CPTII,,, | Performed by: NURSE PRACTITIONER

## 2022-03-29 PROCEDURE — 3008F BODY MASS INDEX DOCD: CPT | Mod: CPTII,,, | Performed by: NURSE PRACTITIONER

## 2022-03-29 RX ORDER — TERBINAFINE HYDROCHLORIDE 250 MG/1
TABLET ORAL
COMMUNITY
Start: 2021-09-14 | End: 2022-08-09

## 2022-03-29 RX ORDER — ERGOCALCIFEROL 1.25 MG/1
CAPSULE ORAL
COMMUNITY

## 2022-03-29 RX ORDER — HYDROXYZINE PAMOATE 25 MG/1
1 CAPSULE ORAL 3 TIMES DAILY PRN
COMMUNITY
Start: 2021-10-15 | End: 2022-08-09

## 2022-03-29 RX ORDER — OMEPRAZOLE MAGNESIUM 10 MG/1
GRANULE, DELAYED RELEASE ORAL
COMMUNITY
End: 2022-06-09

## 2022-03-29 RX ORDER — HYDROXYZINE PAMOATE 25 MG/1
25 CAPSULE ORAL 3 TIMES DAILY PRN
COMMUNITY
Start: 2021-10-15 | End: 2022-08-09

## 2022-03-29 RX ADMIN — METHYLPREDNISOLONE ACETATE 40 MG: 40 INJECTION, SUSPENSION INTRALESIONAL; INTRAMUSCULAR; INTRASYNOVIAL; SOFT TISSUE at 02:03

## 2022-04-08 NOTE — PROCEDURES
Intermediate Joint Aspiration/Injection: L elbow    Date/Time: 3/29/2022 2:40 PM  Performed by: Rodrigo Cavazos NP  Authorized by: Rodrigo Cavazos NP     Consent Done?:  Yes (Written)  Indications:  Joint swelling  Timeout: Prior to procedure the correct patient, procedure, and site was verified      Location:  Elbow  Site:  L elbow  Needle size:  18 G  Approach:  Posterolateral  Medications:  40 mg methylPREDNISolone acetate 40 mg/mL  Aspirate amount (ml):  9  Aspirate:  Clear and yellow  Patient tolerance:  Patient tolerated the procedure well with no immediate complications

## 2022-04-11 RX ORDER — METHYLPREDNISOLONE ACETATE 40 MG/ML
40 INJECTION, SUSPENSION INTRA-ARTICULAR; INTRALESIONAL; INTRAMUSCULAR; SOFT TISSUE
Status: DISCONTINUED | OUTPATIENT
Start: 2022-03-29 | End: 2022-04-11 | Stop reason: HOSPADM

## 2022-04-11 NOTE — PROGRESS NOTES
Subjective:       Patient ID: Luz Pelaez is a 51 y.o. female.    Chief Complaint: Joint Swelling (Its been about 3 weeks its been swollen./Its been hurting between 4 to 5 days)    Elbow Injury  This is a new problem. The current episode started 1 to 4 weeks ago. The problem occurs constantly. The problem has been gradually worsening. Associated symptoms include joint swelling. Pertinent negatives include no abdominal pain, arthralgias, chest pain, coughing, fatigue, fever, headaches, myalgias, rash, sore throat or vomiting. The symptoms are aggravated by bending. She has tried nothing for the symptoms.       Review of Systems   Constitutional: Negative for fatigue, fever and unexpected weight change.   HENT: Negative for ear pain and sore throat.    Eyes: Negative for pain and visual disturbance.   Respiratory: Negative for cough and shortness of breath.    Cardiovascular: Negative for chest pain and palpitations.   Gastrointestinal: Negative for abdominal pain, diarrhea and vomiting.   Musculoskeletal: Positive for joint swelling. Negative for arthralgias and myalgias.   Skin: Negative for color change and rash.   Neurological: Negative for dizziness and headaches.   Psychiatric/Behavioral: Negative for dysphoric mood and sleep disturbance. The patient is not nervous/anxious.        Vitals:    03/29/22 1442   BP: 117/76   Pulse: 94   Resp: 18   Temp: 98.3 °F (36.8 °C)       Objective:     Current Outpatient Medications   Medication Sig Dispense Refill    albuterol (PROVENTIL/VENTOLIN HFA) 90 mcg/actuation inhaler Rescue 8.5 g 1    dextroamphetamine-amphetamine (ADDERALL) 30 mg Tab Take 1 tablet (30 mg total) by mouth 2 (two) times a day. 60 tablet 0    dextroamphetamine-amphetamine (ADDERALL) 30 mg Tab Take 1 tablet (30 mg total) by mouth 2 (two) times a day. 60 tablet 0    dextroamphetamine-amphetamine (ADDERALL) 30 mg Tab Take 1 tablet (30 mg total) by mouth 2 (two) times a day. 60 tablet 0     ergocalciferol (ERGOCALCIFEROL) 50,000 unit Cap Vitamin D2 Take No date recorded No form recorded No frequency recorded No route recorded No set duration recorded No set duration amount recorded active No dosage strength recorded No dosage strength units of measure recorded      fluticasone-umeclidin-vilanter (TRELEGY ELLIPTA) 100-62.5-25 mcg DsDv Inhale 1 puff into the lungs once daily. 60 each 10    hydrOXYzine pamoate (VISTARIL) 25 MG Cap Take 1 capsule by mouth 3 (three) times daily as needed.      linaCLOtide (LINZESS) 290 mcg Cap capsule Take 1 capsule (290 mcg total) by mouth once daily. 90 capsule 1    meloxicam (MOBIC) 15 MG tablet Take 1 tablet (15 mg total) by mouth once daily. 30 tablet 5    mupirocin (BACTROBAN) 2 % ointment Apply topically 3 (three) times daily. 30 g 00    omeprazole (PRILOSEC) 40 MG capsule Take 1 capsule (40 mg total) by mouth once daily. 30 capsule 5    omeprazole magnesium (PRILOSEC) 10 mg SuDR Prilosec      terbinafine HCL (LAMISIL) 250 mg tablet       hydrOXYzine pamoate (VISTARIL) 25 MG Cap Take 25 mg by mouth 3 (three) times daily as needed.      triamcinolone acetonide 0.5% (KENALOG) 0.5 % Crea Apply topically 2 (two) times daily. for 10 days 454 g 0     No current facility-administered medications for this visit.       Physical Exam  Vitals and nursing note reviewed.   Constitutional:       General: She is not in acute distress.     Appearance: She is well-developed.   HENT:      Head: Normocephalic and atraumatic.   Eyes:      Pupils: Pupils are equal, round, and reactive to light.   Cardiovascular:      Rate and Rhythm: Normal rate and regular rhythm.   Pulmonary:      Effort: Pulmonary effort is normal.      Breath sounds: Normal breath sounds.   Musculoskeletal:         General: Normal range of motion.      Left elbow: Swelling present. Tenderness present in lateral epicondyle and olecranon process.      Cervical back: Normal range of motion and neck supple.    Skin:     General: Skin is warm and dry.      Findings: No rash.   Neurological:      Mental Status: She is alert and oriented to person, place, and time.   Psychiatric:         Judgment: Judgment normal.             Assessment:       1. Olecranon bursitis of left elbow        Plan:   Olecranon bursitis of left elbow  -     Intermediate Joint Aspiration/Injection: L elbow        No follow-ups on file.    There are no Patient Instructions on file for this visit.

## 2022-04-18 ENCOUNTER — PATIENT MESSAGE (OUTPATIENT)
Dept: FAMILY MEDICINE | Facility: CLINIC | Age: 52
End: 2022-04-18
Payer: MEDICAID

## 2022-04-18 DIAGNOSIS — M70.22 OLECRANON BURSITIS OF LEFT ELBOW: Primary | ICD-10-CM

## 2022-04-26 ENCOUNTER — PATIENT MESSAGE (OUTPATIENT)
Dept: ADMINISTRATIVE | Facility: HOSPITAL | Age: 52
End: 2022-04-26
Payer: MEDICAID

## 2022-04-27 ENCOUNTER — PATIENT MESSAGE (OUTPATIENT)
Dept: FAMILY MEDICINE | Facility: CLINIC | Age: 52
End: 2022-04-27
Payer: MEDICAID

## 2022-04-27 ENCOUNTER — PATIENT MESSAGE (OUTPATIENT)
Dept: ORTHOPEDICS | Facility: CLINIC | Age: 52
End: 2022-04-27
Payer: MEDICAID

## 2022-04-28 ENCOUNTER — PATIENT MESSAGE (OUTPATIENT)
Dept: SLEEP MEDICINE | Facility: CLINIC | Age: 52
End: 2022-04-28
Payer: MEDICAID

## 2022-04-28 ENCOUNTER — TELEPHONE (OUTPATIENT)
Dept: PULMONOLOGY | Facility: CLINIC | Age: 52
End: 2022-04-28
Payer: MEDICAID

## 2022-04-28 ENCOUNTER — PATIENT MESSAGE (OUTPATIENT)
Dept: FAMILY MEDICINE | Facility: CLINIC | Age: 52
End: 2022-04-28
Payer: MEDICAID

## 2022-04-28 NOTE — TELEPHONE ENCOUNTER
Initiated prior authorization request with patient's insurance for -Trelegy Ellipta 100-62.5-25MCG/INH aerosol powder  prescription. Submitted online via [email or web address] (request key- Approved decision -- PA case ID   (Key: [id number])  Approved from 4/28/22 until 4/28/2023

## 2022-04-29 DIAGNOSIS — M25.522 LEFT ELBOW PAIN: Primary | ICD-10-CM

## 2022-05-10 ENCOUNTER — PATIENT MESSAGE (OUTPATIENT)
Dept: FAMILY MEDICINE | Facility: CLINIC | Age: 52
End: 2022-05-10

## 2022-05-10 ENCOUNTER — OFFICE VISIT (OUTPATIENT)
Dept: FAMILY MEDICINE | Facility: CLINIC | Age: 52
End: 2022-05-10
Payer: MEDICAID

## 2022-05-10 DIAGNOSIS — R41.840 ATTENTION DEFICIT: ICD-10-CM

## 2022-05-10 PROCEDURE — 1159F PR MEDICATION LIST DOCUMENTED IN MEDICAL RECORD: ICD-10-PCS | Mod: CPTII,95,, | Performed by: PHYSICIAN ASSISTANT

## 2022-05-10 PROCEDURE — 1160F RVW MEDS BY RX/DR IN RCRD: CPT | Mod: CPTII,95,, | Performed by: PHYSICIAN ASSISTANT

## 2022-05-10 PROCEDURE — 99213 OFFICE O/P EST LOW 20 MIN: CPT | Mod: 95,,, | Performed by: PHYSICIAN ASSISTANT

## 2022-05-10 PROCEDURE — 99213 PR OFFICE/OUTPT VISIT, EST, LEVL III, 20-29 MIN: ICD-10-PCS | Mod: 95,,, | Performed by: PHYSICIAN ASSISTANT

## 2022-05-10 PROCEDURE — 1160F PR REVIEW ALL MEDS BY PRESCRIBER/CLIN PHARMACIST DOCUMENTED: ICD-10-PCS | Mod: CPTII,95,, | Performed by: PHYSICIAN ASSISTANT

## 2022-05-10 PROCEDURE — 1159F MED LIST DOCD IN RCRD: CPT | Mod: CPTII,95,, | Performed by: PHYSICIAN ASSISTANT

## 2022-05-10 RX ORDER — DEXTROAMPHETAMINE SACCHARATE, AMPHETAMINE ASPARTATE, DEXTROAMPHETAMINE SULFATE AND AMPHETAMINE SULFATE 7.5; 7.5; 7.5; 7.5 MG/1; MG/1; MG/1; MG/1
1 TABLET ORAL 2 TIMES DAILY
Qty: 60 TABLET | Refills: 0 | Status: SHIPPED | OUTPATIENT
Start: 2022-07-09 | End: 2022-08-09 | Stop reason: SDUPTHER

## 2022-05-10 RX ORDER — DEXTROAMPHETAMINE SACCHARATE, AMPHETAMINE ASPARTATE, DEXTROAMPHETAMINE SULFATE AND AMPHETAMINE SULFATE 7.5; 7.5; 7.5; 7.5 MG/1; MG/1; MG/1; MG/1
1 TABLET ORAL 2 TIMES DAILY
Qty: 60 TABLET | Refills: 0 | Status: SHIPPED | OUTPATIENT
Start: 2022-05-10 | End: 2022-06-09

## 2022-05-10 RX ORDER — DEXTROAMPHETAMINE SACCHARATE, AMPHETAMINE ASPARTATE, DEXTROAMPHETAMINE SULFATE AND AMPHETAMINE SULFATE 7.5; 7.5; 7.5; 7.5 MG/1; MG/1; MG/1; MG/1
1 TABLET ORAL 2 TIMES DAILY
Qty: 60 TABLET | Refills: 0 | Status: SHIPPED | OUTPATIENT
Start: 2022-06-09 | End: 2022-07-09

## 2022-05-10 NOTE — PROGRESS NOTES
Primary Care Telemedicine Note  The patient location is: Patient Home   The chief complaint leading to consultation is: Attention Deficit Disorder  Total time spent with patient: 15 minutes    Visit type: Virtual visit with synchronous audio only and video  Each patient to whom he or she provides medical services by telemedicine is: (1) informed of the relationship between the physician and patient and the respective role of any other health care provider with respect to management of the patient; and (2) notified that he or she may decline to receive medical services by telemedicine and may withdraw from such care at any time.    Assessment/Plan:    Problem List Items Addressed This Visit        Neuro    Attention deficit (Chronic)    Overview     Luz Pelaez has ADD. Medication has been used since 2000 and has been stable on the current dose of medicine. She reports being compliant on the medicine and is not receiving narcotics from any other physician. The patient was checked in the [facility] Board of Pharmacy's Prescription Monitoring Program. There appears to be no incongruities with the patient's verbalized history. She denies any complications from taking the medicine. The patient's weight and apatite has been stable and has not had difficulties with agitation. She reports improvement in the symptoms with the current dose.                 Relevant Medications    dextroamphetamine-amphetamine (ADDERALL) 30 mg Tab (Start on 7/9/2022)    dextroamphetamine-amphetamine (ADDERALL) 30 mg Tab (Start on 6/9/2022)    dextroamphetamine-amphetamine (ADDERALL) 30 mg Tab          Follow up in about 3 months (around 8/10/2022).    Nanette Santiago PA-C  _____________________________________________________________________________________________________________________________________________________    CC: Attention Deficit Disorder    HPI: The patient is being seen via telehealth today to discuss the chronic use  of attention deficit medications. Further detail as stated above. No other complaints today.     Past Medical History:   Diagnosis Date    Allergy     Asthma     Attention deficit     Carpal tunnel syndrome     Cervical spine degeneration     GERD (gastroesophageal reflux disease)     Hiatal hernia 11/1/2019    Kidney stones      Past Surgical History:   Procedure Laterality Date    CARPAL TUNNEL RELEASE Left 10/7/2019    Procedure: RELEASE, CARPAL TUNNEL;  Surgeon: Delfino Cain MD;  Location: [facility];  Service: Orthopedics;  Laterality: Left;    CARPAL TUNNEL RELEASE Right 11/4/2019    Procedure: RELEASE, CARPAL TUNNEL;  Surgeon: Delfino Cain MD;  Location: [facility] OR;  Service: Orthopedics;  Laterality: Right;    CHOLECYSTECTOMY      COLONOSCOPY  4/1/2012    date is approximate-done by Dr. Naranjo due to chronic constipation    COLONOSCOPY N/A 6/18/2020    Procedure: COLONOSCOPY;  Surgeon: Kb Mcgarry MD;  Location: [facility];  Service: Endoscopy;  Laterality: N/A;    ESOPHAGOGASTRODUODENOSCOPY N/A 6/18/2020    Procedure: ESOPHAGOGASTRODUODENOSCOPY (EGD);  Surgeon: Kb Mcgarry MD;  Location: [facility];  Service: Endoscopy;  Laterality: N/A;    SLEEVE GASTROPLASTY      TOTAL REDUCTION MAMMOPLASTY       Review of patient's allergies indicates:   Allergen Reactions    Bupropion hcl Other (See Comments)     Mood swings  Mood swings    Lamisil [terbinafine] Rash     Social History     Tobacco Use    Smoking status: Current Every Day Smoker     Packs/day: 1.00     Years: 30.00     Pack years: 30.00     Types: Cigarettes    Smokeless tobacco: Never Used   Substance Use Topics    Alcohol use: Yes     Comment: Socially    Drug use: No     Family History   Problem Relation Age of Onset    Hypertension Mother     Diabetes Mother     Hyperlipidemia Mother     Heart disease Mother 58    Colon cancer Maternal Grandmother     Breast cancer Maternal Grandmother     Breast  cancer Paternal Grandmother         Breast    Heart attack Father 70    Thyroid disease Sister     Hypertension Sister     Breast cancer Paternal Aunt     Stroke Neg Hx      Current Outpatient Medications on File Prior to Visit   Medication Sig Dispense Refill    albuterol (PROVENTIL/VENTOLIN HFA) 90 mcg/actuation inhaler Rescue 8.5 g 1    ergocalciferol (ERGOCALCIFEROL) 50,000 unit Cap Vitamin D2 Take No date recorded No form recorded No frequency recorded No route recorded No set duration recorded No set duration amount recorded active No dosage strength recorded No dosage strength units of measure recorded      fluticasone-umeclidin-vilanter (TRELEGY ELLIPTA) 100-62.5-25 mcg DsDv Inhale 1 puff into the lungs once daily. 60 each 10    hydrOXYzine pamoate (VISTARIL) 25 MG Cap Take 25 mg by mouth 3 (three) times daily as needed.      hydrOXYzine pamoate (VISTARIL) 25 MG Cap Take 1 capsule by mouth 3 (three) times daily as needed.      LINZESS 290 mcg Cap capsule TAKE 1 CAPSULE (290 MCG TOTAL) BY MOUTH ONCE DAILY. 30 capsule 5    meloxicam (MOBIC) 15 MG tablet Take 1 tablet (15 mg total) by mouth once daily. 30 tablet 5    mupirocin (BACTROBAN) 2 % ointment Apply topically 3 (three) times daily. 30 g 00    omeprazole (PRILOSEC) 40 MG capsule Take 1 capsule (40 mg total) by mouth once daily. 30 capsule 5    omeprazole magnesium (PRILOSEC) 10 mg SuDR Prilosec      terbinafine HCL (LAMISIL) 250 mg tablet       triamcinolone acetonide 0.5% (KENALOG) 0.5 % Crea Apply topically 2 (two) times daily. for 10 days 454 g 0    [DISCONTINUED] dextroamphetamine-amphetamine (ADDERALL) 30 mg Tab Take 1 tablet (30 mg total) by mouth 2 (two) times a day. 60 tablet 0    [DISCONTINUED] dextroamphetamine-amphetamine (ADDERALL) 30 mg Tab Take 1 tablet (30 mg total) by mouth 2 (two) times a day. 60 tablet 0    [DISCONTINUED] dextroamphetamine-amphetamine (ADDERALL) 30 mg Tab Take 1 tablet (30 mg total) by mouth 2 (two)  times a day. 60 tablet 0     No current facility-administered medications on file prior to visit.       Review of Systems   Constitutional: Negative for chills and fever.   HENT: Negative for congestion and hearing loss.    Eyes: Negative for blurred vision and discharge.   Respiratory: Negative for wheezing.    Cardiovascular: Negative for chest pain and palpitations.   Gastrointestinal: Positive for constipation. Negative for blood in stool, diarrhea and vomiting.   Genitourinary: Negative for dysuria and hematuria.   Musculoskeletal: Positive for neck pain.   Neurological: Negative for weakness and headaches.   Endo/Heme/Allergies: Negative for polydipsia.   Psychiatric/Behavioral: Negative for depression.     Physical Exam  Constitutional:       General: She is not in acute distress.     Appearance: She is well-developed.   HENT:      Head: Normocephalic and atraumatic.   Pulmonary:      Effort: Pulmonary effort is normal. No respiratory distress.   Musculoskeletal:      Cervical back: Normal range of motion.   Neurological:      Mental Status: She is alert and oriented to person, place, and time.   Psychiatric:         Behavior: Behavior normal.         The patient's Health Maintenance was reviewed and the following appears to be due at this time:  Health Maintenance Due   Topic Date Due    Hepatitis C Screening  Never done    HIV Screening  Never done    Pneumococcal Vaccines (Age 0-64) (2 - PCV) 03/26/2015    Shingles Vaccine (1 of 2) Never done    LDCT Lung Screen  09/17/2021    Mammogram  01/07/2022    COVID-19 Vaccine (3 - Booster for Pfizer series) 01/12/2022

## 2022-05-13 ENCOUNTER — PATIENT MESSAGE (OUTPATIENT)
Dept: SMOKING CESSATION | Facility: CLINIC | Age: 52
End: 2022-05-13
Payer: MEDICAID

## 2022-05-26 ENCOUNTER — PATIENT MESSAGE (OUTPATIENT)
Dept: FAMILY MEDICINE | Facility: CLINIC | Age: 52
End: 2022-05-26
Payer: MEDICAID

## 2022-06-09 DIAGNOSIS — K20.90 ESOPHAGITIS: ICD-10-CM

## 2022-06-09 DIAGNOSIS — M54.12 CERVICAL RADICULOPATHY: ICD-10-CM

## 2022-06-09 RX ORDER — OMEPRAZOLE 40 MG/1
40 CAPSULE, DELAYED RELEASE ORAL DAILY
Qty: 30 CAPSULE | Refills: 3 | Status: SHIPPED | OUTPATIENT
Start: 2022-06-09 | End: 2022-08-09 | Stop reason: SDUPTHER

## 2022-06-09 RX ORDER — MELOXICAM 15 MG/1
15 TABLET ORAL DAILY
Qty: 30 TABLET | Refills: 3 | Status: SHIPPED | OUTPATIENT
Start: 2022-06-09 | End: 2022-11-09 | Stop reason: SDUPTHER

## 2022-06-09 NOTE — TELEPHONE ENCOUNTER
No new care gaps identified.  [facility] Embedded Care Gaps. Reference number: [id number]. 6/09/2022   12:16:05 PM CDT

## 2022-07-08 ENCOUNTER — PATIENT MESSAGE (OUTPATIENT)
Dept: FAMILY MEDICINE | Facility: CLINIC | Age: 52
End: 2022-07-08
Payer: MEDICAID

## 2022-07-27 ENCOUNTER — PATIENT MESSAGE (OUTPATIENT)
Dept: FAMILY MEDICINE | Facility: CLINIC | Age: 52
End: 2022-07-27
Payer: MEDICAID

## 2022-08-09 ENCOUNTER — PATIENT MESSAGE (OUTPATIENT)
Dept: FAMILY MEDICINE | Facility: CLINIC | Age: 52
End: 2022-08-09

## 2022-08-09 ENCOUNTER — TELEPHONE (OUTPATIENT)
Dept: FAMILY MEDICINE | Facility: CLINIC | Age: 52
End: 2022-08-09
Payer: MEDICAID

## 2022-08-09 ENCOUNTER — PATIENT MESSAGE (OUTPATIENT)
Dept: ADMINISTRATIVE | Facility: HOSPITAL | Age: 52
End: 2022-08-09
Payer: MEDICAID

## 2022-08-09 ENCOUNTER — OFFICE VISIT (OUTPATIENT)
Dept: FAMILY MEDICINE | Facility: CLINIC | Age: 52
End: 2022-08-09
Payer: MEDICAID

## 2022-08-09 DIAGNOSIS — R41.840 ATTENTION DEFICIT: ICD-10-CM

## 2022-08-09 DIAGNOSIS — K20.90 ESOPHAGITIS: ICD-10-CM

## 2022-08-09 DIAGNOSIS — K59.00 CONSTIPATION, UNSPECIFIED CONSTIPATION TYPE: ICD-10-CM

## 2022-08-09 PROCEDURE — 99213 OFFICE O/P EST LOW 20 MIN: CPT | Mod: 95,,, | Performed by: NURSE PRACTITIONER

## 2022-08-09 PROCEDURE — 99213 PR OFFICE/OUTPT VISIT, EST, LEVL III, 20-29 MIN: ICD-10-PCS | Mod: 95,,, | Performed by: NURSE PRACTITIONER

## 2022-08-09 PROCEDURE — 1160F RVW MEDS BY RX/DR IN RCRD: CPT | Mod: CPTII,95,, | Performed by: NURSE PRACTITIONER

## 2022-08-09 PROCEDURE — 1160F PR REVIEW ALL MEDS BY PRESCRIBER/CLIN PHARMACIST DOCUMENTED: ICD-10-PCS | Mod: CPTII,95,, | Performed by: NURSE PRACTITIONER

## 2022-08-09 PROCEDURE — 1159F MED LIST DOCD IN RCRD: CPT | Mod: CPTII,95,, | Performed by: NURSE PRACTITIONER

## 2022-08-09 PROCEDURE — 1159F PR MEDICATION LIST DOCUMENTED IN MEDICAL RECORD: ICD-10-PCS | Mod: CPTII,95,, | Performed by: NURSE PRACTITIONER

## 2022-08-09 RX ORDER — OMEPRAZOLE 40 MG/1
40 CAPSULE, DELAYED RELEASE ORAL DAILY
Qty: 90 CAPSULE | Refills: 1 | Status: SHIPPED | OUTPATIENT
Start: 2022-08-09 | End: 2022-11-09 | Stop reason: SDUPTHER

## 2022-08-09 RX ORDER — DEXTROAMPHETAMINE SACCHARATE, AMPHETAMINE ASPARTATE, DEXTROAMPHETAMINE SULFATE AND AMPHETAMINE SULFATE 7.5; 7.5; 7.5; 7.5 MG/1; MG/1; MG/1; MG/1
1 TABLET ORAL 2 TIMES DAILY
Qty: 60 TABLET | Refills: 0 | Status: SHIPPED | OUTPATIENT
Start: 2022-08-09 | End: 2022-09-09 | Stop reason: SDUPTHER

## 2022-08-09 NOTE — PROGRESS NOTES
Subjective:       Patient ID: Luz Pelaez is a 52 y.o. female.    Primary Care Telemedicine Note    The patient location is:  Patient Home   The chief complaint leading to consultation is: Adderall refill  Total time spent with patient: 15 mins    Visit type: Virtual visit with synchronous audio only and video  Each patient to whom he or she provides medical services by telemedicine is:  (1) informed of the relationship between the physician and patient and the respective role of any other health care provider with respect to management of the patient; and (2) notified that he or she may decline to receive medical services by telemedicine and may withdraw from such care at any time.      Chief Complaint: No chief complaint on file.    Medication Refill  This is a chronic (Adderall) problem. The current episode started more than 1 year ago. The problem occurs daily. The problem has been unchanged. Associated symptoms include arthralgias, joint swelling and neck pain. Pertinent negatives include no abdominal pain, chest pain, coughing, fatigue, fever, headaches, myalgias, rash, sore throat, vomiting or weakness. The treatment provided significant relief.     Review of Systems   Constitutional:  Negative for activity change, fatigue, fever and unexpected weight change.   HENT:  Negative for ear pain, hearing loss, rhinorrhea, sore throat and trouble swallowing.    Eyes:  Negative for pain, discharge and visual disturbance.   Respiratory:  Positive for wheezing. Negative for cough, chest tightness and shortness of breath.    Cardiovascular:  Negative for chest pain and palpitations.   Gastrointestinal:  Negative for abdominal pain, blood in stool, constipation, diarrhea and vomiting.   Endocrine: Negative for polydipsia and polyuria.   Genitourinary:  Negative for difficulty urinating, dysuria, hematuria and menstrual problem.   Musculoskeletal:  Positive for arthralgias, joint swelling and neck pain. Negative for  myalgias.   Skin:  Negative for color change and rash.   Neurological:  Negative for dizziness, weakness and headaches.   Psychiatric/Behavioral:  Positive for dysphoric mood. Negative for confusion and sleep disturbance. The patient is not nervous/anxious.      Vitals:    09/09/22 1605   BP: 135/85       Objective:     Current Outpatient Medications   Medication Sig Dispense Refill    albuterol (PROVENTIL/VENTOLIN HFA) 90 mcg/actuation inhaler INHALE 1 TO 2 PUFFS INTO THE LUNGS EVERY SIX HOURS AS NEEDED FORWHEEZING 8.5 g 0    [START ON 10/9/2022] dextroamphetamine-amphetamine (ADDERALL) 30 mg Tab Take 1 tablet (30 mg total) by mouth 2 (two) times a day. 60 tablet 0    dextroamphetamine-amphetamine (ADDERALL) 30 mg Tab Take 1 tablet (30 mg total) by mouth 2 (two) times a day. 60 tablet 0    ergocalciferol (ERGOCALCIFEROL) 50,000 unit Cap Vitamin D2 Take No date recorded No form recorded No frequency recorded No route recorded No set duration recorded No set duration amount recorded active No dosage strength recorded No dosage strength units of measure recorded      fluticasone-umeclidin-vilanter (TRELEGY ELLIPTA) 100-62.5-25 mcg DsDv Inhale 1 puff into the lungs once daily. 60 each 10    linaCLOtide (LINZESS) 290 mcg Cap capsule TAKE 1 CAPSULE (290 MCG TOTAL) BY MOUTH ONCE DAILY. 30 capsule 5    meloxicam (MOBIC) 15 MG tablet TAKE 1 TABLET (15 MG TOTAL) BY MOUTH ONCE DAILY. 30 tablet 3    mupirocin (BACTROBAN) 2 % ointment Apply topically 3 (three) times daily. 30 g 00    omeprazole (PRILOSEC) 40 MG capsule Take 1 capsule (40 mg total) by mouth once daily. 90 capsule 1    triamcinolone acetonide 0.5% (KENALOG) 0.5 % Crea Apply topically 2 (two) times daily. for 10 days 454 g 0     No current facility-administered medications for this visit.       Physical Exam  Constitutional:       Appearance: She is well-developed.   HENT:      Head: Normocephalic.   Pulmonary:      Effort: Pulmonary effort is normal. No  respiratory distress.   Musculoskeletal:      Cervical back: Normal range of motion.   Neurological:      Mental Status: She is alert and oriented to person, place, and time.   Psychiatric:         Thought Content: Thought content normal.         Judgment: Judgment normal.       Assessment:       1. Attention deficit    2. Esophagitis    3. Constipation, unspecified constipation type        Plan:   Attention deficit  -     Discontinue: dextroamphetamine-amphetamine (ADDERALL) 30 mg Tab; Take 1 tablet (30 mg total) by mouth 2 (two) times a day.  Dispense: 60 tablet; Refill: 0  -     dextroamphetamine-amphetamine (ADDERALL) 30 mg Tab; Take 1 tablet (30 mg total) by mouth 2 (two) times a day.  Dispense: 60 tablet; Refill: 0    Esophagitis  -     omeprazole (PRILOSEC) 40 MG capsule; Take 1 capsule (40 mg total) by mouth once daily.  Dispense: 90 capsule; Refill: 1    Constipation, unspecified constipation type  -     linaCLOtide (LINZESS) 290 mcg Cap capsule; TAKE 1 CAPSULE (290 MCG TOTAL) BY MOUTH ONCE DAILY.  Dispense: 30 capsule; Refill: 5    Other orders  -     dextroamphetamine-amphetamine (ADDERALL) 30 mg Tab; Take 1 tablet (30 mg total) by mouth 2 (two) times a day.  Dispense: 60 tablet; Refill: 0    She will send her vitals prior to the additional Rx of Adderall being sent to the pharmacy    No follow-ups on file.    There are no Patient Instructions on file for this visit.

## 2022-08-10 ENCOUNTER — PATIENT MESSAGE (OUTPATIENT)
Dept: FAMILY MEDICINE | Facility: CLINIC | Age: 52
End: 2022-08-10
Payer: MEDICAID

## 2022-08-10 VITALS — SYSTOLIC BLOOD PRESSURE: 158 MMHG | DIASTOLIC BLOOD PRESSURE: 90 MMHG

## 2022-09-09 ENCOUNTER — PATIENT MESSAGE (OUTPATIENT)
Dept: FAMILY MEDICINE | Facility: CLINIC | Age: 52
End: 2022-09-09
Payer: MEDICAID

## 2022-09-09 VITALS — SYSTOLIC BLOOD PRESSURE: 135 MMHG | DIASTOLIC BLOOD PRESSURE: 85 MMHG

## 2022-09-09 VITALS — DIASTOLIC BLOOD PRESSURE: 85 MMHG | SYSTOLIC BLOOD PRESSURE: 138 MMHG

## 2022-09-09 RX ORDER — DEXTROAMPHETAMINE SACCHARATE, AMPHETAMINE ASPARTATE, DEXTROAMPHETAMINE SULFATE AND AMPHETAMINE SULFATE 7.5; 7.5; 7.5; 7.5 MG/1; MG/1; MG/1; MG/1
1 TABLET ORAL 2 TIMES DAILY
Qty: 60 TABLET | Refills: 0 | Status: SHIPPED | OUTPATIENT
Start: 2022-09-09 | End: 2022-11-09 | Stop reason: SDUPTHER

## 2022-09-09 RX ORDER — DEXTROAMPHETAMINE SACCHARATE, AMPHETAMINE ASPARTATE, DEXTROAMPHETAMINE SULFATE AND AMPHETAMINE SULFATE 7.5; 7.5; 7.5; 7.5 MG/1; MG/1; MG/1; MG/1
1 TABLET ORAL 2 TIMES DAILY
Qty: 60 TABLET | Refills: 0 | Status: SHIPPED | OUTPATIENT
Start: 2022-10-09 | End: 2022-10-10 | Stop reason: SDUPTHER

## 2022-10-04 ENCOUNTER — PATIENT MESSAGE (OUTPATIENT)
Dept: ADMINISTRATIVE | Facility: HOSPITAL | Age: 52
End: 2022-10-04
Payer: MEDICAID

## 2022-10-10 ENCOUNTER — PATIENT MESSAGE (OUTPATIENT)
Dept: FAMILY MEDICINE | Facility: CLINIC | Age: 52
End: 2022-10-10
Payer: MEDICAID

## 2022-10-10 DIAGNOSIS — R41.840 ATTENTION DEFICIT: ICD-10-CM

## 2022-10-10 RX ORDER — DEXTROAMPHETAMINE SACCHARATE, AMPHETAMINE ASPARTATE, DEXTROAMPHETAMINE SULFATE AND AMPHETAMINE SULFATE 7.5; 7.5; 7.5; 7.5 MG/1; MG/1; MG/1; MG/1
1 TABLET ORAL 2 TIMES DAILY
Qty: 60 TABLET | Refills: 0 | Status: SHIPPED | OUTPATIENT
Start: 2022-10-10 | End: 2022-10-12 | Stop reason: SDUPTHER

## 2022-10-12 RX ORDER — DEXTROAMPHETAMINE SACCHARATE, AMPHETAMINE ASPARTATE, DEXTROAMPHETAMINE SULFATE AND AMPHETAMINE SULFATE 7.5; 7.5; 7.5; 7.5 MG/1; MG/1; MG/1; MG/1
1 TABLET ORAL 2 TIMES DAILY
Qty: 60 TABLET | Refills: 0 | Status: SHIPPED | OUTPATIENT
Start: 2022-10-12 | End: 2022-11-09 | Stop reason: SDUPTHER

## 2022-10-31 ENCOUNTER — PATIENT MESSAGE (OUTPATIENT)
Dept: FAMILY MEDICINE | Facility: CLINIC | Age: 52
End: 2022-10-31
Payer: MEDICAID

## 2022-11-09 ENCOUNTER — OFFICE VISIT (OUTPATIENT)
Dept: FAMILY MEDICINE | Facility: CLINIC | Age: 52
End: 2022-11-09
Payer: MEDICAID

## 2022-11-09 ENCOUNTER — HOSPITAL ENCOUNTER (OUTPATIENT)
Dept: RADIOLOGY | Facility: HOSPITAL | Age: 52
Discharge: HOME | End: 2022-11-09
Attending: FAMILY MEDICINE
Payer: MEDICAID

## 2022-11-09 VITALS
TEMPERATURE: 97 F | HEIGHT: 65 IN | HEART RATE: 77 BPM | BODY MASS INDEX: 25.83 KG/M2 | OXYGEN SATURATION: 98 % | WEIGHT: 155 LBS | DIASTOLIC BLOOD PRESSURE: 86 MMHG | SYSTOLIC BLOOD PRESSURE: 170 MMHG

## 2022-11-09 VITALS — WEIGHT: 147.06 LBS | HEIGHT: 65 IN | BODY MASS INDEX: 24.5 KG/M2

## 2022-11-09 DIAGNOSIS — Z00.00 ANNUAL PHYSICAL EXAM: Primary | ICD-10-CM

## 2022-11-09 DIAGNOSIS — Z12.31 OTHER SCREENING MAMMOGRAM: ICD-10-CM

## 2022-11-09 DIAGNOSIS — K21.9 GASTROESOPHAGEAL REFLUX DISEASE, UNSPECIFIED WHETHER ESOPHAGITIS PRESENT: ICD-10-CM

## 2022-11-09 DIAGNOSIS — Z23 IMMUNIZATION DUE: ICD-10-CM

## 2022-11-09 DIAGNOSIS — K20.90 ESOPHAGITIS: ICD-10-CM

## 2022-11-09 DIAGNOSIS — R41.840 ATTENTION DEFICIT: ICD-10-CM

## 2022-11-09 DIAGNOSIS — E55.9 VITAMIN D DEFICIENCY: ICD-10-CM

## 2022-11-09 DIAGNOSIS — M54.12 CERVICAL RADICULOPATHY: ICD-10-CM

## 2022-11-09 PROCEDURE — 77063 BREAST TOMOSYNTHESIS BI: CPT | Mod: 26,,, | Performed by: RADIOLOGY

## 2022-11-09 PROCEDURE — 90686 IIV4 VACC NO PRSV 0.5 ML IM: CPT | Mod: PBBFAC,PO

## 2022-11-09 PROCEDURE — 3079F PR MOST RECENT DIASTOLIC BLOOD PRESSURE 80-89 MM HG: ICD-10-PCS | Mod: CPTII,,, | Performed by: NURSE PRACTITIONER

## 2022-11-09 PROCEDURE — 77067 MAMMO DIGITAL SCREENING BILAT WITH TOMO: ICD-10-PCS | Mod: 26,,, | Performed by: RADIOLOGY

## 2022-11-09 PROCEDURE — 3077F SYST BP >= 140 MM HG: CPT | Mod: CPTII,,, | Performed by: NURSE PRACTITIONER

## 2022-11-09 PROCEDURE — 99214 OFFICE O/P EST MOD 30 MIN: CPT | Mod: PBBFAC,PO | Performed by: NURSE PRACTITIONER

## 2022-11-09 PROCEDURE — 77063 BREAST TOMOSYNTHESIS BI: CPT | Mod: TC,PO

## 2022-11-09 PROCEDURE — 3077F PR MOST RECENT SYSTOLIC BLOOD PRESSURE >= 140 MM HG: ICD-10-PCS | Mod: CPTII,,, | Performed by: NURSE PRACTITIONER

## 2022-11-09 PROCEDURE — 99999 PR PBB SHADOW E&M-EST. PATIENT-LVL IV: CPT | Mod: PBBFAC,,, | Performed by: NURSE PRACTITIONER

## 2022-11-09 PROCEDURE — 3008F PR BODY MASS INDEX (BMI) DOCUMENTED: ICD-10-PCS | Mod: CPTII,,, | Performed by: NURSE PRACTITIONER

## 2022-11-09 PROCEDURE — 77063 MAMMO DIGITAL SCREENING BILAT WITH TOMO: ICD-10-PCS | Mod: 26,,, | Performed by: RADIOLOGY

## 2022-11-09 PROCEDURE — 3079F DIAST BP 80-89 MM HG: CPT | Mod: CPTII,,, | Performed by: NURSE PRACTITIONER

## 2022-11-09 PROCEDURE — 3008F BODY MASS INDEX DOCD: CPT | Mod: CPTII,,, | Performed by: NURSE PRACTITIONER

## 2022-11-09 PROCEDURE — 99999 PR PBB SHADOW E&M-EST. PATIENT-LVL IV: ICD-10-PCS | Mod: PBBFAC,,, | Performed by: NURSE PRACTITIONER

## 2022-11-09 PROCEDURE — 1159F PR MEDICATION LIST DOCUMENTED IN MEDICAL RECORD: ICD-10-PCS | Mod: CPTII,,, | Performed by: NURSE PRACTITIONER

## 2022-11-09 PROCEDURE — 99396 PREV VISIT EST AGE 40-64: CPT | Mod: S$PBB,,, | Performed by: NURSE PRACTITIONER

## 2022-11-09 PROCEDURE — 1159F MED LIST DOCD IN RCRD: CPT | Mod: CPTII,,, | Performed by: NURSE PRACTITIONER

## 2022-11-09 PROCEDURE — 77067 SCR MAMMO BI INCL CAD: CPT | Mod: TC,PO

## 2022-11-09 PROCEDURE — 77067 SCR MAMMO BI INCL CAD: CPT | Mod: 26,,, | Performed by: RADIOLOGY

## 2022-11-09 PROCEDURE — 99396 PR PREVENTIVE VISIT,EST,40-64: ICD-10-PCS | Mod: S$PBB,,, | Performed by: NURSE PRACTITIONER

## 2022-11-09 RX ORDER — DEXTROAMPHETAMINE SACCHARATE, AMPHETAMINE ASPARTATE, DEXTROAMPHETAMINE SULFATE AND AMPHETAMINE SULFATE 7.5; 7.5; 7.5; 7.5 MG/1; MG/1; MG/1; MG/1
1 TABLET ORAL 2 TIMES DAILY
Qty: 60 TABLET | Refills: 0 | Status: SHIPPED | OUTPATIENT
Start: 2022-12-10 | End: 2023-01-09

## 2022-11-09 RX ORDER — DEXTROAMPHETAMINE SACCHARATE, AMPHETAMINE ASPARTATE, DEXTROAMPHETAMINE SULFATE AND AMPHETAMINE SULFATE 7.5; 7.5; 7.5; 7.5 MG/1; MG/1; MG/1; MG/1
30 TABLET ORAL 2 TIMES DAILY
Qty: 60 TABLET | Refills: 0 | Status: SHIPPED | OUTPATIENT
Start: 2022-11-10 | End: 2023-02-06 | Stop reason: SDUPTHER

## 2022-11-09 RX ORDER — DEXTROAMPHETAMINE SACCHARATE, AMPHETAMINE ASPARTATE, DEXTROAMPHETAMINE SULFATE AND AMPHETAMINE SULFATE 7.5; 7.5; 7.5; 7.5 MG/1; MG/1; MG/1; MG/1
1 TABLET ORAL 2 TIMES DAILY
Qty: 60 TABLET | Refills: 0 | Status: SHIPPED | OUTPATIENT
Start: 2023-01-10 | End: 2023-02-06 | Stop reason: SDUPTHER

## 2022-11-09 RX ORDER — MELOXICAM 15 MG/1
15 TABLET ORAL DAILY
Qty: 30 TABLET | Refills: 3 | Status: SHIPPED | OUTPATIENT
Start: 2022-11-09 | End: 2023-03-08 | Stop reason: SDUPTHER

## 2022-11-09 RX ORDER — OMEPRAZOLE 40 MG/1
40 CAPSULE, DELAYED RELEASE ORAL DAILY
Qty: 90 CAPSULE | Refills: 3 | Status: SHIPPED | OUTPATIENT
Start: 2022-11-09 | End: 2023-08-02 | Stop reason: SDUPTHER

## 2022-11-09 NOTE — PROGRESS NOTES
There is an area that needs better delineation in the left breast.  The ultrasound has been ordered.

## 2022-11-09 NOTE — PROGRESS NOTES
Subjective:       Patient ID: Luz Pelaez is a 52 y.o. female.    Chief Complaint: Annual Exam    HPI    She comes in for routine annual wellness exam with fasting labs and medication refills     Problem List Items Addressed This Visit          Neuro    Attention deficit (Chronic)    Overview     Luz Pelaez has ADD. Medication has been used since 2000 and has been stable on the current dose of medicine. She reports being compliant on the medicine and is not receiving narcotics from any other physician. The patient was checked in the [facility] [employer] [facility] [employer]'s Prescription Monitoring Program. There appears to be no incongruities with the patient's verbalized history. She denies any complications from taking the medicine. The patient's weight and apatite has been stable and has not had difficulties with agitation. She reports improvement in the symptoms with the current dose.               Relevant Medications    dextroamphetamine-amphetamine (ADDERALL) 30 mg Tab (Start on 12/10/2022)    Other Relevant Orders    Lipid Panel    Comprehensive Metabolic Panel       GI    Gastroesophageal reflux disease    Overview     The patient complains of heartburn.  The symptoms began 6 months. Denies chest pressure, diaphoresis, left arm and neck pain, vomiting, shortness of breath and palpitations.  Associated symptoms include abdominal pain.  The symptoms occur a few minutes after meals.  Things that worsen the symptoms include many different foods.  The symptoms are alleviated by Prilosec somewhat.  She has been on prilosec for many years and it has worsened.             Relevant Orders    Lipid Panel    Comprehensive Metabolic Panel     Other Visit Diagnoses       Annual physical exam    -  Primary    Relevant Orders    Lipid Panel    Comprehensive Metabolic Panel    Vitamin D    Immunization due        Vitamin D deficiency        Relevant Orders    Vitamin D    Cervical radiculopathy        Relevant Medications     meloxicam (MOBIC) 15 MG tablet    Esophagitis        Relevant Medications    omeprazole (PRILOSEC) 40 MG capsule             Review of Systems   Constitutional:  Negative for fatigue, fever and unexpected weight change.   HENT:  Negative for ear pain and sore throat.    Eyes:  Negative for pain and visual disturbance.   Respiratory:  Negative for cough and shortness of breath.    Cardiovascular:  Negative for chest pain and palpitations.   Gastrointestinal:  Negative for abdominal pain, diarrhea and vomiting.   Musculoskeletal:  Negative for arthralgias and myalgias.   Skin:  Negative for color change and rash.   Neurological:  Negative for dizziness and headaches.   Psychiatric/Behavioral:  Negative for dysphoric mood and sleep disturbance. The patient is not nervous/anxious.      Vitals:    11/09/22 0959   BP: (!) 170/86   Pulse: 77   Temp: 97.3 °F (36.3 °C)       Objective:     Current Outpatient Medications   Medication Sig Dispense Refill    albuterol (PROVENTIL/VENTOLIN HFA) 90 mcg/actuation inhaler INHALE 1 TO 2 PUFFS INTO THE LUNGS EVERY SIX HOURS AS NEEDED FORWHEEZING 8.5 g 0    ergocalciferol (ERGOCALCIFEROL) 50,000 unit Cap Vitamin D2 Take No date recorded No form recorded No frequency recorded No route recorded No set duration recorded No set duration amount recorded active No dosage strength recorded No dosage strength units of measure recorded      fluticasone-umeclidin-vilanter (TRELEGY ELLIPTA) 100-62.5-25 mcg DsDv Inhale 1 puff into the lungs once daily. 60 each 10    linaCLOtide (LINZESS) 290 mcg Cap capsule TAKE 1 CAPSULE (290 MCG TOTAL) BY MOUTH ONCE DAILY. 30 capsule 5    mupirocin (BACTROBAN) 2 % ointment Apply topically 3 (three) times daily. 30 g 00    [START ON 1/10/2023] dextroamphetamine-amphetamine (ADDERALL) 30 mg Tab Take 1 tablet (30 mg total) by mouth 2 (two) times a day. 60 tablet 0    [START ON 12/10/2022] dextroamphetamine-amphetamine (ADDERALL) 30 mg Tab Take 1 tablet (30 mg  total) by mouth 2 (two) times a day. 60 tablet 0    [START ON 11/10/2022] dextroamphetamine-amphetamine 30 mg Tab Take 1 tablet (30 mg total) by mouth 2 (two) times a day. 60 tablet 0    meloxicam (MOBIC) 15 MG tablet Take 1 tablet (15 mg total) by mouth once daily. 30 tablet 3    omeprazole (PRILOSEC) 40 MG capsule Take 1 capsule (40 mg total) by mouth once daily. 90 capsule 3     No current facility-administered medications for this visit.       Physical Exam  Vitals and nursing note reviewed.   Constitutional:       General: She is not in acute distress.     Appearance: She is well-developed.   HENT:      Head: Normocephalic and atraumatic.   Eyes:      Pupils: Pupils are equal, round, and reactive to light.   Cardiovascular:      Rate and Rhythm: Normal rate and regular rhythm.   Pulmonary:      Effort: Pulmonary effort is normal.      Breath sounds: Normal breath sounds.   Musculoskeletal:         General: Normal range of motion.      Cervical back: Normal range of motion and neck supple.   Skin:     General: Skin is warm and dry.      Findings: No rash.   Neurological:      Mental Status: She is alert and oriented to person, place, and time.   Psychiatric:         Judgment: Judgment normal.       Assessment:       1. Annual physical exam    2. Attention deficit    3. Gastroesophageal reflux disease, unspecified whether esophagitis present    4. Immunization due    5. Vitamin D deficiency    6. Cervical radiculopathy    7. Esophagitis          Plan:   Annual physical exam  -     Lipid Panel; Future; Expected date: 11/09/2022  -     Comprehensive Metabolic Panel; Future; Expected date: 11/09/2022  -     Vitamin D; Future; Expected date: 11/09/2022    Attention deficit  -     Lipid Panel; Future; Expected date: 11/09/2022  -     Comprehensive Metabolic Panel; Future; Expected date: 11/09/2022  -     dextroamphetamine-amphetamine (ADDERALL) 30 mg Tab; Take 1 tablet (30 mg total) by mouth 2 (two) times a day.   Dispense: 60 tablet; Refill: 0    Gastroesophageal reflux disease, unspecified whether esophagitis present  -     Lipid Panel; Future; Expected date: 11/09/2022  -     Comprehensive Metabolic Panel; Future; Expected date: 11/09/2022    Immunization due  -     Influenza - Quadrivalent (PF)    Vitamin D deficiency  -     Vitamin D; Future; Expected date: 11/09/2022    Cervical radiculopathy  -     meloxicam (MOBIC) 15 MG tablet; Take 1 tablet (15 mg total) by mouth once daily.  Dispense: 30 tablet; Refill: 3    Esophagitis  -     omeprazole (PRILOSEC) 40 MG capsule; Take 1 capsule (40 mg total) by mouth once daily.  Dispense: 90 capsule; Refill: 3    Other orders  -     dextroamphetamine-amphetamine (ADDERALL) 30 mg Tab; Take 1 tablet (30 mg total) by mouth 2 (two) times a day.  Dispense: 60 tablet; Refill: 0  -     dextroamphetamine-amphetamine 30 mg Tab; Take 1 tablet (30 mg total) by mouth 2 (two) times a day.  Dispense: 60 tablet; Refill: 0      No follow-ups on file.    There are no Patient Instructions on file for this visit.

## 2022-11-11 ENCOUNTER — HOSPITAL ENCOUNTER (OUTPATIENT)
Dept: RADIOLOGY | Facility: HOSPITAL | Age: 52
Discharge: HOME | End: 2022-11-11
Attending: FAMILY MEDICINE
Payer: MEDICAID

## 2022-11-11 DIAGNOSIS — R92.8 ABNORMAL MAMMOGRAM: ICD-10-CM

## 2022-11-11 PROCEDURE — 76642 US BREAST LEFT LIMITED: ICD-10-PCS | Mod: 26,LT,, | Performed by: RADIOLOGY

## 2022-11-11 PROCEDURE — 76642 ULTRASOUND BREAST LIMITED: CPT | Mod: 26,LT,, | Performed by: RADIOLOGY

## 2022-11-11 PROCEDURE — 76642 ULTRASOUND BREAST LIMITED: CPT | Mod: TC,PO,LT

## 2022-11-15 DIAGNOSIS — Z12.31 SCREENING MAMMOGRAM FOR HIGH-RISK PATIENT: Primary | ICD-10-CM

## 2022-12-23 ENCOUNTER — PATIENT MESSAGE (OUTPATIENT)
Dept: FAMILY MEDICINE | Facility: CLINIC | Age: 52
End: 2022-12-23
Payer: MEDICAID

## 2023-01-24 ENCOUNTER — PATIENT MESSAGE (OUTPATIENT)
Dept: SLEEP MEDICINE | Facility: CLINIC | Age: 53
End: 2023-01-24
Payer: MEDICAID

## 2023-02-06 ENCOUNTER — OFFICE VISIT (OUTPATIENT)
Dept: FAMILY MEDICINE | Facility: CLINIC | Age: 53
End: 2023-02-06
Payer: MEDICAID

## 2023-02-06 VITALS — SYSTOLIC BLOOD PRESSURE: 135 MMHG | DIASTOLIC BLOOD PRESSURE: 87 MMHG | HEART RATE: 94 BPM

## 2023-02-06 DIAGNOSIS — R41.840 ATTENTION DEFICIT: Primary | Chronic | ICD-10-CM

## 2023-02-06 PROCEDURE — 3079F DIAST BP 80-89 MM HG: CPT | Mod: CPTII,95,, | Performed by: NURSE PRACTITIONER

## 2023-02-06 PROCEDURE — 99213 PR OFFICE/OUTPT VISIT, EST, LEVL III, 20-29 MIN: ICD-10-PCS | Mod: 95,,, | Performed by: NURSE PRACTITIONER

## 2023-02-06 PROCEDURE — 1159F MED LIST DOCD IN RCRD: CPT | Mod: CPTII,95,, | Performed by: NURSE PRACTITIONER

## 2023-02-06 PROCEDURE — 99213 OFFICE O/P EST LOW 20 MIN: CPT | Mod: 95,,, | Performed by: NURSE PRACTITIONER

## 2023-02-06 PROCEDURE — 3079F PR MOST RECENT DIASTOLIC BLOOD PRESSURE 80-89 MM HG: ICD-10-PCS | Mod: CPTII,95,, | Performed by: NURSE PRACTITIONER

## 2023-02-06 PROCEDURE — 1159F PR MEDICATION LIST DOCUMENTED IN MEDICAL RECORD: ICD-10-PCS | Mod: CPTII,95,, | Performed by: NURSE PRACTITIONER

## 2023-02-06 PROCEDURE — 1160F RVW MEDS BY RX/DR IN RCRD: CPT | Mod: CPTII,95,, | Performed by: NURSE PRACTITIONER

## 2023-02-06 PROCEDURE — 3075F SYST BP GE 130 - 139MM HG: CPT | Mod: CPTII,95,, | Performed by: NURSE PRACTITIONER

## 2023-02-06 PROCEDURE — 1160F PR REVIEW ALL MEDS BY PRESCRIBER/CLIN PHARMACIST DOCUMENTED: ICD-10-PCS | Mod: CPTII,95,, | Performed by: NURSE PRACTITIONER

## 2023-02-06 PROCEDURE — 3075F PR MOST RECENT SYSTOLIC BLOOD PRESS GE 130-139MM HG: ICD-10-PCS | Mod: CPTII,95,, | Performed by: NURSE PRACTITIONER

## 2023-02-06 RX ORDER — DEXTROAMPHETAMINE SACCHARATE, AMPHETAMINE ASPARTATE, DEXTROAMPHETAMINE SULFATE AND AMPHETAMINE SULFATE 7.5; 7.5; 7.5; 7.5 MG/1; MG/1; MG/1; MG/1
1 TABLET ORAL 2 TIMES DAILY
Qty: 60 TABLET | Refills: 0 | Status: SHIPPED | OUTPATIENT
Start: 2023-02-06 | End: 2023-05-04 | Stop reason: SDUPTHER

## 2023-02-06 RX ORDER — DEXTROAMPHETAMINE SACCHARATE, AMPHETAMINE ASPARTATE, DEXTROAMPHETAMINE SULFATE AND AMPHETAMINE SULFATE 7.5; 7.5; 7.5; 7.5 MG/1; MG/1; MG/1; MG/1
1 TABLET ORAL 2 TIMES DAILY
Qty: 60 TABLET | Refills: 0 | Status: SHIPPED | OUTPATIENT
Start: 2023-03-08 | End: 2023-05-04 | Stop reason: SDUPTHER

## 2023-02-06 RX ORDER — DEXTROAMPHETAMINE SACCHARATE, AMPHETAMINE ASPARTATE, DEXTROAMPHETAMINE SULFATE AND AMPHETAMINE SULFATE 7.5; 7.5; 7.5; 7.5 MG/1; MG/1; MG/1; MG/1
30 TABLET ORAL 2 TIMES DAILY
Qty: 60 TABLET | Refills: 0 | Status: SHIPPED | OUTPATIENT
Start: 2023-04-07 | End: 2023-05-04 | Stop reason: SDUPTHER

## 2023-02-06 NOTE — PROGRESS NOTES
Subjective:       Patient ID: Luz Pelaez is a 52 y.o. female.    Primary Care Telemedicine Note    The patient location is:  Patient Home   The chief complaint leading to consultation is: Adderall refill  Total time spent with patient: 10 mins    Visit type: Virtual visit with synchronous audio only and video  Each patient to whom he or she provides medical services by telemedicine is:  (1) informed of the relationship between the physician and patient and the respective role of any other health care provider with respect to management of the patient; and (2) notified that he or she may decline to receive medical services by telemedicine and may withdraw from such care at any time.      Chief Complaint: Medication Refill      Problem List Items Addressed This Visit          Neuro    Attention deficit - Primary (Chronic)    Overview     Luz Pelaez has ADD. Medication has been used since 2000 and has been stable on the current dose of medicine. She reports being compliant on the medicine and is not receiving narcotics from any other physician. The patient was checked in the [facility]'s Prescription Monitoring Program. There appears to be no incongruities with the patient's verbalized history. She denies any complications from taking the medicine. The patient's weight and apatite has been stable and has not had difficulties with agitation. She reports improvement in the symptoms with the current dose.                  Review of Systems   Constitutional:  Negative for activity change, fatigue, fever and unexpected weight change.   HENT:  Negative for ear pain, hearing loss, rhinorrhea, sore throat and trouble swallowing.    Eyes:  Negative for pain, discharge and visual disturbance.   Respiratory:  Positive for wheezing. Negative for cough, chest tightness and shortness of breath.    Cardiovascular:  Negative for chest pain and palpitations.   Gastrointestinal:  Negative for abdominal pain,  blood in stool, constipation, diarrhea and vomiting.   Endocrine: Negative for polydipsia and polyuria.   Genitourinary:  Negative for difficulty urinating, dysuria, hematuria and menstrual problem.   Musculoskeletal:  Positive for arthralgias and neck pain. Negative for joint swelling and myalgias.   Skin:  Negative for color change and rash.   Neurological:  Negative for dizziness, weakness and headaches.   Psychiatric/Behavioral:  Positive for dysphoric mood. Negative for confusion and sleep disturbance. The patient is not nervous/anxious.      Vitals:    02/06/23 1226   BP: 135/87   Pulse: 94       Objective:     Current Outpatient Medications   Medication Sig Dispense Refill    albuterol (PROVENTIL/VENTOLIN HFA) 90 mcg/actuation inhaler INHALE 1 TO 2 PUFFS INTO THE LUNGS EVERY SIX HOURS AS NEEDED FORWHEEZING 8.5 g 0    [START ON 3/8/2023] dextroamphetamine-amphetamine (ADDERALL) 30 mg Tab Take 1 tablet (30 mg total) by mouth 2 (two) times a day. 60 tablet 0    dextroamphetamine-amphetamine (ADDERALL) 30 mg Tab Take 1 tablet (30 mg total) by mouth 2 (two) times a day. 60 tablet 0    [START ON 4/7/2023] dextroamphetamine-amphetamine 30 mg Tab Take 1 tablet (30 mg total) by mouth 2 (two) times a day. 60 tablet 0    ergocalciferol (ERGOCALCIFEROL) 50,000 unit Cap Vitamin D2 Take No date recorded No form recorded No frequency recorded No route recorded No set duration recorded No set duration amount recorded active No dosage strength recorded No dosage strength units of measure recorded      fluticasone-umeclidin-vilanter (TRELEGY ELLIPTA) 100-62.5-25 mcg DsDv INHALE 1 PUFF INTO THE LUNGS ONCE DAILY. 60 each 0    linaCLOtide (LINZESS) 290 mcg Cap capsule TAKE 1 CAPSULE (290 MCG TOTAL) BY MOUTH ONCE DAILY. 30 capsule 5    meloxicam (MOBIC) 15 MG tablet Take 1 tablet (15 mg total) by mouth once daily. 30 tablet 3    mupirocin (BACTROBAN) 2 % ointment Apply topically 3 (three) times daily. 30 g 00    omeprazole  (PRILOSEC) 40 MG capsule Take 1 capsule (40 mg total) by mouth once daily. 90 capsule 3     No current facility-administered medications for this visit.       Physical Exam  Constitutional:       Appearance: She is well-developed.   HENT:      Head: Normocephalic.   Pulmonary:      Effort: Pulmonary effort is normal. No respiratory distress.   Musculoskeletal:      Cervical back: Normal range of motion.   Neurological:      Mental Status: She is alert and oriented to person, place, and time.   Psychiatric:         Thought Content: Thought content normal.         Judgment: Judgment normal.       Assessment:       1. Attention deficit          Plan:   Attention deficit    Other orders  -     dextroamphetamine-amphetamine 30 mg Tab; Take 1 tablet (30 mg total) by mouth 2 (two) times a day.  Dispense: 60 tablet; Refill: 0  -     dextroamphetamine-amphetamine (ADDERALL) 30 mg Tab; Take 1 tablet (30 mg total) by mouth 2 (two) times a day.  Dispense: 60 tablet; Refill: 0  -     dextroamphetamine-amphetamine (ADDERALL) 30 mg Tab; Take 1 tablet (30 mg total) by mouth 2 (two) times a day.  Dispense: 60 tablet; Refill: 0        No follow-ups on file.    There are no Patient Instructions on file for this visit.

## 2023-02-17 ENCOUNTER — PATIENT MESSAGE (OUTPATIENT)
Dept: SLEEP MEDICINE | Facility: CLINIC | Age: 53
End: 2023-02-17
Payer: MEDICAID

## 2023-02-23 ENCOUNTER — HOSPITAL ENCOUNTER (OUTPATIENT)
Dept: RADIOLOGY | Facility: HOSPITAL | Age: 53
Discharge: HOME | End: 2023-02-23
Attending: INTERNAL MEDICINE
Payer: MEDICAID

## 2023-02-23 ENCOUNTER — OFFICE VISIT (OUTPATIENT)
Dept: SLEEP MEDICINE | Facility: CLINIC | Age: 53
End: 2023-02-23
Payer: MEDICAID

## 2023-02-23 VITALS
RESPIRATION RATE: 16 BRPM | HEART RATE: 84 BPM | DIASTOLIC BLOOD PRESSURE: 80 MMHG | WEIGHT: 158.06 LBS | SYSTOLIC BLOOD PRESSURE: 130 MMHG | OXYGEN SATURATION: 96 % | BODY MASS INDEX: 26.33 KG/M2 | HEIGHT: 65 IN

## 2023-02-23 DIAGNOSIS — F17.219 CIGARETTE NICOTINE DEPENDENCE WITH NICOTINE-INDUCED DISORDER: ICD-10-CM

## 2023-02-23 DIAGNOSIS — J44.89 COPD WITH CHRONIC BRONCHITIS: ICD-10-CM

## 2023-02-23 DIAGNOSIS — J45.40 MODERATE PERSISTENT ASTHMA WITHOUT COMPLICATION: ICD-10-CM

## 2023-02-23 DIAGNOSIS — J44.89 ASTHMA-COPD OVERLAP SYNDROME: Primary | ICD-10-CM

## 2023-02-23 DIAGNOSIS — J44.89 ASTHMA-COPD OVERLAP SYNDROME: ICD-10-CM

## 2023-02-23 DIAGNOSIS — R91.1 PULMONARY NODULE: ICD-10-CM

## 2023-02-23 DIAGNOSIS — R91.8 ABNORMAL CT SCAN OF LUNG: ICD-10-CM

## 2023-02-23 PROCEDURE — 71046 X-RAY EXAM CHEST 2 VIEWS: CPT | Mod: 26,,, | Performed by: RADIOLOGY

## 2023-02-23 PROCEDURE — 71046 X-RAY EXAM CHEST 2 VIEWS: CPT | Mod: TC

## 2023-02-23 PROCEDURE — 3008F BODY MASS INDEX DOCD: CPT | Mod: CPTII,,, | Performed by: INTERNAL MEDICINE

## 2023-02-23 PROCEDURE — 3079F DIAST BP 80-89 MM HG: CPT | Mod: CPTII,,, | Performed by: INTERNAL MEDICINE

## 2023-02-23 PROCEDURE — 3008F PR BODY MASS INDEX (BMI) DOCUMENTED: ICD-10-PCS | Mod: CPTII,,, | Performed by: INTERNAL MEDICINE

## 2023-02-23 PROCEDURE — 99214 OFFICE O/P EST MOD 30 MIN: CPT | Mod: S$PBB,,, | Performed by: INTERNAL MEDICINE

## 2023-02-23 PROCEDURE — 3075F SYST BP GE 130 - 139MM HG: CPT | Mod: CPTII,,, | Performed by: INTERNAL MEDICINE

## 2023-02-23 PROCEDURE — 1159F MED LIST DOCD IN RCRD: CPT | Mod: CPTII,,, | Performed by: INTERNAL MEDICINE

## 2023-02-23 PROCEDURE — 99214 PR OFFICE/OUTPT VISIT, EST, LEVL IV, 30-39 MIN: ICD-10-PCS | Mod: S$PBB,,, | Performed by: INTERNAL MEDICINE

## 2023-02-23 PROCEDURE — 1159F PR MEDICATION LIST DOCUMENTED IN MEDICAL RECORD: ICD-10-PCS | Mod: CPTII,,, | Performed by: INTERNAL MEDICINE

## 2023-02-23 PROCEDURE — 71046 XR CHEST PA AND LATERAL: ICD-10-PCS | Mod: 26,,, | Performed by: RADIOLOGY

## 2023-02-23 PROCEDURE — 3079F PR MOST RECENT DIASTOLIC BLOOD PRESSURE 80-89 MM HG: ICD-10-PCS | Mod: CPTII,,, | Performed by: INTERNAL MEDICINE

## 2023-02-23 PROCEDURE — 99999 PR PBB SHADOW E&M-EST. PATIENT-LVL V: ICD-10-PCS | Mod: PBBFAC,,, | Performed by: INTERNAL MEDICINE

## 2023-02-23 PROCEDURE — 3075F PR MOST RECENT SYSTOLIC BLOOD PRESS GE 130-139MM HG: ICD-10-PCS | Mod: CPTII,,, | Performed by: INTERNAL MEDICINE

## 2023-02-23 PROCEDURE — 99999 PR PBB SHADOW E&M-EST. PATIENT-LVL V: CPT | Mod: PBBFAC,,, | Performed by: INTERNAL MEDICINE

## 2023-02-23 PROCEDURE — 99215 OFFICE O/P EST HI 40 MIN: CPT | Mod: PBBFAC,25 | Performed by: INTERNAL MEDICINE

## 2023-02-23 RX ORDER — ALBUTEROL SULFATE 90 UG/1
AEROSOL, METERED RESPIRATORY (INHALATION)
Qty: 8.5 G | Refills: 0 | Status: SHIPPED | OUTPATIENT
Start: 2023-02-23 | End: 2023-03-22

## 2023-02-23 RX ORDER — METHYLPREDNISOLONE 4 MG/1
TABLET ORAL
Qty: 21 TABLET | Refills: 2 | Status: SHIPPED | OUTPATIENT
Start: 2023-02-23 | End: 2023-08-02

## 2023-02-23 RX ORDER — ALBUTEROL SULFATE 1.25 MG/3ML
1.25 SOLUTION RESPIRATORY (INHALATION) 2 TIMES DAILY
Qty: 180 ML | Refills: 11 | Status: SHIPPED | OUTPATIENT
Start: 2023-02-23 | End: 2024-02-23

## 2023-02-23 NOTE — ASSESSMENT & PLAN NOTE
Asthma score 14  COPD score 17  REGIME TRELEGY  FEV1:  1.88 L( 65.3%)  Recent exacebation  Medrol for sick plan given  meds refills

## 2023-02-23 NOTE — PROGRESS NOTES
Subjective:       Patient ID: Luz Pelaez is a 52 y.o. female.    No flowsheet data found.     Asthma Control Test  In the past 4  weeks, how much of the time did your asthma keep you from getting as much done at work, school or at home?: Some of the time  During the past 4 weeks, how often have you had shortness of breath?: 3 to 6 times a week  During the past 4 weeks, how often did your asthma symptoms (wheezing, couging, shortness of breath, chest tightness or pain) wake you up at night or earlier than usual in the morning?: 2 or 3 nights a week  During the past 4 weeks, how often have you used your rescue inhaler or nebulizer medication (such as albuterol)?: 2 or 3 times a week  How would you rate your asthma control during the past 4 weeks?: Somewhat controlled  If your score is 19 or less, your asthma may not be under control: 14       COPD Questionnaire  How often do you cough?: Some of the time  How often do you have phlegm (mucus) in your chest?: Some of the time  How often does your chest feel tight?: Never  When you walk up a hill or one flight of stairs, how often are you breathless?: Some of the time  How often are you limited doing any activities at home?: Some of the time  How often are you confident leaving the house despite your lung condition?: Most of the time  How often do you sleep soundly?: Some of the time  How often do you have energy?: Some of the time  Total score: 17         Chief Complaint:   Luz Pelaez is 52 y.o.  Asked to see me by Dr Mcgarry,  Abn chest CT during w/u for neck pain  Had PET CT which did not show any FDG relevant uptake in concerning area, was some uptake in neck where she has pain  Stable on TRELEGY  No cough, No wheezing, No SOB  Active smoker: @18 years. Works in sale, cleans homes  On chantix, cut down to 1/2 PPD  Last spirometry: FEV1: 65.3%)  No occupational exposure  No travel  CHEST CT today: Stable, Resolution of GGO, Prob were inflammatory or  infectious.  Still has nodule opacity RUL: decreased in size,  Remains high risk due to smoking  Will repeat imaging 12 months      02/23/2023  Last visit 09/17/2020  Lost to f/u due to covisd  Recent respiratory exacerbation  Cough, chest tightness. Sputum  Needs refills for meds:   Still smoking  F/u CXR ordered  Qualifies for LDCt  Asked for nebulizer meds  Last PFT FEV1 65.3%      The following portions of the patient's history were reviewed and updated as appropriate:   She  has a past medical history of Allergy, Asthma, Attention deficit, Carpal tunnel syndrome, Cervical spine degeneration, GERD (gastroesophageal reflux disease), Hiatal hernia (11/1/2019), and Kidney stones.  She does not have any pertinent problems on file.  She  has a past surgical history that includes Cholecystectomy; Sleeve Gastroplasty; Colonoscopy (4/1/2012); Total Reduction Mammoplasty; Carpal tunnel release (Left, 10/7/2019); Carpal tunnel release (Right, 11/4/2019); Esophagogastroduodenoscopy (N/A, 6/18/2020); and Colonoscopy (N/A, 6/18/2020).  Her family history includes Breast cancer in her maternal grandmother, paternal aunt, and paternal grandmother; Colon cancer in her maternal grandmother; Diabetes in her mother; Heart attack (age of onset: 70) in her father; Heart disease (age of onset: 58) in her mother; Hyperlipidemia in her mother; Hypertension in her mother and sister; Thyroid disease in her sister.  She  reports that she has been smoking cigarettes. She started smoking about 35 years ago. She has a 30.00 pack-year smoking history. She has never used smokeless tobacco. She reports current alcohol use. She reports that she does not use drugs.  She has a current medication list which includes the following prescription(s): [START ON 3/8/2023] dextroamphetamine-amphetamine, dextroamphetamine-amphetamine, [START ON 4/7/2023] dextroamphetamine-amphetamine, ergocalciferol, linaclotide, meloxicam, mupirocin, omeprazole,  albuterol, albuterol, fluticasone-umeclidin-vilanter, and methylprednisolone.  Current Outpatient Medications on File Prior to Visit   Medication Sig Dispense Refill    [START ON 3/8/2023] dextroamphetamine-amphetamine (ADDERALL) 30 mg Tab Take 1 tablet (30 mg total) by mouth 2 (two) times a day. 60 tablet 0    dextroamphetamine-amphetamine (ADDERALL) 30 mg Tab Take 1 tablet (30 mg total) by mouth 2 (two) times a day. 60 tablet 0    [START ON 4/7/2023] dextroamphetamine-amphetamine 30 mg Tab Take 1 tablet (30 mg total) by mouth 2 (two) times a day. 60 tablet 0    ergocalciferol (ERGOCALCIFEROL) 50,000 unit Cap Vitamin D2 Take No date recorded No form recorded No frequency recorded No route recorded No set duration recorded No set duration amount recorded active No dosage strength recorded No dosage strength units of measure recorded      linaCLOtide (LINZESS) 290 mcg Cap capsule TAKE 1 CAPSULE (290 MCG TOTAL) BY MOUTH ONCE DAILY. 30 capsule 5    meloxicam (MOBIC) 15 MG tablet Take 1 tablet (15 mg total) by mouth once daily. 30 tablet 3    mupirocin (BACTROBAN) 2 % ointment Apply topically 3 (three) times daily. 30 g 00    omeprazole (PRILOSEC) 40 MG capsule Take 1 capsule (40 mg total) by mouth once daily. 90 capsule 3    [DISCONTINUED] albuterol (PROVENTIL/VENTOLIN HFA) 90 mcg/actuation inhaler INHALE 1 TO 2 PUFFS INTO THE LUNGS EVERY SIX HOURS AS NEEDED FORWHEEZING 8.5 g 0    [DISCONTINUED] fluticasone-umeclidin-vilanter (TRELEGY ELLIPTA) 100-62.5-25 mcg DsDv INHALE 1 PUFF INTO THE LUNGS ONCE DAILY. 60 each 0     No current facility-administered medications on file prior to visit.     She is allergic to bupropion hcl and lamisil [terbinafine]..    Review of Systems   Review of Systems   Constitutional: Negative.    HENT: Negative.     Eyes: Negative.    Respiratory:  Positive for cough, sputum production and shortness of breath. Negative for hemoptysis and wheezing.    Cardiovascular: Negative.   "  Gastrointestinal: Negative.    Genitourinary: Negative.    Musculoskeletal:  Positive for neck pain.   Skin: Negative.    All other systems reviewed and are negative.     Objective:        Vitals:    02/23/23 1330   BP: 130/80   Pulse: 84   Resp: 16   SpO2: 96%   Weight: 71.7 kg (158 lb 1.1 oz)   Height: 5' 5" (1.651 m)         Physical Exam  Vitals and nursing note reviewed.   Constitutional:       Appearance: She is well-developed. She is not ill-appearing.   HENT:      Head: Normocephalic and atraumatic.      Nose: Nose normal.      Mouth/Throat:      Pharynx: No oropharyngeal exudate.   Eyes:      General: No scleral icterus.     Conjunctiva/sclera: Conjunctivae normal.      Pupils: Pupils are equal, round, and reactive to light.   Neck:      Thyroid: No thyromegaly.      Vascular: No JVD.      Trachea: No tracheal deviation.   Cardiovascular:      Rate and Rhythm: Normal rate and regular rhythm.      Heart sounds: Normal heart sounds, S1 normal and S2 normal. No murmur heard.  Pulmonary:      Effort: Pulmonary effort is normal. No tachypnea, accessory muscle usage or respiratory distress.      Breath sounds: Normal breath sounds. No stridor.   Abdominal:      General: Bowel sounds are normal. There is no distension.      Palpations: Abdomen is soft. There is no hepatomegaly, splenomegaly or mass.      Tenderness: There is no abdominal tenderness. There is no guarding or rebound.   Musculoskeletal:         General: No tenderness. Normal range of motion.      Cervical back: Normal range of motion and neck supple.   Lymphadenopathy:      Upper Body:      Right upper body: No supraclavicular adenopathy.      Left upper body: No supraclavicular adenopathy.   Skin:     General: Skin is warm and dry.      Findings: No rash.      Nails: There is no clubbing.   Neurological:      Mental Status: She is alert and oriented to person, place, and time.      Coordination: Coordination normal.      Gait: Gait normal.      " Deep Tendon Reflexes: Reflexes are normal and symmetric.       Data Review:   CBC:   Lab Results   Component Value Date    WBC 4.63 10/19/2021    RBC 4.16 10/19/2021    HGB 13.1 10/19/2021    HCT 42.5 10/19/2021     10/19/2021     BMP:   Lab Results   Component Value Date    GLU 84 11/09/2022     11/09/2022    K 4.0 11/09/2022     11/09/2022    CO2 26 11/09/2022    BUN 17 11/09/2022    CREATININE 0.6 11/09/2022    CALCIUM 9.4 11/09/2022     Radiology review: CHEST CT         Diagnostics:    CXR was reveiwed        hyperinflation       Spirometry  FEV1: 1.88L( 65.3%), FVC 3.07L ( 85.5%)  FEV1/FVC 61.13    Assessment:      Problem List Items Addressed This Visit       Abnormal CT scan of lung    Relevant Orders    CT Chest Lung Screening Low Dose    Asthma-COPD overlap syndrome - Primary     Asthma score 14  COPD score 17  REGIME TRELEGY  FEV1:  1.88 L( 65.3%)  Recent exacebation  Medrol for sick plan given  meds refills           Relevant Medications    fluticasone-umeclidin-vilanter (TRELEGY ELLIPTA) 100-62.5-25 mcg DsDv    albuterol (PROVENTIL/VENTOLIN HFA) 90 mcg/actuation inhaler    albuterol (ACCUNEB) 1.25 mg/3 mL Nebu    methylPREDNISolone (MEDROL DOSEPACK) 4 mg tablet    Other Relevant Orders    X-Ray Chest PA And Lateral    Spirometry with/without bronchodilator    Stress test, pulmonary    PULM - Arterial Blood Gases--in addition to PFT only    Ambulatory referral/consult to Pulmonary Disease Management w/ Respiratory Therapist    Cigarette nicotine dependence with nicotine-induced disorder     Smoking cessation         Pulmonary nodule     Needs f/u chest CT         Relevant Orders    CT Chest Lung Screening Low Dose     Other Visit Diagnoses       COPD with chronic bronchitis        Moderate persistent asthma without complication              Adhrence to smoking cessation   CHANTIX caused nausea  Now 1/2 PPD  PDM review inhale use  Continue TRELEGY  Nodule f/u imaging      Plan:           Problem List Items Addressed This Visit       Abnormal CT scan of lung    Relevant Orders    CT Chest Lung Screening Low Dose    Asthma-COPD overlap syndrome - Primary     Asthma score 14  COPD score 17  REGIME TRELEGY  FEV1:  1.88 L( 65.3%)  Recent exacebation  Medrol for sick plan given  meds refills           Relevant Medications    fluticasone-umeclidin-vilanter (TRELEGY ELLIPTA) 100-62.5-25 mcg DsDv    albuterol (PROVENTIL/VENTOLIN HFA) 90 mcg/actuation inhaler    albuterol (ACCUNEB) 1.25 mg/3 mL Nebu    methylPREDNISolone (MEDROL DOSEPACK) 4 mg tablet    Other Relevant Orders    X-Ray Chest PA And Lateral    Spirometry with/without bronchodilator    Stress test, pulmonary    PULM - Arterial Blood Gases--in addition to PFT only    Ambulatory referral/consult to Pulmonary Disease Management w/ Respiratory Therapist    Cigarette nicotine dependence with nicotine-induced disorder     Smoking cessation         Pulmonary nodule     Needs f/u chest CT         Relevant Orders    CT Chest Lung Screening Low Dose     Other Visit Diagnoses       COPD with chronic bronchitis        Moderate persistent asthma without complication                Orders Placed This Encounter   Procedures    X-Ray Chest PA And Lateral     Standing Status:   Future     Number of Occurrences:   1     Standing Expiration Date:   2/23/2024    CT Chest Lung Screening Low Dose     Schedule follow up office visit after test.     Standing Status:   Future     Standing Expiration Date:   8/23/2024     Order Specific Question:   Does the patient show any signs or symptoms of lung cancer?     Answer:   No     Order Specific Question:   Is this the first (baseline) CT or an annual exam?     Answer:   Annual [2]     Order Specific Question:   Is there documentation of shared decision making for this lung screening exam?     Answer:   Yes     Order Specific Question:   Access port per protocol?     Answer:   Yes     Order Specific Question:   Is this a  low dose screening chest CT?     Answer:   Yes    Ambulatory referral/consult to Pulmonary Disease Management w/ Respiratory Therapist     Standing Status:   Future     Standing Expiration Date:   3/23/2024     Referral Priority:   Routine     Referral Type:   Consultation     Referral Reason:   Specialty Services Required     Requested Specialty:   Pulmonary Disease     Number of Visits Requested:   1    Spirometry with/without bronchodilator     Standing Status:   Future     Standing Expiration Date:   2/23/2024     Order Specific Question:   Release to patient     Answer:   Immediate    Stress test, pulmonary     Standing Status:   Future     Standing Expiration Date:   2/23/2024     Order Specific Question:   Reason for study     Answer:   Functional status     Order Specific Question:   Release to patient     Answer:   Immediate    PULM - Arterial Blood Gases--in addition to PFT only     Standing Status:   Future     Standing Expiration Date:   2/23/2024     Order Specific Question:   Release to patient     Answer:   Immediate       Follow up in about 3 months (around 5/23/2023), or cxr today, ABG, Laie, 6MWD, LDCT.    This note was prepared using voice recognition system and is likely to have sound alike errors that may have been overlooked even after proof reading.  Please call me with any questions    Discussed diagnosis, its evaluation, treatment and usual course. All questions answered.    Thank you for the courtesy of participating in the care of this patient    Antelmo Mcknight MD

## 2023-02-24 ENCOUNTER — TELEPHONE (OUTPATIENT)
Dept: PULMONOLOGY | Facility: CLINIC | Age: 53
End: 2023-02-24
Payer: MEDICAID

## 2023-02-24 NOTE — TELEPHONE ENCOUNTER
Returned call to pharmacy accuneb 1.25mg/3ml not available, pharmacy asking to change to 2.5mg/3ml, Dr. Medina authorized the change. Pharmacist notified.

## 2023-02-24 NOTE — TELEPHONE ENCOUNTER
----- Message from [employer] sent at 2/24/2023  1:46 PM CST -----  [facility] is requesting a call back concerning a medication for the pt. Call back number is [phone]. Thx jm

## 2023-04-13 ENCOUNTER — PATIENT MESSAGE (OUTPATIENT)
Dept: FAMILY MEDICINE | Facility: CLINIC | Age: 53
End: 2023-04-13
Payer: MEDICAID

## 2023-05-04 ENCOUNTER — OFFICE VISIT (OUTPATIENT)
Dept: FAMILY MEDICINE | Facility: CLINIC | Age: 53
End: 2023-05-04
Payer: MEDICAID

## 2023-05-04 ENCOUNTER — PATIENT MESSAGE (OUTPATIENT)
Dept: FAMILY MEDICINE | Facility: CLINIC | Age: 53
End: 2023-05-04

## 2023-05-04 VITALS — DIASTOLIC BLOOD PRESSURE: 87 MMHG | SYSTOLIC BLOOD PRESSURE: 136 MMHG | HEART RATE: 82 BPM

## 2023-05-04 DIAGNOSIS — M54.12 CERVICAL RADICULOPATHY: ICD-10-CM

## 2023-05-04 DIAGNOSIS — R41.840 ATTENTION DEFICIT: Primary | Chronic | ICD-10-CM

## 2023-05-04 DIAGNOSIS — M47.22 OSTEOARTHRITIS OF SPINE WITH RADICULOPATHY, CERVICAL REGION: ICD-10-CM

## 2023-05-04 PROCEDURE — 3075F SYST BP GE 130 - 139MM HG: CPT | Mod: CPTII,95,, | Performed by: NURSE PRACTITIONER

## 2023-05-04 PROCEDURE — 3075F PR MOST RECENT SYSTOLIC BLOOD PRESS GE 130-139MM HG: ICD-10-PCS | Mod: CPTII,95,, | Performed by: NURSE PRACTITIONER

## 2023-05-04 PROCEDURE — 99214 OFFICE O/P EST MOD 30 MIN: CPT | Mod: 95,,, | Performed by: NURSE PRACTITIONER

## 2023-05-04 PROCEDURE — 3079F PR MOST RECENT DIASTOLIC BLOOD PRESSURE 80-89 MM HG: ICD-10-PCS | Mod: CPTII,95,, | Performed by: NURSE PRACTITIONER

## 2023-05-04 PROCEDURE — 99214 PR OFFICE/OUTPT VISIT, EST, LEVL IV, 30-39 MIN: ICD-10-PCS | Mod: 95,,, | Performed by: NURSE PRACTITIONER

## 2023-05-04 PROCEDURE — 3079F DIAST BP 80-89 MM HG: CPT | Mod: CPTII,95,, | Performed by: NURSE PRACTITIONER

## 2023-05-04 RX ORDER — DEXTROAMPHETAMINE SACCHARATE, AMPHETAMINE ASPARTATE, DEXTROAMPHETAMINE SULFATE AND AMPHETAMINE SULFATE 7.5; 7.5; 7.5; 7.5 MG/1; MG/1; MG/1; MG/1
1 TABLET ORAL 2 TIMES DAILY
Qty: 60 TABLET | Refills: 0 | Status: SHIPPED | OUTPATIENT
Start: 2023-07-03 | End: 2023-08-02 | Stop reason: SDUPTHER

## 2023-05-04 RX ORDER — DEXTROAMPHETAMINE SACCHARATE, AMPHETAMINE ASPARTATE, DEXTROAMPHETAMINE SULFATE AND AMPHETAMINE SULFATE 7.5; 7.5; 7.5; 7.5 MG/1; MG/1; MG/1; MG/1
30 TABLET ORAL 2 TIMES DAILY
Qty: 60 TABLET | Refills: 0 | Status: SHIPPED | OUTPATIENT
Start: 2023-05-04 | End: 2023-08-02 | Stop reason: SDUPTHER

## 2023-05-04 RX ORDER — DEXTROAMPHETAMINE SACCHARATE, AMPHETAMINE ASPARTATE, DEXTROAMPHETAMINE SULFATE AND AMPHETAMINE SULFATE 7.5; 7.5; 7.5; 7.5 MG/1; MG/1; MG/1; MG/1
1 TABLET ORAL 2 TIMES DAILY
Qty: 60 TABLET | Refills: 0 | Status: SHIPPED | OUTPATIENT
Start: 2023-06-03 | End: 2023-08-02 | Stop reason: SDUPTHER

## 2023-05-04 RX ORDER — MELOXICAM 15 MG/1
15 TABLET ORAL DAILY
Qty: 90 TABLET | Refills: 3 | Status: SHIPPED | OUTPATIENT
Start: 2023-05-04 | End: 2024-01-31 | Stop reason: SDUPTHER

## 2023-05-11 ENCOUNTER — HOSPITAL ENCOUNTER (OUTPATIENT)
Dept: RADIOLOGY | Facility: HOSPITAL | Age: 53
Discharge: HOME | End: 2023-05-11
Attending: FAMILY MEDICINE
Payer: MEDICAID

## 2023-05-11 DIAGNOSIS — R92.8 ABNORMAL MAMMOGRAM: ICD-10-CM

## 2023-05-11 PROCEDURE — 76642 US BREAST LEFT LIMITED: ICD-10-PCS | Mod: 26,LT,, | Performed by: RADIOLOGY

## 2023-05-11 PROCEDURE — 76642 ULTRASOUND BREAST LIMITED: CPT | Mod: TC,PO,LT

## 2023-05-11 PROCEDURE — 76642 ULTRASOUND BREAST LIMITED: CPT | Mod: 26,LT,, | Performed by: RADIOLOGY

## 2023-05-15 NOTE — PROGRESS NOTES
This is Benign!  This is great news.    Recommendation:  Routine screening mammogram in 1 year is recommended.

## 2023-05-22 PROBLEM — M54.12 CERVICAL RADICULOPATHY: Status: ACTIVE | Noted: 2023-05-22

## 2023-05-22 NOTE — PROGRESS NOTES
Subjective:       Patient ID: Luz Pelaez is a 52 y.o. female.    Primary Care Telemedicine Note    The patient location is:  Patient Home   The chief complaint leading to consultation is: Adderall refill  Total time spent with patient: 20 mins    Visit type: Virtual visit with synchronous audio only and video  Each patient to whom he or she provides medical services by telemedicine is:  (1) informed of the relationship between the physician and patient and the respective role of any other health care provider with respect to management of the patient; and (2) notified that he or she may decline to receive medical services by telemedicine and may withdraw from such care at any time.      Chief Complaint: Medication Refill    Medication Refill  Associated symptoms include arthralgias, joint swelling and neck pain. Pertinent negatives include no abdominal pain, chest pain, coughing, fatigue, fever, headaches, myalgias, rash, sore throat, vomiting or weakness.     Problem List Items Addressed This Visit          Neuro    Attention deficit - Primary (Chronic)    Overview     Luz Pelaez has ADD. Medication has been used since 2000 and has been stable on the current dose of medicine. She reports being compliant on the medicine and is not receiving narcotics from any other physician. The patient was checked in the [facility] of Pharmacy's Prescription Monitoring Program. There appears to be no incongruities with the patient's verbalized history. She denies any complications from taking the medicine. The patient's weight and apatite has been stable and has not had difficulties with agitation. She reports improvement in the symptoms with the current dose.                 Cervical spine degeneration    Overview     The patient is currently having cervical spine pain with some radiation of symptoms to the arms monitored as an active problem. The evaluation that has been done for this includes visits in the  office and imaging. This is being addressed or assessed by giving medicine, therapy. Treatment for this condition includes meloxicam             Cervical radiculopathy    Relevant Medications    meloxicam (MOBIC) 15 MG tablet         Review of Systems   Constitutional:  Negative for activity change, fatigue, fever and unexpected weight change.   HENT:  Negative for ear pain, hearing loss, rhinorrhea, sore throat and trouble swallowing.    Eyes:  Negative for pain, discharge and visual disturbance.   Respiratory:  Positive for wheezing. Negative for cough, chest tightness and shortness of breath.    Cardiovascular:  Negative for chest pain and palpitations.   Gastrointestinal:  Negative for abdominal pain, blood in stool, constipation, diarrhea and vomiting.   Endocrine: Negative for polydipsia and polyuria.   Genitourinary:  Negative for difficulty urinating, dysuria, hematuria and menstrual problem.   Musculoskeletal:  Positive for arthralgias, joint swelling and neck pain. Negative for myalgias.   Skin:  Negative for color change and rash.   Neurological:  Negative for dizziness, weakness and headaches.   Psychiatric/Behavioral:  Negative for confusion, dysphoric mood and sleep disturbance. The patient is not nervous/anxious.      Vitals:    05/04/23 1452   BP: 136/87   Pulse: 82       Objective:     Current Outpatient Medications   Medication Sig Dispense Refill    albuterol (ACCUNEB) 1.25 mg/3 mL Nebu Take 3 mLs (1.25 mg total) by nebulization 2 (two) times a day. Rescue 180 mL 11    albuterol (PROVENTIL/VENTOLIN HFA) 90 mcg/actuation inhaler INHALE 1 TO 2 PUFFS BY MOUTH EVERY SIX HOURS AS NEEDED FOR WHEEZING 8.5 g 0    [START ON 7/3/2023] dextroamphetamine-amphetamine (ADDERALL) 30 mg Tab Take 1 tablet (30 mg total) by mouth 2 (two) times a day. 60 tablet 0    [START ON 6/3/2023] dextroamphetamine-amphetamine (ADDERALL) 30 mg Tab Take 1 tablet (30 mg total) by mouth 2 (two) times a day. 60 tablet 0     dextroamphetamine-amphetamine 30 mg Tab Take 1 tablet (30 mg total) by mouth 2 (two) times a day. 60 tablet 0    ergocalciferol (ERGOCALCIFEROL) 50,000 unit Cap Vitamin D2 Take No date recorded No form recorded No frequency recorded No route recorded No set duration recorded No set duration amount recorded active No dosage strength recorded No dosage strength units of measure recorded      fluticasone-umeclidin-vilanter (TRELEGY ELLIPTA) 100-62.5-25 mcg DsDv Inhale 1 puff into the lungs once daily. 60 each 11    linaCLOtide (LINZESS) 290 mcg Cap capsule TAKE 1 CAPSULE (290 MCG TOTAL) BY MOUTH ONCE DAILY. 30 capsule 5    meloxicam (MOBIC) 15 MG tablet Take 1 tablet (15 mg total) by mouth once daily. 90 tablet 3    methylPREDNISolone (MEDROL DOSEPACK) 4 mg tablet use as directed 21 tablet 2    mupirocin (BACTROBAN) 2 % ointment Apply topically 3 (three) times daily. 30 g 00    omeprazole (PRILOSEC) 40 MG capsule Take 1 capsule (40 mg total) by mouth once daily. 90 capsule 3     No current facility-administered medications for this visit.       Physical Exam  Constitutional:       Appearance: She is well-developed.   HENT:      Head: Normocephalic.   Pulmonary:      Effort: Pulmonary effort is normal. No respiratory distress.   Musculoskeletal:      Cervical back: Normal range of motion.   Neurological:      Mental Status: She is alert and oriented to person, place, and time.   Psychiatric:         Thought Content: Thought content normal.         Judgment: Judgment normal.       Assessment:       1. Attention deficit    2. Osteoarthritis of spine with radiculopathy, cervical region    3. Cervical radiculopathy        Plan:   Attention deficit    Osteoarthritis of spine with radiculopathy, cervical region    Cervical radiculopathy  -     meloxicam (MOBIC) 15 MG tablet; Take 1 tablet (15 mg total) by mouth once daily.  Dispense: 90 tablet; Refill: 3    Other orders  -     dextroamphetamine-amphetamine (ADDERALL) 30 mg  Tab; Take 1 tablet (30 mg total) by mouth 2 (two) times a day.  Dispense: 60 tablet; Refill: 0  -     dextroamphetamine-amphetamine (ADDERALL) 30 mg Tab; Take 1 tablet (30 mg total) by mouth 2 (two) times a day.  Dispense: 60 tablet; Refill: 0  -     dextroamphetamine-amphetamine 30 mg Tab; Take 1 tablet (30 mg total) by mouth 2 (two) times a day.  Dispense: 60 tablet; Refill: 0        No follow-ups on file.    There are no Patient Instructions on file for this visit.

## 2023-05-24 ENCOUNTER — PATIENT MESSAGE (OUTPATIENT)
Dept: SLEEP MEDICINE | Facility: CLINIC | Age: 53
End: 2023-05-24
Payer: MEDICAID

## 2023-05-24 NOTE — PROGRESS NOTES
Subjective:       Patient ID: Luz Pelaez is a 52 y.o. female.    No flowsheet data found.     Asthma Control Test  In the past 4  weeks, how much of the time did your asthma keep you from getting as much done at work, school or at home?: Some of the time  During the past 4 weeks, how often have you had shortness of breath?: Once a day  During the past 4 weeks, how often did your asthma symptoms (wheezing, couging, shortness of breath, chest tightness or pain) wake you up at night or earlier than usual in the morning?: 2 or 3 nights a week  During the past 4 weeks, how often have you used your rescue inhaler or nebulizer medication (such as albuterol)?: 2 or 3 times a week  How would you rate your asthma control during the past 4 weeks?: Somewhat controlled  If your score is 19 or less, your asthma may not be under control: 13       COPD Questionnaire  How often do you cough?: All of the time  How often do you have phlegm (mucus) in your chest?: Most of the time  How often does your chest feel tight?: Some of the time  When you walk up a hill or one flight of stairs, how often are you breathless?: All of the time  How often are you limited doing any activities at home?: Some of the time  How often are you confident leaving the house despite your lung condition?: All of the time  How often do you sleep soundly?: Some of the time  How often do you have energy?: Some of the time  Total score: 24         Chief Complaint:   Luz Pelaez is 52 y.o.  Asked to see me by Dr Mcgarry,  Abn chest CT during w/u for neck pain  Had PET CT which did not show any FDG relevant uptake in concerning area, was some uptake in neck where she has pain  Stable on TRELEGY  No cough, No wheezing, No SOB  Active smoker: @18 years. Works in sale, cleans homes  On chantix, cut down to 1/2 PPD  Last spirometry: FEV1: 65.3%)  No occupational exposure  No travel  CHEST CT today: Stable, Resolution of GGO, Prob were inflammatory or  infectious.  Still has nodule opacity RUL: decreased in size,  Remains high risk due to smoking  Will repeat imaging 12 months      02/23/2023  Last visit 09/17/2020  Lost to f/u due to covisd  Recent respiratory exacerbation  Cough, chest tightness. Sputum  Needs refills for meds:   Still smoking  F/u CXR ordered  Qualifies for LDCt  Asked for nebulizer meds  Last PFT FEV1 65.3%      05/25/2023  Followup to review tests  Last seen 02/23/2023: was lost to F/u since 09/2020  At that time had chex=st CT x 2 and PET CT for left UL nodule  Last imaging was not PET FDG avid and grew smaller on last imaging 09/2020  Smoker > 30 PPD, current smoker  No cough, No hemoptysis  ACT score 13, COPD score 24  Did not take TRELGY today  FEV1: 1.76L( 63.2%), FEV1/FVC 51  Normal exercise capacity or desaturation  CT: interval worsening: will need sampling  Also descibed discomfort, pain in legs : turn blue  +ve family Hx raynauds  Will get PETCT  Asks for anxiety medication for PETCT  Will need ride to and from procedure  Normal ABG  BP was elevated       The following portions of the patient's history were reviewed and updated as appropriate:   She  has a past medical history of Allergy, Asthma, Attention deficit, Carpal tunnel syndrome, Cervical spine degeneration, GERD (gastroesophageal reflux disease), Hiatal hernia (11/1/2019), and Kidney stones.  She does not have any pertinent problems on file.  She  has a past surgical history that includes Cholecystectomy; Sleeve Gastroplasty; Colonoscopy (4/1/2012); Total Reduction Mammoplasty; Carpal tunnel release (Left, 10/7/2019); Carpal tunnel release (Right, 11/4/2019); Esophagogastroduodenoscopy (N/A, 6/18/2020); and Colonoscopy (N/A, 6/18/2020).  Her family history includes Breast cancer in her maternal grandmother, paternal aunt, and paternal grandmother; Colon cancer in her maternal grandmother; Diabetes in her mother; Heart attack (age of onset: 70) in her father; Heart disease  (age of onset: 58) in her mother; Hyperlipidemia in her mother; Hypertension in her mother and sister; Thyroid disease in her sister.  She  reports that she has been smoking cigarettes. She started smoking about 35 years ago. She has a 30.00 pack-year smoking history. She has never used smokeless tobacco. She reports current alcohol use. She reports that she does not use drugs.  She has a current medication list which includes the following prescription(s): albuterol, albuterol, [START ON 7/3/2023] dextroamphetamine-amphetamine, [START ON 6/3/2023] dextroamphetamine-amphetamine, dextroamphetamine-amphetamine, ergocalciferol, fluticasone-umeclidin-vilanter, linaclotide, meloxicam, mupirocin, omeprazole, ondansetron, alprazolam, and methylprednisolone.  Current Outpatient Medications on File Prior to Visit   Medication Sig Dispense Refill    albuterol (ACCUNEB) 1.25 mg/3 mL Nebu Take 3 mLs (1.25 mg total) by nebulization 2 (two) times a day. Rescue 180 mL 11    albuterol (PROVENTIL/VENTOLIN HFA) 90 mcg/actuation inhaler INHALE 1 TO 2 PUFFS BY MOUTH EVERY SIX HOURS AS NEEDED FOR WHEEZING 8.5 g 0    [START ON 7/3/2023] dextroamphetamine-amphetamine (ADDERALL) 30 mg Tab Take 1 tablet (30 mg total) by mouth 2 (two) times a day. 60 tablet 0    [START ON 6/3/2023] dextroamphetamine-amphetamine (ADDERALL) 30 mg Tab Take 1 tablet (30 mg total) by mouth 2 (two) times a day. 60 tablet 0    dextroamphetamine-amphetamine 30 mg Tab Take 1 tablet (30 mg total) by mouth 2 (two) times a day. 60 tablet 0    ergocalciferol (ERGOCALCIFEROL) 50,000 unit Cap Vitamin D2 Take No date recorded No form recorded No frequency recorded No route recorded No set duration recorded No set duration amount recorded active No dosage strength recorded No dosage strength units of measure recorded      fluticasone-umeclidin-vilanter (TRELEGY ELLIPTA) 100-62.5-25 mcg DsDv Inhale 1 puff into the lungs once daily. 60 each 11    linaCLOtide (LINZESS) 290 mcg  "Cap capsule TAKE 1 CAPSULE (290 MCG TOTAL) BY MOUTH ONCE DAILY. 30 capsule 5    meloxicam (MOBIC) 15 MG tablet Take 1 tablet (15 mg total) by mouth once daily. 90 tablet 3    mupirocin (BACTROBAN) 2 % ointment Apply topically 3 (three) times daily. 30 g 00    omeprazole (PRILOSEC) 40 MG capsule Take 1 capsule (40 mg total) by mouth once daily. 90 capsule 3    ondansetron (ZOFRAN-ODT) 4 MG TbDL Take by mouth.      methylPREDNISolone (MEDROL DOSEPACK) 4 mg tablet use as directed (Patient not taking: Reported on 5/25/2023) 21 tablet 2     No current facility-administered medications on file prior to visit.     She is allergic to bupropion hcl and lamisil [terbinafine]..    Review of Systems   Review of Systems   Constitutional: Negative.    HENT: Negative.     Eyes: Negative.    Respiratory:  Negative for cough, hemoptysis, sputum production, shortness of breath and wheezing.    Cardiovascular: Negative.    Gastrointestinal: Negative.    Genitourinary: Negative.    Musculoskeletal:  Positive for neck pain.   Skin: Negative.    All other systems reviewed and are negative.     Objective:        Vitals:    05/25/23 1553   BP: 130/80   Pulse: 78   Resp: 17   SpO2: 98%   Weight: 72.6 kg (160 lb 0.9 oz)   Height: 5' 5" (1.651 m)           Physical Exam  Vitals and nursing note reviewed.   Constitutional:       Appearance: She is well-developed. She is not ill-appearing.       HENT:      Head: Normocephalic and atraumatic.      Nose: Nose normal.      Mouth/Throat:      Pharynx: No oropharyngeal exudate.   Eyes:      General: No scleral icterus.     Conjunctiva/sclera: Conjunctivae normal.      Pupils: Pupils are equal, round, and reactive to light.   Neck:      Thyroid: No thyromegaly.      Vascular: No JVD.      Trachea: No tracheal deviation.   Cardiovascular:      Rate and Rhythm: Normal rate and regular rhythm.      Heart sounds: Normal heart sounds, S1 normal and S2 normal. No murmur heard.  Pulmonary:      Effort: " Pulmonary effort is normal. No tachypnea, accessory muscle usage or respiratory distress.      Breath sounds: Normal breath sounds. No stridor.   Abdominal:      General: Bowel sounds are normal. There is no distension.      Palpations: Abdomen is soft. There is no hepatomegaly, splenomegaly or mass.      Tenderness: There is no abdominal tenderness. There is no guarding or rebound.   Musculoskeletal:         General: No tenderness. Normal range of motion.      Cervical back: Normal range of motion and neck supple.   Lymphadenopathy:      Upper Body:      Right upper body: No supraclavicular adenopathy.      Left upper body: No supraclavicular adenopathy.   Skin:     General: Skin is warm and dry.      Findings: No rash.      Nails: There is no clubbing.   Neurological:      Mental Status: She is alert and oriented to person, place, and time.      Coordination: Coordination normal.      Gait: Gait normal.      Deep Tendon Reflexes: Reflexes are normal and symmetric.       Data Review:   CBC:   Lab Results   Component Value Date    WBC 4.63 10/19/2021    RBC 4.16 10/19/2021    HGB 13.1 10/19/2021    HCT 42.5 10/19/2021     10/19/2021     BMP:   Lab Results   Component Value Date    GLU 84 11/09/2022     11/09/2022    K 4.0 11/09/2022     11/09/2022    CO2 26 11/09/2022    BUN 17 11/09/2022    CREATININE 0.6 11/09/2022    CALCIUM 9.4 11/09/2022     Radiology review: CHEST CT         Diagnostics:    CT Chest Lung Screening Low Dose  Narrative: EXAMINATION:  CT CHEST LUNG SCREENING LOW DOSE    CLINICAL HISTORY:  Lung cancer screening, >= 20 pk-yr smoking history, risk factor(s) (Age >= 50y); Other nonspecific abnormal finding of lung field    TECHNIQUE:  CT of the thorax was performed with low dose, lung screening protocol.  No contrast was administered.  Sagittal and coronal reconstructions were obtained.    COMPARISON:  09/17/2020, 02/21/2020.    FINDINGS:  Lungs: Interval development of irregular  "nodular opacity with spiculated margins in the perihilar superior segment left lower lobe measuring approximately 2.7 cm.  Resolution of prior 7 mm right upper lobe nodular opacity.  Subtle ground-glass nodularity with tree-in-bud configuration in the bilateral upper lobes suggesting chronic small airways disease.  Few scattered low-grade ground-glass opacities elsewhere in both lungs.    Pleura: No effusion.    Heart and pericardium: Normal size without effusion.    Aorta and vasculature: Atherosclerosis including coronary arteries.    Sheba/mediastinum: No pathologic lymph node enlargement.    Chest wall and skeletal structures: Unremarkable except age-appropriate degenerative changes.    Upper abdomen: Hiatal hernia.  Cholecystectomy.  Surgical clips left upper quadrant.  Impression: Lung-RADS Category:  4B - Suspicious - consultation advised - possible next steps  Chest CT, tissue sampling and/or PET-CT.    Clinically or potentially clinically significant non lung cancer finding:  None.    Prior Lung Cancer Modifier:  No history of prior lung cancer.    This report was flagged in Epic as abnormal.    Electronically signed by: ALBINO Philip MD  Date:    05/25/2023  Time:    15:19              hyperinflation       Spirometry  FEV1: 1.76L( 63.2%), FVC 3.47L ( 99.5%)  FEV1/FVC  51    Recent Labs     05/25/23  1505   PH 7.441   PCO2 37.1   PO2 80   HCO3 25.3   POCSATURATED 96   BE 1    Normal    Ordering Provider:            Interpreting Provider:   Performing nurse/tech/RT: TRISTON Lomax, RRT  Diagnosis:  (Asthma-COPD Overlap Syndrome)  Height: 5' 5" (165.1 cm)  Weight: 72.6 kg (160 lb 0.9 oz)  BMI (Calculated): 26.6              Patient Race:                                                                Phase Oxygen Assessment Supplemental O2 Heart   Rate Blood Pressure Chana Dyspnea Scale Rating   Resting 96 % Room Air 70 bpm 139/90 2   Exercise             Minute             1 95 % " Room Air 96 bpm       2 96 % Room Air 103 bpm       3 96 % Room Air 100 bpm       4 96 % Room Air 95 bpm       5 95 % Room Air 96 bpm       6  98 % Room Air 96 bpm (!) 159/96 3   Recovery             Minute             1 97 % Room Air 75 bpm       2 97 % Room Air 79 bpm       3 98 % Room Air 79 bpm       4 98 % Room Air 80 bpm 153/88 2      Six Minute Walk Summary  6MWT Status: completed without stopping  Patient Reported: Dyspnea (Back and Hip Pain)            Interpretation:  Did the patient stop or pause?: No  Total Time Walked (Calculated): 360 seconds  Final Partial Lap Distance (feet): 0 feet  Total Distance Meters (Calculated): 426.72 meters  Predicted Distance Meters (Calculated): 548.55 meters  Percentage of Predicted (Calculated): 77.79  Peak VO2 (Calculated): 16.78  Mets: 4.79  Has The Patient Had a Previous Six Minute Walk Test?: No     Previous 6MWT Results  Has The Patient Had a Previous Six Minute Walk Test?: No      Assessment:      Problem List Items Addressed This Visit       Abnormal CT scan of lung    Relevant Orders    NM PET CT Routine Skull to Mid Thigh    Asthma-COPD overlap syndrome - Primary    Cigarette nicotine dependence with nicotine-induced disorder    Pulmonary nodule    Relevant Orders    NM PET CT Routine Skull to Mid Thigh     Other Visit Diagnoses       Raynaud's phenomenon without gangrene        Relevant Orders    Ankle Brachial Indices (KENYATTA)    Anxiety        Relevant Medications    ALPRAZolam (XANAX) 0.5 MG tablet          Adhrence to smoking cessation  Ankle KENYATTA  Continue TRELEGY  Nodule f/u imaging   Xanax for procedure     Plan:          Problem List Items Addressed This Visit       Abnormal CT scan of lung    Relevant Orders    NM PET CT Routine Skull to Mid Thigh    Asthma-COPD overlap syndrome - Primary    Cigarette nicotine dependence with nicotine-induced disorder    Pulmonary nodule    Relevant Orders    NM PET CT Routine Skull to Mid Thigh     Other Visit Diagnoses        Raynaud's phenomenon without gangrene        Relevant Orders    Ankle Brachial Indices (KENYATTA)    Anxiety        Relevant Medications    ALPRAZolam (XANAX) 0.5 MG tablet            Orders Placed This Encounter   Procedures    NM PET CT Routine Skull to Mid Thigh     Standing Status:   Future     Standing Expiration Date:   5/24/2024     Order Specific Question:   Is the patient pregnant?     Answer:   No    Ankle Brachial Indices (KENYATTA)     Standing Status:   Future     Standing Expiration Date:   5/25/2024     Order Specific Question:   Is the patient pregnant?     Answer:   No     Order Specific Question:   Exam Type:     Answer:   Exercise     Order Specific Question:   Release to patient     Answer:   Immediate       Follow up in about 4 weeks (around 6/22/2023), or Ankle KENYATTA, PET CT.    This note was prepared using voice recognition system and is likely to have sound alike errors that may have been overlooked even after proof reading.  Please call me with any questions    Discussed diagnosis, its evaluation, treatment and usual course. All questions answered.    Thank you for the courtesy of participating in the care of this patient    Antelmo Mcknight MD

## 2023-05-25 ENCOUNTER — OFFICE VISIT (OUTPATIENT)
Dept: SLEEP MEDICINE | Facility: CLINIC | Age: 53
End: 2023-05-25
Payer: MEDICAID

## 2023-05-25 ENCOUNTER — CLINICAL SUPPORT (OUTPATIENT)
Dept: PULMONOLOGY | Facility: CLINIC | Age: 53
End: 2023-05-25
Payer: MEDICAID

## 2023-05-25 ENCOUNTER — HOSPITAL ENCOUNTER (OUTPATIENT)
Dept: RADIOLOGY | Facility: HOSPITAL | Age: 53
Discharge: HOME | End: 2023-05-25
Attending: INTERNAL MEDICINE
Payer: MEDICAID

## 2023-05-25 VITALS
DIASTOLIC BLOOD PRESSURE: 80 MMHG | SYSTOLIC BLOOD PRESSURE: 130 MMHG | WEIGHT: 160.06 LBS | HEART RATE: 78 BPM | HEIGHT: 65 IN | BODY MASS INDEX: 26.67 KG/M2 | OXYGEN SATURATION: 98 % | BODY MASS INDEX: 26.67 KG/M2 | RESPIRATION RATE: 17 BRPM | WEIGHT: 160.06 LBS | HEIGHT: 65 IN

## 2023-05-25 DIAGNOSIS — J44.89 ASTHMA-COPD OVERLAP SYNDROME: ICD-10-CM

## 2023-05-25 DIAGNOSIS — F17.219 CIGARETTE NICOTINE DEPENDENCE WITH NICOTINE-INDUCED DISORDER: ICD-10-CM

## 2023-05-25 DIAGNOSIS — J44.89 ASTHMA-COPD OVERLAP SYNDROME: Primary | ICD-10-CM

## 2023-05-25 DIAGNOSIS — R91.1 PULMONARY NODULE: ICD-10-CM

## 2023-05-25 DIAGNOSIS — I73.00 RAYNAUD'S PHENOMENON WITHOUT GANGRENE: ICD-10-CM

## 2023-05-25 DIAGNOSIS — R91.8 ABNORMAL CT SCAN OF LUNG: ICD-10-CM

## 2023-05-25 DIAGNOSIS — F41.9 ANXIETY: ICD-10-CM

## 2023-05-25 LAB
ALLENS TEST: NORMAL
BRPFT: ABNORMAL
DELSYS: NORMAL
FEF 25 75 CHG: -13.8 %
FEF 25 75 LLN: 1.45
FEF 25 75 POST REF: 20.7 %
FEF 25 75 PRE REF: 24.1 %
FEF 25 75 REF: 2.65
FET100 CHG: 37 %
FEV1 CHG: 5.2 %
FEV1 FVC CHG: -3.9 %
FEV1 FVC LLN: 69
FEV1 FVC POST REF: 60.7 %
FEV1 FVC PRE REF: 63.2 %
FEV1 FVC REF: 80
FEV1 LLN: 2.15
FEV1 POST REF: 66.5 %
FEV1 PRE REF: 63.2 %
FEV1 REF: 2.78
FIO2: 21
FVC CHG: 9.5 %
FVC LLN: 2.71
FVC POST REF: 108.9 %
FVC PRE REF: 99.5 %
FVC REF: 3.49
HCO3 UR-SCNC: 25.3 MMOL/L (ref 24–28)
MODE: NORMAL
PCO2 BLDA: 37.1 MMHG (ref 35–45)
PEF CHG: 3.6 %
PEF LLN: 5.05
PEF POST REF: 68.6 %
PEF PRE REF: 66.3 %
PEF REF: 6.81
PH SMN: 7.44 [PH] (ref 7.35–7.45)
PO2 BLDA: 80 MMHG (ref 80–100)
POC BE: 1 MMOL/L
POC SATURATED O2: 96 % (ref 95–100)
POST FEF 25 75: 0.55 L/S (ref 1.45–3.85)
POST FET 100: 19.38 SEC
POST FEV1 FVC: 48.58 % (ref 68.73–91.37)
POST FEV1: 1.85 L (ref 2.15–3.41)
POST FVC: 3.8 L (ref 2.71–4.28)
POST PEF: 4.68 L/S (ref 5.05–8.57)
PRE FEF 25 75: 0.64 L/S (ref 1.45–3.85)
PRE FET 100: 14.14 SEC
PRE FEV1 FVC: 50.58 % (ref 68.73–91.37)
PRE FEV1: 1.76 L (ref 2.15–3.41)
PRE FVC: 3.47 L (ref 2.71–4.28)
PRE PEF: 4.51 L/S (ref 5.05–8.57)
SAMPLE: NORMAL
SITE: NORMAL

## 2023-05-25 PROCEDURE — 94060 EVALUATION OF WHEEZING: CPT | Mod: 26,S$PBB,, | Performed by: INTERNAL MEDICINE

## 2023-05-25 PROCEDURE — 3008F PR BODY MASS INDEX (BMI) DOCUMENTED: ICD-10-PCS | Mod: CPTII,,, | Performed by: INTERNAL MEDICINE

## 2023-05-25 PROCEDURE — 99214 PR OFFICE/OUTPT VISIT, EST, LEVL IV, 30-39 MIN: ICD-10-PCS | Mod: 25,S$PBB,, | Performed by: INTERNAL MEDICINE

## 2023-05-25 PROCEDURE — 36600 WITHDRAWAL OF ARTERIAL BLOOD: CPT | Mod: 59,S$PBB,, | Performed by: INTERNAL MEDICINE

## 2023-05-25 PROCEDURE — 99999 PR PBB SHADOW E&M-EST. PATIENT-LVL I: ICD-10-PCS | Mod: PBBFAC,,,

## 2023-05-25 PROCEDURE — 94060 PR EVAL OF BRONCHOSPASM: ICD-10-PCS | Mod: 26,S$PBB,, | Performed by: INTERNAL MEDICINE

## 2023-05-25 PROCEDURE — 94618 PULMONARY STRESS TESTING: CPT | Mod: PBBFAC

## 2023-05-25 PROCEDURE — 3008F BODY MASS INDEX DOCD: CPT | Mod: CPTII,,, | Performed by: INTERNAL MEDICINE

## 2023-05-25 PROCEDURE — 1160F PR REVIEW ALL MEDS BY PRESCRIBER/CLIN PHARMACIST DOCUMENTED: ICD-10-PCS | Mod: CPTII,,, | Performed by: INTERNAL MEDICINE

## 2023-05-25 PROCEDURE — 1159F MED LIST DOCD IN RCRD: CPT | Mod: CPTII,,, | Performed by: INTERNAL MEDICINE

## 2023-05-25 PROCEDURE — 36600 WITHDRAWAL OF ARTERIAL BLOOD: CPT | Mod: PBBFAC

## 2023-05-25 PROCEDURE — 3079F DIAST BP 80-89 MM HG: CPT | Mod: CPTII,,, | Performed by: INTERNAL MEDICINE

## 2023-05-25 PROCEDURE — 3075F PR MOST RECENT SYSTOLIC BLOOD PRESS GE 130-139MM HG: ICD-10-PCS | Mod: CPTII,,, | Performed by: INTERNAL MEDICINE

## 2023-05-25 PROCEDURE — 99999 PR PBB SHADOW E&M-EST. PATIENT-LVL IV: CPT | Mod: PBBFAC,,, | Performed by: INTERNAL MEDICINE

## 2023-05-25 PROCEDURE — 3079F PR MOST RECENT DIASTOLIC BLOOD PRESSURE 80-89 MM HG: ICD-10-PCS | Mod: CPTII,,, | Performed by: INTERNAL MEDICINE

## 2023-05-25 PROCEDURE — 82803 BLOOD GASES ANY COMBINATION: CPT | Mod: PBBFAC

## 2023-05-25 PROCEDURE — 36600 PR WITHDRAWAL OF ARTERIAL BLOOD: ICD-10-PCS | Mod: 59,S$PBB,, | Performed by: INTERNAL MEDICINE

## 2023-05-25 PROCEDURE — 99999 PR PBB SHADOW E&M-EST. PATIENT-LVL IV: ICD-10-PCS | Mod: PBBFAC,,, | Performed by: INTERNAL MEDICINE

## 2023-05-25 PROCEDURE — 71271 CT THORAX LUNG CANCER SCR C-: CPT | Mod: TC

## 2023-05-25 PROCEDURE — 99214 OFFICE O/P EST MOD 30 MIN: CPT | Mod: 25,S$PBB,, | Performed by: INTERNAL MEDICINE

## 2023-05-25 PROCEDURE — 1159F PR MEDICATION LIST DOCUMENTED IN MEDICAL RECORD: ICD-10-PCS | Mod: CPTII,,, | Performed by: INTERNAL MEDICINE

## 2023-05-25 PROCEDURE — 99211 OFF/OP EST MAY X REQ PHY/QHP: CPT | Mod: PBBFAC,25

## 2023-05-25 PROCEDURE — 3075F SYST BP GE 130 - 139MM HG: CPT | Mod: CPTII,,, | Performed by: INTERNAL MEDICINE

## 2023-05-25 PROCEDURE — 99214 OFFICE O/P EST MOD 30 MIN: CPT | Mod: PBBFAC,25,27 | Performed by: INTERNAL MEDICINE

## 2023-05-25 PROCEDURE — 99999 PR PBB SHADOW E&M-EST. PATIENT-LVL I: CPT | Mod: PBBFAC,,,

## 2023-05-25 PROCEDURE — 94060 EVALUATION OF WHEEZING: CPT | Mod: PBBFAC

## 2023-05-25 PROCEDURE — 71271 CT CHEST LUNG SCREENING LOW DOSE: ICD-10-PCS | Mod: 26,,, | Performed by: RADIOLOGY

## 2023-05-25 PROCEDURE — 1160F RVW MEDS BY RX/DR IN RCRD: CPT | Mod: CPTII,,, | Performed by: INTERNAL MEDICINE

## 2023-05-25 PROCEDURE — 71271 CT THORAX LUNG CANCER SCR C-: CPT | Mod: 26,,, | Performed by: RADIOLOGY

## 2023-05-25 RX ORDER — ONDANSETRON 4 MG/1
TABLET, ORALLY DISINTEGRATING ORAL
COMMUNITY
Start: 2023-02-17

## 2023-05-25 RX ORDER — ALPRAZOLAM 0.5 MG/1
0.5 TABLET ORAL
Qty: 10 TABLET | Refills: 0 | Status: ON HOLD | OUTPATIENT
Start: 2023-05-25 | End: 2023-06-28

## 2023-05-25 NOTE — PROCEDURES
ABG completed. See ABG Below.    Component      Latest Ref Rng & Units 5/25/2023   POC PH      7.35 - 7.45 7.441   POC PCO2      35 - 45 mmHg 37.1   POC PO2      80 - 100 mmHg 80   POC HCO3      24 - 28 mmol/L 25.3   POC BE      -2 to 2 mmol/L 1   POC SATURATED O2      95 - 100 % 96   Sample       ARTERIAL   Site       RR   Allens Test       Pass   DelSys       Room Air   Mode       SPONT   FiO2       21         Interpretation:  [x] Arterial blood gases on room air demonstrate normal pCO2 and pO2  [] Arterial blood gases on room air are abnormal demonstrating hypercarbia (pCO2 >45 mmHg)  [] Arterial blood gases on room air are abnormal demonstrating hypocarbia (pCO2 < 35 mmHg)  [] Arterial blood gases on room air are abnormal demonstrating hypoxemia (pO2 < 80 mmHg)  [] Arterial blood gases on room air are abnormal demonstrating hyperoxemia (pO2 >120 mmHg)  [] Arterial blood gases on room air are abnormal demonstrating hypoxemia (pO2 < 80 mmHg) and hypercarbia (pCO2>45 mmHg)    The table below summarizes the main interpretations of the relationship between the arterial blood gases, pH, pCO2 and HCO-3    []    I

## 2023-05-25 NOTE — PROCEDURES
[facility]  [facility]     SUMMARY     Ordering Provider:    Interpreting Provider:   Performing nurse/tech/RT: TRISTON Lomax RRT  Diagnosis:  (Asthma-COPD Overlap Syndrome)  Height: 5' 5" (165.1 cm)  Weight: 72.6 kg (160 lb 0.9 oz)  BMI (Calculated): 26.6   Patient Race:             Phase Oxygen Assessment Supplemental O2 Heart   Rate Blood Pressure Chana Dyspnea Scale Rating   Resting 96 % Room Air 70 bpm 139/90 2   Exercise        Minute        1 95 % Room Air 96 bpm     2 96 % Room Air 103 bpm     3 96 % Room Air 100 bpm     4 96 % Room Air 95 bpm     5 95 % Room Air 96 bpm     6  98 % Room Air 96 bpm (!) 159/96 3   Recovery        Minute        1 97 % Room Air 75 bpm     2 97 % Room Air 79 bpm     3 98 % Room Air 79 bpm     4 98 % Room Air 80 bpm 153/88 2     Six Minute Walk Summary  6MWT Status: completed without stopping  Patient Reported: Dyspnea (Back and Hip Pain)     Interpretation:  Did the patient stop or pause?: No      Total Time Walked (Calculated): 360 seconds  Final Partial Lap Distance (feet): 0 feet  Total Distance Meters (Calculated): 426.72 meters  Predicted Distance Meters (Calculated): 548.55 meters  Percentage of Predicted (Calculated): 77.79  Peak VO2 (Calculated): 16.78  Mets: 4.79  Has The Patient Had a Previous Six Minute Walk Test?: No       Previous 6MWT Results  Has The Patient Had a Previous Six Minute Walk Test?: No      Six minute walk distance is 426.72m /548.55 meters (77.79 % predicted) with very light.Patient did complete the study, walking 360 seconds of the 360 second test . During exercise, there was no  significant desaturation while breathing room air .Lowest oxygen saturation was 95% .Maximum heart rate during exercise was 103 bpm which is 61 % of maximum predicted heart rate of 168 bpm. Blood pressure increased significantly and Heart rate remained stable Based upon age and body mass index, exercise capacity is " normal.  Peak VO2 during walking was 16.78 ml/kg/min which is 44 % of predicted Peak VO2 max of 38 ml/kg/min based on a resting heart rate of 70/min.    Patient has not had a previous study. No previous study performed.

## 2023-05-30 PROCEDURE — 94618 PULMONARY STRESS TESTING: ICD-10-PCS | Mod: 26,S$PBB,, | Performed by: INTERNAL MEDICINE

## 2023-05-30 PROCEDURE — 94618 PULMONARY STRESS TESTING: CPT | Mod: 26,S$PBB,, | Performed by: INTERNAL MEDICINE

## 2023-06-08 ENCOUNTER — HOSPITAL ENCOUNTER (OUTPATIENT)
Dept: RADIOLOGY | Facility: HOSPITAL | Age: 53
Discharge: HOME | End: 2023-06-08
Attending: INTERNAL MEDICINE
Payer: MEDICAID

## 2023-06-08 DIAGNOSIS — R91.8 ABNORMAL CT SCAN OF LUNG: ICD-10-CM

## 2023-06-08 DIAGNOSIS — R91.1 PULMONARY NODULE: ICD-10-CM

## 2023-06-08 PROCEDURE — A9552 F18 FDG: HCPCS

## 2023-06-08 PROCEDURE — 78815 PET IMAGE W/CT SKULL-THIGH: CPT | Mod: 26,PS,, | Performed by: RADIOLOGY

## 2023-06-08 PROCEDURE — 78815 NM PET CT ROUTINE: ICD-10-PCS | Mod: 26,PS,, | Performed by: RADIOLOGY

## 2023-06-09 ENCOUNTER — TELEPHONE (OUTPATIENT)
Dept: SLEEP MEDICINE | Facility: CLINIC | Age: 53
End: 2023-06-09
Payer: MEDICAID

## 2023-06-09 DIAGNOSIS — R91.8 LUNG MASS: Primary | ICD-10-CM

## 2023-06-09 NOTE — TELEPHONE ENCOUNTER
Orders Placed This Encounter   Procedures    CT Biopsy Lung w/ guidance     Standing Status:   Future     Standing Expiration Date:   6/9/2024     Order Specific Question:   Reason for Exam:     Answer:   left side     Order Specific Question:   May the Radiologist modify the order per protocol to meet the clinical needs of the patient?     Answer:   Yes

## 2023-06-15 ENCOUNTER — OFFICE VISIT (OUTPATIENT)
Dept: SLEEP MEDICINE | Facility: CLINIC | Age: 53
End: 2023-06-15
Payer: MEDICAID

## 2023-06-15 VITALS
HEIGHT: 65 IN | DIASTOLIC BLOOD PRESSURE: 82 MMHG | RESPIRATION RATE: 16 BRPM | BODY MASS INDEX: 27.1 KG/M2 | OXYGEN SATURATION: 96 % | HEART RATE: 92 BPM | WEIGHT: 162.69 LBS | SYSTOLIC BLOOD PRESSURE: 138 MMHG

## 2023-06-15 DIAGNOSIS — R91.1 PULMONARY NODULE: Primary | ICD-10-CM

## 2023-06-15 DIAGNOSIS — J44.89 ASTHMA-COPD OVERLAP SYNDROME: ICD-10-CM

## 2023-06-15 DIAGNOSIS — F17.219 CIGARETTE NICOTINE DEPENDENCE WITH NICOTINE-INDUCED DISORDER: ICD-10-CM

## 2023-06-15 DIAGNOSIS — R91.8 ABNORMAL CT SCAN OF LUNG: ICD-10-CM

## 2023-06-15 PROCEDURE — 3079F PR MOST RECENT DIASTOLIC BLOOD PRESSURE 80-89 MM HG: ICD-10-PCS | Mod: CPTII,,, | Performed by: INTERNAL MEDICINE

## 2023-06-15 PROCEDURE — 1159F PR MEDICATION LIST DOCUMENTED IN MEDICAL RECORD: ICD-10-PCS | Mod: CPTII,,, | Performed by: INTERNAL MEDICINE

## 2023-06-15 PROCEDURE — 99999 PR PBB SHADOW E&M-EST. PATIENT-LVL IV: CPT | Mod: PBBFAC,,, | Performed by: INTERNAL MEDICINE

## 2023-06-15 PROCEDURE — 99214 PR OFFICE/OUTPT VISIT, EST, LEVL IV, 30-39 MIN: ICD-10-PCS | Mod: S$PBB,,, | Performed by: INTERNAL MEDICINE

## 2023-06-15 PROCEDURE — 1159F MED LIST DOCD IN RCRD: CPT | Mod: CPTII,,, | Performed by: INTERNAL MEDICINE

## 2023-06-15 PROCEDURE — 3075F PR MOST RECENT SYSTOLIC BLOOD PRESS GE 130-139MM HG: ICD-10-PCS | Mod: CPTII,,, | Performed by: INTERNAL MEDICINE

## 2023-06-15 PROCEDURE — 99999 PR PBB SHADOW E&M-EST. PATIENT-LVL IV: ICD-10-PCS | Mod: PBBFAC,,, | Performed by: INTERNAL MEDICINE

## 2023-06-15 PROCEDURE — 3008F BODY MASS INDEX DOCD: CPT | Mod: CPTII,,, | Performed by: INTERNAL MEDICINE

## 2023-06-15 PROCEDURE — 3079F DIAST BP 80-89 MM HG: CPT | Mod: CPTII,,, | Performed by: INTERNAL MEDICINE

## 2023-06-15 PROCEDURE — 99214 OFFICE O/P EST MOD 30 MIN: CPT | Mod: S$PBB,,, | Performed by: INTERNAL MEDICINE

## 2023-06-15 PROCEDURE — 3008F PR BODY MASS INDEX (BMI) DOCUMENTED: ICD-10-PCS | Mod: CPTII,,, | Performed by: INTERNAL MEDICINE

## 2023-06-15 PROCEDURE — 99214 OFFICE O/P EST MOD 30 MIN: CPT | Mod: PBBFAC | Performed by: INTERNAL MEDICINE

## 2023-06-15 PROCEDURE — 3075F SYST BP GE 130 - 139MM HG: CPT | Mod: CPTII,,, | Performed by: INTERNAL MEDICINE

## 2023-06-15 NOTE — H&P (VIEW-ONLY)
Subjective:       Patient ID: Luz Pelaez is a 52 y.o. female.    No flowsheet data found.     Asthma Control Test  In the past 4  weeks, how much of the time did your asthma keep you from getting as much done at work, school or at home?: Some of the time  During the past 4 weeks, how often have you had shortness of breath?: Once a day  During the past 4 weeks, how often did your asthma symptoms (wheezing, couging, shortness of breath, chest tightness or pain) wake you up at night or earlier than usual in the morning?: Once or twice  During the past 4 weeks, how often have you used your rescue inhaler or nebulizer medication (such as albuterol)?: 2 or 3 times a week  How would you rate your asthma control during the past 4 weeks?: Somewhat controlled  If your score is 19 or less, your asthma may not be under control: 15       COPD Questionnaire  How often do you cough?: Most of the time  How often do you have phlegm (mucus) in your chest?: Some of the time  How often does your chest feel tight?: A little of the time  When you walk up a hill or one flight of stairs, how often are you breathless?: All of the time  How often are you limited doing any activities at home?: Some of the time  How often are you confident leaving the house despite your lung condition?: All of the time  How often do you sleep soundly?: Never  How often do you have energy?: Some of the time  Total score: 24         Chief Complaint:   Luz Pelaez is 52 y.o.  Asked to see me by Dr Mcgarry,  Abn chest CT during w/u for neck pain  Had PET CT which did not show any FDG relevant uptake in concerning area, was some uptake in neck where she has pain  Stable on TRELEGY  No cough, No wheezing, No SOB  Active smoker: @18 years. Works in sale, cleans homes  On chantix, cut down to 1/2 PPD  Last spirometry: FEV1: 65.3%)  No occupational exposure  No travel  CHEST CT today: Stable, Resolution of GGO, Prob were inflammatory or infectious.  Still has  nodule opacity RUL: decreased in size,  Remains high risk due to smoking  Will repeat imaging 12 months      02/23/2023  Last visit 09/17/2020  Lost to f/u due to covisd  Recent respiratory exacerbation  Cough, chest tightness. Sputum  Needs refills for meds:   Still smoking  F/u CXR ordered  Qualifies for LDCt  Asked for nebulizer meds  Last PFT FEV1 65.3%      05/25/2023  Followup to review tests  Last seen 02/23/2023: was lost to F/u since 09/2020  At that time had chex=st CT x 2 and PET CT for left UL nodule  Last imaging was not PET FDG avid and grew smaller on last imaging 09/2020  Smoker > 30 PPD, current smoker  No cough, No hemoptysis  ACT score 13, COPD score 24  Did not take TRELGY today  FEV1: 1.76L( 63.2%), FEV1/FVC 51  Normal exercise capacity or desaturation  CT: interval worsening: will need sampling  Also descibed discomfort, pain in legs : turn blue  +ve family Hx raynauds  Will get PETCT  Asks for anxiety medication for PETCT  Will need ride to and from procedure  Normal ABG  BP was elevated       06/15/2023  Followup  PET CT shows abn FDG activity  CT biopsy ordered  No avdity in paratracheal or Hilar  Imaging reviewed  No cough, No wheezing, No SOB  Adherent with inhalers  Has cut down on smoking        The following portions of the patient's history were reviewed and updated as appropriate:   She  has a past medical history of Allergy, Asthma, Attention deficit, Carpal tunnel syndrome, Cervical spine degeneration, GERD (gastroesophageal reflux disease), Hiatal hernia (11/1/2019), and Kidney stones.  She does not have any pertinent problems on file.  She  has a past surgical history that includes Cholecystectomy; Sleeve Gastroplasty; Colonoscopy (4/1/2012); Total Reduction Mammoplasty; Carpal tunnel release (Left, 10/7/2019); Carpal tunnel release (Right, 11/4/2019); Esophagogastroduodenoscopy (N/A, 6/18/2020); and Colonoscopy (N/A, 6/18/2020).  Her family history includes Breast cancer in her  maternal grandmother, paternal aunt, and paternal grandmother; Colon cancer in her maternal grandmother; Diabetes in her mother; Heart attack (age of onset: 70) in her father; Heart disease (age of onset: 58) in her mother; Hyperlipidemia in her mother; Hypertension in her mother and sister; Thyroid disease in her sister.  She  reports that she has been smoking cigarettes. She started smoking about 35 years ago. She has a 30.00 pack-year smoking history. She has never used smokeless tobacco. She reports current alcohol use. She reports that she does not use drugs.  She has a current medication list which includes the following prescription(s): albuterol, albuterol, alprazolam, [START ON 7/3/2023] dextroamphetamine-amphetamine, dextroamphetamine-amphetamine, dextroamphetamine-amphetamine, ergocalciferol, fluticasone-umeclidin-vilanter, linaclotide, meloxicam, methylprednisolone, mupirocin, omeprazole, and ondansetron.  Current Outpatient Medications on File Prior to Visit   Medication Sig Dispense Refill    albuterol (ACCUNEB) 1.25 mg/3 mL Nebu Take 3 mLs (1.25 mg total) by nebulization 2 (two) times a day. Rescue 180 mL 11    albuterol (PROVENTIL/VENTOLIN HFA) 90 mcg/actuation inhaler INHALE 1 TO 2 PUFFS BY MOUTH EVERY SIX HOURS AS NEEDED FOR WHEEZING 8.5 g 0    ALPRAZolam (XANAX) 0.5 MG tablet Take 1 tablet (0.5 mg total) by mouth On call Procedure for Anxiety. 10 tablet 0    [START ON 7/3/2023] dextroamphetamine-amphetamine (ADDERALL) 30 mg Tab Take 1 tablet (30 mg total) by mouth 2 (two) times a day. 60 tablet 0    dextroamphetamine-amphetamine (ADDERALL) 30 mg Tab Take 1 tablet (30 mg total) by mouth 2 (two) times a day. 60 tablet 0    dextroamphetamine-amphetamine 30 mg Tab Take 1 tablet (30 mg total) by mouth 2 (two) times a day. 60 tablet 0    ergocalciferol (ERGOCALCIFEROL) 50,000 unit Cap Vitamin D2 Take No date recorded No form recorded No frequency recorded No route recorded No set duration recorded No  "set duration amount recorded active No dosage strength recorded No dosage strength units of measure recorded      fluticasone-umeclidin-vilanter (TRELEGY ELLIPTA) 100-62.5-25 mcg DsDv Inhale 1 puff into the lungs once daily. 60 each 11    linaCLOtide (LINZESS) 290 mcg Cap capsule TAKE 1 CAPSULE (290 MCG TOTAL) BY MOUTH ONCE DAILY. 30 capsule 5    meloxicam (MOBIC) 15 MG tablet Take 1 tablet (15 mg total) by mouth once daily. 90 tablet 3    methylPREDNISolone (MEDROL DOSEPACK) 4 mg tablet use as directed 21 tablet 2    mupirocin (BACTROBAN) 2 % ointment Apply topically 3 (three) times daily. 30 g 00    omeprazole (PRILOSEC) 40 MG capsule Take 1 capsule (40 mg total) by mouth once daily. 90 capsule 3    ondansetron (ZOFRAN-ODT) 4 MG TbDL Take by mouth.       No current facility-administered medications on file prior to visit.     She is allergic to bupropion hcl and lamisil [terbinafine]..    Review of Systems   Review of Systems   Constitutional: Negative.    HENT: Negative.     Eyes: Negative.    Respiratory:  Negative for cough, hemoptysis, sputum production, shortness of breath and wheezing.    Cardiovascular: Negative.    Gastrointestinal: Negative.    Genitourinary: Negative.    Musculoskeletal:  Positive for neck pain.   Skin: Negative.    All other systems reviewed and are negative.     Objective:        Vitals:    06/15/23 1014   BP: 138/82   Pulse: 92   Resp: 16   SpO2: 96%   Weight: 73.8 kg (162 lb 11.2 oz)   Height: 5' 5" (1.651 m)             Physical Exam  Vitals and nursing note reviewed.   Constitutional:       Appearance: She is well-developed. She is not ill-appearing.       HENT:      Head: Normocephalic and atraumatic.      Nose: Nose normal.      Mouth/Throat:      Pharynx: No oropharyngeal exudate.   Eyes:      General: No scleral icterus.     Conjunctiva/sclera: Conjunctivae normal.      Pupils: Pupils are equal, round, and reactive to light.   Neck:      Thyroid: No thyromegaly.      Vascular: " No JVD.      Trachea: No tracheal deviation.   Cardiovascular:      Rate and Rhythm: Normal rate and regular rhythm.      Heart sounds: Normal heart sounds, S1 normal and S2 normal. No murmur heard.  Pulmonary:      Effort: Pulmonary effort is normal. No tachypnea, accessory muscle usage or respiratory distress.      Breath sounds: Normal breath sounds. No stridor.   Abdominal:      General: Bowel sounds are normal. There is no distension.      Palpations: Abdomen is soft. There is no hepatomegaly, splenomegaly or mass.      Tenderness: There is no abdominal tenderness. There is no guarding or rebound.   Musculoskeletal:         General: No tenderness. Normal range of motion.      Cervical back: Normal range of motion and neck supple.   Lymphadenopathy:      Upper Body:      Right upper body: No supraclavicular adenopathy.      Left upper body: No supraclavicular adenopathy.   Skin:     General: Skin is warm and dry.      Findings: No rash.      Nails: There is no clubbing.   Neurological:      Mental Status: She is alert and oriented to person, place, and time.      Coordination: Coordination normal.      Gait: Gait normal.      Deep Tendon Reflexes: Reflexes are normal and symmetric.       Data Review:   CBC:   Lab Results   Component Value Date    WBC 4.63 10/19/2021    RBC 4.16 10/19/2021    HGB 13.1 10/19/2021    HCT 42.5 10/19/2021     10/19/2021     BMP:   Lab Results   Component Value Date    GLU 84 11/09/2022     11/09/2022    K 4.0 11/09/2022     11/09/2022    CO2 26 11/09/2022    BUN 17 11/09/2022    CREATININE 0.6 11/09/2022    CALCIUM 9.4 11/09/2022     Radiology review: CHEST CT         Diagnostics:    NM PET CT Routine Skull to Mid Thigh  Narrative: EXAMINATION:  NM PET CT ROUTINE    CLINICAL HISTORY:  Lung nodule, > 8mm; Solitary pulmonary nodule    TECHNIQUE:  12.2 mCi of F18-FDG was administered intravenously in the left antecubital fossa.  After an approximately 60 min  distribution time, PET/CT images were acquired from the skull base to the mid thigh. Transmission images were acquired to correct for attenuation using a whole body low-dose CT scan without contrast with the arms positioned above the head. Glycemia at the time of injection was 80 mg/dL.    COMPARISON:  LDCT, 05/25/2023 and PET-CT, 03/13/2023    FINDINGS:  Quality of the study: Adequate.    SUV max of liver parenchyma is 2.5.    Neck: No lymphadenopathy.  No abnormal focus of FDG avidity.    Chest: The superior segment spiculated left lower lobe pulmonary nodule which contacts the major fissure measures 19 x 15 mm in shows marked FDG avidity with SUV max 8.2.    No FDG avid mediastinal or hilar or axillary lymphadenopathy.  Small hiatal hernia.  No pleural effusion.  No coronary artery calcification.    Abdomen/pelvis: No abnormal focus of FDG avidity.  No lymphadenopathy or mass.  No ascites.    Skeletal structures: No abnormal focus of FDG avidity.  No lytic or sclerotic lesion.    Physiologic uptake of the tracer is present within the brain, salivary glands, myocardium, GI and  tracts.    Incidental CT findings: N/A  Impression: The spiculated superior segment left lower lobe pulmonary nodule measuring 19 x 15 mm is markedly FDG avid and characteristic of a primary lung cancer.  Biopsy recommended.  No evidence for FDG avid metastatic disease.    All CT scans at this facility are performed  using dose modulation techniques as appropriate to performed exam including the following:  automated exposure control; adjustment of mA and/or kV according to the patients size (this includes techniques or standardized protocols for targeted exams where dose is matched to indication/reason for exam: i.e. extremities or head);  iterative reconstruction technique.    Electronically signed by: Orlando Skaggs MD  Date:    06/09/2023  Time:    10:46                  Assessment:      Problem List Items Addressed This Visit        Asthma-COPD overlap syndrome     Asthma score 15  COPD score 24  REGIME TRELEGY  FEV1:  1.88 L( 65.3%)  STABLE           Cigarette nicotine dependence with nicotine-induced disorder     Smoking cessation         Abnormal CT scan of lung - Primary     RUL nodule FDG AVID  Smoker: 30 pack year    CT BIOPSY         Pulmonary nodule     Significant FDG avidity  CT biopsy          Adhrence to smoking cessation  Ankle KENYATTA  Continue TRELEGY    CT Biopsy        Plan:          Problem List Items Addressed This Visit       Asthma-COPD overlap syndrome     Asthma score 15  COPD score 24  REGIME TRELEGY  FEV1:  1.88 L( 65.3%)  STABLE           Cigarette nicotine dependence with nicotine-induced disorder     Smoking cessation         Abnormal CT scan of lung - Primary     RUL nodule FDG AVID  Smoker: 30 pack year    CT BIOPSY         Pulmonary nodule     Significant FDG avidity  CT biopsy            No orders of the defined types were placed in this encounter.      Follow up in about 4 weeks (around 7/13/2023), or Ankle KENYATTA.    This note was prepared using voice recognition system and is likely to have sound alike errors that may have been overlooked even after proof reading.  Please call me with any questions    Discussed diagnosis, its evaluation, treatment and usual course. All questions answered.    Thank you for the courtesy of participating in the care of this patient    Antelmo Mcknight MD

## 2023-06-16 ENCOUNTER — PATIENT MESSAGE (OUTPATIENT)
Dept: PULMONOLOGY | Facility: CLINIC | Age: 53
End: 2023-06-16
Payer: MEDICAID

## 2023-06-20 RX ORDER — LIDOCAINE HYDROCHLORIDE 40 MG/ML
4 SOLUTION TOPICAL ONCE
Status: CANCELLED | OUTPATIENT
Start: 2023-06-20 | End: 2023-06-20

## 2023-06-20 RX ORDER — SODIUM CHLORIDE 9 MG/ML
INJECTION, SOLUTION INTRAVENOUS CONTINUOUS
Status: CANCELLED | OUTPATIENT
Start: 2023-06-20

## 2023-06-20 RX ORDER — LIDOCAINE HYDROCHLORIDE 10 MG/ML
20 INJECTION INFILTRATION; PERINEURAL ONCE
Status: CANCELLED | OUTPATIENT
Start: 2023-06-20 | End: 2023-06-20

## 2023-06-20 NOTE — H&P (VIEW-ONLY)
DW IR  Lesion too proximal to hilum  Will schedule Sergei bronch  Spoke with patient    My diagnostic impression and work-up plan were discussed at length with patient. Risks were discussed. SERGEI+ EBUS procedure. Complications of the procedure discussed in detail with patient. Complications including but not limited to infection that may require hospital admission, bleeding that may require blood transfusion and or hospital admission, perforation of the lung which may require surgery,chance of injury to the throat, windpipe or bronchial tubes, laryngospam, coughing, aspiration, hypoxemia or cardiac arrythmias. Patient expressed and verbalized understanding. The material risks of anesthesia in connection with the procedure including brain damage, paralysis from the neck downwards, paralysis from the waist downwards, loss of function of an arm or a leg, disfigurement (incluing scars)and death were discussed with patient who expressedand verbalized understanding. Alternate treatments and material risks associated with such alternatives were discussed with pateint. These include radiologic surveillance with minimal risk and sugery with an indeterminate risk. The material risks of refusing the procedure was discussed in detail. This includes no diagnosis or confirmation of diagnisis and rendering of appropriate treatment the risk of which depends on the nature of the diagnosed illness. Patient expressed and verbalized understanding. Procedure scheduled for date June 28th. Consent signed. Orders entered. Coagulation studies per orders.   All questions were answered and the patient expressed understanding

## 2023-06-28 ENCOUNTER — ANESTHESIA EVENT (OUTPATIENT)
Dept: ENDOSCOPY | Facility: HOSPITAL | Age: 53
End: 2023-06-28
Payer: MEDICAID

## 2023-06-28 ENCOUNTER — HOSPITAL ENCOUNTER (OUTPATIENT)
Facility: HOSPITAL | Age: 53
Discharge: HOME | End: 2023-06-28
Attending: INTERNAL MEDICINE | Admitting: INTERNAL MEDICINE
Payer: MEDICAID

## 2023-06-28 ENCOUNTER — ANESTHESIA (OUTPATIENT)
Dept: ENDOSCOPY | Facility: HOSPITAL | Age: 53
End: 2023-06-28
Payer: MEDICAID

## 2023-06-28 VITALS
WEIGHT: 162 LBS | TEMPERATURE: 98 F | SYSTOLIC BLOOD PRESSURE: 149 MMHG | OXYGEN SATURATION: 95 % | BODY MASS INDEX: 26.99 KG/M2 | HEART RATE: 77 BPM | HEIGHT: 65 IN | RESPIRATION RATE: 21 BRPM | DIASTOLIC BLOOD PRESSURE: 92 MMHG

## 2023-06-28 DIAGNOSIS — R91.1 PULMONARY NODULE: ICD-10-CM

## 2023-06-28 PROCEDURE — 31628 BRONCHOSCOPY/LUNG BX EACH: CPT | Performed by: INTERNAL MEDICINE

## 2023-06-28 PROCEDURE — 37000008 HC ANESTHESIA 1ST 15 MINUTES: Performed by: INTERNAL MEDICINE

## 2023-06-28 PROCEDURE — 31627 NAVIGATIONAL BRONCHOSCOPY: CPT | Performed by: INTERNAL MEDICINE

## 2023-06-28 PROCEDURE — 31627 NAVIGATIONAL BRONCHOSCOPY: CPT | Mod: ,,, | Performed by: INTERNAL MEDICINE

## 2023-06-28 PROCEDURE — 25000003 PHARM REV CODE 250: Performed by: STUDENT IN AN ORGANIZED HEALTH CARE EDUCATION/TRAINING PROGRAM

## 2023-06-28 PROCEDURE — 31652 PR BRONCH W/ EBUS, SAMPLING 1 OR 2 NODES, INCL GUIDE: ICD-10-PCS | Mod: ,,, | Performed by: INTERNAL MEDICINE

## 2023-06-28 PROCEDURE — 88305 TISSUE EXAM BY PATHOLOGIST: ICD-10-PCS | Mod: 26,,, | Performed by: PATHOLOGY

## 2023-06-28 PROCEDURE — 31629 BRONCHOSCOPY/NEEDLE BX EACH: CPT | Mod: 59,LT,, | Performed by: INTERNAL MEDICINE

## 2023-06-28 PROCEDURE — 31628 BRONCHOSCOPY/LUNG BX EACH: CPT | Mod: 51,LT,, | Performed by: INTERNAL MEDICINE

## 2023-06-28 PROCEDURE — 31623 DX BRONCHOSCOPE/BRUSH: CPT | Performed by: INTERNAL MEDICINE

## 2023-06-28 PROCEDURE — 31629 BRONCHOSCOPY/NEEDLE BX EACH: CPT | Performed by: INTERNAL MEDICINE

## 2023-06-28 PROCEDURE — 88112 PR  CYTOPATH, CELL ENHANCE TECH: ICD-10-PCS | Mod: 26,59,, | Performed by: PATHOLOGY

## 2023-06-28 PROCEDURE — 88112 CYTOPATH CELL ENHANCE TECH: CPT | Mod: 59 | Performed by: PATHOLOGY

## 2023-06-28 PROCEDURE — 27202796 HC NEEDLE BRUSH, ALWAYS ON TIP TRACKED: Performed by: INTERNAL MEDICINE

## 2023-06-28 PROCEDURE — 31628 PR BRONCHOSCOPY,TRANSBRONCH BIOPSY: ICD-10-PCS | Mod: 51,LT,, | Performed by: INTERNAL MEDICINE

## 2023-06-28 PROCEDURE — 88305 TISSUE EXAM BY PATHOLOGIST: CPT | Mod: 59 | Performed by: PATHOLOGY

## 2023-06-28 PROCEDURE — 31652 BRONCH EBUS SAMPLNG 1/2 NODE: CPT | Mod: ,,, | Performed by: INTERNAL MEDICINE

## 2023-06-28 PROCEDURE — 88173 CYTOPATH EVAL FNA REPORT: CPT | Mod: 26,,, | Performed by: PATHOLOGY

## 2023-06-28 PROCEDURE — 31623 PR BRONCHOSCOPY,DIAGNOSTIC W BRUSH: ICD-10-PCS | Mod: 51,LT,, | Performed by: INTERNAL MEDICINE

## 2023-06-28 PROCEDURE — 25000003 PHARM REV CODE 250: Performed by: INTERNAL MEDICINE

## 2023-06-28 PROCEDURE — 00520 ANES CLOSED CHEST PX NOS: CPT | Performed by: INTERNAL MEDICINE

## 2023-06-28 PROCEDURE — 88172 CYTP DX EVAL FNA 1ST EA SITE: CPT | Performed by: PATHOLOGY

## 2023-06-28 PROCEDURE — 88173 CYTOPATH EVAL FNA REPORT: CPT | Performed by: PATHOLOGY

## 2023-06-28 PROCEDURE — 88177 CYTP FNA EVAL EA ADDL: CPT | Performed by: PATHOLOGY

## 2023-06-28 PROCEDURE — 31652 BRONCH EBUS SAMPLNG 1/2 NODE: CPT | Performed by: INTERNAL MEDICINE

## 2023-06-28 PROCEDURE — 31629 PR BRONCHOSCOPY,TRANSBRON ASPIR BX: ICD-10-PCS | Mod: 59,LT,, | Performed by: INTERNAL MEDICINE

## 2023-06-28 PROCEDURE — 37000009 HC ANESTHESIA EA ADD 15 MINS: Performed by: INTERNAL MEDICINE

## 2023-06-28 PROCEDURE — 31623 DX BRONCHOSCOPE/BRUSH: CPT | Mod: 51,LT,, | Performed by: INTERNAL MEDICINE

## 2023-06-28 PROCEDURE — 27202059 HC NEEDLE, FNA (ANY): Performed by: INTERNAL MEDICINE

## 2023-06-28 PROCEDURE — 31627 PR BRONCHOSCOPY,COMPUTER ASSIST/IMAGE-GUIDED NAVIGATION: ICD-10-PCS | Mod: ,,, | Performed by: INTERNAL MEDICINE

## 2023-06-28 PROCEDURE — 88305 TISSUE EXAM BY PATHOLOGIST: CPT | Mod: 26,,, | Performed by: PATHOLOGY

## 2023-06-28 PROCEDURE — 27202791 HC VPAD 2 PT TRACKER: Performed by: INTERNAL MEDICINE

## 2023-06-28 PROCEDURE — 88112 CYTOPATH CELL ENHANCE TECH: CPT | Mod: 26,59,, | Performed by: PATHOLOGY

## 2023-06-28 PROCEDURE — 27202793: Performed by: INTERNAL MEDICINE

## 2023-06-28 PROCEDURE — 63600175 PHARM REV CODE 636 W HCPCS: Performed by: STUDENT IN AN ORGANIZED HEALTH CARE EDUCATION/TRAINING PROGRAM

## 2023-06-28 PROCEDURE — 88173 PR  INTERPRETATION OF FNA SMEAR: ICD-10-PCS | Mod: 26,,, | Performed by: PATHOLOGY

## 2023-06-28 PROCEDURE — 27202795 HC FORCEPS, ALWAYS ON TIP TRACKED: Performed by: INTERNAL MEDICINE

## 2023-06-28 RX ORDER — DEXAMETHASONE SODIUM PHOSPHATE 4 MG/ML
INJECTION, SOLUTION INTRA-ARTICULAR; INTRALESIONAL; INTRAMUSCULAR; INTRAVENOUS; SOFT TISSUE
Status: DISCONTINUED | OUTPATIENT
Start: 2023-06-28 | End: 2023-06-28

## 2023-06-28 RX ORDER — FENTANYL CITRATE 50 UG/ML
INJECTION, SOLUTION INTRAMUSCULAR; INTRAVENOUS
Status: DISCONTINUED | OUTPATIENT
Start: 2023-06-28 | End: 2023-06-28

## 2023-06-28 RX ORDER — LIDOCAINE HYDROCHLORIDE 20 MG/ML
INJECTION INTRAVENOUS
Status: DISCONTINUED | OUTPATIENT
Start: 2023-06-28 | End: 2023-06-28

## 2023-06-28 RX ORDER — PROPOFOL 10 MG/ML
VIAL (ML) INTRAVENOUS
Status: DISCONTINUED | OUTPATIENT
Start: 2023-06-28 | End: 2023-06-28

## 2023-06-28 RX ORDER — MIDAZOLAM HYDROCHLORIDE 1 MG/ML
INJECTION INTRAMUSCULAR; INTRAVENOUS
Status: DISCONTINUED | OUTPATIENT
Start: 2023-06-28 | End: 2023-06-28

## 2023-06-28 RX ORDER — SODIUM CHLORIDE 9 MG/ML
INJECTION, SOLUTION INTRAVENOUS CONTINUOUS
Status: DISCONTINUED | OUTPATIENT
Start: 2023-06-28 | End: 2023-06-28 | Stop reason: HOSPADM

## 2023-06-28 RX ORDER — ONDANSETRON 2 MG/ML
INJECTION INTRAMUSCULAR; INTRAVENOUS
Status: DISCONTINUED | OUTPATIENT
Start: 2023-06-28 | End: 2023-06-28

## 2023-06-28 RX ORDER — LIDOCAINE HYDROCHLORIDE 40 MG/ML
4 SOLUTION TOPICAL ONCE
Status: COMPLETED | OUTPATIENT
Start: 2023-06-28 | End: 2023-06-28

## 2023-06-28 RX ORDER — DIPHENHYDRAMINE HYDROCHLORIDE 50 MG/ML
INJECTION INTRAMUSCULAR; INTRAVENOUS
Status: DISCONTINUED | OUTPATIENT
Start: 2023-06-28 | End: 2023-06-28

## 2023-06-28 RX ORDER — ROCURONIUM BROMIDE 10 MG/ML
INJECTION, SOLUTION INTRAVENOUS
Status: DISCONTINUED | OUTPATIENT
Start: 2023-06-28 | End: 2023-06-28

## 2023-06-28 RX ORDER — LIDOCAINE HYDROCHLORIDE 10 MG/ML
INJECTION INFILTRATION; PERINEURAL
Status: DISCONTINUED | OUTPATIENT
Start: 2023-06-28 | End: 2023-06-28 | Stop reason: HOSPADM

## 2023-06-28 RX ORDER — LIDOCAINE HYDROCHLORIDE 10 MG/ML
20 INJECTION, SOLUTION EPIDURAL; INFILTRATION; INTRACAUDAL; PERINEURAL ONCE
Status: DISCONTINUED | OUTPATIENT
Start: 2023-06-28 | End: 2023-06-28 | Stop reason: HOSPADM

## 2023-06-28 RX ADMIN — MIDAZOLAM HYDROCHLORIDE 2 MG: 1 INJECTION, SOLUTION INTRAMUSCULAR; INTRAVENOUS at 10:06

## 2023-06-28 RX ADMIN — PROPOFOL 40 MG: 10 INJECTION, EMULSION INTRAVENOUS at 11:06

## 2023-06-28 RX ADMIN — ONDANSETRON 4 MG: 2 INJECTION INTRAMUSCULAR; INTRAVENOUS at 10:06

## 2023-06-28 RX ADMIN — PROPOFOL 60 MG: 10 INJECTION, EMULSION INTRAVENOUS at 10:06

## 2023-06-28 RX ADMIN — ROCURONIUM BROMIDE 30 MG: 10 SOLUTION INTRAVENOUS at 11:06

## 2023-06-28 RX ADMIN — FENTANYL CITRATE 100 MCG: 50 INJECTION, SOLUTION INTRAMUSCULAR; INTRAVENOUS at 10:06

## 2023-06-28 RX ADMIN — GLYCOPYRROLATE 0.2 MG: 0.2 INJECTION, SOLUTION INTRAMUSCULAR; INTRAVITREAL at 10:06

## 2023-06-28 RX ADMIN — SODIUM CHLORIDE, SODIUM LACTATE, POTASSIUM CHLORIDE, AND CALCIUM CHLORIDE: .6; .31; .03; .02 INJECTION, SOLUTION INTRAVENOUS at 10:06

## 2023-06-28 RX ADMIN — DIPHENHYDRAMINE HYDROCHLORIDE 6.25 MG: 50 INJECTION INTRAMUSCULAR; INTRAVENOUS at 10:06

## 2023-06-28 RX ADMIN — LIDOCAINE HYDROCHLORIDE 4 ML: 40 SOLUTION ORAL at 09:06

## 2023-06-28 RX ADMIN — ROCURONIUM BROMIDE 20 MG: 10 SOLUTION INTRAVENOUS at 11:06

## 2023-06-28 RX ADMIN — DEXAMETHASONE SODIUM PHOSPHATE 4 MG: 4 INJECTION, SOLUTION INTRA-ARTICULAR; INTRALESIONAL; INTRAMUSCULAR; INTRAVENOUS; SOFT TISSUE at 10:06

## 2023-06-28 RX ADMIN — PROPOFOL 120 MG: 10 INJECTION, EMULSION INTRAVENOUS at 10:06

## 2023-06-28 RX ADMIN — SUGAMMADEX 400 MG: 100 INJECTION, SOLUTION INTRAVENOUS at 11:06

## 2023-06-28 RX ADMIN — ROCURONIUM BROMIDE 45 MG: 10 SOLUTION INTRAVENOUS at 10:06

## 2023-06-28 RX ADMIN — ROCURONIUM BROMIDE 5 MG: 10 SOLUTION INTRAVENOUS at 10:06

## 2023-06-28 RX ADMIN — LIDOCAINE HYDROCHLORIDE 100 MG: 20 INJECTION INTRAVENOUS at 10:06

## 2023-06-28 NOTE — ANESTHESIA PROCEDURE NOTES
Intubation    Date/Time: 6/28/2023 10:10 AM  Performed by: Lamont Bui CRNA  Authorized by: Orlando Dumont MD     Intubation:     Induction:  Intravenous    Intubated:  Postinduction    Mask Ventilation:  Easy mask    Attempts:  1    Attempted By:  CRNA    Method of Intubation:  Video laryngoscopy    Blade:  Mulligan 3    Laryngeal View Grade: Grade I - full view of cords      Difficult Airway Encountered?: No      Complications:  None    Airway Device:  Oral endotracheal tube    Airway Device Size:  8.5    Tube secured:  20    Secured at:  The lips    Placement Verified By:  Capnometry    Complicating Factors:  None    Findings Post-Intubation:  BS equal bilateral and atraumatic/condition of teeth unchanged

## 2023-06-28 NOTE — OP NOTE
CT VERAN reviewed  Mapping completed  Registration points confirmed  1.6 Left lower lobe mass close to hilum  [place] transbronchial  TBBX 6 passess  Transbronchial needle aspiration    EBUS scope  Station 7: 4.9 mm  4 L : 4.9 mm  11L: 10.9 mm    11L: 3 passess    Extubated post procedure

## 2023-06-28 NOTE — ANESTHESIA POSTPROCEDURE EVALUATION
Anesthesia Post Evaluation    Patient: Luz Pelaez    Procedure(s) Performed: Procedure(s) (LRB):  BRONCHOSCOPY, NAVIGATIONAL (Bilateral)  ENDOBRONCHIAL ULTRASOUND (EBUS) (Left)    Final Anesthesia Type: general      Patient location during evaluation: PACU  Patient participation: Yes- Able to Participate  Level of consciousness: awake and alert  Post-procedure vital signs: reviewed and not stable  Pain management: adequate  Airway patency: patent  ERIKA mitigation strategies: Preoperative initiation of continuous positive airway pressure (CPAP) or non-invasive positive pressure ventilation (NIPPV)  PONV status at discharge: No PONV  Anesthetic complications: no      Cardiovascular status: blood pressure returned to baseline  Respiratory status: unassisted, spontaneous ventilation and room air  Hydration status: euvolemic  Follow-up not needed.            No case tracking events are documented in the log.      Pain/Marvin Score: No data recorded

## 2023-06-28 NOTE — DISCHARGE SUMMARY
[facility]  Discharge Note  Short Stay    Procedure(s) (LRB):  BRONCHOSCOPY, NAVIGATIONAL (Bilateral)  ENDOBRONCHIAL ULTRASOUND (EBUS) (Left)      OUTCOME: Patient tolerated treatment/procedure well without complication and is now ready for discharge.    DISPOSITION: Home or Self Care    FINAL DIAGNOSIS:  Pulmonary nodule    FOLLOWUP: In clinic    DISCHARGE INSTRUCTIONS:  No discharge procedures on file.     TIME SPENT ON DISCHARGE: 15 minutes

## 2023-06-28 NOTE — TRANSFER OF CARE
"Anesthesia Transfer of Care Note    Patient: Luz Pelaez    Procedure(s) Performed: Procedure(s) (LRB):  BRONCHOSCOPY, NAVIGATIONAL (Bilateral)  ENDOBRONCHIAL ULTRASOUND (EBUS) (Left)    Patient location: PACU    Anesthesia Type: general    Transport from OR: Transported from OR on room air with adequate spontaneous ventilation    Post pain: adequate analgesia    Post assessment: no apparent anesthetic complications and tolerated procedure well    Post vital signs: stable    Level of consciousness: awake, responds to stimulation and alert    Nausea/Vomiting: no nausea/vomiting    Complications: none    Transfer of care protocol was followedComments: Report given to PACU RN at bedside. RN given opportunity to ask questions or clarify concerns. No Concerns verbalized. RN was asked if ready to assume care of patient. RN verbally confirmed. Pt. left in stable condition. SV. Vital Signs Return to Near Baseline. No s/s of distress noted.       Last vitals:   Visit Vitals  BP (!) 184/87   Pulse 72   Temp 36.4 °C (97.5 °F)   Resp 20   Ht 5' 5" (1.651 m)   Wt 73.5 kg (162 lb)   SpO2 95%   Breastfeeding No   BMI 26.96 kg/m²     "

## 2023-06-28 NOTE — ANESTHESIA PREPROCEDURE EVALUATION
06/28/2023  Luz Pelaez is a 52 y.o., female.    Pre-op Assessment    I have reviewed the Patient Summary Reports.    I have reviewed the Nursing Notes. I have reviewed the NPO Status.   I have reviewed the Medications.     Review of Systems  Anesthesia Hx:  History of prior surgery of interest to airway management or planning: Previous anesthesia: MAC  10/07/2019 - left carpal tunnel release with MAC.  Procedure performed at an [facility].   Social:  Smoker    Cardiovascular:   ECG has been reviewed.    Pulmonary:   Asthma    Renal/:   renal calculi    Hepatic/GI:   Hiatal Hernia, GERD    Musculoskeletal:  Spine Disorders: cervical    Neurological:   Neuromuscular Disease, Headaches Migraines.  CTS. Pain Etiology/Diagnosis, Carpal Tunnel Syndrome    Psych:  Attention Deficit Disorder.         Physical Exam  General:  Well nourished, Cooperative, Alert and Oriented      Airway/Jaw/Neck:  Airway Findings: Mouth Opening: Normal   Tongue: Normal   General Airway Assessment: Adult Mallampati: II  TM Distance: Normal, at least 6 cm   Jaw/Neck Findings:  Neck ROM: Normal ROM   Neck Findings:     Eyes/Ears/Nose:  Eyes/Ears/Nose Findings:    Dental:  Dental Findings: In tact, Upper partial dentures     Chest/Lungs:  Chest/Lungs Findings:    Heart/Vascular:  Heart Findings: Heart murmur: negative Vascular Findings: Normal    Abdomen:  Abdomen Findings: Normal    Musculoskeletal:  Musculoskeletal Findings: Normal   Skin:  Skin Findings: Normal    Mental Status:  Mental Status Findings:  Alert and Oriented         Anesthesia Plan  Type of Anesthesia, risks & benefits discussed:  Anesthesia Type:  Gen ETT    Patient's Preference:   Plan Factors:          Intra-op Monitoring Plan: standard ASA monitors  Intra-op Monitoring Plan Comments:   Post Op Pain Control Plan:   Post Op Pain Control Plan Comments:      Induction:    Beta Blocker:  Patient is not currently on a Beta-Blocker (No further documentation required).       Informed Consent: Informed consent signed with the Patient and all parties understand the risks and agree with anesthesia plan.  All questions answered.  Anesthesia consent signed with patient.  ASA Score: 2     Day of Surgery Review of History & Physical:  There are no significant changes.            Ready For Surgery From Anesthesia Perspective.           Physical Exam  General: Well nourished, Cooperative, Alert and Oriented    Airway:  Mallampati: II   Mouth Opening: Normal  TM Distance: Normal, at least 6 cm  Tongue: Normal  Neck ROM: Normal ROM    Dental:  In tact, Upper partial dentures          Anesthesia Plan  Type of Anesthesia, risks & benefits discussed:    Anesthesia Type: Gen ETT  Intra-op Monitoring Plan: standard ASA monitors  Informed Consent: Informed consent signed with the Patient and all parties understand the risks and agree with anesthesia plan.  All questions answered.   ASA Score: 2    Ready For Surgery From Anesthesia Perspective.       .

## 2023-06-28 NOTE — PLAN OF CARE
CXR complete at bedside. Tolerated well. Dr. Mcknight at bedside also viewing CXR-no pneumothorax as per MD.

## 2023-06-29 NOTE — DISCHARGE SUMMARY
[facility]  Discharge Note  Short Stay    Procedure(s) (LRB):  BRONCHOSCOPY, NAVIGATIONAL (Bilateral)  ENDOBRONCHIAL ULTRASOUND (EBUS) (Left)      OUTCOME: Patient tolerated treatment/procedure well without complication and is now ready for discharge.    DISPOSITION: Home or Self Care    FINAL DIAGNOSIS:  Pulmonary nodule    FOLLOWUP: In clinic    DISCHARGE INSTRUCTIONS:  No discharge procedures on file.      Clinical Reference Documents Added to Patient Instructions         Document    ENDOBRONCHIAL ULTRASOUND (ENGLISH)            TIME SPENT ON DISCHARGE: 15 minutes    CXR was okay

## 2023-07-05 ENCOUNTER — TELEPHONE (OUTPATIENT)
Dept: SLEEP MEDICINE | Facility: CLINIC | Age: 53
End: 2023-07-05
Payer: MEDICAID

## 2023-07-05 ENCOUNTER — TELEPHONE (OUTPATIENT)
Dept: PULMONOLOGY | Facility: CLINIC | Age: 53
End: 2023-07-05
Payer: MEDICAID

## 2023-07-05 DIAGNOSIS — R91.1 PULMONARY NODULE: Primary | ICD-10-CM

## 2023-07-05 DIAGNOSIS — R94.2 ABNORMAL PET SCAN OF LUNG: ICD-10-CM

## 2023-07-05 LAB
FINAL PATHOLOGIC DIAGNOSIS: ABNORMAL
Lab: ABNORMAL

## 2023-07-05 NOTE — TELEPHONE ENCOUNTER
Orders Placed This Encounter   Procedures    Ambulatory referral/consult to Pulmonology     Standing Status:   Future     Standing Expiration Date:   8/5/2024     Referral Priority:   Routine     Referral Type:   Consultation     Referral Reason:   Specialty Services Required     Referred to Provider:   Jair Morales MD     Requested Specialty:   Pulmonary Disease     Number of Visits Requested:   1

## 2023-07-05 NOTE — TELEPHONE ENCOUNTER
Spoke with patient  Specimens Non diagnostic  Option  VATS/ROBOTIC or CT guided      1.  Left lower lobe bronchial brushings, for cytology (1 ThinPrep, 6 smears):   Negative for malignant cells   Benign bronchial cells with mild reactive atypia present       2.  Left lower lobe mass, biopsy:   Negative for malignant cells.   Superficial strips of benign bronchial mucosa       3.  Left lower lobe mass, fine needle aspiration (8 smears, 1 cell block):   Negative for malignant cells   Benign bronchial cells with mild reactive atypia present       4.  Station 11 L lymph node, fine needle aspiration (6 smears, 1 cell block):   Negative for malignant cells.   Blood present.   No definitive lymph node tissue appreciated

## 2023-07-05 NOTE — TELEPHONE ENCOUNTER
----- Message from Jair Morales MD sent at 7/5/2023  3:20 PM CDT -----  Please book this with Dr. Vasquez or myself in [facility].. Whoever is up next.     DV  ----- Message -----  From: Antelmo Mcknight MD  Sent: 7/5/2023   1:59 PM CDT  To: Jair Morales MD    Hi,  Did Sergei EBUS on this patient : underlying COPD  FDG avid perihilar left sided  lesion  Could visualize area of B6  Unfortunately all specimens are non diagnostic however suspicion in high  IR would not consider due to bleeding risk  Would you consider Robotic Bronch?  thanks

## 2023-07-05 NOTE — TELEPHONE ENCOUNTER
----- Message from Jair Morales MD sent at 7/5/2023  3:20 PM CDT -----  We can try. I will set her up with me or johanna in clinic.     DV   ----- Message -----  From: Antelmo Mcknight MD  Sent: 7/5/2023   1:59 PM CDT  To: Jair Morales MD    Hi,  Did Sergei EBUS on this patient : underlying COPD  FDG avid perihilar left sided  lesion  Could visualize area of B6  Unfortunately all specimens are non diagnostic however suspicion in high  IR would not consider due to bleeding risk  Would you consider Robotic Bronch?  thanks

## 2023-07-07 ENCOUNTER — TELEPHONE (OUTPATIENT)
Dept: CARDIOLOGY | Facility: HOSPITAL | Age: 53
End: 2023-07-07
Payer: MEDICAID

## 2023-07-19 ENCOUNTER — OFFICE VISIT (OUTPATIENT)
Dept: PULMONOLOGY | Facility: CLINIC | Age: 53
End: 2023-07-19
Payer: MEDICAID

## 2023-07-19 VITALS
OXYGEN SATURATION: 97 % | BODY MASS INDEX: 27.29 KG/M2 | DIASTOLIC BLOOD PRESSURE: 94 MMHG | HEIGHT: 65 IN | SYSTOLIC BLOOD PRESSURE: 174 MMHG | TEMPERATURE: 98 F | HEART RATE: 77 BPM | WEIGHT: 163.81 LBS

## 2023-07-19 DIAGNOSIS — R91.1 PULMONARY NODULE: ICD-10-CM

## 2023-07-19 DIAGNOSIS — F17.200 SMOKER: ICD-10-CM

## 2023-07-19 DIAGNOSIS — R91.1 SOLITARY PULMONARY NODULE: Primary | ICD-10-CM

## 2023-07-19 DIAGNOSIS — R94.2 ABNORMAL PET SCAN OF LUNG: ICD-10-CM

## 2023-07-19 DIAGNOSIS — F17.219 CIGARETTE NICOTINE DEPENDENCE WITH NICOTINE-INDUCED DISORDER: ICD-10-CM

## 2023-07-19 PROCEDURE — 3077F PR MOST RECENT SYSTOLIC BLOOD PRESSURE >= 140 MM HG: ICD-10-PCS | Mod: CPTII,,, | Performed by: INTERNAL MEDICINE

## 2023-07-19 PROCEDURE — 99406 PR TOBACCO USE CESSATION INTERMEDIATE 3-10 MINUTES: ICD-10-PCS | Mod: S$PBB,,, | Performed by: INTERNAL MEDICINE

## 2023-07-19 PROCEDURE — 1160F PR REVIEW ALL MEDS BY PRESCRIBER/CLIN PHARMACIST DOCUMENTED: ICD-10-PCS | Mod: CPTII,,, | Performed by: INTERNAL MEDICINE

## 2023-07-19 PROCEDURE — 3077F SYST BP >= 140 MM HG: CPT | Mod: CPTII,,, | Performed by: INTERNAL MEDICINE

## 2023-07-19 PROCEDURE — 3080F PR MOST RECENT DIASTOLIC BLOOD PRESSURE >= 90 MM HG: ICD-10-PCS | Mod: CPTII,,, | Performed by: INTERNAL MEDICINE

## 2023-07-19 PROCEDURE — 1160F RVW MEDS BY RX/DR IN RCRD: CPT | Mod: CPTII,,, | Performed by: INTERNAL MEDICINE

## 2023-07-19 PROCEDURE — 99215 OFFICE O/P EST HI 40 MIN: CPT | Mod: PBBFAC | Performed by: INTERNAL MEDICINE

## 2023-07-19 PROCEDURE — 1159F MED LIST DOCD IN RCRD: CPT | Mod: CPTII,,, | Performed by: INTERNAL MEDICINE

## 2023-07-19 PROCEDURE — 3080F DIAST BP >= 90 MM HG: CPT | Mod: CPTII,,, | Performed by: INTERNAL MEDICINE

## 2023-07-19 PROCEDURE — 99999 PR PBB SHADOW E&M-EST. PATIENT-LVL V: ICD-10-PCS | Mod: PBBFAC,,, | Performed by: INTERNAL MEDICINE

## 2023-07-19 PROCEDURE — 3008F PR BODY MASS INDEX (BMI) DOCUMENTED: ICD-10-PCS | Mod: CPTII,,, | Performed by: INTERNAL MEDICINE

## 2023-07-19 PROCEDURE — 1159F PR MEDICATION LIST DOCUMENTED IN MEDICAL RECORD: ICD-10-PCS | Mod: CPTII,,, | Performed by: INTERNAL MEDICINE

## 2023-07-19 PROCEDURE — 99214 OFFICE O/P EST MOD 30 MIN: CPT | Mod: 25,S$PBB,, | Performed by: INTERNAL MEDICINE

## 2023-07-19 PROCEDURE — 99214 PR OFFICE/OUTPT VISIT, EST, LEVL IV, 30-39 MIN: ICD-10-PCS | Mod: 25,S$PBB,, | Performed by: INTERNAL MEDICINE

## 2023-07-19 PROCEDURE — 3008F BODY MASS INDEX DOCD: CPT | Mod: CPTII,,, | Performed by: INTERNAL MEDICINE

## 2023-07-19 PROCEDURE — 99406 BEHAV CHNG SMOKING 3-10 MIN: CPT | Mod: S$PBB,,, | Performed by: INTERNAL MEDICINE

## 2023-07-19 PROCEDURE — 99999 PR PBB SHADOW E&M-EST. PATIENT-LVL V: CPT | Mod: PBBFAC,,, | Performed by: INTERNAL MEDICINE

## 2023-07-20 NOTE — PROGRESS NOTES
"Subjective:       Patient ID: Luz Pelaez is a 53 y.o. female.    Chief Complaint: Pulmonary Nodules (Consult form Dr. Mcknight)    53 year old current smoker with a history of waxing and waning pulmonary nodules.  Patient has a history of a hiatal hernia and does have significant reflux symptoms.  Patient underwent CT screen in May and found a new pulmonary nodule.  Follow up PET with hypermetabolic activity.  Patient was scheduled for veran EMN guided bronchoscopy and planning CT with a less conspicious and measuring smaller nodule.  She is here for consideration of robotic bronchoscopy.      Of note, path from bronchoscopy reports cellular "atypia".      No cultures were obtained.     Review of Systems    Objective:      Vitals:    07/19/23 1123   BP: (!) 174/94   BP Location: Right arm   Patient Position: Sitting   BP Method: Medium (Automatic)   Pulse: 77   Temp: 98.4 °F (36.9 °C)   TempSrc: Oral   SpO2: 97%   Weight: 74.3 kg (163 lb 12.8 oz)   Height: 5' 5" (1.651 m)      Physical Exam    Personal Diagnostic Review  All images personally reviewed with patient and her .  Of note, most recent CT of chest reports nodule form one month prior as smaller    There is a poorly visualized irregular density in the left lower lobe that measures 15 mm in craniocaudal dimension by 12 mm in AP dimension by 11 mm in medial-lateral dimension on the current examination.  On the prior examination it measured 16 mm in craniocaudal dimension by 24 mm in AP dimension by 16 mm in medial-lateral dimension.  No flowsheet data found.      Assessment:       1. Solitary pulmonary nodule    2. Pulmonary nodule    3. Abnormal PET scan of lung    4. Smoker    5. Cigarette nicotine dependence with nicotine-induced disorder        Outpatient Encounter Medications as of 7/19/2023   Medication Sig Dispense Refill    albuterol (ACCUNEB) 1.25 mg/3 mL Nebu Take 3 mLs (1.25 mg total) by nebulization 2 (two) times a day. Rescue 180 mL 11 "    albuterol (PROVENTIL/VENTOLIN HFA) 90 mcg/actuation inhaler INHALE 1 TO 2 PUFFS BY MOUTH EVERY SIX HOURS AS NEEDED FOR WHEEZING 8.5 g 0    dextroamphetamine-amphetamine (ADDERALL) 30 mg Tab Take 1 tablet (30 mg total) by mouth 2 (two) times a day. 60 tablet 0    dextroamphetamine-amphetamine (ADDERALL) 30 mg Tab Take 1 tablet (30 mg total) by mouth 2 (two) times a day. 60 tablet 0    ergocalciferol (ERGOCALCIFEROL) 50,000 unit Cap Vitamin D2 Take No date recorded No form recorded No frequency recorded No route recorded No set duration recorded No set duration amount recorded active No dosage strength recorded No dosage strength units of measure recorded      fluticasone-umeclidin-vilanter (TRELEGY ELLIPTA) 100-62.5-25 mcg DsDv Inhale 1 puff into the lungs once daily. 60 each 11    linaCLOtide (LINZESS) 290 mcg Cap capsule TAKE 1 CAPSULE (290 MCG TOTAL) BY MOUTH ONCE DAILY. 30 capsule 5    meloxicam (MOBIC) 15 MG tablet Take 1 tablet (15 mg total) by mouth once daily. 90 tablet 3    methylPREDNISolone (MEDROL DOSEPACK) 4 mg tablet use as directed 21 tablet 2    mupirocin (BACTROBAN) 2 % ointment Apply topically 3 (three) times daily. 30 g 00    omeprazole (PRILOSEC) 40 MG capsule Take 1 capsule (40 mg total) by mouth once daily. 90 capsule 3    dextroamphetamine-amphetamine 30 mg Tab Take 1 tablet (30 mg total) by mouth 2 (two) times a day. (Patient not taking: Reported on 7/19/2023) 60 tablet 0    ondansetron (ZOFRAN-ODT) 4 MG TbDL Take by mouth.       No facility-administered encounter medications on file as of 7/19/2023.     Orders Placed This Encounter   Procedures    CT Chest Without Contrast     Standing Status:   Future     Standing Expiration Date:   7/19/2024     Scheduling Instructions:      Chest Navigational Bronchoscopy     Order Specific Question:   May the Radiologist modify the order per protocol to meet the clinical needs of the patient?     Answer:   Yes     Plan:     Problem List Items Addressed  This Visit       Cigarette nicotine dependence with nicotine-induced disorder    Overview     The patient presents with a complaint of smoking.  She has a history of 31 pack years. She currently smokes 1 packs per day. She has attempted smoking cessation once. She has remained smoke free for up to 10 months..   The patient has been smoking 1 packs a day for 31  years.  Reasons given as to why the patient has not stopped smoking include under a lot of stress now.   She recently had an abnormality found on a CT and she has been scheduled with Dr. Mcknight for an evaluation.  When she quit in the past, she did this with chantix.  She had nausea with this and wants to get zofran to help.    CT Chest Without Contrast   Order: [id number]   Status:  Final result   Visible to patient:  Yes (Patient Portal) Next appt:  10/11/2019 at 10:00 AM in Orthopedics (Delfino Cain MD) Dx:  Carpal tunnel syndrome on both sides;...   Details     Reading Physician Reading Date Result Priority   Tarik Gallo III, MD 9/26/2019 Routine      Narrative     EXAMINATION:  CT CHEST WITHOUT CONTRAST    CLINICAL HISTORY:  abnormal x ray of cervial spine; Abnormal findings on diagnostic imaging of other parts of musculoskeletal system    TECHNIQUE:  Low dose axial images, sagittal and coronal reformations were obtained from the thoracic inlet to the lung bases. Contrast was not administered.    COMPARISON:  CXR March 26, 2014, CT C-spine September 5, 2019    FINDINGS:  Minimal apical pleuroparenchymal scarring noted bilaterally, slightly greater on the right.  Subpleural minimally spiculated nodule adjacent to the scarring noted measuring 6 mm.  This best visualized on series 603 image 224.  Per Fleischner society guidelines for single solid nodule in the 6-8 mm range: Low risk patient CT at 6-12 months then consider CT at 18-24 months.  For high risk patient CT at 6-12 months then CT at 18-24 months.    Several bilateral areas of ground-glass  opacity with the central bronchiectasis or dilated bronchus noted.  These are more prominent in the upper lobes, right greater than left.  No dense consolidation, mass lesion or effusion.  No focal or diffuse pleural thickening.  Additional areas of the cylindrical bronchiectasis noted again with the upper lobe prominence, right greater than left.  Infectious or inflammatory processes within the differential.    Trachea and mainstem bronchi within normal limits.  Thyroid unremarkable.  Heart size normal without pericardial effusion.    No bulky or matted adenopathy within the axillary, mediastinal or hilar regions.  A few subcentimeter short axis nodes identified in these areas.  Postsurgical changes noted in the GE junction region with small hiatal hernia identified.    Included upper abdomen remarkable for prior cholecystectomy.  Degenerative changes noted throughout the spine.  No lytic or blastic lesion.      Impression       6 mm spiculated nodule identified in the right apex abutting adjacent pleuroparenchymal scarring.  Fleischner society guidelines for single solid nodule in the 6-8 mm range: Low risk patient CT at 6-12 months then consider CT at 18-24 months.  For high risk patient: CT at 6-12 months then again at 18-24 months.    Bilateral bronchiectasis most prominently involving the upper lobes, right greater than left.    Focal bronchiectasis with surrounding ground-glass opacity noted within both upper lobes, greater on the right.  Possible etiologies would include sequela from infectious or inflammatory process.  Per Fleischner society guidelines for ground-glass opacities greater than 6 mm: CT at 6-12 months to confirm presence then CT at 3 and 5 years.    Subcentimeter short axis axillary and mediastinal nodes without bulky or matted adenopathy identified.    Postsurgical changes GE junction region with small to moderate size hiatal hernia.    Additional incidental findings as  above.      Electronically signed by: Tarik Gallo MD  Date: 09/26/2019  Time: 16:15                    Current Assessment & Plan     Dangers of cigarette smoking were reviewed with patient in detail. Patient was Counseled for 3-10 minutes. Nicotine replacement options were discussed.          Pulmonary nodule    Abnormal PET scan of lung    Overview     Follow up imaging with nodule appearing smaller with a negative biopsy, although alternative dx not made.  Suspect inflammatory process and recommend short term follow up CT. If continues to get smaller or resolve would recommend continue surveillance rather than repeating biopsy.           Other Visit Diagnoses       Solitary pulmonary nodule    -  Primary    Relevant Orders    CT Chest Without Contrast    Smoker        Relevant Orders    CT Chest Without Contrast

## 2023-07-20 NOTE — ASSESSMENT & PLAN NOTE
Dangers of cigarette smoking were reviewed with patient in detail. Patient was Counseled for 3-10 minutes. Nicotine replacement options were discussed.

## 2023-07-31 ENCOUNTER — LAB VISIT (OUTPATIENT)
Dept: LAB | Facility: HOSPITAL | Age: 53
End: 2023-07-31
Attending: PHYSICIAN ASSISTANT
Payer: MEDICAID

## 2023-07-31 ENCOUNTER — TELEPHONE (OUTPATIENT)
Dept: FAMILY MEDICINE | Facility: CLINIC | Age: 53
End: 2023-07-31
Payer: MEDICAID

## 2023-07-31 ENCOUNTER — OFFICE VISIT (OUTPATIENT)
Dept: FAMILY MEDICINE | Facility: CLINIC | Age: 53
End: 2023-07-31
Payer: MEDICAID

## 2023-07-31 VITALS
HEART RATE: 98 BPM | RESPIRATION RATE: 18 BRPM | BODY MASS INDEX: 26.97 KG/M2 | SYSTOLIC BLOOD PRESSURE: 137 MMHG | TEMPERATURE: 98 F | OXYGEN SATURATION: 100 % | HEIGHT: 65 IN | WEIGHT: 161.88 LBS | DIASTOLIC BLOOD PRESSURE: 85 MMHG

## 2023-07-31 DIAGNOSIS — R52 BODY ACHES: ICD-10-CM

## 2023-07-31 DIAGNOSIS — I10 PRIMARY HYPERTENSION: Primary | ICD-10-CM

## 2023-07-31 DIAGNOSIS — I10 PRIMARY HYPERTENSION: ICD-10-CM

## 2023-07-31 LAB
ALBUMIN SERPL BCP-MCNC: 3.8 G/DL (ref 3.5–5.2)
ALP SERPL-CCNC: 89 U/L (ref 55–135)
ALT SERPL W/O P-5'-P-CCNC: 15 U/L (ref 10–44)
ANION GAP SERPL CALC-SCNC: 8 MMOL/L (ref 8–16)
AST SERPL-CCNC: 18 U/L (ref 10–40)
BASOPHILS # BLD AUTO: 0.02 K/UL (ref 0–0.2)
BASOPHILS NFR BLD: 0.2 % (ref 0–1.9)
BILIRUB SERPL-MCNC: 1 MG/DL (ref 0.1–1)
BUN SERPL-MCNC: 15 MG/DL (ref 6–20)
CALCIUM SERPL-MCNC: 9.4 MG/DL (ref 8.7–10.5)
CHLORIDE SERPL-SCNC: 103 MMOL/L (ref 95–110)
CO2 SERPL-SCNC: 25 MMOL/L (ref 23–29)
CREAT SERPL-MCNC: 0.6 MG/DL (ref 0.5–1.4)
CTP QC/QA: YES
DIFFERENTIAL METHOD: ABNORMAL
EOSINOPHIL # BLD AUTO: 0 K/UL (ref 0–0.5)
EOSINOPHIL NFR BLD: 0.2 % (ref 0–8)
ERYTHROCYTE [DISTWIDTH] IN BLOOD BY AUTOMATED COUNT: 13.9 % (ref 11.5–14.5)
EST. GFR  (NO RACE VARIABLE): >60 ML/MIN/1.73 M^2
GLUCOSE SERPL-MCNC: 104 MG/DL (ref 70–110)
HCT VFR BLD AUTO: 43.6 % (ref 37–48.5)
HGB BLD-MCNC: 13.7 G/DL (ref 12–16)
IMM GRANULOCYTES # BLD AUTO: 0.01 K/UL (ref 0–0.04)
IMM GRANULOCYTES NFR BLD AUTO: 0.1 % (ref 0–0.5)
LYMPHOCYTES # BLD AUTO: 0.9 K/UL (ref 1–4.8)
LYMPHOCYTES NFR BLD: 11.5 % (ref 18–48)
MCH RBC QN AUTO: 31.9 PG (ref 27–31)
MCHC RBC AUTO-ENTMCNC: 31.4 G/DL (ref 32–36)
MCV RBC AUTO: 101 FL (ref 82–98)
MONOCYTES # BLD AUTO: 0.4 K/UL (ref 0.3–1)
MONOCYTES NFR BLD: 4.6 % (ref 4–15)
NEUTROPHILS # BLD AUTO: 6.7 K/UL (ref 1.8–7.7)
NEUTROPHILS NFR BLD: 83.4 % (ref 38–73)
NRBC BLD-RTO: 0 /100 WBC
PLATELET # BLD AUTO: 228 K/UL (ref 150–450)
PMV BLD AUTO: 12 FL (ref 9.2–12.9)
POTASSIUM SERPL-SCNC: 3.9 MMOL/L (ref 3.5–5.1)
PROT SERPL-MCNC: 6.7 G/DL (ref 6–8.4)
RBC # BLD AUTO: 4.3 M/UL (ref 4–5.4)
SARS-COV-2 RDRP RESP QL NAA+PROBE: NEGATIVE
SODIUM SERPL-SCNC: 136 MMOL/L (ref 136–145)
TSH SERPL DL<=0.005 MIU/L-ACNC: 0.74 UIU/ML (ref 0.4–4)
WBC # BLD AUTO: 8.01 K/UL (ref 3.9–12.7)

## 2023-07-31 PROCEDURE — 4010F PR ACE/ARB THEARPY RXD/TAKEN: ICD-10-PCS | Mod: CPTII,,, | Performed by: PHYSICIAN ASSISTANT

## 2023-07-31 PROCEDURE — 85025 COMPLETE CBC W/AUTO DIFF WBC: CPT | Performed by: PHYSICIAN ASSISTANT

## 2023-07-31 PROCEDURE — 1160F RVW MEDS BY RX/DR IN RCRD: CPT | Mod: CPTII,,, | Performed by: PHYSICIAN ASSISTANT

## 2023-07-31 PROCEDURE — 99999 PR PBB SHADOW E&M-EST. PATIENT-LVL V: ICD-10-PCS | Mod: PBBFAC,,, | Performed by: PHYSICIAN ASSISTANT

## 2023-07-31 PROCEDURE — 99999PBSHW: ICD-10-PCS | Mod: PBBFAC,,,

## 2023-07-31 PROCEDURE — 99215 OFFICE O/P EST HI 40 MIN: CPT | Mod: PBBFAC,PO | Performed by: PHYSICIAN ASSISTANT

## 2023-07-31 PROCEDURE — 93005 ELECTROCARDIOGRAM TRACING: CPT | Mod: PBBFAC,PO | Performed by: INTERNAL MEDICINE

## 2023-07-31 PROCEDURE — 87635 SARS-COV-2 COVID-19 AMP PRB: CPT | Mod: PBBFAC,PO | Performed by: PHYSICIAN ASSISTANT

## 2023-07-31 PROCEDURE — 3075F SYST BP GE 130 - 139MM HG: CPT | Mod: CPTII,,, | Performed by: PHYSICIAN ASSISTANT

## 2023-07-31 PROCEDURE — 80053 COMPREHEN METABOLIC PANEL: CPT | Performed by: PHYSICIAN ASSISTANT

## 2023-07-31 PROCEDURE — 3079F DIAST BP 80-89 MM HG: CPT | Mod: CPTII,,, | Performed by: PHYSICIAN ASSISTANT

## 2023-07-31 PROCEDURE — 93010 ELECTROCARDIOGRAM REPORT: CPT | Mod: S$PBB,,, | Performed by: INTERNAL MEDICINE

## 2023-07-31 PROCEDURE — 1159F PR MEDICATION LIST DOCUMENTED IN MEDICAL RECORD: ICD-10-PCS | Mod: CPTII,,, | Performed by: PHYSICIAN ASSISTANT

## 2023-07-31 PROCEDURE — 99213 PR OFFICE/OUTPT VISIT, EST, LEVL III, 20-29 MIN: ICD-10-PCS | Mod: S$PBB,,, | Performed by: PHYSICIAN ASSISTANT

## 2023-07-31 PROCEDURE — 99999 PR PBB SHADOW E&M-EST. PATIENT-LVL V: CPT | Mod: PBBFAC,,, | Performed by: PHYSICIAN ASSISTANT

## 2023-07-31 PROCEDURE — 3008F PR BODY MASS INDEX (BMI) DOCUMENTED: ICD-10-PCS | Mod: CPTII,,, | Performed by: PHYSICIAN ASSISTANT

## 2023-07-31 PROCEDURE — 3079F PR MOST RECENT DIASTOLIC BLOOD PRESSURE 80-89 MM HG: ICD-10-PCS | Mod: CPTII,,, | Performed by: PHYSICIAN ASSISTANT

## 2023-07-31 PROCEDURE — 3008F BODY MASS INDEX DOCD: CPT | Mod: CPTII,,, | Performed by: PHYSICIAN ASSISTANT

## 2023-07-31 PROCEDURE — 84443 ASSAY THYROID STIM HORMONE: CPT | Performed by: PHYSICIAN ASSISTANT

## 2023-07-31 PROCEDURE — 1159F MED LIST DOCD IN RCRD: CPT | Mod: CPTII,,, | Performed by: PHYSICIAN ASSISTANT

## 2023-07-31 PROCEDURE — 4010F ACE/ARB THERAPY RXD/TAKEN: CPT | Mod: CPTII,,, | Performed by: PHYSICIAN ASSISTANT

## 2023-07-31 PROCEDURE — 1160F PR REVIEW ALL MEDS BY PRESCRIBER/CLIN PHARMACIST DOCUMENTED: ICD-10-PCS | Mod: CPTII,,, | Performed by: PHYSICIAN ASSISTANT

## 2023-07-31 PROCEDURE — 99213 OFFICE O/P EST LOW 20 MIN: CPT | Mod: S$PBB,,, | Performed by: PHYSICIAN ASSISTANT

## 2023-07-31 PROCEDURE — 36415 COLL VENOUS BLD VENIPUNCTURE: CPT | Mod: PO | Performed by: PHYSICIAN ASSISTANT

## 2023-07-31 PROCEDURE — 93010 EKG 12-LEAD: ICD-10-PCS | Mod: S$PBB,,, | Performed by: INTERNAL MEDICINE

## 2023-07-31 PROCEDURE — 3075F PR MOST RECENT SYSTOLIC BLOOD PRESS GE 130-139MM HG: ICD-10-PCS | Mod: CPTII,,, | Performed by: PHYSICIAN ASSISTANT

## 2023-07-31 PROCEDURE — 99999PBSHW: Mod: PBBFAC,,,

## 2023-07-31 RX ORDER — LOSARTAN POTASSIUM 25 MG/1
25 TABLET ORAL DAILY
Qty: 30 TABLET | Refills: 11 | Status: SHIPPED | OUTPATIENT
Start: 2023-07-31 | End: 2023-08-15

## 2023-07-31 NOTE — TELEPHONE ENCOUNTER
Offered pt same day appointment at both [place] and [facility], pt preferred [place], appt scheduled.

## 2023-07-31 NOTE — TELEPHONE ENCOUNTER
----- Message from Raquel Monroy sent at 7/31/2023 10:58 AM CDT -----  Contact: Self  .Type:  Same Day Appointment Request    Caller is requesting a same day appointment.  Caller declined first available appointment listed below.    Name of Caller:Sister  When is the first available appointment?8/2/23  Symptoms:High BP   Best Call Back Number:[phone]   Additional Information: Patient needs appt today if possible can be with NP

## 2023-07-31 NOTE — PROGRESS NOTES
Assessment/Plan:    Problem List Items Addressed This Visit          Cardiac/Vascular    Primary hypertension - Primary    Overview     -at goal today; has been above goal on recent home checks  -new diagnosis; will obtain labs and EKG today in the clinic  -plan to start Losartan 25 mg daily  -monitor BP at home; BP check in 2 weeks   continue lifestyle modification with low sodium diet and exercise   -discussed hypertension disease course and importance of treating high blood pressure  -patient understood and advised of risk of untreated blood pressure. ER precautions were given for symptoms of hypertensive urgency and emergency.           Relevant Medications    losartan (COZAAR) 25 MG tablet    Other Relevant Orders    IN OFFICE EKG 12-LEAD (to [facility])    CBC Auto Differential    Comprehensive Metabolic Panel    TSH     Other Visit Diagnoses       Body aches        Relevant Orders    POCT COVID-19 Rapid Screening (Completed)            Follow up in about 2 weeks (around 8/14/2023), or if symptoms worsen or fail to improve, for nurse visit for BP check.    Nanette Santiago PA-C  _____________________________________________________________________________________________________________________________________________________    CC: elevated BP     HPI: Patient is in clinic today as an established patient here for elevated BP. The patient has had an elevated blood pressure when it has been checked recently. She stated that yesterday she started having nausea and headaches and checked her BP in which it was in the 180s/100s. She was also noted to have elevated BP reading (174/94) at her pulmonology appointment. She denies chest pain, palpitations, shortness of breath, dyspnea on exertion, left arm or neck pain, nausea, vomiting, diaphoresis, PND and orthopnea. The patient does not have a history of hypertension and is not on medications that would increase the blood pressure at this time. She reports recently  starting a new OTC supplement for menopausal symptoms (Amberen), however, she reports having elevated BP readings prior to starting the medication. She is also on Adderall for ADD/ADHD, but has been on this medication for several years with no prior issues.     Patient also reports waking up this morning with body aches. She denies fever, chills, coughing, diaphoresis, ear pain, headaches, hoarse voice, neck pain, shortness of breath, sneezing, sore throat or swollen glands. She has not had any known sick contacts.     No other complaints today.    Past Medical History:   Diagnosis Date    Allergy     Asthma     Attention deficit     Carpal tunnel syndrome     Cervical spine degeneration     GERD (gastroesophageal reflux disease)     Hiatal hernia 11/1/2019    Kidney stones      Past Surgical History:   Procedure Laterality Date    CARPAL TUNNEL RELEASE Left 10/7/2019    Procedure: RELEASE, CARPAL TUNNEL;  Surgeon: Delfino Cain MD;  Location: [facility];  Service: Orthopedics;  Laterality: Left;    CARPAL TUNNEL RELEASE Right 11/4/2019    Procedure: RELEASE, CARPAL TUNNEL;  Surgeon: Delfino Cain MD;  Location: [facility] OR;  Service: Orthopedics;  Laterality: Right;    CHOLECYSTECTOMY      COLONOSCOPY  4/1/2012    date is approximate-done by Dr. Naranjo due to chronic constipation    COLONOSCOPY N/A 6/18/2020    Procedure: COLONOSCOPY;  Surgeon: Kb Mcgarry MD;  Location: [facility];  Service: Endoscopy;  Laterality: N/A;    ENDOBRONCHIAL ULTRASOUND Left 6/28/2023    Procedure: ENDOBRONCHIAL ULTRASOUND (EBUS);  Surgeon: Antelmo Mcknight MD;  Location: [facility];  Service: Pulmonary;  Laterality: Left;    ESOPHAGOGASTRODUODENOSCOPY N/A 6/18/2020    Procedure: ESOPHAGOGASTRODUODENOSCOPY (EGD);  Surgeon: Kb Mcgarry MD;  Location: [facility];  Service: Endoscopy;  Laterality: N/A;    NAVIGATIONAL BRONCHOSCOPY Bilateral 6/28/2023    Procedure: BRONCHOSCOPY, NAVIGATIONAL;  Surgeon: Antelmo  MD Juan Luis;  Location: [facility];  Service: Pulmonary;  Laterality: Bilateral;    SLEEVE GASTROPLASTY      TOTAL REDUCTION MAMMOPLASTY       Review of patient's allergies indicates:   Allergen Reactions    Bupropion hcl Other (See Comments)     Mood swings  Mood swings    Lamisil [terbinafine] Rash     Social History     Tobacco Use    Smoking status: Every Day     Current packs/day: 0.50     Average packs/day: 0.5 packs/day for 35.6 years (17.8 ttl pk-yrs)     Types: Cigarettes     Start date: 1988    Smokeless tobacco: Never   Substance Use Topics    Alcohol use: Yes     Comment: Socially    Drug use: No     Family History   Problem Relation Age of Onset    Hypertension Mother     Diabetes Mother     Hyperlipidemia Mother     Heart disease Mother 58    Colon cancer Maternal Grandmother     Breast cancer Maternal Grandmother     Breast cancer Paternal Grandmother         Breast    Heart attack Father 70    Thyroid disease Sister     Hypertension Sister     Breast cancer Paternal Aunt     Stroke Neg Hx      Current Outpatient Medications on File Prior to Visit   Medication Sig Dispense Refill    albuterol (ACCUNEB) 1.25 mg/3 mL Nebu Take 3 mLs (1.25 mg total) by nebulization 2 (two) times a day. Rescue 180 mL 11    albuterol (PROVENTIL/VENTOLIN HFA) 90 mcg/actuation inhaler INHALE 1 TO 2 PUFFS BY MOUTH EVERY SIX HOURS AS NEEDED FOR WHEEZING 8.5 g 0    dextroamphetamine-amphetamine (ADDERALL) 30 mg Tab Take 1 tablet (30 mg total) by mouth 2 (two) times a day. 60 tablet 0    dextroamphetamine-amphetamine (ADDERALL) 30 mg Tab Take 1 tablet (30 mg total) by mouth 2 (two) times a day. 60 tablet 0    dextroamphetamine-amphetamine 30 mg Tab Take 1 tablet (30 mg total) by mouth 2 (two) times a day. 60 tablet 0    ergocalciferol (ERGOCALCIFEROL) 50,000 unit Cap Vitamin D2 Take No date recorded No form recorded No frequency recorded No route recorded No set duration recorded No set duration amount recorded active No dosage  "strength recorded No dosage strength units of measure recorded      fluticasone-umeclidin-vilanter (TRELEGY ELLIPTA) 100-62.5-25 mcg DsDv Inhale 1 puff into the lungs once daily. 60 each 11    linaCLOtide (LINZESS) 290 mcg Cap capsule TAKE 1 CAPSULE (290 MCG TOTAL) BY MOUTH ONCE DAILY. 30 capsule 5    meloxicam (MOBIC) 15 MG tablet Take 1 tablet (15 mg total) by mouth once daily. 90 tablet 3    mupirocin (BACTROBAN) 2 % ointment Apply topically 3 (three) times daily. 30 g 00    omeprazole (PRILOSEC) 40 MG capsule Take 1 capsule (40 mg total) by mouth once daily. 90 capsule 3    ondansetron (ZOFRAN-ODT) 4 MG TbDL Take by mouth.      methylPREDNISolone (MEDROL DOSEPACK) 4 mg tablet use as directed (Patient not taking: Reported on 7/31/2023) 21 tablet 2     No current facility-administered medications on file prior to visit.       Review of Systems   Constitutional:  Negative for chills, diaphoresis, fatigue and fever.   HENT:  Negative for congestion, ear pain, postnasal drip, sinus pain and sore throat.    Eyes:  Negative for pain and redness.   Respiratory:  Negative for cough, chest tightness and shortness of breath.    Cardiovascular:  Negative for chest pain and leg swelling.   Gastrointestinal:  Positive for nausea. Negative for abdominal pain, constipation, diarrhea and vomiting.   Genitourinary:  Negative for dysuria and hematuria.   Musculoskeletal:  Negative for arthralgias and joint swelling.   Skin:  Negative for rash.   Neurological:  Positive for headaches. Negative for dizziness and syncope.   Psychiatric/Behavioral:  Negative for dysphoric mood. The patient is not nervous/anxious.        Vitals:    07/31/23 1308   BP: 137/85   BP Location: Left arm   Patient Position: Sitting   Pulse: 98   Resp: 18   Temp: 98 °F (36.7 °C)   SpO2: 100%   Weight: 73.4 kg (161 lb 14.4 oz)   Height: 5' 5" (1.651 m)       Wt Readings from Last 3 Encounters:   07/31/23 73.4 kg (161 lb 14.4 oz)   07/19/23 74.3 kg (163 lb 12.8 " oz)   06/28/23 73.5 kg (162 lb)       Physical Exam  Constitutional:       General: She is not in acute distress.     Appearance: Normal appearance. She is well-developed.   HENT:      Head: Normocephalic and atraumatic.   Eyes:      Conjunctiva/sclera: Conjunctivae normal.   Cardiovascular:      Rate and Rhythm: Normal rate and regular rhythm.      Pulses: Normal pulses.      Heart sounds: Normal heart sounds. No murmur heard.  Pulmonary:      Effort: Pulmonary effort is normal. No respiratory distress.      Breath sounds: Normal breath sounds.   Abdominal:      General: Bowel sounds are normal. There is no distension.      Palpations: Abdomen is soft.      Tenderness: There is no abdominal tenderness.   Musculoskeletal:         General: Normal range of motion.      Cervical back: Normal range of motion and neck supple.   Skin:     General: Skin is warm and dry.      Findings: No rash.   Neurological:      General: No focal deficit present.      Mental Status: She is alert and oriented to person, place, and time.   Psychiatric:         Mood and Affect: Mood normal.         Behavior: Behavior normal.         Health Maintenance   Topic Date Due    Hepatitis C Screening  Never done    Mammogram  11/09/2023    TETANUS VACCINE  03/26/2024    Lipid Panel  11/09/2027

## 2023-08-01 NOTE — PROGRESS NOTES
Results have been released via [employer]. Please verify that these have been viewed by patient. If not, please call patient with results.     I have sent a message to them with the following interpretation (see below).    I have reviewed your recent blood work.     CBC NORMAL-The CBC appears to be stable at this time with no sign of major anemia, abnormal white count or platelet abnormality.  CMP/BMP NORMAL-The electrolytes all appear stable at this time. This includes kidney functions along with routine electrolytes like sugar, potassium and sodium. The liver enzymes were noted to be stable also.  TSH NORMAL-The TSH screening indicated a normal function of the thyroid.    Please do not hesitate to call or message with any additional questions or concerns.    Nanetet Santiago PA-C

## 2023-08-02 ENCOUNTER — OFFICE VISIT (OUTPATIENT)
Dept: FAMILY MEDICINE | Facility: CLINIC | Age: 53
End: 2023-08-02
Payer: MEDICAID

## 2023-08-02 VITALS — DIASTOLIC BLOOD PRESSURE: 99 MMHG | SYSTOLIC BLOOD PRESSURE: 146 MMHG

## 2023-08-02 DIAGNOSIS — K21.00 GASTROESOPHAGEAL REFLUX DISEASE WITH ESOPHAGITIS WITHOUT HEMORRHAGE: ICD-10-CM

## 2023-08-02 DIAGNOSIS — K59.00 CONSTIPATION, UNSPECIFIED CONSTIPATION TYPE: ICD-10-CM

## 2023-08-02 DIAGNOSIS — R41.840 ATTENTION DEFICIT: Primary | Chronic | ICD-10-CM

## 2023-08-02 PROCEDURE — 99214 PR OFFICE/OUTPT VISIT, EST, LEVL IV, 30-39 MIN: ICD-10-PCS | Mod: 95,,, | Performed by: NURSE PRACTITIONER

## 2023-08-02 PROCEDURE — 99214 OFFICE O/P EST MOD 30 MIN: CPT | Mod: 95,,, | Performed by: NURSE PRACTITIONER

## 2023-08-02 PROCEDURE — 3077F PR MOST RECENT SYSTOLIC BLOOD PRESSURE >= 140 MM HG: ICD-10-PCS | Mod: CPTII,95,, | Performed by: NURSE PRACTITIONER

## 2023-08-02 PROCEDURE — 1159F MED LIST DOCD IN RCRD: CPT | Mod: CPTII,95,, | Performed by: NURSE PRACTITIONER

## 2023-08-02 PROCEDURE — 4010F ACE/ARB THERAPY RXD/TAKEN: CPT | Mod: CPTII,95,, | Performed by: NURSE PRACTITIONER

## 2023-08-02 PROCEDURE — 3080F DIAST BP >= 90 MM HG: CPT | Mod: CPTII,95,, | Performed by: NURSE PRACTITIONER

## 2023-08-02 PROCEDURE — 3077F SYST BP >= 140 MM HG: CPT | Mod: CPTII,95,, | Performed by: NURSE PRACTITIONER

## 2023-08-02 PROCEDURE — 4010F PR ACE/ARB THEARPY RXD/TAKEN: ICD-10-PCS | Mod: CPTII,95,, | Performed by: NURSE PRACTITIONER

## 2023-08-02 PROCEDURE — 1159F PR MEDICATION LIST DOCUMENTED IN MEDICAL RECORD: ICD-10-PCS | Mod: CPTII,95,, | Performed by: NURSE PRACTITIONER

## 2023-08-02 PROCEDURE — 1160F PR REVIEW ALL MEDS BY PRESCRIBER/CLIN PHARMACIST DOCUMENTED: ICD-10-PCS | Mod: CPTII,95,, | Performed by: NURSE PRACTITIONER

## 2023-08-02 PROCEDURE — 3080F PR MOST RECENT DIASTOLIC BLOOD PRESSURE >= 90 MM HG: ICD-10-PCS | Mod: CPTII,95,, | Performed by: NURSE PRACTITIONER

## 2023-08-02 PROCEDURE — 1160F RVW MEDS BY RX/DR IN RCRD: CPT | Mod: CPTII,95,, | Performed by: NURSE PRACTITIONER

## 2023-08-02 RX ORDER — DEXTROAMPHETAMINE SACCHARATE, AMPHETAMINE ASPARTATE, DEXTROAMPHETAMINE SULFATE AND AMPHETAMINE SULFATE 7.5; 7.5; 7.5; 7.5 MG/1; MG/1; MG/1; MG/1
1 TABLET ORAL 2 TIMES DAILY
Qty: 60 TABLET | Refills: 0 | Status: SHIPPED | OUTPATIENT
Start: 2023-09-01 | End: 2023-10-31 | Stop reason: SDUPTHER

## 2023-08-02 RX ORDER — DEXTROAMPHETAMINE SACCHARATE, AMPHETAMINE ASPARTATE, DEXTROAMPHETAMINE SULFATE AND AMPHETAMINE SULFATE 7.5; 7.5; 7.5; 7.5 MG/1; MG/1; MG/1; MG/1
30 TABLET ORAL 2 TIMES DAILY
Qty: 60 TABLET | Refills: 0 | Status: SHIPPED | OUTPATIENT
Start: 2023-08-02 | End: 2023-10-31 | Stop reason: SDUPTHER

## 2023-08-02 RX ORDER — DEXTROAMPHETAMINE SACCHARATE, AMPHETAMINE ASPARTATE, DEXTROAMPHETAMINE SULFATE AND AMPHETAMINE SULFATE 7.5; 7.5; 7.5; 7.5 MG/1; MG/1; MG/1; MG/1
1 TABLET ORAL 2 TIMES DAILY
Qty: 60 TABLET | Refills: 0 | Status: SHIPPED | OUTPATIENT
Start: 2023-10-01 | End: 2023-10-31 | Stop reason: SDUPTHER

## 2023-08-02 RX ORDER — OMEPRAZOLE 40 MG/1
40 CAPSULE, DELAYED RELEASE ORAL DAILY
Qty: 90 CAPSULE | Refills: 3 | Status: SHIPPED | OUTPATIENT
Start: 2023-08-02 | End: 2024-08-01

## 2023-08-02 RX ORDER — MUPIROCIN 20 MG/G
OINTMENT TOPICAL 3 TIMES DAILY
Qty: 30 G | Refills: 2 | Status: SHIPPED | OUTPATIENT
Start: 2023-08-02

## 2023-08-02 NOTE — PROGRESS NOTES
Subjective:       Patient ID: Luz Pelaez is a 53 y.o. female.    Primary Care Telemedicine Note    The patient location is:  Patient Home   The chief complaint leading to consultation is: medication refill  Total time spent with patient: 20 mins    Visit type: Virtual visit with synchronous audio only and video  Each patient to whom he or she provides medical services by telemedicine is:  (1) informed of the relationship between the physician and patient and the respective role of any other health care provider with respect to management of the patient; and (2) notified that he or she may decline to receive medical services by telemedicine and may withdraw from such care at any time.      Chief Complaint: Medication Refill    [facility]  Problem List Items Addressed This Visit          Neuro    Attention deficit - Primary (Chronic)    Overview     Luz Pelaez has ADD. Medication has been used since 2000 and has been stable on the current dose of medicine. She reports being compliant on the medicine and is not receiving narcotics from any other physician. The patient was checked in the [facility] Board of Pharmacy's Prescription Monitoring Program. There appears to be no incongruities with the patient's verbalized history. She denies any complications from taking the medicine. The patient's weight and apatite has been stable and has not had difficulties with agitation. She reports improvement in the symptoms with the current dose.               Relevant Medications    dextroamphetamine-amphetamine (ADDERALL) 30 mg Tab (Start on 10/1/2023)    dextroamphetamine-amphetamine (ADDERALL) 30 mg Tab (Start on 9/1/2023)    dextroamphetamine-amphetamine 30 mg Tab       GI    Gastroesophageal reflux disease with esophagitis    Overview     She had an EGD in 2014 and had esophagitis. She is on mobic, she smokes and she is on a low dose of the prilosec. She has had continued pain with this and would like more coverage.  She is  about to quit smoking.         Relevant Medications    omeprazole (PRILOSEC) 40 MG capsule    Constipation    Overview     -chronic constipation  -no improvement in symptoms with OTC medications    -Regular bowel movements with Linzess, denies side effects         Relevant Medications    linaCLOtide (LINZESS) 145 mcg Cap capsule         Review of Systems   Constitutional:  Negative for activity change and unexpected weight change.   HENT:  Negative for ear pain, hearing loss, rhinorrhea and trouble swallowing.    Eyes:  Negative for pain, discharge and visual disturbance.   Respiratory:  Positive for wheezing. Negative for chest tightness and shortness of breath.    Cardiovascular:  Negative for palpitations.   Gastrointestinal:  Positive for constipation. Negative for blood in stool and diarrhea.   Endocrine: Negative for polydipsia and polyuria.   Genitourinary:  Negative for difficulty urinating, dysuria, hematuria and menstrual problem.   Skin:  Negative for color change.   Neurological:  Negative for dizziness.   Psychiatric/Behavioral:  Positive for dysphoric mood. Negative for confusion and sleep disturbance. The patient is not nervous/anxious.        Vitals:    08/02/23 1743   BP: (!) 146/99       Objective:     Current Outpatient Medications   Medication Sig Dispense Refill    albuterol (ACCUNEB) 1.25 mg/3 mL Nebu Take 3 mLs (1.25 mg total) by nebulization 2 (two) times a day. Rescue 180 mL 11    albuterol (PROVENTIL/VENTOLIN HFA) 90 mcg/actuation inhaler INHALE 1 TO 2 PUFFS BY MOUTH EVERY SIX HOURS AS NEEDED FOR WHEEZING 8.5 g 0    [START ON 10/1/2023] dextroamphetamine-amphetamine (ADDERALL) 30 mg Tab Take 1 tablet (30 mg total) by mouth 2 (two) times a day. 60 tablet 0    [START ON 9/1/2023] dextroamphetamine-amphetamine (ADDERALL) 30 mg Tab Take 1 tablet (30 mg total) by mouth 2 (two) times a day. 60 tablet 0    dextroamphetamine-amphetamine 30 mg Tab Take 1 tablet (30 mg total) by mouth 2 (two) times a  day. 60 tablet 0    ergocalciferol (ERGOCALCIFEROL) 50,000 unit Cap Vitamin D2 Take No date recorded No form recorded No frequency recorded No route recorded No set duration recorded No set duration amount recorded active No dosage strength recorded No dosage strength units of measure recorded      fluticasone-umeclidin-vilanter (TRELEGY ELLIPTA) 100-62.5-25 mcg DsDv Inhale 1 puff into the lungs once daily. 60 each 11    linaCLOtide (LINZESS) 145 mcg Cap capsule Take 1 capsule (145 mcg total) by mouth before breakfast. 90 capsule 3    losartan (COZAAR) 25 MG tablet Take 1 tablet (25 mg total) by mouth once daily. 30 tablet 11    meloxicam (MOBIC) 15 MG tablet Take 1 tablet (15 mg total) by mouth once daily. 90 tablet 3    mupirocin (BACTROBAN) 2 % ointment Apply topically 3 (three) times daily. 30 g 2    omeprazole (PRILOSEC) 40 MG capsule Take 1 capsule (40 mg total) by mouth once daily. 90 capsule 3    ondansetron (ZOFRAN-ODT) 4 MG TbDL Take by mouth.       No current facility-administered medications for this visit.       Physical Exam  Constitutional:       Appearance: She is well-developed.   HENT:      Head: Normocephalic.   Pulmonary:      Effort: Pulmonary effort is normal. No respiratory distress.   Musculoskeletal:      Cervical back: Normal range of motion.   Neurological:      Mental Status: She is alert and oriented to person, place, and time.   Psychiatric:         Thought Content: Thought content normal.         Judgment: Judgment normal.         Assessment:       1. Attention deficit    2. Constipation, unspecified constipation type    3. Gastroesophageal reflux disease with esophagitis without hemorrhage        Plan:   Attention deficit  -     dextroamphetamine-amphetamine (ADDERALL) 30 mg Tab; Take 1 tablet (30 mg total) by mouth 2 (two) times a day.  Dispense: 60 tablet; Refill: 0  -     dextroamphetamine-amphetamine (ADDERALL) 30 mg Tab; Take 1 tablet (30 mg total) by mouth 2 (two) times a day.   Dispense: 60 tablet; Refill: 0  -     dextroamphetamine-amphetamine 30 mg Tab; Take 1 tablet (30 mg total) by mouth 2 (two) times a day.  Dispense: 60 tablet; Refill: 0    Constipation, unspecified constipation type  -     linaCLOtide (LINZESS) 145 mcg Cap capsule; Take 1 capsule (145 mcg total) by mouth before breakfast.  Dispense: 90 capsule; Refill: 3    Gastroesophageal reflux disease with esophagitis without hemorrhage  -     omeprazole (PRILOSEC) 40 MG capsule; Take 1 capsule (40 mg total) by mouth once daily.  Dispense: 90 capsule; Refill: 3    Other orders  -     mupirocin (BACTROBAN) 2 % ointment; Apply topically 3 (three) times daily.  Dispense: 30 g; Refill: 2        No follow-ups on file.    There are no Patient Instructions on file for this visit.

## 2023-08-08 ENCOUNTER — HOSPITAL ENCOUNTER (OUTPATIENT)
Dept: CARDIOLOGY | Facility: HOSPITAL | Age: 53
Discharge: HOME | End: 2023-08-08
Attending: INTERNAL MEDICINE
Payer: MEDICAID

## 2023-08-08 VITALS
BODY MASS INDEX: 27.16 KG/M2 | DIASTOLIC BLOOD PRESSURE: 94 MMHG | HEIGHT: 65 IN | WEIGHT: 163 LBS | SYSTOLIC BLOOD PRESSURE: 156 MMHG

## 2023-08-08 DIAGNOSIS — I73.00 RAYNAUD'S PHENOMENON WITHOUT GANGRENE: ICD-10-CM

## 2023-08-08 LAB
ABI CLAUDICATION TIME: 1 MIN
ABI POST MINUTES1: 2 MIN
IMMEDIATE ARM BP: 185 MMHG
IMMEDIATE LEFT ABI: 1.02
IMMEDIATE LEFT TIBIAL: 188 MMHG
IMMEDIATE RIGHT ABI: 1.1
IMMEDIATE RIGHT TIBIAL: 204 MMHG
LEFT ABI: 1.26
LEFT ARM BP: 156 MMHG
LEFT DORSALIS PEDIS: 194 MMHG
LEFT POSTERIOR TIBIAL: 196 MMHG
LEFT TBI: 0.88
LEFT TOE PRESSURE: 137 MMHG
POST1 ARM BP: 158 MMHG
POST1 LEFT ABI: 1.24
POST1 LEFT TIBIAL: 196 MMHG
POST1 RIGHT ABI: 1.24
POST1 RIGHT TIBIAL: 196 MMHG
RIGHT ABI: 1.28
RIGHT ARM BP: 151 MMHG
RIGHT DORSALIS PEDIS: 193 MMHG
RIGHT POSTERIOR TIBIAL: 200 MMHG
RIGHT TBI: 0.88
RIGHT TOE PRESSURE: 137 MMHG
TREADMILL GRADE: 10 %
TREADMILL SPEED: 2 MPH
TREADMILL TIME: 5 MIN

## 2023-08-08 PROCEDURE — 93924 LWR XTR VASC STDY BILAT: CPT | Mod: 26,,, | Performed by: INTERNAL MEDICINE

## 2023-08-08 PROCEDURE — 93924 ANKLE BRACHIAL INDICES (ABI): ICD-10-PCS | Mod: 26,,, | Performed by: INTERNAL MEDICINE

## 2023-08-08 PROCEDURE — 93924 LWR XTR VASC STDY BILAT: CPT | Mod: PO

## 2023-08-15 ENCOUNTER — CLINICAL SUPPORT (OUTPATIENT)
Dept: FAMILY MEDICINE | Facility: CLINIC | Age: 53
End: 2023-08-15
Payer: MEDICAID

## 2023-08-15 VITALS — HEART RATE: 79 BPM | DIASTOLIC BLOOD PRESSURE: 88 MMHG | SYSTOLIC BLOOD PRESSURE: 142 MMHG

## 2023-08-15 DIAGNOSIS — I10 PRIMARY HYPERTENSION: ICD-10-CM

## 2023-08-15 DIAGNOSIS — Z01.30 BP CHECK: Primary | ICD-10-CM

## 2023-08-15 PROCEDURE — 99999 PR PBB SHADOW E&M-EST. PATIENT-LVL III: CPT | Mod: PBBFAC,,,

## 2023-08-15 PROCEDURE — 99213 OFFICE O/P EST LOW 20 MIN: CPT | Mod: PBBFAC,PO

## 2023-08-15 PROCEDURE — 99999 PR PBB SHADOW E&M-EST. PATIENT-LVL III: ICD-10-PCS | Mod: PBBFAC,,,

## 2023-08-15 RX ORDER — LOSARTAN POTASSIUM 50 MG/1
50 TABLET ORAL DAILY
Qty: 30 TABLET | Refills: 11 | Status: SHIPPED | OUTPATIENT
Start: 2023-08-15 | End: 2024-08-14

## 2023-08-15 NOTE — Clinical Note
Bp in clinic was high.  Change losartan to 50 mg a day.  Rebook in ancillary in 2 weeks to recheck the bp.

## 2023-08-15 NOTE — Clinical Note
Clinic machine /93 HR 79. Home machine /103 HR 77. Pt asymptomatic. Pt sat in clinic 5 minutes prior to rechecking BP. Manual /88.

## 2023-08-15 NOTE — PROGRESS NOTES
Pt in clinic for BP check and to compare home machine to clinic machine. Clinic machine /93 HR 79. Home machine /103 HR 77. Pt asymptomatic. Pt sat in clinic 5 minutes prior to rechecking BP. Manual /88. PCP notified by Epic message.      [facility]

## 2023-08-16 ENCOUNTER — TELEPHONE (OUTPATIENT)
Dept: FAMILY MEDICINE | Facility: CLINIC | Age: 53
End: 2023-08-16
Payer: MEDICAID

## 2023-08-16 ENCOUNTER — HOSPITAL ENCOUNTER (OUTPATIENT)
Dept: RADIOLOGY | Facility: HOSPITAL | Age: 53
Discharge: HOME | End: 2023-08-16
Attending: INTERNAL MEDICINE
Payer: MEDICAID

## 2023-08-16 DIAGNOSIS — F17.200 SMOKER: ICD-10-CM

## 2023-08-16 DIAGNOSIS — R91.1 SOLITARY PULMONARY NODULE: ICD-10-CM

## 2023-08-16 PROCEDURE — 71250 CT CHEST WITHOUT CONTRAST: ICD-10-PCS | Mod: 26,,, | Performed by: RADIOLOGY

## 2023-08-16 PROCEDURE — 71250 CT THORAX DX C-: CPT | Mod: TC

## 2023-08-16 PROCEDURE — 71250 CT THORAX DX C-: CPT | Mod: 26,,, | Performed by: RADIOLOGY

## 2023-08-16 NOTE — TELEPHONE ENCOUNTER
Spoke to pt over the phone, inform her of med change . Pt vu and nurse visit was schedule for 9/1/23 at 9 am . Pt kristal

## 2023-08-16 NOTE — TELEPHONE ENCOUNTER
----- Message from Kb Mcgarry MD sent at 8/15/2023  5:21 PM CDT -----  Bp in clinic was high.   Change losartan to 50 mg a day.   Rebook in ancillary in 2 weeks to recheck the bp.

## 2023-08-18 ENCOUNTER — TELEPHONE (OUTPATIENT)
Dept: PULMONOLOGY | Facility: HOSPITAL | Age: 53
End: 2023-08-18
Payer: MEDICAID

## 2023-08-18 DIAGNOSIS — K21.9 GASTROESOPHAGEAL REFLUX DISEASE, UNSPECIFIED WHETHER ESOPHAGITIS PRESENT: Primary | ICD-10-CM

## 2023-08-18 DIAGNOSIS — T17.908A ASPIRATION INTO AIRWAY, INITIAL ENCOUNTER: ICD-10-CM

## 2023-08-18 NOTE — TELEPHONE ENCOUNTER
Spoke to patient.  LLL completely resolved.  Waxing and waning of nodules suggests recurrent aspiration.  Will notify Dr. Mcknight and Dr. Mcgarry.  Recommend GI consult for reflux.

## 2023-08-18 NOTE — TELEPHONE ENCOUNTER
Appt was schedule in [place] with airam spivey , for 9/21/23 at 8 am . Pt vu  Provider: Ibis Bradley NP Dept: [facility] [place], Gastroenterology       Dept Address: [address] [place] [address]       Dept Phone Number: [phone]   Referring Provider: LANI DESIR CSN: [id number]   Appt Phone:     Notes: .

## 2023-08-18 NOTE — TELEPHONE ENCOUNTER
Let her know that it was recommended that she be referred to GI due to recurrent aspiration and that I have placed an order for the referral.     I have signed for the following orders AND/OR meds.  Please call the patient and ask the patient to schedule the testing AND/OR inform about any medications that were sent.      Orders Placed This Encounter   Procedures    Ambulatory referral/consult to Gastroenterology     Standing Status:   Future     Standing Expiration Date:   9/18/2024     Referral Priority:   Routine     Referral Type:   Consultation     Referral Reason:   Specialty Services Required     Requested Specialty:   Gastroenterology            @[email or web address]@

## 2023-10-19 ENCOUNTER — PATIENT MESSAGE (OUTPATIENT)
Dept: FAMILY MEDICINE | Facility: CLINIC | Age: 53
End: 2023-10-19
Payer: MEDICAID

## 2023-10-25 NOTE — LETTER
Please discontinue your diuretic until you follow-up with Dr. Black Scott in the office. Magnesium can be taken over-the-counter. Follow-up with PCP within the next 2 days in order to be reevaluated. Return to the ED with any new or worsening symptoms. September 18, 2019      Renny Kelley MD  [address] [place] [id number]           [facility]  [address] [place] [id number]  Phone: [phone]  Fax: [phone]          Patient: Luz Pelaez   MR Number: [id number]   YOB: 1970   Date of Visit: 9/18/2019       Dear Dr. Renny Kelley:    Thank you for referring Luz Pelaez to me for evaluation. Attached you will find relevant portions of my assessment and plan of care.    If you have questions, please do not hesitate to call me. I look forward to following Luz Pelaez along with you.    Sincerely,    Delfino Cain MD    Enclosure  CC:  No Recipients    If you would like to receive this communication electronically, please contact [email or web address] or [phone] to request more information on [employer] access.    For providers and/or their staff who would like to refer a patient to [facility], please contact us through our one-stop-shop provider referral line, [facility], at [phone].    If you feel you have received this communication in error or would no longer like to receive these types of communications, please e-mail [email or web address]

## 2023-10-30 ENCOUNTER — PATIENT MESSAGE (OUTPATIENT)
Dept: FAMILY MEDICINE | Facility: CLINIC | Age: 53
End: 2023-10-30
Payer: MEDICAID

## 2023-10-31 ENCOUNTER — OFFICE VISIT (OUTPATIENT)
Dept: FAMILY MEDICINE | Facility: CLINIC | Age: 53
End: 2023-10-31
Payer: MEDICAID

## 2023-10-31 VITALS — HEART RATE: 80 BPM | DIASTOLIC BLOOD PRESSURE: 85 MMHG | SYSTOLIC BLOOD PRESSURE: 135 MMHG

## 2023-10-31 DIAGNOSIS — R41.840 ATTENTION DEFICIT: Chronic | ICD-10-CM

## 2023-10-31 PROCEDURE — 1159F PR MEDICATION LIST DOCUMENTED IN MEDICAL RECORD: ICD-10-PCS | Mod: CPTII,95,, | Performed by: NURSE PRACTITIONER

## 2023-10-31 PROCEDURE — 4010F PR ACE/ARB THEARPY RXD/TAKEN: ICD-10-PCS | Mod: CPTII,95,, | Performed by: NURSE PRACTITIONER

## 2023-10-31 PROCEDURE — 99213 PR OFFICE/OUTPT VISIT, EST, LEVL III, 20-29 MIN: ICD-10-PCS | Mod: 95,,, | Performed by: NURSE PRACTITIONER

## 2023-10-31 PROCEDURE — 3075F PR MOST RECENT SYSTOLIC BLOOD PRESS GE 130-139MM HG: ICD-10-PCS | Mod: CPTII,95,, | Performed by: NURSE PRACTITIONER

## 2023-10-31 PROCEDURE — 1160F PR REVIEW ALL MEDS BY PRESCRIBER/CLIN PHARMACIST DOCUMENTED: ICD-10-PCS | Mod: CPTII,95,, | Performed by: NURSE PRACTITIONER

## 2023-10-31 PROCEDURE — 4010F ACE/ARB THERAPY RXD/TAKEN: CPT | Mod: CPTII,95,, | Performed by: NURSE PRACTITIONER

## 2023-10-31 PROCEDURE — 99213 OFFICE O/P EST LOW 20 MIN: CPT | Mod: 95,,, | Performed by: NURSE PRACTITIONER

## 2023-10-31 PROCEDURE — 1160F RVW MEDS BY RX/DR IN RCRD: CPT | Mod: CPTII,95,, | Performed by: NURSE PRACTITIONER

## 2023-10-31 PROCEDURE — 1159F MED LIST DOCD IN RCRD: CPT | Mod: CPTII,95,, | Performed by: NURSE PRACTITIONER

## 2023-10-31 PROCEDURE — 3075F SYST BP GE 130 - 139MM HG: CPT | Mod: CPTII,95,, | Performed by: NURSE PRACTITIONER

## 2023-10-31 PROCEDURE — 3079F DIAST BP 80-89 MM HG: CPT | Mod: CPTII,95,, | Performed by: NURSE PRACTITIONER

## 2023-10-31 PROCEDURE — 3079F PR MOST RECENT DIASTOLIC BLOOD PRESSURE 80-89 MM HG: ICD-10-PCS | Mod: CPTII,95,, | Performed by: NURSE PRACTITIONER

## 2023-10-31 RX ORDER — DEXTROAMPHETAMINE SACCHARATE, AMPHETAMINE ASPARTATE, DEXTROAMPHETAMINE SULFATE AND AMPHETAMINE SULFATE 7.5; 7.5; 7.5; 7.5 MG/1; MG/1; MG/1; MG/1
1 TABLET ORAL 2 TIMES DAILY
Qty: 60 TABLET | Refills: 0 | Status: SHIPPED | OUTPATIENT
Start: 2023-12-30 | End: 2024-01-31 | Stop reason: SDUPTHER

## 2023-10-31 RX ORDER — DEXTROAMPHETAMINE SACCHARATE, AMPHETAMINE ASPARTATE, DEXTROAMPHETAMINE SULFATE AND AMPHETAMINE SULFATE 7.5; 7.5; 7.5; 7.5 MG/1; MG/1; MG/1; MG/1
30 TABLET ORAL 2 TIMES DAILY
Qty: 60 TABLET | Refills: 0 | Status: SHIPPED | OUTPATIENT
Start: 2023-10-31 | End: 2024-01-31 | Stop reason: SDUPTHER

## 2023-10-31 RX ORDER — DEXTROAMPHETAMINE SACCHARATE, AMPHETAMINE ASPARTATE, DEXTROAMPHETAMINE SULFATE AND AMPHETAMINE SULFATE 7.5; 7.5; 7.5; 7.5 MG/1; MG/1; MG/1; MG/1
1 TABLET ORAL 2 TIMES DAILY
Qty: 60 TABLET | Refills: 0 | Status: SHIPPED | OUTPATIENT
Start: 2023-11-30 | End: 2024-01-31 | Stop reason: SDUPTHER

## 2023-10-31 NOTE — PROGRESS NOTES
Subjective:       Patient ID: Luz Pelaez is a 53 y.o. female.    Primary Care Telemedicine Note    The patient location is:  Patient Home   The chief complaint leading to consultation is: Adderall refill  Total time spent with patient: 10 mins    Visit type: Virtual visit with synchronous audio only and video  Each patient to whom he or she provides medical services by telemedicine is:  (1) informed of the relationship between the physician and patient and the respective role of any other health care provider with respect to management of the patient; and (2) notified that he or she may decline to receive medical services by telemedicine and may withdraw from such care at any time.      Chief Complaint: Medication Refill    HPI    Problem List Items Addressed This Visit          Neuro    Attention deficit (Chronic)    Overview     Luz Pelaez has ADD. Medication has been used since 2000 and has been stable on the current dose of medicine. She reports being compliant on the medicine and is not receiving narcotics from any other physician. The patient was checked in the [facility] Board of Pharmacy's Prescription Monitoring Program. There appears to be no incongruities with the patient's verbalized history. She denies any complications from taking the medicine. The patient's weight and apatite has been stable and has not had difficulties with agitation. She reports improvement in the symptoms with the current dose.               Relevant Medications    dextroamphetamine-amphetamine (ADDERALL) 30 mg Tab (Start on 12/30/2023)    dextroamphetamine-amphetamine (ADDERALL) 30 mg Tab (Start on 11/30/2023)    dextroamphetamine-amphetamine 30 mg Tab         Review of Systems   Constitutional:  Negative for activity change, fatigue, fever and unexpected weight change.   HENT:  Negative for ear pain, hearing loss, rhinorrhea, sore throat and trouble swallowing.    Eyes:  Negative for pain, discharge and visual  disturbance.   Respiratory:  Positive for wheezing. Negative for cough, chest tightness and shortness of breath.    Cardiovascular:  Negative for chest pain and palpitations.   Gastrointestinal:  Negative for abdominal pain, blood in stool, constipation, diarrhea and vomiting.   Endocrine: Negative for polydipsia and polyuria.   Genitourinary:  Negative for difficulty urinating, dysuria, hematuria and menstrual problem.   Musculoskeletal:  Negative for arthralgias and myalgias.   Skin:  Negative for color change and rash.   Neurological:  Negative for dizziness and headaches.   Psychiatric/Behavioral:  Positive for dysphoric mood. Negative for confusion and sleep disturbance. The patient is not nervous/anxious.        Vitals:    10/31/23 1633   BP: 135/85   Pulse: 80       Objective:     Current Outpatient Medications   Medication Sig Dispense Refill    albuterol (ACCUNEB) 1.25 mg/3 mL Nebu Take 3 mLs (1.25 mg total) by nebulization 2 (two) times a day. Rescue 180 mL 11    albuterol (PROVENTIL/VENTOLIN HFA) 90 mcg/actuation inhaler INHALE 1 TO 2 PUFFS BY MOUTH EVERY SIX HOURS AS NEEDED FOR WHEEZING 8.5 g 0    [START ON 12/30/2023] dextroamphetamine-amphetamine (ADDERALL) 30 mg Tab Take 1 tablet (30 mg total) by mouth 2 (two) times a day. 60 tablet 0    [START ON 11/30/2023] dextroamphetamine-amphetamine (ADDERALL) 30 mg Tab Take 1 tablet (30 mg total) by mouth 2 (two) times a day. 60 tablet 0    dextroamphetamine-amphetamine 30 mg Tab Take 1 tablet (30 mg total) by mouth 2 (two) times a day. 60 tablet 0    ergocalciferol (ERGOCALCIFEROL) 50,000 unit Cap Vitamin D2 Take No date recorded No form recorded No frequency recorded No route recorded No set duration recorded No set duration amount recorded active No dosage strength recorded No dosage strength units of measure recorded      fluticasone-umeclidin-vilanter (TRELEGY ELLIPTA) 100-62.5-25 mcg DsDv Inhale 1 puff into the lungs once daily. 60 each 11    linaCLOtide  (LINZESS) 145 mcg Cap capsule Take 1 capsule (145 mcg total) by mouth before breakfast. 90 capsule 3    losartan (COZAAR) 50 MG tablet Take 1 tablet (50 mg total) by mouth once daily. 30 tablet 11    meloxicam (MOBIC) 15 MG tablet Take 1 tablet (15 mg total) by mouth once daily. 90 tablet 3    mupirocin (BACTROBAN) 2 % ointment Apply topically 3 (three) times daily. 30 g 2    omeprazole (PRILOSEC) 40 MG capsule Take 1 capsule (40 mg total) by mouth once daily. 90 capsule 3    ondansetron (ZOFRAN-ODT) 4 MG TbDL Take by mouth.       No current facility-administered medications for this visit.       Physical Exam  Constitutional:       Appearance: She is well-developed.   HENT:      Head: Normocephalic.   Pulmonary:      Effort: Pulmonary effort is normal. No respiratory distress.   Musculoskeletal:      Cervical back: Normal range of motion.   Neurological:      Mental Status: She is alert and oriented to person, place, and time.   Psychiatric:         Thought Content: Thought content normal.         Judgment: Judgment normal.         Assessment:       1. Attention deficit        Plan:   Attention deficit  -     dextroamphetamine-amphetamine (ADDERALL) 30 mg Tab; Take 1 tablet (30 mg total) by mouth 2 (two) times a day.  Dispense: 60 tablet; Refill: 0  -     dextroamphetamine-amphetamine (ADDERALL) 30 mg Tab; Take 1 tablet (30 mg total) by mouth 2 (two) times a day.  Dispense: 60 tablet; Refill: 0  -     dextroamphetamine-amphetamine 30 mg Tab; Take 1 tablet (30 mg total) by mouth 2 (two) times a day.  Dispense: 60 tablet; Refill: 0    The patient was checked in the [facility] Board of Pharmacy's  Prescription Monitoring Program. There appears to be no incongruities with the patient's verbalized history.       No follow-ups on file.    There are no Patient Instructions on file for this visit.

## 2023-11-24 ENCOUNTER — PATIENT MESSAGE (OUTPATIENT)
Dept: FAMILY MEDICINE | Facility: CLINIC | Age: 53
End: 2023-11-24
Payer: MEDICAID

## 2024-01-29 RX ORDER — LINACLOTIDE 290 UG/1
290 CAPSULE, GELATIN COATED ORAL
Qty: 30 CAPSULE | Refills: 5 | Status: SHIPPED | OUTPATIENT
Start: 2024-01-29 | End: 2024-05-01 | Stop reason: SDUPTHER

## 2024-01-29 NOTE — TELEPHONE ENCOUNTER
I have filled her requested med.   However, she has not seen me in 2 years and I see that she has an appt with [facility] on Wednesday.  Can you change her to me?  Lola is opening my schedule for some other times and I hope that you can coordinate on this so that I can get to see her.  It can be virtual if needed.     I have signed for the following orders AND/OR meds.  Please call the patient and ask the patient to schedule the testing AND/OR inform about any medications that were sent.      No orders of the defined types were placed in this encounter.      Medications Ordered This Encounter   Medications    LINZESS 290 mcg Cap capsule     Sig: TAKE 1 CAPSULE (290 MCG TOTAL) BY MOUTH ONCE DAILY.     Dispense:  30 capsule     Refill:  5     PT REQUESTING REFILLS. THANK YOU!       [unfilled]

## 2024-01-31 ENCOUNTER — OFFICE VISIT (OUTPATIENT)
Dept: FAMILY MEDICINE | Facility: CLINIC | Age: 54
End: 2024-01-31
Payer: MEDICAID

## 2024-01-31 VITALS
HEIGHT: 65 IN | OXYGEN SATURATION: 96 % | RESPIRATION RATE: 18 BRPM | HEART RATE: 93 BPM | WEIGHT: 163.31 LBS | SYSTOLIC BLOOD PRESSURE: 153 MMHG | DIASTOLIC BLOOD PRESSURE: 89 MMHG | BODY MASS INDEX: 27.21 KG/M2 | TEMPERATURE: 99 F

## 2024-01-31 DIAGNOSIS — M54.12 CERVICAL RADICULOPATHY: ICD-10-CM

## 2024-01-31 DIAGNOSIS — J44.89 ASTHMA-COPD OVERLAP SYNDROME: ICD-10-CM

## 2024-01-31 DIAGNOSIS — Z12.31 ENCOUNTER FOR SCREENING MAMMOGRAM FOR BREAST CANCER: ICD-10-CM

## 2024-01-31 DIAGNOSIS — R41.840 ATTENTION DEFICIT: Primary | Chronic | ICD-10-CM

## 2024-01-31 DIAGNOSIS — Z23 IMMUNIZATION DUE: ICD-10-CM

## 2024-01-31 PROCEDURE — 99214 OFFICE O/P EST MOD 30 MIN: CPT | Mod: S$PBB,,, | Performed by: NURSE PRACTITIONER

## 2024-01-31 PROCEDURE — 3079F DIAST BP 80-89 MM HG: CPT | Mod: CPTII,,, | Performed by: NURSE PRACTITIONER

## 2024-01-31 PROCEDURE — 99215 OFFICE O/P EST HI 40 MIN: CPT | Mod: PBBFAC,PO | Performed by: NURSE PRACTITIONER

## 2024-01-31 PROCEDURE — 99999PBSHW PNEUMOCOCCAL CONJUGATE VACCINE 20-VALENT: Mod: PBBFAC,,,

## 2024-01-31 PROCEDURE — 99999 PR PBB SHADOW E&M-EST. PATIENT-LVL V: CPT | Mod: PBBFAC,,, | Performed by: NURSE PRACTITIONER

## 2024-01-31 PROCEDURE — 99999PBSHW FLU VACCINE (QUAD) GREATER THAN OR EQUAL TO 3YO PRESERVATIVE FREE IM: Mod: PBBFAC,,,

## 2024-01-31 PROCEDURE — 90471 IMMUNIZATION ADMIN: CPT | Mod: PBBFAC,PO

## 2024-01-31 PROCEDURE — 4010F ACE/ARB THERAPY RXD/TAKEN: CPT | Mod: CPTII,,, | Performed by: NURSE PRACTITIONER

## 2024-01-31 PROCEDURE — 90677 PCV20 VACCINE IM: CPT | Mod: PBBFAC,PO

## 2024-01-31 PROCEDURE — 3008F BODY MASS INDEX DOCD: CPT | Mod: CPTII,,, | Performed by: NURSE PRACTITIONER

## 2024-01-31 PROCEDURE — 1159F MED LIST DOCD IN RCRD: CPT | Mod: CPTII,,, | Performed by: NURSE PRACTITIONER

## 2024-01-31 PROCEDURE — 3077F SYST BP >= 140 MM HG: CPT | Mod: CPTII,,, | Performed by: NURSE PRACTITIONER

## 2024-01-31 RX ORDER — MELOXICAM 15 MG/1
15 TABLET ORAL DAILY
Qty: 90 TABLET | Refills: 3 | Status: SHIPPED | OUTPATIENT
Start: 2024-01-31 | End: 2025-01-30

## 2024-01-31 RX ORDER — DEXTROAMPHETAMINE SACCHARATE, AMPHETAMINE ASPARTATE, DEXTROAMPHETAMINE SULFATE AND AMPHETAMINE SULFATE 7.5; 7.5; 7.5; 7.5 MG/1; MG/1; MG/1; MG/1
1 TABLET ORAL 2 TIMES DAILY
Qty: 60 TABLET | Refills: 0 | Status: SHIPPED | OUTPATIENT
Start: 2024-01-31 | End: 2024-05-01 | Stop reason: SDUPTHER

## 2024-01-31 RX ORDER — DEXTROAMPHETAMINE SACCHARATE, AMPHETAMINE ASPARTATE, DEXTROAMPHETAMINE SULFATE AND AMPHETAMINE SULFATE 7.5; 7.5; 7.5; 7.5 MG/1; MG/1; MG/1; MG/1
1 TABLET ORAL 2 TIMES DAILY
Qty: 60 TABLET | Refills: 0 | Status: SHIPPED | OUTPATIENT
Start: 2024-03-31 | End: 2024-05-01 | Stop reason: SDUPTHER

## 2024-01-31 RX ORDER — DEXTROAMPHETAMINE SACCHARATE, AMPHETAMINE ASPARTATE, DEXTROAMPHETAMINE SULFATE AND AMPHETAMINE SULFATE 7.5; 7.5; 7.5; 7.5 MG/1; MG/1; MG/1; MG/1
30 TABLET ORAL 2 TIMES DAILY
Qty: 60 TABLET | Refills: 0 | Status: SHIPPED | OUTPATIENT
Start: 2024-03-01 | End: 2024-05-01 | Stop reason: SDUPTHER

## 2024-01-31 RX ORDER — ALBUTEROL SULFATE 90 UG/1
AEROSOL, METERED RESPIRATORY (INHALATION)
Qty: 8.5 G | Refills: 11 | Status: SHIPPED | OUTPATIENT
Start: 2024-01-31

## 2024-01-31 NOTE — PROGRESS NOTES
Subjective:       Patient ID: Luz Pelaez is a 53 y.o. female.    Chief Complaint: Medication Refill    HPI    Problem List Items Addressed This Visit          Neuro    Attention deficit - Primary (Chronic)    Overview     Luz Pelaez has ADD. Medication has been used since 2000 and has been stable on the current dose of medicine. She reports being compliant on the medicine and is not receiving narcotics from any other physician. The patient was checked in the [facility] Board of Pharmacy's Prescription Monitoring Program. There appears to be no incongruities with the patient's verbalized history. She denies any complications from taking the medicine. The patient's weight and apatite has been stable and has not had difficulties with agitation. She reports improvement in the symptoms with the current dose.               Relevant Medications    dextroamphetamine-amphetamine (ADDERALL) 30 mg Tab    dextroamphetamine-amphetamine 30 mg Tab (Start on 3/1/2024)    dextroamphetamine-amphetamine (ADDERALL) 30 mg Tab (Start on 3/31/2024)    Cervical radiculopathy    Relevant Medications    meloxicam (MOBIC) 15 MG tablet       Pulmonary    Asthma-COPD overlap syndrome    Relevant Medications    albuterol (PROVENTIL/VENTOLIN HFA) 90 mcg/actuation inhaler     Other Visit Diagnoses       Immunization due        Relevant Orders    (In Office Administered) Pneumococcal Conjugate Vaccine (20 Valent) (IM) (Preferred) (Completed)    Influenza - Quadrivalent *Preferred* (6 months+) (PF) (Completed)    Encounter for screening mammogram for breast cancer        Relevant Orders    Mammo Digital Screening Bilat              Review of Systems   Constitutional:  Negative for unexpected weight change.   HENT:  Negative for ear pain.    Eyes:  Negative for pain and visual disturbance.   Respiratory:  Negative for shortness of breath.    Cardiovascular:  Negative for palpitations.   Gastrointestinal:  Negative for diarrhea.   Skin:   Negative for color change.   Neurological:  Negative for dizziness.   Psychiatric/Behavioral:  Negative for dysphoric mood and sleep disturbance. The patient is not nervous/anxious.        Vitals:    01/31/24 1443   BP: (!) 153/89   Pulse: 93   Resp: 18   Temp: 98.7 °F (37.1 °C)       Objective:     Current Outpatient Medications   Medication Sig Dispense Refill    albuterol (ACCUNEB) 1.25 mg/3 mL Nebu Take 3 mLs (1.25 mg total) by nebulization 2 (two) times a day. Rescue 180 mL 11    ergocalciferol (ERGOCALCIFEROL) 50,000 unit Cap Vitamin D2 Take No date recorded No form recorded No frequency recorded No route recorded No set duration recorded No set duration amount recorded active No dosage strength recorded No dosage strength units of measure recorded      fluticasone-umeclidin-vilanter (TRELEGY ELLIPTA) 100-62.5-25 mcg DsDv Inhale 1 puff into the lungs once daily. 60 each 11    LINZESS 290 mcg Cap capsule TAKE 1 CAPSULE (290 MCG TOTAL) BY MOUTH ONCE DAILY. 30 capsule 5    losartan (COZAAR) 50 MG tablet Take 1 tablet (50 mg total) by mouth once daily. 30 tablet 11    mupirocin (BACTROBAN) 2 % ointment Apply topically 3 (three) times daily. 30 g 2    omeprazole (PRILOSEC) 40 MG capsule Take 1 capsule (40 mg total) by mouth once daily. 90 capsule 3    ondansetron (ZOFRAN-ODT) 4 MG TbDL Take by mouth.      albuterol (PROVENTIL/VENTOLIN HFA) 90 mcg/actuation inhaler INHALE 1 TO 2 PUFFS BY MOUTH EVERY SIX HOURS AS NEEDED FOR WHEEZING 8.5 g 11    dextroamphetamine-amphetamine (ADDERALL) 30 mg Tab Take 1 tablet (30 mg total) by mouth 2 (two) times a day. 60 tablet 0    [START ON 3/31/2024] dextroamphetamine-amphetamine (ADDERALL) 30 mg Tab Take 1 tablet (30 mg total) by mouth 2 (two) times a day. 60 tablet 0    [START ON 3/1/2024] dextroamphetamine-amphetamine 30 mg Tab Take 1 tablet (30 mg total) by mouth 2 (two) times a day. 60 tablet 0    meloxicam (MOBIC) 15 MG tablet Take 1 tablet (15 mg total) by mouth once daily.  90 tablet 3     No current facility-administered medications for this visit.       Physical Exam  Vitals and nursing note reviewed.   Constitutional:       General: She is not in acute distress.     Appearance: She is well-developed.   HENT:      Head: Normocephalic and atraumatic.   Eyes:      Pupils: Pupils are equal, round, and reactive to light.   Cardiovascular:      Rate and Rhythm: Normal rate and regular rhythm.   Pulmonary:      Effort: Pulmonary effort is normal.      Breath sounds: Normal breath sounds.   Musculoskeletal:         General: Normal range of motion.      Cervical back: Normal range of motion and neck supple.   Skin:     General: Skin is warm and dry.      Findings: No rash.   Neurological:      Mental Status: She is alert and oriented to person, place, and time.   Psychiatric:         Judgment: Judgment normal.         Assessment:       1. Attention deficit    2. Asthma-COPD overlap syndrome    3. Cervical radiculopathy    4. Immunization due    5. Encounter for screening mammogram for breast cancer        Plan:   Attention deficit  -     dextroamphetamine-amphetamine (ADDERALL) 30 mg Tab; Take 1 tablet (30 mg total) by mouth 2 (two) times a day.  Dispense: 60 tablet; Refill: 0  -     dextroamphetamine-amphetamine 30 mg Tab; Take 1 tablet (30 mg total) by mouth 2 (two) times a day.  Dispense: 60 tablet; Refill: 0  -     dextroamphetamine-amphetamine (ADDERALL) 30 mg Tab; Take 1 tablet (30 mg total) by mouth 2 (two) times a day.  Dispense: 60 tablet; Refill: 0    Asthma-COPD overlap syndrome  -     albuterol (PROVENTIL/VENTOLIN HFA) 90 mcg/actuation inhaler; INHALE 1 TO 2 PUFFS BY MOUTH EVERY SIX HOURS AS NEEDED FOR WHEEZING  Dispense: 8.5 g; Refill: 11    Cervical radiculopathy  -     meloxicam (MOBIC) 15 MG tablet; Take 1 tablet (15 mg total) by mouth once daily.  Dispense: 90 tablet; Refill: 3    Immunization due  -     (In Office Administered) Pneumococcal Conjugate Vaccine (20 Valent)  (IM) (Preferred)  -     Influenza - Quadrivalent *Preferred* (6 months+) (PF)    Encounter for screening mammogram for breast cancer  -     Mammo Digital Screening Bilat; Future; Expected date: 01/31/2024        No follow-ups on file.    There are no Patient Instructions on file for this visit.

## 2024-02-07 ENCOUNTER — HOSPITAL ENCOUNTER (OUTPATIENT)
Dept: RADIOLOGY | Facility: HOSPITAL | Age: 54
Discharge: HOME | End: 2024-02-07
Attending: NURSE PRACTITIONER
Payer: MEDICAID

## 2024-02-07 DIAGNOSIS — Z12.31 ENCOUNTER FOR SCREENING MAMMOGRAM FOR BREAST CANCER: ICD-10-CM

## 2024-02-07 PROCEDURE — 77063 BREAST TOMOSYNTHESIS BI: CPT | Mod: 26,,, | Performed by: RADIOLOGY

## 2024-02-07 PROCEDURE — 77067 SCR MAMMO BI INCL CAD: CPT | Mod: TC,PO

## 2024-02-07 PROCEDURE — 77067 SCR MAMMO BI INCL CAD: CPT | Mod: 26,,, | Performed by: RADIOLOGY

## 2024-02-16 DIAGNOSIS — J44.89 ASTHMA-COPD OVERLAP SYNDROME: ICD-10-CM

## 2024-03-23 ENCOUNTER — PATIENT MESSAGE (OUTPATIENT)
Dept: FAMILY MEDICINE | Facility: CLINIC | Age: 54
End: 2024-03-23
Payer: MEDICAID

## 2024-03-28 ENCOUNTER — PATIENT MESSAGE (OUTPATIENT)
Dept: FAMILY MEDICINE | Facility: CLINIC | Age: 54
End: 2024-03-28
Payer: MEDICAID

## 2024-04-17 NOTE — PROGRESS NOTES
Addended by: JONAH KING on: 4/17/2024 01:19 AM     Modules accepted: Orders, Level of Service     Subjective:       Patient ID: Luz Pelaez is a 49 y.o. female.    Chief Complaint: Medication Refill    Medication Refill   This is a chronic (Adderall) problem. The current episode started more than 1 year ago. The problem occurs daily. The problem has been unchanged. Associated symptoms include arthralgias, fatigue, neck pain, numbness (bilateral hands) and weakness. Pertinent negatives include no abdominal pain, chest pain, coughing, headaches, myalgias, sore throat or vomiting. The treatment provided moderate relief.   Fatigue   This is a chronic problem. The current episode started more than 1 year ago. The problem occurs daily. The problem has been unchanged. Associated symptoms include arthralgias, fatigue, neck pain, numbness (bilateral hands) and weakness. Pertinent negatives include no abdominal pain, chest pain, coughing, headaches, myalgias, sore throat or vomiting. She has tried rest and walking for the symptoms. The treatment provided no relief.   Hip Pain    The incident occurred more than 1 week ago. There was no injury mechanism. The pain is present in the right hip. The pain is moderate. The pain has been fluctuating since onset. Associated symptoms include muscle weakness and numbness (bilateral hands). The symptoms are aggravated by weight bearing. She has tried acetaminophen for the symptoms. The treatment provided no relief.       Review of Systems   Constitutional: Positive for fatigue. Negative for unexpected weight change.   HENT: Negative for ear pain and sore throat.    Eyes: Negative for pain and visual disturbance.   Respiratory: Negative for cough and shortness of breath.    Cardiovascular: Negative for chest pain and palpitations.   Gastrointestinal: Negative for abdominal pain and vomiting.   Musculoskeletal: Positive for arthralgias and neck pain. Negative for myalgias.   Skin: Negative for color change.   Neurological: Positive for weakness and numbness (bilateral hands). Negative  for dizziness and headaches.   Psychiatric/Behavioral: Negative for dysphoric mood and sleep disturbance. The patient is not nervous/anxious.        Vitals:    08/12/19 1627   BP: 136/86   Pulse: 84   Temp: 98 °F (36.7 °C)       Objective:     Current Outpatient Medications   Medication Sig Dispense Refill    albuterol (PROAIR HFA) 90 mcg/actuation inhaler INHALE 1-2 PUFFS INTO THE LUNGS EVERY 6 HOURS AS NEEDED FOR WHEEZING 17 g 11    [START ON 10/12/2019] dextroamphetamine-amphetamine (ADDERALL) 30 mg Tab Take 1 tablet by mouth 2 (two) times daily. 60 tablet 0    [START ON 9/12/2019] dextroamphetamine-amphetamine (ADDERALL) 30 mg Tab Take 30 mg by mouth 2 (two) times daily. 60 tablet 0    dextroamphetamine-amphetamine (ADDERALL) 30 mg Tab Take 1 tablet by mouth 2 (two) times daily. 60 tablet 0    fluticasone-salmeterol 250-50 mcg/dose (ADVAIR DISKUS) 250-50 mcg/dose diskus inhaler Use 1 inhalation into the  lungs two times daily 60 each 11    LINZESS 290 mcg Cap Take 1 capsule (290 mcg total) by mouth once daily. 30 capsule 6    meloxicam (MOBIC) 15 MG tablet Take 1 tablet (15 mg total) by mouth once daily. 90 tablet 3    mupirocin (BACTROBAN) 2 % ointment APPLY TOPICALLY 3 (THREE) TIMES DAILY FOR 7 DAYS  0    omeprazole (PRILOSEC) 20 MG capsule Take 1 capsule by mouth  twice daily 180 capsule 3    tiotropium (SPIRIVA WITH HANDIHALER) 18 mcg inhalation capsule Inhale the contents of 1    capsule via HandiHaler once daily 30 capsule 11     No current facility-administered medications for this visit.        Physical Exam   Constitutional: She is oriented to person, place, and time. She appears well-developed and well-nourished. No distress.   HENT:   Head: Normocephalic and atraumatic.   Eyes: Pupils are equal, round, and reactive to light. EOM are normal.   Neck: Normal range of motion. Neck supple. No thyromegaly present.   Cardiovascular: Normal rate and regular rhythm.   Pulmonary/Chest: Effort normal  and breath sounds normal.   Musculoskeletal: Normal range of motion.        Right hip: She exhibits bony tenderness.        Cervical back: She exhibits bony tenderness. She exhibits normal range of motion.        Right hand: Normal.        Left hand: Normal.   Neurological: She is alert and oriented to person, place, and time.   Skin: Skin is warm and dry. No rash noted.   Psychiatric: She has a normal mood and affect. Judgment normal.   Nursing note and vitals reviewed.      Assessment:       1. Fatigue, unspecified type    2. Neck pain    3. Bilateral hand numbness    4. Arthralgia of both hands    5. Right hip pain        Plan:   Fatigue, unspecified type  -     CBC auto differential; Future; Expected date: 08/12/2019  -     TSH; Future; Expected date: 08/12/2019  -     Vitamin D; Future; Expected date: 08/12/2019  -     Iron and TIBC; Future; Expected date: 08/12/2019  -     Ferritin; Future; Expected date: 08/12/2019    Neck pain  -     MRI Cervical Spine Without Contrast; Future; Expected date: 08/12/2019    Bilateral hand numbness    Arthralgia of both hands  -     Sedimentation rate; Future; Expected date: 08/12/2019  -     Rheumatoid factor; Future; Expected date: 08/12/2019    Right hip pain  -     X-Ray Hip 2 View Right; Future; Expected date: 08/12/2019    Other orders  -     dextroamphetamine-amphetamine (ADDERALL) 30 mg Tab; Take 1 tablet by mouth 2 (two) times daily.  Dispense: 60 tablet; Refill: 0  -     dextroamphetamine-amphetamine (ADDERALL) 30 mg Tab; Take 30 mg by mouth 2 (two) times daily.  Dispense: 60 tablet; Refill: 0  -     dextroamphetamine-amphetamine (ADDERALL) 30 mg Tab; Take 1 tablet by mouth 2 (two) times daily.  Dispense: 60 tablet; Refill: 0        No follow-ups on file.    There are no Patient Instructions on file for this visit.

## 2024-04-26 ENCOUNTER — PATIENT MESSAGE (OUTPATIENT)
Dept: FAMILY MEDICINE | Facility: CLINIC | Age: 54
End: 2024-04-26
Payer: MEDICAID

## 2024-05-01 ENCOUNTER — OFFICE VISIT (OUTPATIENT)
Dept: FAMILY MEDICINE | Facility: CLINIC | Age: 54
End: 2024-05-01
Payer: MEDICAID

## 2024-05-01 VITALS
HEIGHT: 65 IN | BODY MASS INDEX: 26.66 KG/M2 | DIASTOLIC BLOOD PRESSURE: 80 MMHG | SYSTOLIC BLOOD PRESSURE: 137 MMHG | WEIGHT: 160 LBS | HEART RATE: 77 BPM

## 2024-05-01 DIAGNOSIS — K59.09 CHRONIC CONSTIPATION: ICD-10-CM

## 2024-05-01 DIAGNOSIS — K21.00 GASTROESOPHAGEAL REFLUX DISEASE WITH ESOPHAGITIS WITHOUT HEMORRHAGE: ICD-10-CM

## 2024-05-01 DIAGNOSIS — R41.840 ATTENTION DEFICIT: Primary | Chronic | ICD-10-CM

## 2024-05-01 PROCEDURE — 1159F MED LIST DOCD IN RCRD: CPT | Mod: CPTII,95,, | Performed by: NURSE PRACTITIONER

## 2024-05-01 PROCEDURE — 1160F RVW MEDS BY RX/DR IN RCRD: CPT | Mod: CPTII,95,, | Performed by: NURSE PRACTITIONER

## 2024-05-01 PROCEDURE — 4010F ACE/ARB THERAPY RXD/TAKEN: CPT | Mod: CPTII,95,, | Performed by: NURSE PRACTITIONER

## 2024-05-01 PROCEDURE — 99214 OFFICE O/P EST MOD 30 MIN: CPT | Mod: 95,,, | Performed by: NURSE PRACTITIONER

## 2024-05-01 PROCEDURE — 3079F DIAST BP 80-89 MM HG: CPT | Mod: CPTII,95,, | Performed by: NURSE PRACTITIONER

## 2024-05-01 PROCEDURE — 3075F SYST BP GE 130 - 139MM HG: CPT | Mod: CPTII,95,, | Performed by: NURSE PRACTITIONER

## 2024-05-01 PROCEDURE — 3008F BODY MASS INDEX DOCD: CPT | Mod: CPTII,95,, | Performed by: NURSE PRACTITIONER

## 2024-05-01 RX ORDER — DEXTROAMPHETAMINE SACCHARATE, AMPHETAMINE ASPARTATE, DEXTROAMPHETAMINE SULFATE AND AMPHETAMINE SULFATE 7.5; 7.5; 7.5; 7.5 MG/1; MG/1; MG/1; MG/1
1 TABLET ORAL 2 TIMES DAILY
Qty: 60 TABLET | Refills: 0 | Status: SHIPPED | OUTPATIENT
Start: 2024-06-30

## 2024-05-01 RX ORDER — DEXTROAMPHETAMINE SACCHARATE, AMPHETAMINE ASPARTATE, DEXTROAMPHETAMINE SULFATE AND AMPHETAMINE SULFATE 7.5; 7.5; 7.5; 7.5 MG/1; MG/1; MG/1; MG/1
30 TABLET ORAL 2 TIMES DAILY
Qty: 60 TABLET | Refills: 0 | Status: SHIPPED | OUTPATIENT
Start: 2024-05-31

## 2024-05-01 RX ORDER — OMEPRAZOLE 40 MG/1
40 CAPSULE, DELAYED RELEASE ORAL DAILY
Qty: 90 CAPSULE | Refills: 3 | Status: SHIPPED | OUTPATIENT
Start: 2024-05-01 | End: 2025-05-01

## 2024-05-01 RX ORDER — DEXTROAMPHETAMINE SACCHARATE, AMPHETAMINE ASPARTATE, DEXTROAMPHETAMINE SULFATE AND AMPHETAMINE SULFATE 7.5; 7.5; 7.5; 7.5 MG/1; MG/1; MG/1; MG/1
1 TABLET ORAL 2 TIMES DAILY
Qty: 60 TABLET | Refills: 0 | Status: SHIPPED | OUTPATIENT
Start: 2024-05-01

## 2024-05-01 NOTE — PROGRESS NOTES
Subjective:       Patient ID: Luz Pelaez is a 53 y.o. female.    Primary Care Telemedicine Note    The patient location is:  Patient Home   The chief complaint leading to consultation is: med refill  Total time spent with patient: 15 mins    Visit type: Virtual visit with synchronous audio only and video  Each patient to whom he or she provides medical services by telemedicine is:  (1) informed of the relationship between the physician and patient and the respective role of any other health care provider with respect to management of the patient; and (2) notified that he or she may decline to receive medical services by telemedicine and may withdraw from such care at any time.      Chief Complaint: Medication Refill    [facility]    Problem List Items Addressed This Visit          Neuro    Attention deficit - Primary (Chronic)    Overview     Luz Pelaez has ADD. Medication has been used since 2000 and has been stable on the current dose of medicine. She reports being compliant on the medicine and is not receiving narcotics from any other physician. She denies any complications from taking the medicine. The patient's weight and apatite has been stable and has not had difficulties with agitation. She reports improvement in the symptoms with the current dose.      The patient was checked in the [facility] Board of Pharmacy's Prescription Monitoring Program. There appears to be no incongruities with the patient's verbalized history.          Relevant Medications    dextroamphetamine-amphetamine (ADDERALL) 30 mg Tab    dextroamphetamine-amphetamine 30 mg Tab (Start on 5/31/2024)    dextroamphetamine-amphetamine (ADDERALL) 30 mg Tab (Start on 6/30/2024)       GI    Gastroesophageal reflux disease    Overview     The patient complains of heartburn.  The symptoms began 6 months. Denies chest pressure, diaphoresis, left arm and neck pain, vomiting, shortness of breath and palpitations.  Associated symptoms include  abdominal pain.  The symptoms occur a few minutes after meals.  Things that worsen the symptoms include many different foods.  The symptoms are alleviated by Prilosec somewhat.  She has been on prilosec for many years and it has worsened.             Relevant Medications    omeprazole (PRILOSEC) 40 MG capsule    Chronic constipation    Overview     -chronic constipation  -no improvement in symptoms with OTC medications    -Regular bowel movements with Linzess, denies side effects         Relevant Medications    linaCLOtide (LINZESS) 290 mcg Cap capsule         Review of Systems   Constitutional:  Negative for activity change and unexpected weight change.   HENT:  Negative for ear pain, hearing loss, rhinorrhea and trouble swallowing.    Eyes:  Positive for visual disturbance. Negative for pain and discharge.   Respiratory:  Positive for wheezing. Negative for chest tightness and shortness of breath.    Cardiovascular:  Negative for palpitations.   Gastrointestinal:  Positive for constipation. Negative for blood in stool and diarrhea.   Endocrine: Negative for polydipsia and polyuria.   Genitourinary:  Negative for difficulty urinating, dysuria, hematuria and menstrual problem.   Skin:  Negative for color change.   Neurological:  Negative for dizziness.   Psychiatric/Behavioral:  Negative for confusion, dysphoric mood and sleep disturbance. The patient is not nervous/anxious.        Vitals:    05/01/24 1358   BP: 137/80   Pulse: 77       Objective:     Current Outpatient Medications   Medication Sig Dispense Refill    albuterol (PROVENTIL/VENTOLIN HFA) 90 mcg/actuation inhaler INHALE 1 TO 2 PUFFS BY MOUTH EVERY SIX HOURS AS NEEDED FOR WHEEZING 8.5 g 11    ergocalciferol (ERGOCALCIFEROL) 50,000 unit Cap Vitamin D2 Take No date recorded No form recorded No frequency recorded No route recorded No set duration recorded No set duration amount recorded active No dosage strength recorded No dosage strength units of measure  recorded      fluticasone-umeclidin-vilanter (TRELEGY ELLIPTA) 100-62.5-25 mcg DsDv Inhale 1 puff into the lungs once daily. 60 each 11    losartan (COZAAR) 50 MG tablet Take 1 tablet (50 mg total) by mouth once daily. 30 tablet 11    meloxicam (MOBIC) 15 MG tablet Take 1 tablet (15 mg total) by mouth once daily. 90 tablet 3    mupirocin (BACTROBAN) 2 % ointment Apply topically 3 (three) times daily. 30 g 2    ondansetron (ZOFRAN-ODT) 4 MG TbDL Take by mouth.      dextroamphetamine-amphetamine (ADDERALL) 30 mg Tab Take 1 tablet (30 mg total) by mouth 2 (two) times a day. 60 tablet 0    [START ON 6/30/2024] dextroamphetamine-amphetamine (ADDERALL) 30 mg Tab Take 1 tablet (30 mg total) by mouth 2 (two) times a day. 60 tablet 0    [START ON 5/31/2024] dextroamphetamine-amphetamine 30 mg Tab Take 1 tablet (30 mg total) by mouth 2 (two) times a day. 60 tablet 0    linaCLOtide (LINZESS) 290 mcg Cap capsule Take 1 capsule (290 mcg total) by mouth before breakfast. 30 capsule 5    omeprazole (PRILOSEC) 40 MG capsule Take 1 capsule (40 mg total) by mouth once daily. 90 capsule 3     No current facility-administered medications for this visit.       Physical Exam  Constitutional:       Appearance: She is well-developed.   HENT:      Head: Normocephalic.   Pulmonary:      Effort: Pulmonary effort is normal. No respiratory distress.   Musculoskeletal:      Cervical back: Normal range of motion.   Neurological:      Mental Status: She is alert and oriented to person, place, and time.   Psychiatric:         Thought Content: Thought content normal.         Judgment: Judgment normal.         Assessment:       1. Chronic constipation    2. Gastroesophageal reflux disease with esophagitis without hemorrhage    3. Attention deficit        Plan:   Chronic constipation  -     linaCLOtide (LINZESS) 290 mcg Cap capsule; Take 1 capsule (290 mcg total) by mouth before breakfast.  Dispense: 30 capsule; Refill: 5    Gastroesophageal reflux  disease with esophagitis without hemorrhage  -     omeprazole (PRILOSEC) 40 MG capsule; Take 1 capsule (40 mg total) by mouth once daily.  Dispense: 90 capsule; Refill: 3    Attention deficit  -     dextroamphetamine-amphetamine (ADDERALL) 30 mg Tab; Take 1 tablet (30 mg total) by mouth 2 (two) times a day.  Dispense: 60 tablet; Refill: 0  -     dextroamphetamine-amphetamine 30 mg Tab; Take 1 tablet (30 mg total) by mouth 2 (two) times a day.  Dispense: 60 tablet; Refill: 0  -     dextroamphetamine-amphetamine (ADDERALL) 30 mg Tab; Take 1 tablet (30 mg total) by mouth 2 (two) times a day.  Dispense: 60 tablet; Refill: 0        No follow-ups on file.    There are no Patient Instructions on file for this visit.

## 2024-06-25 ENCOUNTER — PATIENT MESSAGE (OUTPATIENT)
Dept: FAMILY MEDICINE | Facility: CLINIC | Age: 54
End: 2024-06-25
Payer: MEDICAID

## 2024-07-08 ENCOUNTER — TELEPHONE (OUTPATIENT)
Dept: FAMILY MEDICINE | Facility: CLINIC | Age: 54
End: 2024-07-08
Payer: MEDICAID

## 2024-07-08 NOTE — TELEPHONE ENCOUNTER
----- Message from Aarno Tavares sent at 7/8/2024  2:33 PM CDT -----  Contact: self  ..Type:  Patient Returning Call    Who Called:.Luz Pelaez  Who Left Message for Patient:  Does the patient know what this is regarding?:earlier apt   Would the patient rather a call back or a response via [facility] [employer]? Call back   Best Call Back Number:.[phone]   Additional Information: pt states she missed an call on Friday

## 2024-08-05 DIAGNOSIS — I10 PRIMARY HYPERTENSION: ICD-10-CM

## 2024-08-06 RX ORDER — LOSARTAN POTASSIUM 50 MG/1
50 TABLET ORAL DAILY
Qty: 90 TABLET | Refills: 0 | Status: SHIPPED | OUTPATIENT
Start: 2024-08-06

## 2024-08-23 ENCOUNTER — OFFICE VISIT (OUTPATIENT)
Dept: FAMILY MEDICINE | Facility: CLINIC | Age: 54
End: 2024-08-23
Payer: MEDICAID

## 2024-08-23 ENCOUNTER — PATIENT MESSAGE (OUTPATIENT)
Dept: FAMILY MEDICINE | Facility: CLINIC | Age: 54
End: 2024-08-23

## 2024-08-23 VITALS — SYSTOLIC BLOOD PRESSURE: 134 MMHG | DIASTOLIC BLOOD PRESSURE: 89 MMHG

## 2024-08-23 VITALS — HEART RATE: 86 BPM | DIASTOLIC BLOOD PRESSURE: 89 MMHG | SYSTOLIC BLOOD PRESSURE: 134 MMHG

## 2024-08-23 DIAGNOSIS — I10 PRIMARY HYPERTENSION: ICD-10-CM

## 2024-08-23 DIAGNOSIS — R41.840 ATTENTION DEFICIT: Primary | Chronic | ICD-10-CM

## 2024-08-23 DIAGNOSIS — Z13.1 SCREENING FOR DIABETES MELLITUS: ICD-10-CM

## 2024-08-23 RX ORDER — DEXTROAMPHETAMINE SACCHARATE, AMPHETAMINE ASPARTATE, DEXTROAMPHETAMINE SULFATE AND AMPHETAMINE SULFATE 7.5; 7.5; 7.5; 7.5 MG/1; MG/1; MG/1; MG/1
1 TABLET ORAL 2 TIMES DAILY
Qty: 60 TABLET | Refills: 0 | Status: SHIPPED | OUTPATIENT
Start: 2024-10-22

## 2024-08-23 RX ORDER — DEXTROAMPHETAMINE SACCHARATE, AMPHETAMINE ASPARTATE, DEXTROAMPHETAMINE SULFATE AND AMPHETAMINE SULFATE 7.5; 7.5; 7.5; 7.5 MG/1; MG/1; MG/1; MG/1
1 TABLET ORAL 2 TIMES DAILY
Qty: 60 TABLET | Refills: 0 | Status: SHIPPED | OUTPATIENT
Start: 2024-09-22

## 2024-08-23 RX ORDER — DEXTROAMPHETAMINE SACCHARATE, AMPHETAMINE ASPARTATE, DEXTROAMPHETAMINE SULFATE AND AMPHETAMINE SULFATE 7.5; 7.5; 7.5; 7.5 MG/1; MG/1; MG/1; MG/1
30 TABLET ORAL 2 TIMES DAILY
Qty: 60 TABLET | Refills: 0 | Status: SHIPPED | OUTPATIENT
Start: 2024-08-23

## 2024-08-23 NOTE — PROGRESS NOTES
Subjective:       Patient ID: Luz Pelaez is a 54 y.o. female.    Primary Care Telemedicine Note    The patient location is:  Patient Home   The chief complaint leading to consultation is: med refill  Total time spent with patient: 10 mins    Visit type: Virtual visit with synchronous audio only and video  Each patient to whom he or she provides medical services by telemedicine is:  (1) informed of the relationship between the physician and patient and the respective role of any other health care provider with respect to management of the patient; and (2) notified that he or she may decline to receive medical services by telemedicine and may withdraw from such care at any time.        HPI    Problem List Items Addressed This Visit          Neuro    Attention deficit - Primary (Chronic)    Overview     Luz Pelaez has ADD. Medication has been used since 2000 and has been stable on the current dose of medicine. She reports being compliant on the medicine and is not receiving narcotics from any other physician. She denies any complications from taking the medicine. The patient's weight and apatite has been stable and has not had difficulties with agitation. She reports improvement in the symptoms with the current dose.      The patient was checked in the [facility] Board of Pharmacy's Prescription Monitoring Program. There appears to be no incongruities with the patient's verbalized history.          Relevant Medications    dextroamphetamine-amphetamine 30 mg Tab    dextroamphetamine-amphetamine (ADDERALL) 30 mg Tab (Start on 9/22/2024)    dextroamphetamine-amphetamine (ADDERALL) 30 mg Tab (Start on 10/22/2024)       Cardiac/Vascular    Primary hypertension    Overview     -at goal today; has been above goal on recent home checks  -new diagnosis; will obtain labs and EKG today in the clinic  -plan to start Losartan 25 mg daily  -monitor BP at home; BP check in 2 weeks   continue lifestyle modification with low  sodium diet and exercise   -discussed hypertension disease course and importance of treating high blood pressure  -patient understood and advised of risk of untreated blood pressure. ER precautions were given for symptoms of hypertensive urgency and emergency.           Relevant Orders    Lipid Panel     Other Visit Diagnoses       Screening for diabetes mellitus        Relevant Orders    Hemoglobin A1C          Health maintenance needs to updated including:  Lipid panel, A1C    The patient presents with essential hypertension.  The patient is tolerating the medication well and is in excellent compliance.  The patient is experiencing no side effects.  Counseling was offered regarding low salt diets.  The patient has a reduced salt intake.  The patient denies chest pain, palpitations, shortness of breath, dyspnea on exertion, left or murmur neck pain, nausea, vomiting, diaphoresis, paroxysmal nocturnal dyspnea, and orthopnea.   Hypertension Medications               losartan (COZAAR) 50 MG tablet Take 1 tablet (50 mg total) by mouth once daily.              Review of Systems   Constitutional:  Positive for activity change. Negative for fatigue, fever and unexpected weight change.   HENT:  Negative for ear pain, hearing loss, rhinorrhea, sore throat and trouble swallowing.    Eyes:  Negative for pain, discharge and visual disturbance.   Respiratory:  Negative for cough, chest tightness, shortness of breath and wheezing.    Cardiovascular:  Negative for chest pain and palpitations.   Gastrointestinal:  Negative for abdominal pain, blood in stool, constipation, diarrhea and vomiting.   Endocrine: Positive for polyuria. Negative for polydipsia.   Genitourinary:  Negative for difficulty urinating, dysuria, hematuria and menstrual problem.   Musculoskeletal:  Positive for arthralgias and neck pain. Negative for joint swelling and myalgias.   Skin:  Negative for color change and rash.   Neurological:  Negative for  dizziness, weakness and headaches.   Psychiatric/Behavioral:  Negative for confusion, dysphoric mood and sleep disturbance. The patient is not nervous/anxious.        Vitals:    08/23/24 1711   BP: 134/89   Pulse: 86       Objective:     Current Outpatient Medications   Medication Sig Dispense Refill    albuterol (PROVENTIL/VENTOLIN HFA) 90 mcg/actuation inhaler INHALE 1 TO 2 PUFFS BY MOUTH EVERY SIX HOURS AS NEEDED FOR WHEEZING 8.5 g 11    [START ON 9/22/2024] dextroamphetamine-amphetamine (ADDERALL) 30 mg Tab Take 1 tablet (30 mg total) by mouth 2 (two) times a day. 60 tablet 0    [START ON 10/22/2024] dextroamphetamine-amphetamine (ADDERALL) 30 mg Tab Take 1 tablet (30 mg total) by mouth 2 (two) times a day. 60 tablet 0    dextroamphetamine-amphetamine 30 mg Tab Take 1 tablet (30 mg total) by mouth 2 (two) times a day. 60 tablet 0    ergocalciferol (ERGOCALCIFEROL) 50,000 unit Cap Vitamin D2 Take No date recorded No form recorded No frequency recorded No route recorded No set duration recorded No set duration amount recorded active No dosage strength recorded No dosage strength units of measure recorded      fluticasone-umeclidin-vilanter (TRELEGY ELLIPTA) 100-62.5-25 mcg DsDv Inhale 1 puff into the lungs once daily. 60 each 11    linaCLOtide (LINZESS) 290 mcg Cap capsule Take 1 capsule (290 mcg total) by mouth before breakfast. 30 capsule 5    losartan (COZAAR) 50 MG tablet Take 1 tablet (50 mg total) by mouth once daily. 90 tablet 0    meloxicam (MOBIC) 15 MG tablet Take 1 tablet (15 mg total) by mouth once daily. 90 tablet 3    mupirocin (BACTROBAN) 2 % ointment Apply topically 3 (three) times daily. 30 g 2    omeprazole (PRILOSEC) 40 MG capsule Take 1 capsule (40 mg total) by mouth once daily. 90 capsule 3    ondansetron (ZOFRAN-ODT) 4 MG TbDL Take by mouth.       No current facility-administered medications for this visit.       Physical Exam  Constitutional:       Appearance: She is well-developed.   HENT:       Head: Normocephalic.   Pulmonary:      Effort: Pulmonary effort is normal. No respiratory distress.   Musculoskeletal:      Cervical back: Normal range of motion.   Neurological:      Mental Status: She is alert and oriented to person, place, and time.   Psychiatric:         Thought Content: Thought content normal.         Judgment: Judgment normal.         Assessment:       1. Attention deficit    2. Primary hypertension    3. Screening for diabetes mellitus        Plan:   Attention deficit  -     dextroamphetamine-amphetamine 30 mg Tab; Take 1 tablet (30 mg total) by mouth 2 (two) times a day.  Dispense: 60 tablet; Refill: 0  -     dextroamphetamine-amphetamine (ADDERALL) 30 mg Tab; Take 1 tablet (30 mg total) by mouth 2 (two) times a day.  Dispense: 60 tablet; Refill: 0  -     dextroamphetamine-amphetamine (ADDERALL) 30 mg Tab; Take 1 tablet (30 mg total) by mouth 2 (two) times a day.  Dispense: 60 tablet; Refill: 0    Primary hypertension  -     Lipid Panel; Future; Expected date: 08/23/2024    Screening for diabetes mellitus  -     Hemoglobin A1C; Future; Expected date: 08/23/2024        Follow up in about 3 months (around 11/23/2024), or if symptoms worsen or fail to improve.    There are no Patient Instructions on file for this visit.

## 2024-08-27 DIAGNOSIS — K21.00 GASTROESOPHAGEAL REFLUX DISEASE WITH ESOPHAGITIS WITHOUT HEMORRHAGE: ICD-10-CM

## 2024-08-27 RX ORDER — OMEPRAZOLE 40 MG/1
40 CAPSULE, DELAYED RELEASE ORAL DAILY
Qty: 90 CAPSULE | Refills: 3 | Status: SHIPPED | OUTPATIENT
Start: 2024-08-27 | End: 2025-08-27

## 2024-08-29 ENCOUNTER — LAB VISIT (OUTPATIENT)
Dept: LAB | Facility: HOSPITAL | Age: 54
End: 2024-08-29
Attending: NURSE PRACTITIONER
Payer: MEDICAID

## 2024-08-29 DIAGNOSIS — Z13.1 SCREENING FOR DIABETES MELLITUS: ICD-10-CM

## 2024-08-29 DIAGNOSIS — I10 PRIMARY HYPERTENSION: ICD-10-CM

## 2024-08-29 LAB
CHOLEST SERPL-MCNC: 155 MG/DL (ref 120–199)
CHOLEST/HDLC SERPL: 2.5 {RATIO} (ref 2–5)
ESTIMATED AVG GLUCOSE: 108 MG/DL (ref 68–131)
HBA1C MFR BLD: 5.4 % (ref 4–5.6)
HDLC SERPL-MCNC: 63 MG/DL (ref 40–75)
HDLC SERPL: 40.6 % (ref 20–50)
LDLC SERPL CALC-MCNC: 79.2 MG/DL (ref 63–159)
NONHDLC SERPL-MCNC: 92 MG/DL
TRIGL SERPL-MCNC: 64 MG/DL (ref 30–150)

## 2024-08-29 PROCEDURE — 80061 LIPID PANEL: CPT | Performed by: NURSE PRACTITIONER

## 2024-08-29 PROCEDURE — 83036 HEMOGLOBIN GLYCOSYLATED A1C: CPT | Performed by: NURSE PRACTITIONER

## 2024-09-11 ENCOUNTER — PATIENT MESSAGE (OUTPATIENT)
Dept: FAMILY MEDICINE | Facility: CLINIC | Age: 54
End: 2024-09-11
Payer: MEDICAID

## 2024-11-13 ENCOUNTER — TELEPHONE (OUTPATIENT)
Dept: FAMILY MEDICINE | Facility: CLINIC | Age: 54
End: 2024-11-13
Payer: MEDICAID

## 2024-11-20 ENCOUNTER — TELEPHONE (OUTPATIENT)
Dept: FAMILY MEDICINE | Facility: CLINIC | Age: 54
End: 2024-11-20
Payer: MEDICAID

## 2024-11-20 ENCOUNTER — OFFICE VISIT (OUTPATIENT)
Dept: FAMILY MEDICINE | Facility: CLINIC | Age: 54
End: 2024-11-20
Payer: MEDICAID

## 2024-11-20 ENCOUNTER — HOSPITAL ENCOUNTER (OUTPATIENT)
Dept: RADIOLOGY | Facility: HOSPITAL | Age: 54
Discharge: HOME | End: 2024-11-20
Attending: FAMILY MEDICINE
Payer: MEDICAID

## 2024-11-20 ENCOUNTER — PATIENT MESSAGE (OUTPATIENT)
Dept: FAMILY MEDICINE | Facility: CLINIC | Age: 54
End: 2024-11-20

## 2024-11-20 VITALS
HEIGHT: 65 IN | HEART RATE: 75 BPM | SYSTOLIC BLOOD PRESSURE: 146 MMHG | DIASTOLIC BLOOD PRESSURE: 97 MMHG | BODY MASS INDEX: 27.49 KG/M2 | WEIGHT: 165 LBS

## 2024-11-20 DIAGNOSIS — E55.9 VITAMIN D DEFICIENCY: ICD-10-CM

## 2024-11-20 DIAGNOSIS — Z00.00 ANNUAL PHYSICAL EXAM: Primary | ICD-10-CM

## 2024-11-20 DIAGNOSIS — K59.09 CHRONIC CONSTIPATION: ICD-10-CM

## 2024-11-20 DIAGNOSIS — Z23 IMMUNIZATION DUE: ICD-10-CM

## 2024-11-20 DIAGNOSIS — K21.00 GASTROESOPHAGEAL REFLUX DISEASE WITH ESOPHAGITIS WITHOUT HEMORRHAGE: ICD-10-CM

## 2024-11-20 DIAGNOSIS — M54.12 CERVICAL RADICULOPATHY: ICD-10-CM

## 2024-11-20 DIAGNOSIS — M25.562 ACUTE PAIN OF LEFT KNEE: ICD-10-CM

## 2024-11-20 DIAGNOSIS — R41.840 ATTENTION DEFICIT: Chronic | ICD-10-CM

## 2024-11-20 DIAGNOSIS — I10 PRIMARY HYPERTENSION: ICD-10-CM

## 2024-11-20 PROCEDURE — 3077F SYST BP >= 140 MM HG: CPT | Mod: CPTII,95,, | Performed by: FAMILY MEDICINE

## 2024-11-20 PROCEDURE — 4010F ACE/ARB THERAPY RXD/TAKEN: CPT | Mod: CPTII,95,, | Performed by: FAMILY MEDICINE

## 2024-11-20 PROCEDURE — 3080F DIAST BP >= 90 MM HG: CPT | Mod: CPTII,95,, | Performed by: FAMILY MEDICINE

## 2024-11-20 PROCEDURE — 73564 X-RAY EXAM KNEE 4 OR MORE: CPT | Mod: 26,LT,, | Performed by: RADIOLOGY

## 2024-11-20 PROCEDURE — 73562 X-RAY EXAM OF KNEE 3: CPT | Mod: TC,PO,LT

## 2024-11-20 PROCEDURE — 3044F HG A1C LEVEL LT 7.0%: CPT | Mod: CPTII,95,, | Performed by: FAMILY MEDICINE

## 2024-11-20 PROCEDURE — 99214 OFFICE O/P EST MOD 30 MIN: CPT | Mod: 95,,, | Performed by: FAMILY MEDICINE

## 2024-11-20 PROCEDURE — 3008F BODY MASS INDEX DOCD: CPT | Mod: CPTII,95,, | Performed by: FAMILY MEDICINE

## 2024-11-20 PROCEDURE — 73562 X-RAY EXAM OF KNEE 3: CPT | Mod: 26,59,RT, | Performed by: RADIOLOGY

## 2024-11-20 RX ORDER — DEXTROAMPHETAMINE SACCHARATE, AMPHETAMINE ASPARTATE, DEXTROAMPHETAMINE SULFATE AND AMPHETAMINE SULFATE 7.5; 7.5; 7.5; 7.5 MG/1; MG/1; MG/1; MG/1
1 TABLET ORAL 2 TIMES DAILY
Qty: 60 TABLET | Refills: 0 | Status: SHIPPED | OUTPATIENT
Start: 2024-11-21 | End: 2024-12-21

## 2024-11-20 RX ORDER — DEXTROAMPHETAMINE SACCHARATE, AMPHETAMINE ASPARTATE, DEXTROAMPHETAMINE SULFATE AND AMPHETAMINE SULFATE 7.5; 7.5; 7.5; 7.5 MG/1; MG/1; MG/1; MG/1
1 TABLET ORAL 2 TIMES DAILY
Qty: 60 TABLET | Refills: 0 | Status: SHIPPED | OUTPATIENT
Start: 2025-01-20 | End: 2025-02-19

## 2024-11-20 RX ORDER — LOSARTAN POTASSIUM 50 MG/1
50 TABLET ORAL DAILY
Qty: 90 TABLET | Refills: 3 | Status: CANCELLED | OUTPATIENT
Start: 2024-11-20

## 2024-11-20 RX ORDER — LOSARTAN POTASSIUM 100 MG/1
100 TABLET ORAL DAILY
Qty: 90 TABLET | Refills: 3 | Status: SHIPPED | OUTPATIENT
Start: 2024-11-20 | End: 2025-11-20

## 2024-11-20 RX ORDER — OMEPRAZOLE 40 MG/1
40 CAPSULE, DELAYED RELEASE ORAL DAILY
Qty: 90 CAPSULE | Refills: 3 | Status: SHIPPED | OUTPATIENT
Start: 2024-11-20 | End: 2025-11-20

## 2024-11-20 RX ORDER — MELOXICAM 15 MG/1
15 TABLET ORAL DAILY
Qty: 90 TABLET | Refills: 3 | Status: SHIPPED | OUTPATIENT
Start: 2024-11-20 | End: 2025-11-20

## 2024-11-20 RX ORDER — DEXTROAMPHETAMINE SACCHARATE, AMPHETAMINE ASPARTATE, DEXTROAMPHETAMINE SULFATE AND AMPHETAMINE SULFATE 7.5; 7.5; 7.5; 7.5 MG/1; MG/1; MG/1; MG/1
1 TABLET ORAL 2 TIMES DAILY
Qty: 60 TABLET | Refills: 0 | Status: SHIPPED | OUTPATIENT
Start: 2024-12-21 | End: 2025-01-20

## 2024-11-20 NOTE — TELEPHONE ENCOUNTER
----- Message from Kb Mcgarry MD sent at 11/20/2024  7:39 AM CST -----  Arrange shots, xray and blood work soon and repeat the shingles in 2 months.   Defer them on the chart so I can sign my notes.

## 2024-11-20 NOTE — PROGRESS NOTES
"Primary Care Telemedicine Note   The patient location is:  Patient Home    The chief complaint leading to consultation is: left knee pain and needs follow up on chronic issues.   Total time spent with patient: 25 min   Visit type: Virtual visit with synchronous audio and video   Each patient to whom he or she provides medical services by telemedicine is:  (1) informed of the relationship between the physician and patient and the respective role of any other health care provider with respect to management of the patient; and (2) notified that he or she may decline to receive medical services by telemedicine and may withdraw from such care at any time.       CC:As Above   HPI:   History of Present Illness    CHIEF COMPLAINT:  Patient presents today for follow-up and left knee pain.    MUSCULOSKELETAL:  She reports left knee pain that started approximately three weeks ago. The knee is swollen and difficult to walk on in the morning, with limited range of motion upon waking. Pain is localized to the inside of the knee. She denies any known injury preceding the onset of symptoms. She takes ibuprofen 800 mg daily in the morning for pain relief, which provides some alleviation of symptoms throughout the day. By evening, the pain relief wears off. She also reports generalized body pain that is constant and affects her whole body, describing feeling "stoved up". She attributes this pain to arthritis in multiple areas, including her neck and hands.    RESPIRATORY:  She experiences shortness of breath with minimal exertion. She acknowledges ongoing lung issues and the need for inhaler use. She has two refills remaining on her current inhaler prescription.  She will connect with Dr. Mcknight on this issue.    MENOPAUSE:  Her last menstrual cycle occurred approximately four years ago. She expresses concern about significant weight gain since the onset of menopausal symptoms.    MEDICATIONS:  She is currently taking Losartan for " blood pressure, which will be increased to 100 mg, Omeprazole, and Adderall. She uses inhalers prescribed by a different provider. She reports a recent blood pressure reading of 146/97.  No CP.    NUTRITIONAL CONCERNS:  She reports a history of vitamin D deficiency diagnosed approximately 6 years ago. She previously took vitamin D supplements but discontinued use after running out. She expresses interest in having her vitamin D levels rechecked. She also reports eating a lot of ice recently and mentions that someone advised her to have her iron levels checked due to this behavior.      ROS:  General: -fever, -chills, -fatigue, +weight gain, -weight loss  Eyes: -vision changes, -redness, -discharge  ENT: -ear pain, -nasal congestion, -sore throat  Cardiovascular: -chest pain, -palpitations, -lower extremity edema  Respiratory: -cough, +shortness of breath  Gastrointestinal: -abdominal pain, -nausea, -vomiting, -diarrhea, -constipation, -blood in stool  Genitourinary: -dysuria, -hematuria, -frequency  Musculoskeletal: +joint pain, -muscle pain, +joint swelling  Skin: -rash, -lesion  Neurological: -headache, -dizziness, -numbness, -tingling  Psychiatric: -anxiety, -depression, -sleep difficulty          Problem List Items Addressed This Visit       Acute pain of left knee    Relevant Medications    meloxicam (MOBIC) 15 MG tablet    Attention deficit (Chronic)    Overview     Luz Pelaez has ADD. Medication has been used since 2000 and has been stable on the current dose of medicine. She reports being compliant on the medicine and is not receiving narcotics from any other physician. She denies any complications from taking the medicine. The patient's weight and apatite has been stable and has not had difficulties with agitation. She reports improvement in the symptoms with the current dose.      The patient was checked in the [facility] [employer]'s Prescription Monitoring Program. There appears to be no  incongruities with the patient's verbalized history.          Relevant Medications    dextroamphetamine-amphetamine 30 mg Tab (Start on 11/21/2024)    dextroamphetamine-amphetamine 30 mg Tab (Start on 12/21/2024)    dextroamphetamine-amphetamine 30 mg Tab (Start on 1/20/2025)    Cervical radiculopathy    Relevant Medications    meloxicam (MOBIC) 15 MG tablet    Chronic constipation    Overview     -chronic constipation  -no improvement in symptoms with OTC medications    -Regular bowel movements with Linzess, denies side effects         Relevant Medications    linaCLOtide (LINZESS) 290 mcg Cap capsule    Gastroesophageal reflux disease    Overview     The patient complains of heartburn.  The symptoms began 6 months. Denies chest pressure, diaphoresis, left arm and neck pain, vomiting, shortness of breath and palpitations.  Associated symptoms include abdominal pain.  The symptoms occur a few minutes after meals.  Things that worsen the symptoms include many different foods.  The symptoms are alleviated by Prilosec somewhat.  She has been on prilosec for many years and it has worsened.             Relevant Medications    omeprazole (PRILOSEC) 40 MG capsule    Primary hypertension    Overview     -at goal today; has been above goal on recent home checks  -new diagnosis; will obtain labs and EKG today in the clinic  -plan to start Losartan 25 mg daily  -monitor BP at home; BP check in 2 weeks   continue lifestyle modification with low sodium diet and exercise   -discussed hypertension disease course and importance of treating high blood pressure  -patient understood and advised of risk of untreated blood pressure. ER precautions were given for symptoms of hypertensive urgency and emergency.           Relevant Medications    losartan (COZAAR) 100 MG tablet    Other Relevant Orders    Comprehensive Metabolic Panel     Other Visit Diagnoses       Annual physical exam    -  Primary    Relevant Orders    HIV 1/2 Ag/Ab (4th  Gen)    Hepatitis C Antibody    Iron and TIBC    CBC Auto Differential    Immunization due        Relevant Medications    varicella zoster (Shingrix) IM vaccine (>/= 49 yo) (Start on 1/19/2025  8:00 AM)    varicella zoster (Shingrix) IM vaccine (>/= 49 yo) (Start on 11/20/2024  8:00 AM)    Tdap (BOOSTRIX) vaccine injection 0.5 mL (Start on 11/20/2024  7:45 AM)    influenza (Afluria) 45 mcg/0.5 mL IM vaccine (> or = 36 mo) 0.5 mL (Start on 11/20/2024  7:45 AM)    Other Relevant Orders    X-Ray Knee 3 View Left    Vitamin D deficiency        Relevant Orders    Vitamin D               The patient's Health Maintenance was reviewed and the following appears to be due at this time:   Health Maintenance Due   Topic Date Due    Hepatitis C Screening  Never done    HIV Screening  Never done    Shingles Vaccine (1 of 2) Never done    TETANUS VACCINE  03/26/2024    Influenza Vaccine (1) 09/01/2024    COVID-19 Vaccine (3 - 2024-25 season) 09/01/2024    Mammogram  02/07/2025          Outpatient Medications Prior to Visit   Medication Sig Dispense Refill    albuterol (PROVENTIL/VENTOLIN HFA) 90 mcg/actuation inhaler INHALE 1 TO 2 PUFFS BY MOUTH EVERY SIX HOURS AS NEEDED FOR WHEEZING 8.5 g 11    dextroamphetamine-amphetamine (ADDERALL) 30 mg Tab Take 1 tablet (30 mg total) by mouth 2 (two) times a day. 60 tablet 0    ergocalciferol (ERGOCALCIFEROL) 50,000 unit Cap Vitamin D2 Take No date recorded No form recorded No frequency recorded No route recorded No set duration recorded No set duration amount recorded active No dosage strength recorded No dosage strength units of measure recorded      fluticasone-umeclidin-vilanter (TRELEGY ELLIPTA) 100-62.5-25 mcg DsDv Inhale 1 puff into the lungs once daily. 60 each 11    ondansetron (ZOFRAN-ODT) 4 MG TbDL Take by mouth.      dextroamphetamine-amphetamine (ADDERALL) 30 mg Tab Take 1 tablet (30 mg total) by mouth 2 (two) times a day. 60 tablet 0    dextroamphetamine-amphetamine 30 mg Tab  Take 1 tablet (30 mg total) by mouth 2 (two) times a day. 60 tablet 0    linaCLOtide (LINZESS) 290 mcg Cap capsule Take 1 capsule (290 mcg total) by mouth before breakfast. 30 capsule 5    losartan (COZAAR) 50 MG tablet Take 1 tablet (50 mg total) by mouth once daily. 90 tablet 0    meloxicam (MOBIC) 15 MG tablet Take 1 tablet (15 mg total) by mouth once daily. 90 tablet 3    mupirocin (BACTROBAN) 2 % ointment Apply topically 3 (three) times daily. 30 g 2    omeprazole (PRILOSEC) 40 MG capsule Take 1 capsule (40 mg total) by mouth once daily. 90 capsule 3     No facility-administered medications prior to visit.         PMH:  Past Medical History:   Diagnosis Date    Allergy     Asthma     Attention deficit     Carpal tunnel syndrome     Cervical spine degeneration     GERD (gastroesophageal reflux disease)     Hiatal hernia 11/1/2019    Kidney stones      Past Surgical History:   Procedure Laterality Date    CARPAL TUNNEL RELEASE Left 10/7/2019    Procedure: RELEASE, CARPAL TUNNEL;  Surgeon: Delfino Cain MD;  Location: [facility] OR;  Service: Orthopedics;  Laterality: Left;    CARPAL TUNNEL RELEASE Right 11/4/2019    Procedure: RELEASE, CARPAL TUNNEL;  Surgeon: Delfino Cain MD;  Location: [facility] OR;  Service: Orthopedics;  Laterality: Right;    CHOLECYSTECTOMY      COLONOSCOPY  4/1/2012    date is approximate-done by Dr. Naranjo due to chronic constipation    COLONOSCOPY N/A 6/18/2020    Procedure: COLONOSCOPY;  Surgeon: Kb Mcgarry MD;  Location: [facility];  Service: Endoscopy;  Laterality: N/A;    ENDOBRONCHIAL ULTRASOUND Left 6/28/2023    Procedure: ENDOBRONCHIAL ULTRASOUND (EBUS);  Surgeon: Antelmo Mcknight MD;  Location: [facility];  Service: Pulmonary;  Laterality: Left;    ESOPHAGOGASTRODUODENOSCOPY N/A 6/18/2020    Procedure: ESOPHAGOGASTRODUODENOSCOPY (EGD);  Surgeon: Kb Mcgarry MD;  Location: [facility];  Service: Endoscopy;  Laterality: N/A;    NAVIGATIONAL BRONCHOSCOPY Bilateral  6/28/2023    Procedure: BRONCHOSCOPY, NAVIGATIONAL;  Surgeon: Antelmo Mcknight MD;  Location: [facility];  Service: Pulmonary;  Laterality: Bilateral;    SLEEVE GASTROPLASTY      TOTAL REDUCTION MAMMOPLASTY       Review of patient's allergies indicates:   Allergen Reactions    Bupropion hcl Other (See Comments)     Mood swings  Mood swings    Lamisil [terbinafine] Rash     Current Outpatient Medications on File Prior to Visit   Medication Sig Dispense Refill    albuterol (PROVENTIL/VENTOLIN HFA) 90 mcg/actuation inhaler INHALE 1 TO 2 PUFFS BY MOUTH EVERY SIX HOURS AS NEEDED FOR WHEEZING 8.5 g 11    dextroamphetamine-amphetamine (ADDERALL) 30 mg Tab Take 1 tablet (30 mg total) by mouth 2 (two) times a day. 60 tablet 0    ergocalciferol (ERGOCALCIFEROL) 50,000 unit Cap Vitamin D2 Take No date recorded No form recorded No frequency recorded No route recorded No set duration recorded No set duration amount recorded active No dosage strength recorded No dosage strength units of measure recorded      fluticasone-umeclidin-vilanter (TRELEGY ELLIPTA) 100-62.5-25 mcg DsDv Inhale 1 puff into the lungs once daily. 60 each 11    ondansetron (ZOFRAN-ODT) 4 MG TbDL Take by mouth.      [DISCONTINUED] dextroamphetamine-amphetamine (ADDERALL) 30 mg Tab Take 1 tablet (30 mg total) by mouth 2 (two) times a day. 60 tablet 0    [DISCONTINUED] dextroamphetamine-amphetamine 30 mg Tab Take 1 tablet (30 mg total) by mouth 2 (two) times a day. 60 tablet 0    [DISCONTINUED] linaCLOtide (LINZESS) 290 mcg Cap capsule Take 1 capsule (290 mcg total) by mouth before breakfast. 30 capsule 5    [DISCONTINUED] losartan (COZAAR) 50 MG tablet Take 1 tablet (50 mg total) by mouth once daily. 90 tablet 0    [DISCONTINUED] meloxicam (MOBIC) 15 MG tablet Take 1 tablet (15 mg total) by mouth once daily. 90 tablet 3    [DISCONTINUED] mupirocin (BACTROBAN) 2 % ointment Apply topically 3 (three) times daily. 30 g 2    [DISCONTINUED] omeprazole (PRILOSEC) 40  MG capsule Take 1 capsule (40 mg total) by mouth once daily. 90 capsule 3     No current facility-administered medications on file prior to visit.     Social History     Socioeconomic History    Marital status:     Number of children: 2   Occupational History    Occupation:      Employer: [employer]   Tobacco Use    Smoking status: Every Day     Current packs/day: 0.50     Average packs/day: 0.5 packs/day for 36.9 years (18.4 ttl pk-yrs)     Types: Cigarettes     Start date: 1988    Smokeless tobacco: Never   Substance and Sexual Activity    Alcohol use: Yes     Comment: Socially    Drug use: No    Sexual activity: Yes     Partners: Male   Social History Narrative    Live w/ spouse no pets      Social Drivers of Health     Financial Resource Strain: Medium Risk (1/31/2024)    Overall Financial Resource Strain (CARDIA)     Difficulty of Paying Living Expenses: Somewhat hard   Food Insecurity: No Food Insecurity (1/31/2024)    Hunger Vital Sign     Worried About Running Out of Food in the Last Year: Never true     Ran Out of Food in the Last Year: Never true   Transportation Needs: No Transportation Needs (1/31/2024)    PRAPARE - Transportation     Lack of Transportation (Medical): No     Lack of Transportation (Non-Medical): No   Physical Activity: Inactive (1/31/2024)    Exercise Vital Sign     Days of Exercise per Week: 0 days     Minutes of Exercise per Session: 0 min   Stress: Stress Concern Present (1/31/2024)    [employer] - Occupational Stress Questionnaire     Feeling of Stress : To some extent   Housing Stability: High Risk (1/31/2024)    Housing Stability Vital Sign     Unable to Pay for Housing in the Last Year: Yes     Number of Places Lived in the Last Year: 1     Unstable Housing in the Last Year: No     Family History   Problem Relation Name Age of Onset    Hypertension Mother      Diabetes Mother      Hyperlipidemia Mother    "   Heart disease Mother  58    Colon cancer Maternal Grandmother      Breast cancer Maternal Grandmother      Breast cancer Paternal Grandmother          Breast    Heart attack Father  70    Thyroid disease Sister      Hypertension Sister      Breast cancer Paternal Aunt      Stroke Neg Hx         Review of Systems   HENT:  Negative for hearing loss.    Eyes:  Negative for discharge and visual disturbance.   Cardiovascular:  Negative for chest pain and palpitations.   Respiratory:  Positive for wheezing.    Endocrine: Negative for polydipsia and polyuria.   Musculoskeletal:  Positive for joint swelling and neck pain.   Gastrointestinal:  Positive for constipation. Negative for diarrhea and vomiting.   Genitourinary:  Negative for dysuria and hematuria.   Neurological:  Negative for headaches and weakness.      Physical Exam    (Vitals taken at home and reported to us and documented)   Pulse If Self Reported:       No data to display                 Last 5 Patient Entered Readings                Current 30 Day Average:   Recent Readings        SBP (mmHg)        DBP (mmHg)        Pulse                     BP If Self Reported:       No data to display               Pulse Readings from Last 3 Encounters:   11/20/24 75   08/23/24 86   05/01/24 77      Weight If Self Reported:       No data to display               Glucose If Self Reported:       No data to display               Last 5 Blood Glucose Readings           No data to display               BP (!) 146/97   Pulse 75   Ht 5' 5" (1.651 m)   Wt 74.8 kg (165 lb)   BMI 27.46 kg/m²  if verbally reported and entered.    Constitutional: The patient is oriented to person, place, and time and appears well-developed and well-nourished with no distress.    Eyes: EOM are normal.    Pulmonary/Chest: Effort normal. No respiratory distress.    Neurological: The patient is alert and oriented to person, place, and time.    Skin: the patient is not diaphoretic.    Psychiatric: " The patient has a normal mood and affect. The behavior is normal. Judgment, thought and content normal.              DIAGNOSIS  Encounter Diagnoses   Name Primary?    Annual physical exam Yes    Attention deficit     Chronic constipation     Primary hypertension     Cervical radiculopathy     Gastroesophageal reflux disease with esophagitis without hemorrhage     Immunization due     Acute pain of left knee     Vitamin D deficiency           PLAN:     Assessment & Plan    Assessed left knee pain and swelling, considering x-ray to rule out structural issues  Evaluated blood pressure control, decided to increase Losartan dosage  Considered vitamin D and iron status due to patient's history and symptoms  Planned to update vaccinations and conduct routine health maintenance screenings    KNEE PAIN AND OSTEOARTHRITIS:  - Patient to review knee stretching program provided in after visit summary.  - Started Meloxicam 15mg daily for knee pain and inflammation.  - Ordered X-ray of left knee.    HYPERTENSION:  - Patient to track blood pressure regularly.  - Increased Losartan to 100mg daily for blood pressure control.    ATTENTION-DEFICIT HYPERACTIVITY DISORDER:  - Continued Adderall at current dose, refill available tomorrow.    GASTROESOPHAGEAL REFLUX DISEASE:  - Continued Omeprazole at current dose.    IMMUNIZATIONS AND PREVENTIVE CARE:  - Explained shingles vaccine as a 2-part series, completed once for life.  - Discussed importance of tetanus vaccine with pertussis component for grandparent protection.  - Informed about routine HIV and Hepatitis C screening recommendations.  - Administered vaccinations: Flu vaccine, Tetanus-Pertussis (Tdap) vaccine, Shingles vaccine (first dose of 2-dose series).    RESPIRATORY ISSUES:  - Continued inhaler prescriptions through Dr. Chris (patient has 2 refills remaining).    LABS:  - Ordered labs: CBC, iron panel, vitamin D level, electrolytes, HIV test, Hepatitis C test.    FOLLOW-UP:  -  Follow up with e-visit in 2 weeks to assess blood pressure control after medication adjustment.  - Contact the office to arrange x-ray, lab work, and remaining shingles vaccine dose.       I have discontinued Megan Pelaez's losartan 50 MG A DAY. I am also having her start on dextroamphetamine-amphetamine, dextroamphetamine-amphetamine, dextroamphetamine-amphetamine, and losartan. Additionally, I am having her maintain her ergocalciferol, ondansetron, albuterol, fluticasone-umeclidin-vilanter, dextroamphetamine-amphetamine, linaCLOtide, meloxicam, and omeprazole. We will continue to administer varicella-zoster gE-AS01B (PF), varicella-zoster gE-AS01B (PF), Tdap, and influenza.  No problem-specific Assessment & Plan notes found for this encounter.  START LOSARTAN 100 MG A DAY AND F/U IN 2 WEEKS.    No follow-ups on file.    Diagnoses and all orders for this visit:    Annual physical exam  -     HIV 1/2 Ag/Ab (4th Gen); Future  -     Hepatitis C Antibody; Future  -     Iron and TIBC; Future  -     CBC Auto Differential; Future    Attention deficit  -     dextroamphetamine-amphetamine 30 mg Tab; Take 1 tablet (30 mg total) by mouth 2 (two) times a day.  -     dextroamphetamine-amphetamine 30 mg Tab; Take 1 tablet (30 mg total) by mouth 2 (two) times a day.  -     dextroamphetamine-amphetamine 30 mg Tab; Take 1 tablet (30 mg total) by mouth 2 (two) times a day.    Chronic constipation  -     linaCLOtide (LINZESS) 290 mcg Cap capsule; Take 1 capsule (290 mcg total) by mouth before breakfast.    Primary hypertension  -     Comprehensive Metabolic Panel; Future  -     losartan (COZAAR) 100 MG tablet; Take 1 tablet (100 mg total) by mouth once daily.    Cervical radiculopathy  -     meloxicam (MOBIC) 15 MG tablet; Take 1 tablet (15 mg total) by mouth once daily.    Gastroesophageal reflux disease with esophagitis without hemorrhage  -     omeprazole (PRILOSEC) 40 MG capsule; Take 1 capsule (40 mg total) by mouth once  daily.    Immunization due  -     varicella zoster (Shingrix) IM vaccine (>/= 51 yo)  -     varicella zoster (Shingrix) IM vaccine (>/= 51 yo)  -     Tdap (BOOSTRIX) vaccine injection 0.5 mL  -     influenza (Afluria) 45 mcg/0.5 mL IM vaccine (> or = 36 mo) 0.5 mL  -     X-Ray Knee 3 View Left; Future    Acute pain of left knee  -     meloxicam (MOBIC) 15 MG tablet; Take 1 tablet (15 mg total) by mouth once daily.    Vitamin D deficiency  -     Vitamin D; Future    Other orders  -     MYC E-VISIT  The following orders have not been finalized:  -     Cancel: losartan (COZAAR) 50 MG tablet      Medications Ordered This Encounter   Medications    dextroamphetamine-amphetamine 30 mg Tab     Sig: Take 1 tablet (30 mg total) by mouth 2 (two) times a day.     Dispense:  60 tablet     Refill:  0    dextroamphetamine-amphetamine 30 mg Tab     Sig: Take 1 tablet (30 mg total) by mouth 2 (two) times a day.     Dispense:  60 tablet     Refill:  0    dextroamphetamine-amphetamine 30 mg Tab     Sig: Take 1 tablet (30 mg total) by mouth 2 (two) times a day.     Dispense:  60 tablet     Refill:  0    influenza (Afluria) 45 mcg/0.5 mL IM vaccine (> or = 36 mo) 0.5 mL    linaCLOtide (LINZESS) 290 mcg Cap capsule     Sig: Take 1 capsule (290 mcg total) by mouth before breakfast.     Dispense:  30 capsule     Refill:  5     PT REQUESTING REFILLS. THANK YOU!    losartan (COZAAR) 100 MG tablet     Sig: Take 1 tablet (100 mg total) by mouth once daily.     Dispense:  90 tablet     Refill:  3     .    meloxicam (MOBIC) 15 MG tablet     Sig: Take 1 tablet (15 mg total) by mouth once daily.     Dispense:  90 tablet     Refill:  3    omeprazole (PRILOSEC) 40 MG capsule     Sig: Take 1 capsule (40 mg total) by mouth once daily.     Dispense:  90 capsule     Refill:  3    Tdap (BOOSTRIX) vaccine injection 0.5 mL    varicella zoster (Shingrix) IM vaccine (>/= 51 yo)    varicella zoster (Shingrix) IM vaccine (>/= 51 yo)     The patient was  instructed to stop the following meds:  Medications Discontinued During This Encounter   Medication Reason    dextroamphetamine-amphetamine 30 mg Tab     dextroamphetamine-amphetamine (ADDERALL) 30 mg Tab     mupirocin (BACTROBAN) 2 % ointment Patient no longer taking    losartan (COZAAR) 50 MG tablet     meloxicam (MOBIC) 15 MG tablet Reorder    linaCLOtide (LINZESS) 290 mcg Cap capsule Reorder    omeprazole (PRILOSEC) 40 MG capsule Reorder     Orders Placed This Encounter   Procedures    X-Ray Knee 3 View Left     Standing Status:   Future     Standing Expiration Date:   11/20/2025     Order Specific Question:   Reason for Exam:     Answer:   pain     Order Specific Question:   Is the patient pregnant?     Answer:   No    Comprehensive Metabolic Panel     Standing Status:   Future     Standing Expiration Date:   11/20/2025     Order Specific Question:   Send normal result to authorizing provider's In Basket if patient is active on MyChart:     Answer:   No    HIV 1/2 Ag/Ab (4th Gen)     Standing Status:   Future     Standing Expiration Date:   1/20/2026    Hepatitis C Antibody     Standing Status:   Future     Standing Expiration Date:   11/20/2025     Order Specific Question:   Release to patient     Answer:   Immediate     Order Specific Question:   Send normal result to authorizing provider's In Basket if patient is active on MyChart:     Answer:   Yes    Iron and TIBC     Standing Status:   Future     Standing Expiration Date:   11/20/2025     Order Specific Question:   Send normal result to authorizing provider's In Basket if patient is active on MyChart:     Answer:   No    CBC Auto Differential     Standing Status:   Future     Standing Expiration Date:   1/19/2026     Order Specific Question:   Send normal result to authorizing provider's In Basket if patient is active on MyChart:     Answer:   No    Vitamin D     Standing Status:   Future     Standing Expiration Date:   1/19/2026     Order Specific  Question:   Send normal result to authorizing provider's In Basket if patient is active on MyChart:     Answer:   No       Medication List with Changes/Refills   New Medications    DEXTROAMPHETAMINE-AMPHETAMINE 30 MG TAB    Take 1 tablet (30 mg total) by mouth 2 (two) times a day.    DEXTROAMPHETAMINE-AMPHETAMINE 30 MG TAB    Take 1 tablet (30 mg total) by mouth 2 (two) times a day.    DEXTROAMPHETAMINE-AMPHETAMINE 30 MG TAB    Take 1 tablet (30 mg total) by mouth 2 (two) times a day.    LOSARTAN (COZAAR) 100 MG TABLET    Take 1 tablet (100 mg total) by mouth once daily.   Current Medications    ALBUTEROL (PROVENTIL/VENTOLIN HFA) 90 MCG/ACTUATION INHALER    INHALE 1 TO 2 PUFFS BY MOUTH EVERY SIX HOURS AS NEEDED FOR WHEEZING    DEXTROAMPHETAMINE-AMPHETAMINE (ADDERALL) 30 MG TAB    Take 1 tablet (30 mg total) by mouth 2 (two) times a day.    ERGOCALCIFEROL (ERGOCALCIFEROL) 50,000 UNIT CAP    Vitamin D2 Take No date recorded No form recorded No frequency recorded No route recorded No set duration recorded No set duration amount recorded active No dosage strength recorded No dosage strength units of measure recorded    FLUTICASONE-UMECLIDIN-VILANTER (TRELEGY ELLIPTA) 100-62.5-25 MCG DSDV    Inhale 1 puff into the lungs once daily.    ONDANSETRON (ZOFRAN-ODT) 4 MG TBDL    Take by mouth.   Changed and/or Refilled Medications    Modified Medication Previous Medication    LINACLOTIDE (LINZESS) 290 MCG CAP CAPSULE linaCLOtide (LINZESS) 290 mcg Cap capsule       Take 1 capsule (290 mcg total) by mouth before breakfast.    Take 1 capsule (290 mcg total) by mouth before breakfast.    MELOXICAM (MOBIC) 15 MG TABLET meloxicam (MOBIC) 15 MG tablet       Take 1 tablet (15 mg total) by mouth once daily.    Take 1 tablet (15 mg total) by mouth once daily.    OMEPRAZOLE (PRILOSEC) 40 MG CAPSULE omeprazole (PRILOSEC) 40 MG capsule       Take 1 capsule (40 mg total) by mouth once daily.    Take 1 capsule (40 mg total) by mouth once  daily.   Discontinued Medications    DEXTROAMPHETAMINE-AMPHETAMINE (ADDERALL) 30 MG TAB    Take 1 tablet (30 mg total) by mouth 2 (two) times a day.    DEXTROAMPHETAMINE-AMPHETAMINE 30 MG TAB    Take 1 tablet (30 mg total) by mouth 2 (two) times a day.    LOSARTAN (COZAAR) 50 MG TABLET    Take 1 tablet (50 mg total) by mouth once daily.    MUPIROCIN (BACTROBAN) 2 % OINTMENT    Apply topically 3 (three) times daily.      Medication List with Changes/Refills   New Medications    DEXTROAMPHETAMINE-AMPHETAMINE 30 MG TAB    Take 1 tablet (30 mg total) by mouth 2 (two) times a day.       Start Date: 11/21/2024End Date: 12/21/2024    DEXTROAMPHETAMINE-AMPHETAMINE 30 MG TAB    Take 1 tablet (30 mg total) by mouth 2 (two) times a day.       Start Date: 12/21/2024End Date: 1/20/2025    DEXTROAMPHETAMINE-AMPHETAMINE 30 MG TAB    Take 1 tablet (30 mg total) by mouth 2 (two) times a day.       Start Date: 1/20/2025 End Date: 2/19/2025    LOSARTAN (COZAAR) 100 MG TABLET    Take 1 tablet (100 mg total) by mouth once daily.       Start Date: 11/20/2024End Date: 11/20/2025   Current Medications    ALBUTEROL (PROVENTIL/VENTOLIN HFA) 90 MCG/ACTUATION INHALER    INHALE 1 TO 2 PUFFS BY MOUTH EVERY SIX HOURS AS NEEDED FOR WHEEZING       Start Date: 1/31/2024 End Date: --    DEXTROAMPHETAMINE-AMPHETAMINE (ADDERALL) 30 MG TAB    Take 1 tablet (30 mg total) by mouth 2 (two) times a day.       Start Date: 10/22/2024End Date: --    ERGOCALCIFEROL (ERGOCALCIFEROL) 50,000 UNIT CAP    Vitamin D2 Take No date recorded No form recorded No frequency recorded No route recorded No set duration recorded No set duration amount recorded active No dosage strength recorded No dosage strength units of measure recorded       Start Date: --        End Date: --    FLUTICASONE-UMECLIDIN-VILANTER (TRELEGY ELLIPTA) 100-62.5-25 MCG DSDV    Inhale 1 puff into the lungs once daily.       Start Date: 2/16/2024 End Date: --    ONDANSETRON (ZOFRAN-ODT) 4 MG TBDL     Take by mouth.       Start Date: 2/17/2023 End Date: --   Changed and/or Refilled Medications    Modified Medication Previous Medication    LINACLOTIDE (LINZESS) 290 MCG CAP CAPSULE linaCLOtide (LINZESS) 290 mcg Cap capsule       Take 1 capsule (290 mcg total) by mouth before breakfast.    Take 1 capsule (290 mcg total) by mouth before breakfast.       Start Date: 11/20/2024End Date: --    Start Date: 5/1/2024  End Date: 11/20/2024    MELOXICAM (MOBIC) 15 MG TABLET meloxicam (MOBIC) 15 MG tablet       Take 1 tablet (15 mg total) by mouth once daily.    Take 1 tablet (15 mg total) by mouth once daily.       Start Date: 11/20/2024End Date: 11/20/2025    Start Date: 1/31/2024 End Date: 11/20/2024    OMEPRAZOLE (PRILOSEC) 40 MG CAPSULE omeprazole (PRILOSEC) 40 MG capsule       Take 1 capsule (40 mg total) by mouth once daily.    Take 1 capsule (40 mg total) by mouth once daily.       Start Date: 11/20/2024End Date: 11/20/2025    Start Date: 8/27/2024 End Date: 11/20/2024   Discontinued Medications    DEXTROAMPHETAMINE-AMPHETAMINE (ADDERALL) 30 MG TAB    Take 1 tablet (30 mg total) by mouth 2 (two) times a day.       Start Date: 9/22/2024 End Date: 11/20/2024    DEXTROAMPHETAMINE-AMPHETAMINE 30 MG TAB    Take 1 tablet (30 mg total) by mouth 2 (two) times a day.       Start Date: 8/23/2024 End Date: 11/20/2024    LOSARTAN (COZAAR) 50 MG TABLET    Take 1 tablet (50 mg total) by mouth once daily.       Start Date: 8/6/2024  End Date: 11/20/2024    MUPIROCIN (BACTROBAN) 2 % OINTMENT    Apply topically 3 (three) times daily.       Start Date: 8/2/2023  End Date: 11/20/2024                     This note was generated with the assistance of ambient listening technology. Verbal consent was obtained by the patient and accompanying visitor(s) for the recording of patient appointment to facilitate this note. I attest to having reviewed and edited the generated note for accuracy, though some syntax or spelling errors may persist.  Please contact the author of this note for any clarification.                 Answers submitted by the patient for this visit:  Review of Systems Questionnaire (Submitted on 11/13/2024)  activity change: Yes  unexpected weight change: No  rhinorrhea: No  trouble swallowing: No  chest tightness: No  blood in stool: No  difficulty urinating: No  menstrual problem: No  arthralgias: Yes  confusion: No  dysphoric mood: No

## 2024-11-20 NOTE — Clinical Note
Arrange shots, xray and blood work soon and repeat the shingles in 2 months.  Defer them on the chart so I can sign my notes.

## 2024-12-04 ENCOUNTER — E-VISIT (OUTPATIENT)
Dept: FAMILY MEDICINE | Facility: CLINIC | Age: 54
End: 2024-12-04
Payer: MEDICAID

## 2024-12-04 DIAGNOSIS — I10 PRIMARY HYPERTENSION: Primary | ICD-10-CM

## 2024-12-05 ENCOUNTER — PATIENT MESSAGE (OUTPATIENT)
Dept: FAMILY MEDICINE | Facility: CLINIC | Age: 54
End: 2024-12-05
Payer: MEDICAID

## 2024-12-05 DIAGNOSIS — M25.562 LEFT KNEE PAIN, UNSPECIFIED CHRONICITY: Primary | ICD-10-CM

## 2024-12-05 RX ORDER — AMLODIPINE BESYLATE 5 MG/1
5 TABLET ORAL DAILY
Qty: 30 TABLET | Refills: 0 | Status: SHIPPED | OUTPATIENT
Start: 2024-12-05 | End: 2025-12-05

## 2024-12-05 NOTE — PROGRESS NOTES
Patient ID: Luz Pelaez is a 54 y.o. female.    Chief Complaint: Hypertension (Entered automatically based on patient selection in [employer].)    The patient initiated a request through [employer] on 12/4/2024 for evaluation and management with a chief complaint of Hypertension (Entered automatically based on patient selection in [employer].)     I evaluated the questionnaire submission on 12/05/2024.    [facility] Peq Evisit Hypertension    12/5/2024 11:31 AM CST - Filed by Patient   Do you agree to participate in an E-Visit? Yes   If you have any of the following symptoms, please do not complete an E-Visit. Instead, schedule an appointment with your provider I acknowledge   Choose the state of your primary residence [place]   Do you have any of the following pregnancy-related conditions? None   What would you like addressed about your blood pressure? New concern   What is the main issue you would like addressed today? Still having severe knee pain   Your blood pressure is a: Recurring problem   When were you first diagnosed with high blood pressure? More than 1 year ago   Since you first learned you had high blood pressure, how has it changed? Always present but gets better and worse   How would you classify your blood pressure? Hard to control   Are you having any of the following symptoms from your high blood pressure? None of the above   Are you taking any of the following medications? Medications to treat ADHD;  Over-the-counter pain and fever relievers   The following factors can make high blood pressure worse or harder to control. Which of them might be contributing to your high blood pressure?  Excess weight;  Pain;  Stress   Have you taken blood pressure medications in the past that caused you problems or side effects? No   Are you currently taking medication(s) for your blood pressure? Yes   Have you recently started a new medication or changed your dose? Yes   How often are you taking your medication per week?   Every day   Have you had any side effects from your current blood pressure medication? No   Are you able to take your blood pressure? Yes   Please give your most recent blood pressure readings    Reading 1 156/104 12/5/24 - 9:30am   Reading 2 167/110 12/4/24-10:15am   Reading 3    Reading 4    Reading 5    If you are able to take your pulse, please provide it below. 71   Provide any additional information you feel is important. I havent been writing my blood pressure readings down so i was only able to provide 2 readings   Please attach any relevant images or files    Are you able to take any other vitals? No         Encounter Diagnosis   Name Primary?    Primary hypertension Yes        No orders of the defined types were placed in this encounter.     Medications Ordered This Encounter   Medications    amLODIPine (NORVASC) 5 MG tablet     Sig: Take 1 tablet (5 mg total) by mouth once daily.     Dispense:  30 tablet     Refill:  0     .        Follow up in about 2 weeks (around 12/18/2024).      E-Visit Time Tracking:    Day 1 Time (in minutes): 5    Total Time (in minutes): 5

## 2024-12-06 ENCOUNTER — IMMUNIZATION (OUTPATIENT)
Dept: FAMILY MEDICINE | Facility: CLINIC | Age: 54
End: 2024-12-06
Payer: MEDICAID

## 2024-12-06 DIAGNOSIS — Z23 NEED FOR VACCINATION: Primary | ICD-10-CM

## 2024-12-06 RX ORDER — FERROUS SULFATE 325(65) MG
325 TABLET ORAL
Qty: 90 TABLET | Refills: 3 | Status: SHIPPED | OUTPATIENT
Start: 2024-12-06

## 2024-12-06 NOTE — TELEPHONE ENCOUNTER
I have signed for the following orders AND/OR meds.  Please call the patient and ask the patient to schedule the testing AND/OR inform about any medications that were sent.      Orders Placed This Encounter   Procedures    MRI Knee Without Contrast Left     Standing Status:   Future     Standing Expiration Date:   12/6/2025     Order Specific Question:   Does the patient have or ever had a pacemaker or a defibrillator (Note: Some facilities may not be able to schedule an MRI for patients with pacemakers and defibrillators. You should contact your local radiology dept to determine if this is the case.)?     Answer:   No     Order Specific Question:   Does the patient have an aneurysm or surgical clip, pump, nerve/brain stimulator, middle/inner ear prosthesis, or other metal implant or foreign object (bullet, shrapnel)? If they have a card related to their implant, ask them to bring it. Issues related to the implant may cause the MRI to be delayed.     Answer:   No     Order Specific Question:   Will the patient require po anxiolysis or sedation?     Answer:   No     Order Specific Question:   May the Radiologist modify the order per protocol to meet the clinical needs of the patient?     Answer:   Yes     Order Specific Question:   Does the patient have on a skin patch for medication with aluminized backing?     Answer:   No

## 2024-12-12 ENCOUNTER — CLINICAL SUPPORT (OUTPATIENT)
Dept: FAMILY MEDICINE | Facility: CLINIC | Age: 54
End: 2024-12-12
Payer: MEDICAID

## 2024-12-12 DIAGNOSIS — Z23 NEED FOR TETANUS, DIPHTHERIA, AND ACELLULAR PERTUSSIS (TDAP) VACCINE: ICD-10-CM

## 2024-12-12 DIAGNOSIS — Z23 NEED FOR SHINGLES VACCINE: Primary | ICD-10-CM

## 2024-12-12 PROCEDURE — 99999 PR PBB SHADOW E&M-EST. PATIENT-LVL II: CPT | Mod: PBBFAC,,,

## 2024-12-12 PROCEDURE — 99999PBSHW PR PBB SHADOW TECHNICAL ONLY FILED TO HB: Mod: PBBFAC,,,

## 2024-12-12 PROCEDURE — 90471 IMMUNIZATION ADMIN: CPT | Mod: PBBFAC,PO

## 2024-12-12 PROCEDURE — 90715 TDAP VACCINE 7 YRS/> IM: CPT | Mod: PBBFAC,PO

## 2024-12-12 PROCEDURE — 90472 IMMUNIZATION ADMIN EACH ADD: CPT | Mod: PBBFAC,PO

## 2024-12-12 PROCEDURE — 90750 HZV VACC RECOMBINANT IM: CPT | Mod: PBBFAC,PO

## 2024-12-12 PROCEDURE — 99212 OFFICE O/P EST SF 10 MIN: CPT | Mod: PBBFAC,PO,25

## 2024-12-12 RX ADMIN — ZOSTER VACCINE RECOMBINANT, ADJUVANTED 0.5 ML: KIT at 04:12

## 2024-12-12 RX ADMIN — TETANUS TOXOID, REDUCED DIPHTHERIA TOXOID AND ACELLULAR PERTUSSIS VACCINE, ADSORBED 0.5 ML: 5; 2.5; 8; 8; 2.5 SUSPENSION INTRAMUSCULAR at 04:12

## 2025-01-07 DIAGNOSIS — I10 PRIMARY HYPERTENSION: ICD-10-CM

## 2025-01-07 NOTE — TELEPHONE ENCOUNTER
Refill Routing Note   Medication(s) are not appropriate for processing by [facility] for the following reason(s):        Required vitals abnormal    [facility] action(s):  Defer        Medication Therapy Plan: 11/20/24 (!) 146/97      Appointments  past 12m or future 3m with PCP    Date Provider   Last Visit   11/20/2024 Kb Mcgarry MD   Next Visit   Visit date not found Kb Mcgarry MD   ED visits in past 90 days: 0        Note composed:4:17 PM 01/07/2025

## 2025-01-07 NOTE — TELEPHONE ENCOUNTER
No care due was identified.  [facility] Embedded Care Due Messages. Reference number: [id number].   1/07/2025 11:08:26 AM CST

## 2025-01-08 ENCOUNTER — PATIENT MESSAGE (OUTPATIENT)
Dept: FAMILY MEDICINE | Facility: CLINIC | Age: 55
End: 2025-01-08

## 2025-01-08 ENCOUNTER — E-VISIT (OUTPATIENT)
Dept: FAMILY MEDICINE | Facility: CLINIC | Age: 55
End: 2025-01-08
Payer: MEDICAID

## 2025-01-08 VITALS
BODY MASS INDEX: 26.66 KG/M2 | WEIGHT: 160 LBS | SYSTOLIC BLOOD PRESSURE: 130 MMHG | DIASTOLIC BLOOD PRESSURE: 86 MMHG | HEIGHT: 65 IN | HEART RATE: 75 BPM

## 2025-01-08 DIAGNOSIS — I10 PRIMARY HYPERTENSION: ICD-10-CM

## 2025-01-08 RX ORDER — AMLODIPINE BESYLATE 5 MG/1
5 TABLET ORAL
Qty: 30 TABLET | Refills: 0 | Status: SHIPPED | OUTPATIENT
Start: 2025-01-08 | End: 2025-01-08 | Stop reason: SDUPTHER

## 2025-01-08 RX ORDER — AMLODIPINE BESYLATE 5 MG/1
5 TABLET ORAL DAILY
Qty: 90 TABLET | Refills: 3 | Status: SHIPPED | OUTPATIENT
Start: 2025-01-08

## 2025-01-08 RX ORDER — LOSARTAN POTASSIUM 100 MG/1
100 TABLET ORAL DAILY
Qty: 90 TABLET | Refills: 3 | Status: SHIPPED | OUTPATIENT
Start: 2025-01-08 | End: 2026-01-08

## 2025-01-08 NOTE — Clinical Note
Please arrange for this patient to be seen for ankle pain and swelling by someone today.  Cecilia if possible.

## 2025-01-08 NOTE — PROGRESS NOTES
Patient ID: Luz Pelaez is a 54 y.o. female.    Chief Complaint: General Illness (Entered automatically based on patient selection in [employer].)    The patient initiated a request through [employer] on 1/8/2025 for evaluation and management with a chief complaint of General Illness (Entered automatically based on patient selection in [employer].)     I evaluated the questionnaire submission on 01/08/2025.    Ohs Peq Evisit [facility]-Medication    1/8/2025  4:14 PM CST - Filed by Patient   What do you need help with? Medication Management   Do you agree to participate in an E-Visit? Yes   If you have any of the following symptoms, please present to your local emergency room or call 911:  I acknowledge   Medication requests for narcotics will not be addressed via an E-Visit.  Please schedule an appointment. I acknowledge   Do you have any of the following pregnancy-related conditions? None   Do you want to address a new or existing medication? I would like to address a medication I currently take   What is the main issue you would like addressed today? Refill blood press medication   Would you like to change or continue your medication? Continue medication   What medication would you like to continue?  Amlodipine 5mg   Are you taking it as prescribed? Yes    What medical condition is the  medication intended to treat? High Blood Pressure   Is the medication helping your condition? Yes   Are you having any side effects from the medication? No   Provide any additional information you feel is important.    Please attach any relevant images or files    Are you able to take your vital signs? Yes   Systolic Blood Pressure: 130   Diastolic Blood Pressure: 86   Weight: 160   Height: 65   Pulse: 75   Temperature:    Respiration rate:    Pulse Oxygen:          Encounter Diagnosis   Name Primary?    Primary hypertension         No orders of the defined types were placed in this encounter.     Medications Ordered This Encounter    Medications    amLODIPine (NORVASC) 5 MG tablet     Sig: Take 1 tablet (5 mg total) by mouth once daily.     Dispense:  90 tablet     Refill:  3    losartan (COZAAR) 100 MG tablet     Sig: Take 1 tablet (100 mg total) by mouth once daily.     Dispense:  90 tablet     Refill:  3     .        Follow up if symptoms worsen or fail to improve.      E-Visit Time Tracking:    Day 1 Time (in minutes): 5    Total Time (in minutes): 5

## 2025-01-09 ENCOUNTER — HOSPITAL ENCOUNTER (OUTPATIENT)
Dept: RADIOLOGY | Facility: HOSPITAL | Age: 55
Discharge: HOME | End: 2025-01-09
Attending: NURSE PRACTITIONER
Payer: MEDICAID

## 2025-01-09 ENCOUNTER — OFFICE VISIT (OUTPATIENT)
Dept: FAMILY MEDICINE | Facility: CLINIC | Age: 55
End: 2025-01-09
Payer: MEDICAID

## 2025-01-09 ENCOUNTER — PATIENT MESSAGE (OUTPATIENT)
Dept: FAMILY MEDICINE | Facility: CLINIC | Age: 55
End: 2025-01-09

## 2025-01-09 ENCOUNTER — TELEPHONE (OUTPATIENT)
Dept: FAMILY MEDICINE | Facility: CLINIC | Age: 55
End: 2025-01-09
Payer: MEDICAID

## 2025-01-09 VITALS
HEART RATE: 98 BPM | OXYGEN SATURATION: 98 % | WEIGHT: 157.69 LBS | HEIGHT: 65 IN | SYSTOLIC BLOOD PRESSURE: 130 MMHG | DIASTOLIC BLOOD PRESSURE: 86 MMHG | TEMPERATURE: 97 F | BODY MASS INDEX: 26.27 KG/M2

## 2025-01-09 DIAGNOSIS — M25.572 ACUTE LEFT ANKLE PAIN: Primary | ICD-10-CM

## 2025-01-09 DIAGNOSIS — M25.572 ACUTE LEFT ANKLE PAIN: ICD-10-CM

## 2025-01-09 DIAGNOSIS — S99.912D INJURY OF LEFT ANKLE, SUBSEQUENT ENCOUNTER: ICD-10-CM

## 2025-01-09 DIAGNOSIS — S99.911D INJURY OF RIGHT ANKLE, SUBSEQUENT ENCOUNTER: ICD-10-CM

## 2025-01-09 PROCEDURE — 99213 OFFICE O/P EST LOW 20 MIN: CPT | Mod: S$PBB,,, | Performed by: NURSE PRACTITIONER

## 2025-01-09 PROCEDURE — 73610 X-RAY EXAM OF ANKLE: CPT | Mod: 26,LT,, | Performed by: RADIOLOGY

## 2025-01-09 PROCEDURE — 4010F ACE/ARB THERAPY RXD/TAKEN: CPT | Mod: CPTII,,, | Performed by: NURSE PRACTITIONER

## 2025-01-09 PROCEDURE — 99999 PR PBB SHADOW E&M-EST. PATIENT-LVL V: CPT | Mod: PBBFAC,,, | Performed by: NURSE PRACTITIONER

## 2025-01-09 PROCEDURE — 3075F SYST BP GE 130 - 139MM HG: CPT | Mod: CPTII,,, | Performed by: NURSE PRACTITIONER

## 2025-01-09 PROCEDURE — 73610 X-RAY EXAM OF ANKLE: CPT | Mod: TC,PO,LT

## 2025-01-09 PROCEDURE — 3008F BODY MASS INDEX DOCD: CPT | Mod: CPTII,,, | Performed by: NURSE PRACTITIONER

## 2025-01-09 PROCEDURE — 3079F DIAST BP 80-89 MM HG: CPT | Mod: CPTII,,, | Performed by: NURSE PRACTITIONER

## 2025-01-09 PROCEDURE — 1159F MED LIST DOCD IN RCRD: CPT | Mod: CPTII,,, | Performed by: NURSE PRACTITIONER

## 2025-01-09 PROCEDURE — 1160F RVW MEDS BY RX/DR IN RCRD: CPT | Mod: CPTII,,, | Performed by: NURSE PRACTITIONER

## 2025-01-09 PROCEDURE — 99215 OFFICE O/P EST HI 40 MIN: CPT | Mod: PBBFAC,25,PO | Performed by: NURSE PRACTITIONER

## 2025-01-09 RX ORDER — TRAMADOL HYDROCHLORIDE 50 MG/1
50 TABLET ORAL EVERY 8 HOURS PRN
Qty: 15 TABLET | Refills: 0 | Status: SHIPPED | OUTPATIENT
Start: 2025-01-09 | End: 2025-01-14

## 2025-01-09 RX ORDER — ONDANSETRON 4 MG/1
4 TABLET, FILM COATED ORAL EVERY 8 HOURS PRN
Qty: 15 TABLET | Refills: 0 | Status: SHIPPED | OUTPATIENT
Start: 2025-01-09 | End: 2025-01-14

## 2025-01-09 RX ORDER — LEG BRACE
1 EACH MISCELLANEOUS DAILY
Qty: 1 EACH | Refills: 0 | Status: CANCELLED | OUTPATIENT
Start: 2025-01-09

## 2025-01-09 NOTE — PROGRESS NOTES
Subjective     Patient ID: Luz Pelaez is a 54 y.o. female.    Chief Complaint: Ankle Injury    Ankle Injury   The incident occurred 12 to 24 hours ago. The incident occurred at home. The injury mechanism was a twisting injury. The pain is present in the left ankle. The quality of the pain is described as aching. The pain is moderate. The pain has been Fluctuating since onset. Associated symptoms include a loss of motion. Pertinent negatives include no inability to bear weight, loss of sensation, muscle weakness, numbness or tingling. She reports no foreign bodies present. The symptoms are aggravated by movement, palpation and weight bearing. She has tried ice and NSAIDs for the symptoms. The treatment provided no relief.     Past Medical History:   Diagnosis Date    Allergy     Asthma     Attention deficit     Carpal tunnel syndrome     Cervical spine degeneration     GERD (gastroesophageal reflux disease)     Hiatal hernia 11/1/2019    Kidney stones      Social History     Socioeconomic History    Marital status:     Number of children: 2   Occupational History    Occupation:      Employer: [employer]   Tobacco Use    Smoking status: Every Day     Current packs/day: 0.50     Average packs/day: 0.5 packs/day for 37.0 years (18.5 ttl pk-yrs)     Types: Cigarettes     Start date: 1988    Smokeless tobacco: Never   Substance and Sexual Activity    Alcohol use: Yes     Comment: Socially    Drug use: No    Sexual activity: Yes     Partners: Male   Social History Narrative    Live w/ spouse no pets      Social Drivers of Health     Financial Resource Strain: Medium Risk (1/31/2024)    Overall Financial Resource Strain (CARDIA)     Difficulty of Paying Living Expenses: Somewhat hard   Food Insecurity: No Food Insecurity (1/31/2024)    Hunger Vital Sign     Worried About Running Out of Food in the Last Year: Never true     Ran Out of Food in the Last Year: Never true    Transportation Needs: No Transportation Needs (1/31/2024)    PRAPARE - Transportation     Lack of Transportation (Medical): No     Lack of Transportation (Non-Medical): No   Physical Activity: Inactive (1/31/2024)    Exercise Vital Sign     Days of Exercise per Week: 0 days     Minutes of Exercise per Session: 0 min   Stress: Stress Concern Present (1/31/2024)    [employer] [place] [employer] - Occupational Stress Questionnaire     Feeling of Stress : To some extent   Housing Stability: High Risk (1/31/2024)    Housing Stability Vital Sign     Unable to Pay for Housing in the Last Year: Yes     Number of Places Lived in the Last Year: 1     Unstable Housing in the Last Year: No     Past Surgical History:   Procedure Laterality Date    CARPAL TUNNEL RELEASE Left 10/7/2019    Procedure: RELEASE, CARPAL TUNNEL;  Surgeon: Delfino Cain MD;  Location: [facility];  Service: Orthopedics;  Laterality: Left;    CARPAL TUNNEL RELEASE Right 11/4/2019    Procedure: RELEASE, CARPAL TUNNEL;  Surgeon: Delfino Cain MD;  Location: [facility] OR;  Service: Orthopedics;  Laterality: Right;    CHOLECYSTECTOMY      COLONOSCOPY  4/1/2012    date is approximate-done by Dr. Naranjo due to chronic constipation    COLONOSCOPY N/A 6/18/2020    Procedure: COLONOSCOPY;  Surgeon: Kb Mcgarry MD;  Location: [facility];  Service: Endoscopy;  Laterality: N/A;    ENDOBRONCHIAL ULTRASOUND Left 6/28/2023    Procedure: ENDOBRONCHIAL ULTRASOUND (EBUS);  Surgeon: Antelmo Mcknight MD;  Location: [facility];  Service: Pulmonary;  Laterality: Left;    ESOPHAGOGASTRODUODENOSCOPY N/A 6/18/2020    Procedure: ESOPHAGOGASTRODUODENOSCOPY (EGD);  Surgeon: Kb Mcgarry MD;  Location: [facility];  Service: Endoscopy;  Laterality: N/A;    NAVIGATIONAL BRONCHOSCOPY Bilateral 6/28/2023    Procedure: BRONCHOSCOPY, NAVIGATIONAL;  Surgeon: Antelmo Mcknight MD;  Location: [facility];  Service: Pulmonary;  Laterality: Bilateral;    SLEEVE  GASTROPLASTY      TOTAL REDUCTION MAMMOPLASTY         Review of Systems   Constitutional: Negative.    HENT: Negative.     Eyes: Negative.    Respiratory: Negative.     Cardiovascular: Negative.    Gastrointestinal: Negative.    Endocrine: Negative.    Genitourinary: Negative.    Musculoskeletal:  Positive for arthralgias (left ankle).   Integumentary:  Negative.   Allergic/Immunologic: Negative.    Neurological: Negative.  Negative for tingling and numbness.   Psychiatric/Behavioral: Negative.            Objective     Physical Exam  Vitals and nursing note reviewed.   Constitutional:       Appearance: Normal appearance.   HENT:      Head: Normocephalic.      Right Ear: Tympanic membrane, ear canal and external ear normal.      Left Ear: Tympanic membrane, ear canal and external ear normal.      Nose: Nose normal.      Mouth/Throat:      Mouth: Mucous membranes are moist.      Pharynx: Oropharynx is clear.   Eyes:      Conjunctiva/sclera: Conjunctivae normal.      Pupils: Pupils are equal, round, and reactive to light.   Cardiovascular:      Rate and Rhythm: Normal rate and regular rhythm.      Pulses: Normal pulses.      Heart sounds: Normal heart sounds.   Pulmonary:      Effort: Pulmonary effort is normal.      Breath sounds: Normal breath sounds.   Abdominal:      General: Bowel sounds are normal.      Palpations: Abdomen is soft.   Musculoskeletal:      Cervical back: Normal range of motion and neck supple.      Left ankle: Swelling (mild) present. No deformity, ecchymosis or lacerations. Tenderness present over the lateral malleolus. No medial malleolus tenderness. Decreased range of motion. Anterior drawer test negative. Normal pulse.   Skin:     General: Skin is warm and dry.      Capillary Refill: Capillary refill takes 2 to 3 seconds.   Neurological:      Mental Status: She is alert and oriented to person, place, and time.   Psychiatric:         Mood and Affect: Mood normal.         Behavior: Behavior  normal.         Thought Content: Thought content normal.         Judgment: Judgment normal.            Assessment and Plan     1. Acute left ankle pain  2. Injury of left ankle, subsequent encounter  -     X-Ray Ankle Complete Left; Future; Expected date: 01/09/2025  -     Ambulatory referral/consult to Orthopedics; Future; Expected date: 01/16/2025  -     miscellaneous medical supply Kit; 1 each by Misc.(Non-Drug; Combo Route) route once daily. 2 crutches for home use  Dispense: 2 kit; Refill: 0  Ultram request sent to MD to approve  Avoid weight bearing  Ice to affected area 2-3 x/d; 20 min on and 10 min off  F/U with orthopedics  Report to ER immediately if symptoms worsen or persist             No follow-ups on file.

## 2025-01-09 NOTE — PATIENT INSTRUCTIONS
Ultram request sent to MD to approve  Avoid weight bearing  Ice to affected area 2-3 x/d; 20 min on and 10 min off  F/U with orthopedics  Report to ER immediately if symptoms worsen or persist    Oscar Escobar,     If you are due for any health screening(s) below please notify me so we can arrange them to be ordered and scheduled. Most healthy patients at your age complete them, but you are free to accept or refuse.     If you can't do it, I'll definitely understand. If you can, I'd certainly appreciate it!    All of your core healthy metrics are met.      Were here to help you quit smoking     Our records indicated that you are still smoking. One of the best things you can do for your health is to stop smoking and we are here to help.     Talk with your provider about our Smoking Cessation Program and how we can support you on your journey.

## 2025-01-09 NOTE — TELEPHONE ENCOUNTER
----- Message from Kb Mcgarry MD sent at 1/9/2025  7:14 AM CST -----  Please arrange for this patient to be seen for ankle pain and swelling by someone today.  Cecilia if possible.

## 2025-01-09 NOTE — TELEPHONE ENCOUNTER
Pt requests for ankle pain from injury; states NSAIDs not helping. [facility] pt

## 2025-01-13 DIAGNOSIS — M25.572 LEFT ANKLE PAIN, UNSPECIFIED CHRONICITY: Primary | ICD-10-CM

## 2025-01-14 ENCOUNTER — HOSPITAL ENCOUNTER (OUTPATIENT)
Dept: RADIOLOGY | Facility: HOSPITAL | Age: 55
Discharge: HOME | End: 2025-01-14
Attending: ORTHOPAEDIC SURGERY
Payer: MEDICAID

## 2025-01-14 ENCOUNTER — OFFICE VISIT (OUTPATIENT)
Dept: ORTHOPEDICS | Facility: CLINIC | Age: 55
End: 2025-01-14
Payer: MEDICAID

## 2025-01-14 DIAGNOSIS — M25.572 LEFT ANKLE PAIN, UNSPECIFIED CHRONICITY: ICD-10-CM

## 2025-01-14 DIAGNOSIS — M84.364A STRESS FRACTURE OF LEFT FIBULA, INITIAL ENCOUNTER: Primary | ICD-10-CM

## 2025-01-14 DIAGNOSIS — M25.572 ACUTE LEFT ANKLE PAIN: ICD-10-CM

## 2025-01-14 DIAGNOSIS — M25.572 ACUTE LEFT ANKLE PAIN: Primary | ICD-10-CM

## 2025-01-14 DIAGNOSIS — S82.65XA NONDISPLACED FRACTURE OF LATERAL MALLEOLUS OF LEFT FIBULA, INITIAL ENCOUNTER FOR CLOSED FRACTURE: ICD-10-CM

## 2025-01-14 DIAGNOSIS — S99.912D INJURY OF LEFT ANKLE, SUBSEQUENT ENCOUNTER: ICD-10-CM

## 2025-01-14 PROCEDURE — 73630 X-RAY EXAM OF FOOT: CPT | Mod: TC,PO,LT

## 2025-01-14 PROCEDURE — 99999 PR PBB SHADOW E&M-EST. PATIENT-LVL I: CPT | Mod: PBBFAC,,, | Performed by: ORTHOPAEDIC SURGERY

## 2025-01-14 PROCEDURE — 73610 X-RAY EXAM OF ANKLE: CPT | Mod: TC,PO,LT

## 2025-01-14 PROCEDURE — 73630 X-RAY EXAM OF FOOT: CPT | Mod: 26,LT,, | Performed by: RADIOLOGY

## 2025-01-14 PROCEDURE — 99203 OFFICE O/P NEW LOW 30 MIN: CPT | Mod: S$PBB,,, | Performed by: ORTHOPAEDIC SURGERY

## 2025-01-14 PROCEDURE — 1159F MED LIST DOCD IN RCRD: CPT | Mod: CPTII,,, | Performed by: ORTHOPAEDIC SURGERY

## 2025-01-14 PROCEDURE — 4010F ACE/ARB THERAPY RXD/TAKEN: CPT | Mod: CPTII,,, | Performed by: ORTHOPAEDIC SURGERY

## 2025-01-14 PROCEDURE — 99211 OFF/OP EST MAY X REQ PHY/QHP: CPT | Mod: PBBFAC,25,PO | Performed by: ORTHOPAEDIC SURGERY

## 2025-01-14 PROCEDURE — 1160F RVW MEDS BY RX/DR IN RCRD: CPT | Mod: CPTII,,, | Performed by: ORTHOPAEDIC SURGERY

## 2025-01-14 PROCEDURE — 73610 X-RAY EXAM OF ANKLE: CPT | Mod: 26,LT,, | Performed by: RADIOLOGY

## 2025-01-14 NOTE — PROGRESS NOTES
Status/Diagnosis: Left distal fibula shaft stress fracture.  Date of Surgery: none  Date of Injury: none; DOO 01/08/2025  Return visit: 5 weeks  X-rays on Return: WB 3-views Left ankle    Chief Complaint: Left ankle/lower leg pain    Present History:  Patient presents today via referral from Yulissa Casey   Luz presents with left ankle pain and swelling that started about two weeks ago. One week ago, while mopping, her foot slipped and she heard a pop sound, which exacerbated the condition. She reports that the ankle was bothering her even before the slip occurred.    Pain progressively worsened since the day before the visit, despite not engaging in any strenuous activity. She reports tenderness when pressure is applied to certain areas of the foot. She has been trying to keep the foot elevated for relief.    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 She has a recent history of a torn meniscus about a month and a half ago, which was initially thought to be a fracture but turned out to be a severe bruise.    PMH significant for asthma and chronic tobacco use (1ppd x 30+ years). Works cleaning [employer].      Past Medical History:   Diagnosis Date    Allergy     Asthma     Attention deficit     Carpal tunnel syndrome     Cervical spine degeneration     GERD (gastroesophageal reflux disease)     Hiatal hernia 11/1/2019    Kidney stones        Past Surgical History:   Procedure Laterality Date    CARPAL TUNNEL RELEASE Left 10/7/2019    Procedure: RELEASE, CARPAL TUNNEL;  Surgeon: Delfino Cain MD;  Location: [facility] OR;  Service: Orthopedics;  Laterality: Left;    CARPAL TUNNEL RELEASE Right 11/4/2019    Procedure: RELEASE, CARPAL TUNNEL;  Surgeon: Delfino Cain MD;  Location: [facility] OR;  Service: Orthopedics;  Laterality: Right;    CHOLECYSTECTOMY      COLONOSCOPY  4/1/2012    date is approximate-done by Dr. Naranjo due to chronic constipation    COLONOSCOPY N/A  6/18/2020    Procedure: COLONOSCOPY;  Surgeon: Kb Mcgarry MD;  Location: [facility] ENDO;  Service: Endoscopy;  Laterality: N/A;    ENDOBRONCHIAL ULTRASOUND Left 6/28/2023    Procedure: ENDOBRONCHIAL ULTRASOUND (EBUS);  Surgeon: Antelmo Mcknight MD;  Location: [facility] ENDO;  Service: Pulmonary;  Laterality: Left;    ESOPHAGOGASTRODUODENOSCOPY N/A 6/18/2020    Procedure: ESOPHAGOGASTRODUODENOSCOPY (EGD);  Surgeon: Kb Mcgarry MD;  Location: [facility] ENDO;  Service: Endoscopy;  Laterality: N/A;    NAVIGATIONAL BRONCHOSCOPY Bilateral 6/28/2023    Procedure: BRONCHOSCOPY, NAVIGATIONAL;  Surgeon: Antelmo Mcknight MD;  Location: [facility] ENDO;  Service: Pulmonary;  Laterality: Bilateral;    SLEEVE GASTROPLASTY      TOTAL REDUCTION MAMMOPLASTY         Current Outpatient Medications   Medication Sig    albuterol (PROVENTIL/VENTOLIN HFA) 90 mcg/actuation inhaler INHALE 1 TO 2 PUFFS BY MOUTH EVERY SIX HOURS AS NEEDED FOR WHEEZING    amLODIPine (NORVASC) 5 MG tablet Take 1 tablet (5 mg total) by mouth once daily.    dextroamphetamine-amphetamine (ADDERALL) 30 mg Tab Take 1 tablet (30 mg total) by mouth 2 (two) times a day.    dextroamphetamine-amphetamine 30 mg Tab Take 1 tablet (30 mg total) by mouth 2 (two) times a day.    [START ON 1/20/2025] dextroamphetamine-amphetamine 30 mg Tab Take 1 tablet (30 mg total) by mouth 2 (two) times a day.    ergocalciferol (ERGOCALCIFEROL) 50,000 unit Cap Vitamin D2 Take No date recorded No form recorded No frequency recorded No route recorded No set duration recorded No set duration amount recorded active No dosage strength recorded No dosage strength units of measure recorded    ferrous sulfate (IRON, FERROUS SULFATE,) 325 mg (65 mg iron) Tab tablet Take 1 tablet (325 mg total) by mouth daily with breakfast.    fluticasone-umeclidin-vilanter (TRELEGY ELLIPTA) 100-62.5-25 mcg DsDv Inhale 1 puff into the lungs once daily.    linaCLOtide (LINZESS) 290 mcg Cap capsule Take 1 capsule (290 mcg  total) by mouth before breakfast.    losartan (COZAAR) 100 MG tablet Take 1 tablet (100 mg total) by mouth once daily.    meloxicam (MOBIC) 15 MG tablet Take 1 tablet (15 mg total) by mouth once daily.    miscellaneous medical supply Kit 1 each by Misc.(Non-Drug; Combo Route) route once daily. 2 crutches for home use    omeprazole (PRILOSEC) 40 MG capsule Take 1 capsule (40 mg total) by mouth once daily.    ondansetron (ZOFRAN) 4 MG tablet Take 1 tablet (4 mg total) by mouth every 8 (eight) hours as needed for Nausea.    traMADoL (ULTRAM) 50 mg tablet Take 1 tablet (50 mg total) by mouth every 8 (eight) hours as needed for Pain.     Current Facility-Administered Medications   Medication    influenza (Afluria) 45 mcg/0.5 mL IM vaccine (> or = 36 mo) 0.5 mL       Review of patient's allergies indicates:   Allergen Reactions    Bupropion hcl Other (See Comments)     Mood swings  Mood swings    Lamisil [terbinafine] Rash       Family History   Problem Relation Name Age of Onset    Hypertension Mother      Diabetes Mother      Hyperlipidemia Mother      Heart disease Mother  58    Colon cancer Maternal Grandmother      Breast cancer Maternal Grandmother      Breast cancer Paternal Grandmother          Breast    Heart attack Father  70    Thyroid disease Sister      Hypertension Sister      Breast cancer Paternal Aunt      Stroke Neg Hx         Social History     Socioeconomic History    Marital status:     Number of children: 2   Occupational History    Occupation:      Employer: [employer]   Tobacco Use    Smoking status: Every Day     Current packs/day: 0.50     Average packs/day: 0.5 packs/day for 37.0 years (18.5 ttl pk-yrs)     Types: Cigarettes     Start date: 1988    Smokeless tobacco: Never   Substance and Sexual Activity    Alcohol use: Yes     Comment: Socially    Drug use: No    Sexual activity: Yes     Partners: Male   Social History Narrative    Live w/ spouse no  pets      Social Drivers of Health     Financial Resource Strain: Medium Risk (1/31/2024)    Overall Financial Resource Strain (CARDIA)     Difficulty of Paying Living Expenses: Somewhat hard   Food Insecurity: No Food Insecurity (1/31/2024)    Hunger Vital Sign     Worried About Running Out of Food in the Last Year: Never true     Ran Out of Food in the Last Year: Never true   Transportation Needs: No Transportation Needs (1/31/2024)    PRAPARE - Transportation     Lack of Transportation (Medical): No     Lack of Transportation (Non-Medical): No   Physical Activity: Inactive (1/31/2024)    Exercise Vital Sign     Days of Exercise per Week: 0 days     Minutes of Exercise per Session: 0 min   Stress: Stress Concern Present (1/31/2024)    [employer] [place] [employer] - Occupational Stress Questionnaire     Feeling of Stress : To some extent   Housing Stability: High Risk (1/31/2024)    Housing Stability Vital Sign     Unable to Pay for Housing in the Last Year: Yes     Number of Places Lived in the Last Year: 1     Unstable Housing in the Last Year: No       Physical exam:  There were no vitals filed for this visit.  There is no height or weight on file to calculate BMI.  General: In no apparent distress; well developed and well nourished.  HEENT: normocephalic; atraumatic.  Cardiovascular: regular rate.  Respiratory: no increased work of breathing.  Musculoskeletal:   Gait: mild antalgic  Inspection:   Moderate residual swelling with pain and tenderness localized to the distal fibular shaft with deep palpation.  No medial based ankle swelling or tenderness noted.  No ATFL tenderness noted.  No laxity with anterior drawer or varus/valgus talar tilt testing.  Silfverskiold: Negative  Alignment:  Knee: neutral               Ankle: neutral              Hindfoot: neutral              Forefoot: neutral   Strength:              Dorsiflexion 5/5  Plantar flexion 5/5  Inversion 5/5  Eversion 5/5  Sensation:               SILT distally  ROM:              Ankle: near full with pain at extremes              Subtalar: near full with pain at extremes  Pulses: Palpable pedal pulse                   Imaging Studies/Outside documentation:  I have ordered/reviewed/interpreted the following images/outside documentation:  1. Weight-bearing 3-views of Left foot and ankle:   On my independent review, acute nondisplaced transverse fracture of the distal fibular shaft above the level of the syndesmosis.  Ankle mortise remains congruent.  Calcaneal pitch and talonavicular uncoverage within normal limits.  Moderate enthesophyte at the Achilles insertion with adjacent Ramón deformity.        Assessment:  Luz Pelaez is a 54 y.o. female with Left distal fibula shaft stress fracture.     Plan:   Clinical and radiographic findings were discussed.  No known history of trauma but with nondisplaced distal fibular shaft fracture above the level of the syndesmosis.  Recommend conservative management to include patient be fit for a tall cam boot today.    Weightbear as tolerated right lower extremity.  Discussed rest, ice, compression, elevation, over-the-counter oral Tylenol as needed for pain   Reiterated the importance of smoking cessation as it pertains to bone healing.    Follow up in 5 weeks for repeat evaluation and repeat x-ray.    Possible transition out of the boot at that time.    Consider bone density scan if not up-to-date given no history of injury.    Patient voiced understanding all questions were answered.      We performed a custom orthotic/brace fitting, adjusting and training with the patient. The patient demonstrated understanding and proper care. This was performed for 15 minutes.    This note was created using voice recognition software and may contain grammatical errors.

## 2025-02-09 ENCOUNTER — PATIENT MESSAGE (OUTPATIENT)
Dept: ORTHOPEDICS | Facility: CLINIC | Age: 55
End: 2025-02-09
Payer: MEDICAID

## 2025-02-10 ENCOUNTER — PATIENT MESSAGE (OUTPATIENT)
Dept: FAMILY MEDICINE | Facility: CLINIC | Age: 55
End: 2025-02-10
Payer: MEDICAID

## 2025-02-13 DIAGNOSIS — M25.572 ACUTE LEFT ANKLE PAIN: Primary | ICD-10-CM

## 2025-02-13 DIAGNOSIS — K59.09 CHRONIC CONSTIPATION: ICD-10-CM

## 2025-02-13 NOTE — TELEPHONE ENCOUNTER
No care due was identified.  [facility] Embedded Care Due Messages. Reference number: [id number].   2/13/2025 11:14:04 AM CST

## 2025-02-13 NOTE — TELEPHONE ENCOUNTER
Refill Routing Note   Medication(s) are not appropriate for processing by [facility] for the following reason(s):        Outside of protocol    [facility] action(s):  Route               Appointments  past 12m or future 3m with PCP    Date Provider   Last Visit   1/8/2025 Kb Mcgarry MD   Next Visit   Visit date not found Kb Mcgarry MD   ED visits in past 90 days: 0        Note composed:12:43 PM 02/13/2025

## 2025-02-17 ENCOUNTER — TELEPHONE (OUTPATIENT)
Dept: PULMONOLOGY | Facility: CLINIC | Age: 55
End: 2025-02-17
Payer: MEDICAID

## 2025-02-17 NOTE — TELEPHONE ENCOUNTER
Called pt to inform them that they needed to come in for a visit to receive a refill on their prescription, but there was no answer. A voicemail message was left.

## 2025-02-18 ENCOUNTER — TELEPHONE (OUTPATIENT)
Dept: FAMILY MEDICINE | Facility: CLINIC | Age: 55
End: 2025-02-18
Payer: MEDICAID

## 2025-02-18 DIAGNOSIS — Z23 IMMUNIZATION DUE: Primary | ICD-10-CM

## 2025-02-20 ENCOUNTER — HOSPITAL ENCOUNTER (OUTPATIENT)
Dept: RADIOLOGY | Facility: HOSPITAL | Age: 55
Discharge: HOME | End: 2025-02-20
Attending: ORTHOPAEDIC SURGERY
Payer: MEDICAID

## 2025-02-20 ENCOUNTER — TELEPHONE (OUTPATIENT)
Dept: PULMONOLOGY | Facility: CLINIC | Age: 55
End: 2025-02-20
Payer: MEDICAID

## 2025-02-20 ENCOUNTER — CLINICAL SUPPORT (OUTPATIENT)
Dept: FAMILY MEDICINE | Facility: CLINIC | Age: 55
End: 2025-02-20
Payer: MEDICAID

## 2025-02-20 ENCOUNTER — OFFICE VISIT (OUTPATIENT)
Dept: ORTHOPEDICS | Facility: CLINIC | Age: 55
End: 2025-02-20
Payer: MEDICAID

## 2025-02-20 DIAGNOSIS — M25.572 ACUTE LEFT ANKLE PAIN: ICD-10-CM

## 2025-02-20 DIAGNOSIS — M84.364A STRESS FRACTURE OF LEFT FIBULA, INITIAL ENCOUNTER: Primary | ICD-10-CM

## 2025-02-20 DIAGNOSIS — Z23 IMMUNIZATION DUE: Primary | ICD-10-CM

## 2025-02-20 PROCEDURE — 99213 OFFICE O/P EST LOW 20 MIN: CPT | Mod: PBBFAC,25,PO | Performed by: ORTHOPAEDIC SURGERY

## 2025-02-20 PROCEDURE — 90471 IMMUNIZATION ADMIN: CPT | Mod: PBBFAC,PO

## 2025-02-20 PROCEDURE — 90750 HZV VACC RECOMBINANT IM: CPT | Mod: PBBFAC,PO

## 2025-02-20 PROCEDURE — 73610 X-RAY EXAM OF ANKLE: CPT | Mod: TC,PO,LT

## 2025-02-20 RX ADMIN — Medication 0.5 ML: at 04:02

## 2025-02-20 NOTE — PROGRESS NOTES
Status/Diagnosis: Left distal fibula shaft stress fracture.  Date of Surgery: none  Date of Injury: none; DOO 01/08/2025  Return visit: 5 weeks  X-rays on Return: WB 3-views Left ankle    Chief Complaint: Left ankle/lower leg pain    Present History:  Patient presents today via referral from No ref. provider found   Luz presents with left ankle pain and swelling that started about two weeks ago. One week ago, while mopping, her foot slipped and she heard a pop sound, which exacerbated the condition. She reports that the ankle was bothering her even before the slip occurred.    Pain progressively worsened since the day before the visit, despite not engaging in any strenuous activity. She reports tenderness when pressure is applied to certain areas of the foot. She has been trying to keep the foot elevated for relief.    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 She has a recent history of a torn meniscus about a month and a half ago, which was initially thought to be a fracture but turned out to be a severe bruise.    PMH significant for asthma and chronic tobacco use (1ppd x 30+ years). Works cleaning [employer].    02/20/2025:  Patient returns today for repeat clinical evaluation and x-ray.  Overall doing well.  1/10 pain.  Reports wearing her boot for the most part.  She has been bearing full weight out of the boot while at home at night.  She continues to work cleaning houses.  No new injuries.      Past Medical History:   Diagnosis Date    Allergy     Asthma     Attention deficit     Carpal tunnel syndrome     Cervical spine degeneration     GERD (gastroesophageal reflux disease)     Hiatal hernia 11/1/2019    Kidney stones        Past Surgical History:   Procedure Laterality Date    CARPAL TUNNEL RELEASE Left 10/7/2019    Procedure: RELEASE, CARPAL TUNNEL;  Surgeon: Delfino Cain MD;  Location: [facility];  Service: Orthopedics;  Laterality: Left;    CARPAL TUNNEL RELEASE  Right 11/4/2019    Procedure: RELEASE, CARPAL TUNNEL;  Surgeon: Delfino Cain MD;  Location: [facility] OR;  Service: Orthopedics;  Laterality: Right;    CHOLECYSTECTOMY      COLONOSCOPY  4/1/2012    date is approximate-done by Dr. Naranjo due to chronic constipation    COLONOSCOPY N/A 6/18/2020    Procedure: COLONOSCOPY;  Surgeon: Kb Mcgarry MD;  Location: [facility];  Service: Endoscopy;  Laterality: N/A;    ENDOBRONCHIAL ULTRASOUND Left 6/28/2023    Procedure: ENDOBRONCHIAL ULTRASOUND (EBUS);  Surgeon: Antelmo Mcknight MD;  Location: [facility];  Service: Pulmonary;  Laterality: Left;    ESOPHAGOGASTRODUODENOSCOPY N/A 6/18/2020    Procedure: ESOPHAGOGASTRODUODENOSCOPY (EGD);  Surgeon: Kb Mcgarry MD;  Location: [facility];  Service: Endoscopy;  Laterality: N/A;    NAVIGATIONAL BRONCHOSCOPY Bilateral 6/28/2023    Procedure: BRONCHOSCOPY, NAVIGATIONAL;  Surgeon: Antelmo Mcknight MD;  Location: [facility];  Service: Pulmonary;  Laterality: Bilateral;    SLEEVE GASTROPLASTY      TOTAL REDUCTION MAMMOPLASTY         Current Outpatient Medications   Medication Sig    albuterol (PROVENTIL/VENTOLIN HFA) 90 mcg/actuation inhaler INHALE 1 TO 2 PUFFS BY MOUTH EVERY SIX HOURS AS NEEDED FOR WHEEZING    amLODIPine (NORVASC) 5 MG tablet Take 1 tablet (5 mg total) by mouth once daily.    dextroamphetamine-amphetamine (ADDERALL) 30 mg Tab Take 1 tablet (30 mg total) by mouth 2 (two) times a day.    ergocalciferol (ERGOCALCIFEROL) 50,000 unit Cap Vitamin D2 Take No date recorded No form recorded No frequency recorded No route recorded No set duration recorded No set duration amount recorded active No dosage strength recorded No dosage strength units of measure recorded    ferrous sulfate (IRON, FERROUS SULFATE,) 325 mg (65 mg iron) Tab tablet Take 1 tablet (325 mg total) by mouth daily with breakfast.    fluticasone-umeclidin-vilanter (TRELEGY ELLIPTA) 100-62.5-25 mcg DsDv Inhale 1 puff into the lungs once daily.     linaCLOtide (LINZESS) 290 mcg Cap capsule Take 1 capsule (290 mcg total) by mouth before breakfast.    losartan (COZAAR) 100 MG tablet Take 1 tablet (100 mg total) by mouth once daily.    meloxicam (MOBIC) 15 MG tablet Take 1 tablet (15 mg total) by mouth once daily.    miscellaneous medical supply Kit 1 each by Misc.(Non-Drug; Combo Route) route once daily. 2 crutches for home use    omeprazole (PRILOSEC) 40 MG capsule Take 1 capsule (40 mg total) by mouth once daily.     Current Facility-Administered Medications   Medication    influenza (Afluria) 45 mcg/0.5 mL IM vaccine (> or = 36 mo) 0.5 mL    varicella-zoster gE vac,2 of 2 SusR 0.5 mL       Review of patient's allergies indicates:   Allergen Reactions    Bupropion hcl Other (See Comments)     Mood swings  Mood swings    Lamisil [terbinafine] Rash       Family History   Problem Relation Name Age of Onset    Hypertension Mother      Diabetes Mother      Hyperlipidemia Mother      Heart disease Mother  58    Colon cancer Maternal Grandmother      Breast cancer Maternal Grandmother      Breast cancer Paternal Grandmother          Breast    Heart attack Father  70    Thyroid disease Sister      Hypertension Sister      Breast cancer Paternal Aunt      Stroke Neg Hx         Social History     Socioeconomic History    Marital status:     Number of children: 2   Occupational History    Occupation:      Employer: [employer]   Tobacco Use    Smoking status: Every Day     Current packs/day: 0.50     Average packs/day: 0.5 packs/day for 37.1 years (18.6 ttl pk-yrs)     Types: Cigarettes     Start date: 1988    Smokeless tobacco: Never   Substance and Sexual Activity    Alcohol use: Yes     Comment: Socially    Drug use: No    Sexual activity: Yes     Partners: Male   Social History Narrative    Live w/ spouse no pets      Social Drivers of Health     Financial Resource Strain: Medium Risk (2/13/2025)    Overall Financial  Resource Strain (CARDIA)     Difficulty of Paying Living Expenses: Somewhat hard   Food Insecurity: Food Insecurity Present (2/13/2025)    Hunger Vital Sign     Worried About Running Out of Food in the Last Year: Sometimes true     Ran Out of Food in the Last Year: Sometimes true   Transportation Needs: No Transportation Needs (2/13/2025)    PRAPARE - Transportation     Lack of Transportation (Medical): No     Lack of Transportation (Non-Medical): No   Physical Activity: Inactive (2/13/2025)    Exercise Vital Sign     Days of Exercise per Week: 0 days     Minutes of Exercise per Session: 0 min   Stress: Stress Concern Present (2/13/2025)    [employer] [place] [employer] - Occupational Stress Questionnaire     Feeling of Stress : Rather much   Housing Stability: High Risk (2/13/2025)    Housing Stability Vital Sign     Unable to Pay for Housing in the Last Year: Yes     Number of Times Moved in the Last Year: 1     Homeless in the Last Year: No       Physical exam:  There were no vitals filed for this visit.  There is no height or weight on file to calculate BMI.  General: In no apparent distress; well developed and well nourished.  HEENT: normocephalic; atraumatic.  Cardiovascular: regular rate.  Respiratory: no increased work of breathing.  Musculoskeletal:   Gait:  Nonantalgic  Inspection:   Palpable callus on repeat examination today.  Minimal residual swelling.  No tenderness on deep palpation at the fracture site.  No medial based ankle swelling or tenderness noted.  No ATFL tenderness noted.  No laxity with anterior drawer or varus/valgus talar tilt testing.  Silfverskiold: Negative  Alignment:  Knee: neutral               Ankle: neutral              Hindfoot: neutral              Forefoot: neutral   Strength:              Dorsiflexion 5/5  Plantar flexion 5/5  Inversion 5/5  Eversion 5/5  Sensation:              SILT distally  ROM:              Ankle and subtalar: Near full without pain.  Pulses:  Palpable pedal pulse                   Imaging Studies/Outside documentation:  I have ordered/reviewed/interpreted the following images/outside documentation:  1. Weight-bearing 3-views of Left ankle:   On my independent review, previously noted nondisplaced transverse fracture involving the distal fibular shaft above the level of the syndesmosis.  Significant callus formation versus previous clinic films on 01/14/2025.  Ankle mortise remains congruent.  Moderate enthesophyte at the Achilles insertion with adjacent Ramón deformity.        Assessment:  Luz Pelaez is a 54 y.o. female with Left distal fibula shaft stress fracture.     Plan:   Clinical and radiographic findings were discussed.  No known history of trauma but with nondisplaced distal fibular shaft fracture above the level of the syndesmosis.  Significant interval callus formation is present on repeat x-ray today.    Recommend continued conservative management.  Patient okay to bear full weight in a good supportive tennis shoe while at home.  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    Still no high impact activities.    Reiterated the importance of smoking cessation as it pertains to bone healing.    Follow up in 5 weeks for repeat evaluation and repeat x-ray.    Plans to transition out of the boot at that time.    Consider bone density scan if not up-to-date given no history of injury.    Patient voiced understanding all questions were answered.        This note was created using voice recognition software and may contain grammatical errors.

## 2025-02-20 NOTE — TELEPHONE ENCOUNTER
----- Message from Suzi sent at 2/20/2025  2:01 PM CST -----  Contact: Luz Escobar is calling  to speak to the nurse regarding scheduling a appointment, please give her a call at  ThanksLJ

## 2025-02-24 ENCOUNTER — OFFICE VISIT (OUTPATIENT)
Dept: FAMILY MEDICINE | Facility: CLINIC | Age: 55
End: 2025-02-24
Payer: MEDICAID

## 2025-02-24 ENCOUNTER — OFFICE VISIT (OUTPATIENT)
Dept: PULMONOLOGY | Facility: CLINIC | Age: 55
End: 2025-02-24
Payer: MEDICAID

## 2025-02-24 ENCOUNTER — HOSPITAL ENCOUNTER (OUTPATIENT)
Dept: RADIOLOGY | Facility: HOSPITAL | Age: 55
Discharge: HOME | End: 2025-02-24
Attending: INTERNAL MEDICINE
Payer: MEDICAID

## 2025-02-24 ENCOUNTER — PATIENT MESSAGE (OUTPATIENT)
Dept: FAMILY MEDICINE | Facility: CLINIC | Age: 55
End: 2025-02-24

## 2025-02-24 VITALS
WEIGHT: 158.19 LBS | BODY MASS INDEX: 26.35 KG/M2 | RESPIRATION RATE: 20 BRPM | HEART RATE: 81 BPM | SYSTOLIC BLOOD PRESSURE: 122 MMHG | OXYGEN SATURATION: 96 % | HEIGHT: 65 IN | DIASTOLIC BLOOD PRESSURE: 84 MMHG

## 2025-02-24 VITALS — DIASTOLIC BLOOD PRESSURE: 92 MMHG | HEART RATE: 83 BPM | SYSTOLIC BLOOD PRESSURE: 136 MMHG

## 2025-02-24 VITALS — DIASTOLIC BLOOD PRESSURE: 84 MMHG | SYSTOLIC BLOOD PRESSURE: 122 MMHG

## 2025-02-24 DIAGNOSIS — R91.1 SOLITARY PULMONARY NODULE: ICD-10-CM

## 2025-02-24 DIAGNOSIS — R41.840 ATTENTION DEFICIT: Primary | ICD-10-CM

## 2025-02-24 DIAGNOSIS — Z87.81 HISTORY OF FRACTURE: ICD-10-CM

## 2025-02-24 DIAGNOSIS — R91.1 PULMONARY NODULE: ICD-10-CM

## 2025-02-24 DIAGNOSIS — I10 PRIMARY HYPERTENSION: ICD-10-CM

## 2025-02-24 DIAGNOSIS — Z78.0 ASYMPTOMATIC AGE-RELATED POSTMENOPAUSAL STATE: ICD-10-CM

## 2025-02-24 DIAGNOSIS — E55.9 VITAMIN D DEFICIENCY: ICD-10-CM

## 2025-02-24 DIAGNOSIS — J44.89 ASTHMA-COPD OVERLAP SYNDROME: Primary | ICD-10-CM

## 2025-02-24 DIAGNOSIS — M25.50 ARTHRALGIA, UNSPECIFIED JOINT: ICD-10-CM

## 2025-02-24 DIAGNOSIS — Z12.31 ENCOUNTER FOR SCREENING MAMMOGRAM FOR BREAST CANCER: ICD-10-CM

## 2025-02-24 DIAGNOSIS — F17.219 CIGARETTE NICOTINE DEPENDENCE WITH NICOTINE-INDUCED DISORDER: ICD-10-CM

## 2025-02-24 PROCEDURE — 99215 OFFICE O/P EST HI 40 MIN: CPT | Mod: PBBFAC | Performed by: INTERNAL MEDICINE

## 2025-02-24 PROCEDURE — 98006 SYNCH AUDIO-VIDEO EST MOD 30: CPT | Mod: 95,,, | Performed by: FAMILY MEDICINE

## 2025-02-24 PROCEDURE — 3008F BODY MASS INDEX DOCD: CPT | Mod: CPTII,,, | Performed by: INTERNAL MEDICINE

## 2025-02-24 PROCEDURE — 1160F RVW MEDS BY RX/DR IN RCRD: CPT | Mod: CPTII,95,, | Performed by: FAMILY MEDICINE

## 2025-02-24 PROCEDURE — 99999 PR PBB SHADOW E&M-EST. PATIENT-LVL V: CPT | Mod: PBBFAC,,, | Performed by: INTERNAL MEDICINE

## 2025-02-24 PROCEDURE — 4010F ACE/ARB THERAPY RXD/TAKEN: CPT | Mod: CPTII,95,, | Performed by: FAMILY MEDICINE

## 2025-02-24 PROCEDURE — 99214 OFFICE O/P EST MOD 30 MIN: CPT | Mod: S$PBB,,, | Performed by: INTERNAL MEDICINE

## 2025-02-24 PROCEDURE — 3079F DIAST BP 80-89 MM HG: CPT | Mod: CPTII,,, | Performed by: INTERNAL MEDICINE

## 2025-02-24 PROCEDURE — 1159F MED LIST DOCD IN RCRD: CPT | Mod: CPTII,,, | Performed by: INTERNAL MEDICINE

## 2025-02-24 PROCEDURE — 71250 CT THORAX DX C-: CPT | Mod: TC,PO

## 2025-02-24 PROCEDURE — 1159F MED LIST DOCD IN RCRD: CPT | Mod: CPTII,95,, | Performed by: FAMILY MEDICINE

## 2025-02-24 PROCEDURE — 3074F SYST BP LT 130 MM HG: CPT | Mod: CPTII,,, | Performed by: INTERNAL MEDICINE

## 2025-02-24 PROCEDURE — 4010F ACE/ARB THERAPY RXD/TAKEN: CPT | Mod: CPTII,,, | Performed by: INTERNAL MEDICINE

## 2025-02-24 PROCEDURE — 71250 CT THORAX DX C-: CPT | Mod: 26,,, | Performed by: RADIOLOGY

## 2025-02-24 RX ORDER — DEXTROAMPHETAMINE SACCHARATE, AMPHETAMINE ASPARTATE, DEXTROAMPHETAMINE SULFATE AND AMPHETAMINE SULFATE 7.5; 7.5; 7.5; 7.5 MG/1; MG/1; MG/1; MG/1
1 TABLET ORAL 2 TIMES DAILY
Qty: 60 TABLET | Refills: 0 | Status: SHIPPED | OUTPATIENT
Start: 2025-04-25 | End: 2025-05-25

## 2025-02-24 RX ORDER — FLUTICASONE FUROATE, UMECLIDINIUM BROMIDE AND VILANTEROL TRIFENATATE 100; 62.5; 25 UG/1; UG/1; UG/1
1 POWDER RESPIRATORY (INHALATION) DAILY
Qty: 60 EACH | Refills: 11 | Status: SHIPPED | OUTPATIENT
Start: 2025-02-24

## 2025-02-24 RX ORDER — DEXTROAMPHETAMINE SACCHARATE, AMPHETAMINE ASPARTATE, DEXTROAMPHETAMINE SULFATE AND AMPHETAMINE SULFATE 7.5; 7.5; 7.5; 7.5 MG/1; MG/1; MG/1; MG/1
1 TABLET ORAL 2 TIMES DAILY
Qty: 60 TABLET | Refills: 0 | Status: SHIPPED | OUTPATIENT
Start: 2025-02-24 | End: 2025-03-26

## 2025-02-24 RX ORDER — CALCIUM CARBONATE 500(1250)
1 TABLET,CHEWABLE ORAL 2 TIMES DAILY
COMMUNITY
Start: 2025-02-24 | End: 2026-02-24

## 2025-02-24 RX ORDER — CHOLECALCIFEROL (VITAMIN D3) 25 MCG
1000 TABLET ORAL DAILY
COMMUNITY
Start: 2025-02-24

## 2025-02-24 RX ORDER — ALBUTEROL SULFATE 90 UG/1
INHALANT RESPIRATORY (INHALATION)
Qty: 8.5 G | Refills: 11 | Status: SHIPPED | OUTPATIENT
Start: 2025-02-24

## 2025-02-24 RX ORDER — DEXTROAMPHETAMINE SACCHARATE, AMPHETAMINE ASPARTATE, DEXTROAMPHETAMINE SULFATE AND AMPHETAMINE SULFATE 7.5; 7.5; 7.5; 7.5 MG/1; MG/1; MG/1; MG/1
1 TABLET ORAL 2 TIMES DAILY
Qty: 60 TABLET | Refills: 0 | Status: SHIPPED | OUTPATIENT
Start: 2025-03-26 | End: 2025-04-25

## 2025-02-24 NOTE — PROGRESS NOTES
Primary Care Telemedicine Note   The patient location is:  Patient Home    The chief complaint leading to consultation is: ADD, arthralgia, recent fracture of fibula  Total time spent with patient: 25 min   Visit type: Virtual visit with synchronous audio and video   Each patient to whom he or she provides medical services by telemedicine is:  (1) informed of the relationship between the physician and patient and the respective role of any other health care provider with respect to management of the patient; and (2) notified that he or she may decline to receive medical services by telemedicine and may withdraw from such care at any time.       CC:As Above   HPI:   History of Present Illness    CHIEF COMPLAINT:  Patient presents today for follow up of left ankle fracture    MUSCULOSKELETAL - LEFT ANKLE FRACTURE:  She has been in a boot for 5 weeks under Dr. Brand's care in [place], with 4-5 weeks remaining while at work. She can remove the boot when at home during periods of minimal activity. The injury occurred when she was hopping and her foot slipped, resulting in pain and a popping sensation.    ARTHRITIS:  She has arthritis affecting hands, particularly in the knuckles and joints, with constant pain in neck, back, hands, and shoulders. She previously took meloxicam but discontinued due to concerns about kidney effects. Family history significant for maternal arthritis.    PULMONARY HISTORY:  History of lung spots requiring biopsy. PET scan showed areas of increased metabolic activity. Follow-up MRI demonstrated complete resolution of the spots.    MEDICATIONS:  Current medications include Adderall 30mg twice daily, Albuterol PRN for asthma/COPD, Linzess, Prilosec, Losartan, Amlodipine for hypertension, and Vitamin D 1000 units daily. She takes Tylenol for pain management, two tablets in morning and evening, occasionally one tablet midday PRN.    PREVENTIVE CARE:  Last mammogram completed February 7, 2024.  Last pap smear in 2020. Vitamin D level in November was 27 ng/mL (below normal range of 30 ng/mL).          Problem List Items Addressed This Visit       Attention deficit - Primary (Chronic)    Overview   Luz Pelaez has ADD. Medication has been used since 2000 and has been stable on the current dose of medicine. She reports being compliant on the medicine and is not receiving narcotics from any other physician. She denies any complications from taking the medicine. The patient's weight and apatite has been stable and has not had difficulties with agitation. She reports improvement in the symptoms with the current dose.      The patient was checked in the [facility] Board of Pharmacy's Prescription Monitoring Program. There appears to be no incongruities with the patient's verbalized history.          Relevant Medications    dextroamphetamine-amphetamine 30 mg Tab    dextroamphetamine-amphetamine 30 mg Tab (Start on 3/26/2025)    dextroamphetamine-amphetamine 30 mg Tab (Start on 4/25/2025)    Vitamin D deficiency    Overview   The patient has Vitamin D deficiency and has been on medication for this.  There have been no side effects from the medicine and levels need to be tracked over time.   Lab Results   Component Value Date    [id number] 27 (L) 11/20/2024    [id number] 89 11/09/2022    [id number] 59 10/19/2021               Other Visit Diagnoses         History of fracture        Relevant Medications    vitamin D (VITAMIN D3) 1000 units Tab    calcium carbonate (CALCIUM 500) 500 mg calcium (1,250 mg) chewable tablet    Other Relevant Orders    DXA Bone Density Axial Skeleton 1 or more sites      Asymptomatic age-related postmenopausal state        Relevant Orders    DXA Bone Density Axial Skeleton 1 or more sites      Arthralgia, unspecified joint          Encounter for screening mammogram for breast cancer        Relevant Orders    Mammo Digital Screening Bilat               The patient's Health  Maintenance was reviewed and the following appears to be due at this time:   Health Maintenance Due   Topic Date Due    COVID-19 Vaccine (3 - 2024-25 season) 09/01/2024    Mammogram  02/07/2025    Cervical Cancer Screening  05/20/2025          Medications Prior to Visit[1]      PMH:  Past Medical History:   Diagnosis Date    Allergy     Asthma     Attention deficit     Carpal tunnel syndrome     Cervical spine degeneration     GERD (gastroesophageal reflux disease)     Hiatal hernia 11/1/2019    Kidney stones      Past Surgical History:   Procedure Laterality Date    CARPAL TUNNEL RELEASE Left 10/7/2019    Procedure: RELEASE, CARPAL TUNNEL;  Surgeon: Delfino Cain MD;  Location: [facility] OR;  Service: Orthopedics;  Laterality: Left;    CARPAL TUNNEL RELEASE Right 11/4/2019    Procedure: RELEASE, CARPAL TUNNEL;  Surgeon: Delfino Cain MD;  Location: [facility] OR;  Service: Orthopedics;  Laterality: Right;    CHOLECYSTECTOMY      COLONOSCOPY  4/1/2012    date is approximate-done by Dr. Naranjo due to chronic constipation    COLONOSCOPY N/A 6/18/2020    Procedure: COLONOSCOPY;  Surgeon: Kb Mcgarry MD;  Location: [facility];  Service: Endoscopy;  Laterality: N/A;    ENDOBRONCHIAL ULTRASOUND Left 6/28/2023    Procedure: ENDOBRONCHIAL ULTRASOUND (EBUS);  Surgeon: Antelmo Mcknight MD;  Location: [facility];  Service: Pulmonary;  Laterality: Left;    ESOPHAGOGASTRODUODENOSCOPY N/A 6/18/2020    Procedure: ESOPHAGOGASTRODUODENOSCOPY (EGD);  Surgeon: Kb Mcgarry MD;  Location: [facility];  Service: Endoscopy;  Laterality: N/A;    NAVIGATIONAL BRONCHOSCOPY Bilateral 6/28/2023    Procedure: BRONCHOSCOPY, NAVIGATIONAL;  Surgeon: Antelmo Mcknight MD;  Location: [facility];  Service: Pulmonary;  Laterality: Bilateral;    SLEEVE GASTROPLASTY      TOTAL REDUCTION MAMMOPLASTY       Review of patient's allergies indicates:   Allergen Reactions    Bupropion hcl Other (See Comments)     Mood swings  Mood swings     Lamisil [terbinafine] Rash     Medications Ordered Prior to Encounter[2]  Social History[3]  Family History   Problem Relation Name Age of Onset    Hypertension Mother      Diabetes Mother      Hyperlipidemia Mother      Heart disease Mother  58    Colon cancer Maternal Grandmother      Breast cancer Maternal Grandmother      Breast cancer Paternal Grandmother          Breast    Heart attack Father  70    Thyroid disease Sister      Hypertension Sister      Breast cancer Paternal Aunt      Stroke Neg Hx         Review of Systems   HENT:  Negative for hearing loss.    Eyes:  Negative for discharge and visual disturbance.   Cardiovascular:  Negative for chest pain and palpitations.   Respiratory:  Positive for wheezing.    Endocrine: Negative for polydipsia and polyuria.   Musculoskeletal:  Positive for joint swelling and neck pain.   Gastrointestinal:  Positive for constipation. Negative for diarrhea and vomiting.   Genitourinary:  Negative for dysuria and hematuria.   Neurological:  Negative for headaches and weakness.      Physical Exam    (Vitals taken at home and reported to us and documented)   Pulse If Self Reported:       No data to display                 Last 5 Patient Entered Readings                Current 30 Day Average:   Recent Readings        SBP (mmHg)        DBP (mmHg)        Pulse                     BP If Self Reported:       No data to display               Pulse Readings from Last 3 Encounters:   01/09/25 98   01/08/25 75   11/20/24 75      Weight If Self Reported:       No data to display               Glucose If Self Reported:       No data to display               Last 5 Blood Glucose Readings           No data to display               There were no vitals taken for this visit. if verbally reported and entered.    Constitutional: The patient is oriented to person, place, and time and appears well-developed and well-nourished with no distress.    Eyes: EOM are normal.    Pulmonary/Chest: Effort  normal. No respiratory distress.    Neurological: The patient is alert and oriented to person, place, and time.    Skin: the patient is not diaphoretic.    Psychiatric: The patient has a normal mood and affect. The behavior is normal. Judgment, thought and content normal.        DIAGNOSIS  Encounter Diagnoses   Name Primary?    Attention deficit Yes    History of fracture     Asymptomatic age-related postmenopausal state     Vitamin D deficiency     Arthralgia, unspecified joint     Encounter for screening mammogram for breast cancer           PLAN:     Assessment & Plan    IMPRESSION:  - Assessed recent left fibula fracture, currently in boot for 5 weeks with 4-5 weeks remaining  - Evaluated calcium and vitamin D intake due to fracture risk, considering patient's menopausal status  - Reviewed vitamin D levels from November, noting slightly low at 27 (normal >30)  - Considered osteoporosis management in light of recent fracture  - Evaluated current medication list  - Assessed joint pain and arthritis symptoms, considering non-prescription alternatives to meloxicam    FRACTURE:  - Educated the patient on the significance of fracture at their age.  - Instructed the patient to continue wearing the boot for another 4-5 weeks while working.  - Advised the patient that the fracture is healing adequately according to the orthopedist.  - Referred the patient to Dr. Brand, an orthopedist in [place], for ongoing care.    OSTEOPOROSIS SCREENING:  - Ordered a bone density scan.  - Scheduled a follow-up to arrange the bone density scan.  - Considered managing the condition if osteoporosis is present.    CALCIUM DEFICIENCY:  - Recommend calcium supplementation of 500mg twice daily.  - Prescribed calcium 500mg twice daily.  - Emphasized the importance of calcium intake for menopausal women, recommending 1500mg daily.    JOINT PAIN:  - Educated the patient on turmeric and fish oil as potential herbal alternatives for joint  pain.  - Recommend OTC Tylenol for joint pain as needed.  - Advised the patient to try turmeric as an herbal anti-inflammatory for joint pain.  - Noted the patient's report of constant pain in multiple joints including hands, neck, back, and shoulders.  - Acknowledged visible changes in hand joints, described by the patient as looking '80 years old'.  - Discussed the patient's previous use of meloxicam for arthritis, which was discontinued due to concerns about kidney effects.    COPD:  - Continued albuterol as needed for COPD management.  - Noted the patient's upcoming appointment with Dr. Mcknight for COPD management.  - Reviewed Dr. Mcknight's findings of spots on the patient's lungs during previous exams.  - Discussed the biopsy and PET scan performed by Dr. Mcknight to evaluate lung spots.  - Noted the referral to a lung specialist in [place] for further evaluation.  - Scheduled the patient to receive Trelegy medication from Dr. Mcknight.    ASTHMA:  - Continued albuterol as needed for asthma management.  - Instructed the patient to refill albuterol prescription.    HYPERTENSION:  - Instructed the patient to send blood pressure and pulse readings.  - Continued Losartan for hypertension management.  - Continued Amlodipine for hypertension management.    VITAMIN D DEFICIENCY:  - Discussed vitamin D's role in calcium absorption with the patient.  - Explained vitamin D level interpretation and when prescription strength is typically prescribed.  - Continued OTC vitamin D 1000 units daily.  - Noted the patient's last vitamin D level was 27 in November, which is slightly low (normal is 30).  - Evaluated that the current vitamin D supplementation is adequate based on recent levels.  - Planned to monitor vitamin D levels over time due to patient's history of fracture.    WOMEN'S HEALTH:  - Referred the patient to nurse practitioner Rodrigo for well-woman care and routine Pap smear.  - Scheduled a follow-up to  arrange a mammogram.       I have discontinued Megan Pelaez's dextroamphetamine-amphetamine, dextroamphetamine-amphetamine, dextroamphetamine-amphetamine, dextroamphetamine-amphetamine, dextroamphetamine-amphetamine, dextroamphetamine-amphetamine, ergocalciferol, dextroamphetamine-amphetamine, dextroamphetamine-amphetamine, dextroamphetamine-amphetamine, dextroamphetamine-amphetamine, dextroamphetamine-amphetamine, dextroamphetamine-amphetamine, and dextroamphetamine-amphetamine. I am also having her start on dextroamphetamine-amphetamine, dextroamphetamine-amphetamine, dextroamphetamine-amphetamine, vitamin D, and calcium carbonate. Additionally, I am having her maintain her albuterol, fluticasone-umeclidin-vilanter, omeprazole, ferrous sulfate, amLODIPine, losartan, miscellaneous medical supply, and linaCLOtide. We will continue to administer influenza.  No problem-specific Assessment & Plan notes found for this encounter.      No follow-ups on file.    Diagnoses and all orders for this visit:    Attention deficit  -     dextroamphetamine-amphetamine 30 mg Tab; Take 1 tablet (30 mg total) by mouth 2 (two) times a day.  -     dextroamphetamine-amphetamine 30 mg Tab; Take 1 tablet (30 mg total) by mouth 2 (two) times a day.  -     dextroamphetamine-amphetamine 30 mg Tab; Take 1 tablet (30 mg total) by mouth 2 (two) times a day.    History of fracture  -     DXA Bone Density Axial Skeleton 1 or more sites; Future  -     vitamin D (VITAMIN D3) 1000 units Tab; Take 1 tablet (1,000 Units total) by mouth once daily.  -     calcium carbonate (CALCIUM 500) 500 mg calcium (1,250 mg) chewable tablet; Take 1 tablet (500 mg total) by mouth 2 (two) times daily.    Asymptomatic age-related postmenopausal state  -     DXA Bone Density Axial Skeleton 1 or more sites; Future    Vitamin D deficiency  Continue OTC dose for now as the level of the vitin d deficiency is not that low.  Recheck in a year.    Arthralgia, unspecified  joint  Suggested tumeric and fish oil.    Encounter for screening mammogram for breast cancer  -     Mammo Digital Screening Bilat; Future      Medications Ordered This Encounter   Medications    calcium carbonate (CALCIUM 500) 500 mg calcium (1,250 mg) chewable tablet     Sig: Take 1 tablet (500 mg total) by mouth 2 (two) times daily.    dextroamphetamine-amphetamine 30 mg Tab     Sig: Take 1 tablet (30 mg total) by mouth 2 (two) times a day.     Dispense:  60 tablet     Refill:  0    dextroamphetamine-amphetamine 30 mg Tab     Sig: Take 1 tablet (30 mg total) by mouth 2 (two) times a day.     Dispense:  60 tablet     Refill:  0    dextroamphetamine-amphetamine 30 mg Tab     Sig: Take 1 tablet (30 mg total) by mouth 2 (two) times a day.     Dispense:  60 tablet     Refill:  0    vitamin D (VITAMIN D3) 1000 units Tab     Sig: Take 1 tablet (1,000 Units total) by mouth once daily.     The patient was instructed to stop the following meds:  Medications Discontinued During This Encounter   Medication Reason    dextroamphetamine-amphetamine (ADDERALL) 30 mg Tab     meloxicam (MOBIC) 15 MG tablet Patient no longer taking    ergocalciferol (ERGOCALCIFEROL) 50,000 unit Cap     dextroamphetamine-amphetamine 30 mg Tab Other - Commment Required    dextroamphetamine-amphetamine 30 mg Tab Other - Commment Required    dextroamphetamine-amphetamine 30 mg Tab Other - Commment Required    dextroamphetamine-amphetamine 30 mg Tab Other - Commment Required    dextroamphetamine-amphetamine 30 mg Tab Other - Commment Required    dextroamphetamine-amphetamine 30 mg Tab Other - Commment Required    dextroamphetamine-amphetamine (ADDERALL) 30 mg Tab Other - Commment Required    dextroamphetamine-amphetamine (ADDERALL) 30 mg Tab Other - Commment Required    dextroamphetamine-amphetamine (ADDERALL) 30 mg Tab Other - Commment Required    dextroamphetamine-amphetamine 30 mg Tab Other - Commment Required    dextroamphetamine-amphetamine 30 mg  Tab Other - Commment Required    dextroamphetamine-amphetamine 30 mg Tab Other - Commment Required     Orders Placed This Encounter   Procedures    DXA Bone Density Axial Skeleton 1 or more sites     Standing Status:   Future     Expected Date:   2/24/2025     Expiration Date:   2/24/2028     Is the patient pregnant?:   No     May the Radiologist modify the order per protocol to meet the clinical needs of the patient?:   Yes     Release to patient:   Immediate    Mammo Digital Screening Bilat     Standing Status:   Future     Expected Date:   2/24/2025     Expiration Date:   2/24/2026     Reason for Exam::   screening     Is the patient pregnant?:   No       Medication List with Changes/Refills   New Medications    CALCIUM CARBONATE (CALCIUM 500) 500 MG CALCIUM (1,250 MG) CHEWABLE TABLET    Take 1 tablet (500 mg total) by mouth 2 (two) times daily.    DEXTROAMPHETAMINE-AMPHETAMINE 30 MG TAB    Take 1 tablet (30 mg total) by mouth 2 (two) times a day.    DEXTROAMPHETAMINE-AMPHETAMINE 30 MG TAB    Take 1 tablet (30 mg total) by mouth 2 (two) times a day.    DEXTROAMPHETAMINE-AMPHETAMINE 30 MG TAB    Take 1 tablet (30 mg total) by mouth 2 (two) times a day.    VITAMIN D (VITAMIN D3) 1000 UNITS TAB    Take 1 tablet (1,000 Units total) by mouth once daily.   Current Medications    ALBUTEROL (PROVENTIL/VENTOLIN HFA) 90 MCG/ACTUATION INHALER    INHALE 1 TO 2 PUFFS BY MOUTH EVERY SIX HOURS AS NEEDED FOR WHEEZING    AMLODIPINE (NORVASC) 5 MG TABLET    Take 1 tablet (5 mg total) by mouth once daily.    FERROUS SULFATE (IRON, FERROUS SULFATE,) 325 MG (65 MG IRON) TAB TABLET    Take 1 tablet (325 mg total) by mouth daily with breakfast.    FLUTICASONE-UMECLIDIN-VILANTER (TRELEGY ELLIPTA) 100-62.5-25 MCG DSDV    Inhale 1 puff into the lungs once daily.    LINACLOTIDE (LINZESS) 290 MCG CAP CAPSULE    Take 1 capsule (290 mcg total) by mouth before breakfast.    LOSARTAN (COZAAR) 100 MG TABLET    Take 1 tablet (100 mg total) by  mouth once daily.    MISCELLANEOUS MEDICAL SUPPLY KIT    1 each by Misc.(Non-Drug; Combo Route) route once daily. 2 crutches for home use    OMEPRAZOLE (PRILOSEC) 40 MG CAPSULE    Take 1 capsule (40 mg total) by mouth once daily.   Discontinued Medications    DEXTROAMPHETAMINE-AMPHETAMINE (ADDERALL) 30 MG TAB    Take 1 tablet (30 mg total) by mouth 2 (two) times a day.    DEXTROAMPHETAMINE-AMPHETAMINE (ADDERALL) 30 MG TAB    Take 1 tablet (30 mg total) by mouth 2 (two) times a day.    DEXTROAMPHETAMINE-AMPHETAMINE (ADDERALL) 30 MG TAB    Take 1 tablet (30 mg total) by mouth 2 (two) times a day.    DEXTROAMPHETAMINE-AMPHETAMINE (ADDERALL) 30 MG TAB    Take 1 tablet (30 mg total) by mouth 2 (two) times a day.    DEXTROAMPHETAMINE-AMPHETAMINE 30 MG TAB    Take 1 tablet (30 mg total) by mouth 2 (two) times daily.    DEXTROAMPHETAMINE-AMPHETAMINE 30 MG TAB    Take 1 tablet (30 mg total) by mouth 2 (two) times daily.    DEXTROAMPHETAMINE-AMPHETAMINE 30 MG TAB    Take 1 tablet (30 mg total) by mouth 2 (two) times daily.    DEXTROAMPHETAMINE-AMPHETAMINE 30 MG TAB    Take 1 tablet (30 mg total) by mouth 2 (two) times a day.    DEXTROAMPHETAMINE-AMPHETAMINE 30 MG TAB    Take 1 tablet (30 mg total) by mouth 2 (two) times a day.    DEXTROAMPHETAMINE-AMPHETAMINE 30 MG TAB    Take 1 tablet (30 mg total) by mouth once daily.    DEXTROAMPHETAMINE-AMPHETAMINE 30 MG TAB    Take 1 tablet (30 mg total) by mouth 2 (two) times a day.    DEXTROAMPHETAMINE-AMPHETAMINE 30 MG TAB    Take 1 tablet (30 mg total) by mouth 2 (two) times a day.    DEXTROAMPHETAMINE-AMPHETAMINE 30 MG TAB    Take 1 tablet (30 mg total) by mouth 2 (two) times a day.    ERGOCALCIFEROL (ERGOCALCIFEROL) 50,000 UNIT CAP    Vitamin D2 Take No date recorded No form recorded No frequency recorded No route recorded No set duration recorded No set duration amount recorded active No dosage strength recorded No dosage strength units of measure recorded    MELOXICAM (MOBIC) 15  MG TABLET    Take 1 tablet (15 mg total) by mouth once daily.      Medication List with Changes/Refills   New Medications    CALCIUM CARBONATE (CALCIUM 500) 500 MG CALCIUM (1,250 MG) CHEWABLE TABLET    Take 1 tablet (500 mg total) by mouth 2 (two) times daily.       Start Date: 2/24/2025 End Date: 2/24/2026    DEXTROAMPHETAMINE-AMPHETAMINE 30 MG TAB    Take 1 tablet (30 mg total) by mouth 2 (two) times a day.       Start Date: 2/24/2025 End Date: 3/26/2025    DEXTROAMPHETAMINE-AMPHETAMINE 30 MG TAB    Take 1 tablet (30 mg total) by mouth 2 (two) times a day.       Start Date: 3/26/2025 End Date: 4/25/2025    DEXTROAMPHETAMINE-AMPHETAMINE 30 MG TAB    Take 1 tablet (30 mg total) by mouth 2 (two) times a day.       Start Date: 4/25/2025 End Date: 5/25/2025    VITAMIN D (VITAMIN D3) 1000 UNITS TAB    Take 1 tablet (1,000 Units total) by mouth once daily.       Start Date: 2/24/2025 End Date: --   Current Medications    ALBUTEROL (PROVENTIL/VENTOLIN HFA) 90 MCG/ACTUATION INHALER    INHALE 1 TO 2 PUFFS BY MOUTH EVERY SIX HOURS AS NEEDED FOR WHEEZING       Start Date: 1/31/2024 End Date: --    AMLODIPINE (NORVASC) 5 MG TABLET    Take 1 tablet (5 mg total) by mouth once daily.       Start Date: 1/8/2025  End Date: --    FERROUS SULFATE (IRON, FERROUS SULFATE,) 325 MG (65 MG IRON) TAB TABLET    Take 1 tablet (325 mg total) by mouth daily with breakfast.       Start Date: 12/6/2024 End Date: --    FLUTICASONE-UMECLIDIN-VILANTER (TRELEGY ELLIPTA) 100-62.5-25 MCG DSDV    Inhale 1 puff into the lungs once daily.       Start Date: 2/16/2024 End Date: --    LINACLOTIDE (LINZESS) 290 MCG CAP CAPSULE    Take 1 capsule (290 mcg total) by mouth before breakfast.       Start Date: 2/13/2025 End Date: --    LOSARTAN (COZAAR) 100 MG TABLET    Take 1 tablet (100 mg total) by mouth once daily.       Start Date: 1/8/2025  End Date: 1/8/2026    MISCELLANEOUS MEDICAL SUPPLY KIT    1 each by Misc.(Non-Drug; Combo Route) route once daily. 2  crutches for home use       Start Date: 1/9/2025  End Date: --    OMEPRAZOLE (PRILOSEC) 40 MG CAPSULE    Take 1 capsule (40 mg total) by mouth once daily.       Start Date: 11/20/2024End Date: 11/20/2025   Discontinued Medications    DEXTROAMPHETAMINE-AMPHETAMINE (ADDERALL) 30 MG TAB    Take 1 tablet (30 mg total) by mouth 2 (two) times a day.       Start Date: 6/9/2022  End Date: 7/9/2022    DEXTROAMPHETAMINE-AMPHETAMINE (ADDERALL) 30 MG TAB    Take 1 tablet (30 mg total) by mouth 2 (two) times a day.       Start Date: 5/10/2022 End Date: 6/9/2022    DEXTROAMPHETAMINE-AMPHETAMINE (ADDERALL) 30 MG TAB    Take 1 tablet (30 mg total) by mouth 2 (two) times a day.       Start Date: 12/10/2022End Date: 1/9/2023    DEXTROAMPHETAMINE-AMPHETAMINE (ADDERALL) 30 MG TAB    Take 1 tablet (30 mg total) by mouth 2 (two) times a day.       Start Date: 10/22/2024End Date: 2/24/2025    DEXTROAMPHETAMINE-AMPHETAMINE 30 MG TAB    Take 1 tablet (30 mg total) by mouth 2 (two) times daily.       Start Date: 5/11/2020 End Date: 6/10/2020    DEXTROAMPHETAMINE-AMPHETAMINE 30 MG TAB    Take 1 tablet (30 mg total) by mouth 2 (two) times daily.       Start Date: 6/10/2020 End Date: 7/10/2020    DEXTROAMPHETAMINE-AMPHETAMINE 30 MG TAB    Take 1 tablet (30 mg total) by mouth 2 (two) times daily.       Start Date: 10/9/2020 End Date: 11/8/2020    DEXTROAMPHETAMINE-AMPHETAMINE 30 MG TAB    Take 1 tablet (30 mg total) by mouth 2 (two) times a day.       Start Date: 11/11/2021End Date: 12/11/2021    DEXTROAMPHETAMINE-AMPHETAMINE 30 MG TAB    Take 1 tablet (30 mg total) by mouth 2 (two) times a day.       Start Date: 12/11/2021End Date: 1/10/2022    DEXTROAMPHETAMINE-AMPHETAMINE 30 MG TAB    Take 1 tablet (30 mg total) by mouth once daily.       Start Date: 1/10/2022 End Date: 2/9/2022    DEXTROAMPHETAMINE-AMPHETAMINE 30 MG TAB    Take 1 tablet (30 mg total) by mouth 2 (two) times a day.       Start Date: 11/21/2024End Date: 12/21/2024     DEXTROAMPHETAMINE-AMPHETAMINE 30 MG TAB    Take 1 tablet (30 mg total) by mouth 2 (two) times a day.       Start Date: 12/21/2024End Date: 1/20/2025    DEXTROAMPHETAMINE-AMPHETAMINE 30 MG TAB    Take 1 tablet (30 mg total) by mouth 2 (two) times a day.       Start Date: 1/20/2025 End Date: 2/19/2025    ERGOCALCIFEROL (ERGOCALCIFEROL) 50,000 UNIT CAP    Vitamin D2 Take No date recorded No form recorded No frequency recorded No route recorded No set duration recorded No set duration amount recorded active No dosage strength recorded No dosage strength units of measure recorded       Start Date: --        End Date: 2/24/2025    MELOXICAM (MOBIC) 15 MG TABLET    Take 1 tablet (15 mg total) by mouth once daily.       Start Date: 11/20/2024End Date: 2/24/2025                     This note was generated with the assistance of ambient listening technology. Verbal consent was obtained by the patient and accompanying visitor(s) for the recording of patient appointment to facilitate this note. I attest to having reviewed and edited the generated note for accuracy, though some syntax or spelling errors may persist. Please contact the author of this note for any clarification.                 Answers submitted by the patient for this visit:  Review of Systems Questionnaire (Submitted on 2/20/2025)  activity change: No  unexpected weight change: No  rhinorrhea: No  trouble swallowing: No  chest tightness: No  blood in stool: No  difficulty urinating: No  menstrual problem: No  arthralgias: Yes  confusion: No  dysphoric mood: No         [1]   Outpatient Medications Prior to Visit   Medication Sig Dispense Refill    albuterol (PROVENTIL/VENTOLIN HFA) 90 mcg/actuation inhaler INHALE 1 TO 2 PUFFS BY MOUTH EVERY SIX HOURS AS NEEDED FOR WHEEZING 8.5 g 11    amLODIPine (NORVASC) 5 MG tablet Take 1 tablet (5 mg total) by mouth once daily. 90 tablet 3    ferrous sulfate (IRON, FERROUS SULFATE,) 325 mg (65 mg iron) Tab tablet Take 1 tablet  (325 mg total) by mouth daily with breakfast. 90 tablet 3    fluticasone-umeclidin-vilanter (TRELEGY ELLIPTA) 100-62.5-25 mcg DsDv Inhale 1 puff into the lungs once daily. 60 each 11    linaCLOtide (LINZESS) 290 mcg Cap capsule Take 1 capsule (290 mcg total) by mouth before breakfast. 30 capsule 5    losartan (COZAAR) 100 MG tablet Take 1 tablet (100 mg total) by mouth once daily. 90 tablet 3    miscellaneous medical supply Kit 1 each by Misc.(Non-Drug; Combo Route) route once daily. 2 crutches for home use 2 kit 0    omeprazole (PRILOSEC) 40 MG capsule Take 1 capsule (40 mg total) by mouth once daily. 90 capsule 3    dextroamphetamine-amphetamine (ADDERALL) 30 mg Tab Take 1 tablet (30 mg total) by mouth 2 (two) times a day. 60 tablet 0    ergocalciferol (ERGOCALCIFEROL) 50,000 unit Cap Vitamin D2 Take No date recorded No form recorded No frequency recorded No route recorded No set duration recorded No set duration amount recorded active No dosage strength recorded No dosage strength units of measure recorded      meloxicam (MOBIC) 15 MG tablet Take 1 tablet (15 mg total) by mouth once daily. 90 tablet 3     Facility-Administered Medications Prior to Visit   Medication Dose Route Frequency Provider Last Rate Last Admin    influenza (Afluria) 45 mcg/0.5 mL IM vaccine (> or = 36 mo) 0.5 mL  0.5 mL Intramuscular 1 time in Clinic/HOD        [2]   Current Outpatient Medications on File Prior to Visit   Medication Sig Dispense Refill    albuterol (PROVENTIL/VENTOLIN HFA) 90 mcg/actuation inhaler INHALE 1 TO 2 PUFFS BY MOUTH EVERY SIX HOURS AS NEEDED FOR WHEEZING 8.5 g 11    amLODIPine (NORVASC) 5 MG tablet Take 1 tablet (5 mg total) by mouth once daily. 90 tablet 3    ferrous sulfate (IRON, FERROUS SULFATE,) 325 mg (65 mg iron) Tab tablet Take 1 tablet (325 mg total) by mouth daily with breakfast. 90 tablet 3    fluticasone-umeclidin-vilanter (TRELEGY ELLIPTA) 100-62.5-25 mcg DsDv Inhale 1 puff into the lungs once daily.  60 each 11    linaCLOtide (LINZESS) 290 mcg Cap capsule Take 1 capsule (290 mcg total) by mouth before breakfast. 30 capsule 5    losartan (COZAAR) 100 MG tablet Take 1 tablet (100 mg total) by mouth once daily. 90 tablet 3    miscellaneous medical supply Kit 1 each by Misc.(Non-Drug; Combo Route) route once daily. 2 crutches for home use 2 kit 0    omeprazole (PRILOSEC) 40 MG capsule Take 1 capsule (40 mg total) by mouth once daily. 90 capsule 3    [DISCONTINUED] dextroamphetamine-amphetamine (ADDERALL) 30 mg Tab Take 1 tablet (30 mg total) by mouth 2 (two) times a day. 60 tablet 0    [DISCONTINUED] ergocalciferol (ERGOCALCIFEROL) 50,000 unit Cap Vitamin D2 Take No date recorded No form recorded No frequency recorded No route recorded No set duration recorded No set duration amount recorded active No dosage strength recorded No dosage strength units of measure recorded      [DISCONTINUED] meloxicam (MOBIC) 15 MG tablet Take 1 tablet (15 mg total) by mouth once daily. 90 tablet 3     Current Facility-Administered Medications on File Prior to Visit   Medication Dose Route Frequency Provider Last Rate Last Admin    influenza (Afluria) 45 mcg/0.5 mL IM vaccine (> or = 36 mo) 0.5 mL  0.5 mL Intramuscular 1 time in Clinic/HOD        [3]   Social History  Socioeconomic History    Marital status:     Number of children: 2   Occupational History    Occupation:      Employer: [employer]   Tobacco Use    Smoking status: Every Day     Current packs/day: 0.50     Average packs/day: 0.5 packs/day for 37.1 years (18.6 ttl pk-yrs)     Types: Cigarettes     Start date: 1988    Smokeless tobacco: Never   Substance and Sexual Activity    Alcohol use: Yes     Comment: Socially    Drug use: No    Sexual activity: Yes     Partners: Male   Social History Narrative    Live w/ spouse no pets      Social Drivers of Health     Financial Resource Strain: Medium Risk (2/13/2025)    Overall  Financial Resource Strain (CARDIA)     Difficulty of Paying Living Expenses: Somewhat hard   Food Insecurity: Food Insecurity Present (2/13/2025)    Hunger Vital Sign     Worried About Running Out of Food in the Last Year: Sometimes true     Ran Out of Food in the Last Year: Sometimes true   Transportation Needs: No Transportation Needs (2/13/2025)    PRAPARE - Transportation     Lack of Transportation (Medical): No     Lack of Transportation (Non-Medical): No   Physical Activity: Inactive (2/13/2025)    Exercise Vital Sign     Days of Exercise per Week: 0 days     Minutes of Exercise per Session: 0 min   Stress: Stress Concern Present (2/13/2025)    [facility] [place] [facility] [facility] - Occupational Stress Questionnaire     Feeling of Stress : Rather much   Housing Stability: High Risk (2/13/2025)    Housing Stability Vital Sign     Unable to Pay for Housing in the Last Year: Yes     Number of Times Moved in the Last Year: 1     Homeless in the Last Year: No

## 2025-02-24 NOTE — ASSESSMENT & PLAN NOTE
Asthma score 15  COPD score 24  REGIME TRELEGY     STABLE    Requested Prescriptions     Signed Prescriptions Disp Refills    albuterol (PROVENTIL/VENTOLIN HFA) 90 mcg/actuation inhaler 8.5 g 11     Sig: INHALE 1 TO 2 PUFFS BY MOUTH EVERY SIX HOURS AS NEEDED FOR WHEEZING    fluticasone-umeclidin-vilanter (TRELEGY ELLIPTA) 100-62.5-25 mcg DsDv 60 each 11     Sig: Inhale 1 puff into the lungs once daily.

## 2025-02-24 NOTE — ASSESSMENT & PLAN NOTE
Dangers of cigarette smoking were reviewed with patient in detail. Patient was Counseled for 3-10 minutes. Nicotine replacement options were discussed. Nicotine replacement was discussed- not prescribed per patient's request    Currently smoking 1 pack for 3 days    Referral to cessation

## 2025-02-24 NOTE — PROGRESS NOTES
Subjective:       Patient ID: Luz Pelaez is a 54 y.o. female.    No flowsheet data found.     Asthma Control Test  In the past 4  weeks, how much of the time did your asthma keep you from getting as much done at work, school or at home?: Some of the time  During the past 4 weeks, how often have you had shortness of breath?: Once a day  During the past 4 weeks, how often did your asthma symptoms (wheezing, couging, shortness of breath, chest tightness or pain) wake you up at night or earlier than usual in the morning?: Once or twice  During the past 4 weeks, how often have you used your rescue inhaler or nebulizer medication (such as albuterol)?: 2 or 3 times a week  How would you rate your asthma control during the past 4 weeks?: Somewhat controlled  If your score is 19 or less, your asthma may not be under control: 15       COPD Questionnaire  How often do you cough?: All of the time  How often do you have phlegm (mucus) in your chest?: Most of the time  How often does your chest feel tight?: Some of the time  When you walk up a hill or one flight of stairs, how often are you breathless?: All of the time  How often are you limited doing any activities at home?: Some of the time  How often are you confident leaving the house despite your lung condition?: All of the time  How often do you sleep soundly?: Some of the time  How often do you have energy?: Some of the time  Total score: 24         Chief Complaint:   Luz Pelaez is 54 y.o.  Asked to see me by Dr Mcgarry,  Abn chest CT during w/u for neck pain  Had PET CT which did not show any FDG relevant uptake in concerning area, was some uptake in neck where she has pain  Stable on TRELEGY  No cough, No wheezing, No SOB  Active smoker: @18 years. Works in sale, cleans homes  On chantix, cut down to 1/2 PPD  Last spirometry: FEV1: 65.3%)  No occupational exposure  No travel  CHEST CT today: Stable, Resolution of GGO, Prob were inflammatory or infectious.  Still  has nodule opacity RUL: decreased in size,  Remains high risk due to smoking  Will repeat imaging 12 months      02/23/2023  Last visit 09/17/2020  Lost to f/u due to covisd  Recent respiratory exacerbation  Cough, chest tightness. Sputum  Needs refills for meds:   Still smoking  F/u CXR ordered  Qualifies for LDCt  Asked for nebulizer meds  Last PFT FEV1 65.3%      05/25/2023  Followup to review tests  Last seen 02/23/2023: was lost to F/u since 09/2020  At that time had chex=st CT x 2 and PET CT for left UL nodule  Last imaging was not PET FDG avid and grew smaller on last imaging 09/2020  Smoker > 30 PPD, current smoker  No cough, No hemoptysis  ACT score 13, COPD score 24  Did not take TRELGY today  FEV1: 1.76L( 63.2%), FEV1/FVC 51  Normal exercise capacity or desaturation  CT: interval worsening: will need sampling  Also descibed discomfort, pain in legs : turn blue  +ve family Hx raynauds  Will get PETCT  Asks for anxiety medication for PETCT  Will need ride to and from procedure  Normal ABG  BP was elevated       06/15/2023  Followup  PET CT shows abn FDG activity  CT biopsy ordered  No avdity in paratracheal or Hilar  Imaging reviewed  No cough, No wheezing, No SOB  Adherent with inhalers  Has cut down on smoking    02/24/2025   Follow-up visit   Overdue appt  Seen DR Patricia Vasquez after CT and had PET  Needs meds refills  No recent exacebations  Smoking 1 pack every 3 days  Recent fracture left fibula: stress;  On vit D and calcium  No cough, no wheezing, No Sob  Gets SOB mopping floors  Pending bone density    The following portions of the patient's history were reviewed and updated as appropriate:   She  has a past medical history of Allergy, Asthma, Attention deficit, Carpal tunnel syndrome, Cervical spine degeneration, GERD (gastroesophageal reflux disease), Hiatal hernia (11/1/2019), and Kidney stones.  She does not have any pertinent problems on file.  She  has a past surgical history that includes  Cholecystectomy; Sleeve Gastroplasty; Colonoscopy (4/1/2012); Total Reduction Mammoplasty; Carpal tunnel release (Left, 10/7/2019); Carpal tunnel release (Right, 11/4/2019); Esophagogastroduodenoscopy (N/A, 6/18/2020); Colonoscopy (N/A, 6/18/2020); Navigational bronchoscopy (Bilateral, 6/28/2023); and Endobronchial ultrasound (Left, 6/28/2023).  Her family history includes Breast cancer in her maternal grandmother, paternal aunt, and paternal grandmother; Colon cancer in her maternal grandmother; Diabetes in her mother; Heart attack (age of onset: 70) in her father; Heart disease (age of onset: 58) in her mother; Hyperlipidemia in her mother; Hypertension in her mother and sister; Thyroid disease in her sister.  She  reports that she has been smoking cigarettes. She started smoking about 37 years ago. She has a 18.6 pack-year smoking history. She has never used smokeless tobacco. She reports current alcohol use. She reports that she does not use drugs.  She has a current medication list which includes the following prescription(s): amlodipine, calcium carbonate, dextroamphetamine-amphetamine, [START ON 3/26/2025] dextroamphetamine-amphetamine, [START ON 4/25/2025] dextroamphetamine-amphetamine, ferrous sulfate, linaclotide, losartan, miscellaneous medical supply, omeprazole, vitamin d, albuterol, and trelegy ellipta, and the following Facility-Administered Medications: influenza.  Current Outpatient Medications on File Prior to Visit   Medication Sig Dispense Refill    amLODIPine (NORVASC) 5 MG tablet Take 1 tablet (5 mg total) by mouth once daily. 90 tablet 3    calcium carbonate (CALCIUM 500) 500 mg calcium (1,250 mg) chewable tablet Take 1 tablet (500 mg total) by mouth 2 (two) times daily.      dextroamphetamine-amphetamine 30 mg Tab Take 1 tablet (30 mg total) by mouth 2 (two) times a day. 60 tablet 0    [START ON 3/26/2025] dextroamphetamine-amphetamine 30 mg Tab Take 1 tablet (30 mg total) by mouth 2 (two)  times a day. 60 tablet 0    [START ON 4/25/2025] dextroamphetamine-amphetamine 30 mg Tab Take 1 tablet (30 mg total) by mouth 2 (two) times a day. 60 tablet 0    ferrous sulfate (IRON, FERROUS SULFATE,) 325 mg (65 mg iron) Tab tablet Take 1 tablet (325 mg total) by mouth daily with breakfast. 90 tablet 3    linaCLOtide (LINZESS) 290 mcg Cap capsule Take 1 capsule (290 mcg total) by mouth before breakfast. 30 capsule 5    losartan (COZAAR) 100 MG tablet Take 1 tablet (100 mg total) by mouth once daily. 90 tablet 3    miscellaneous medical supply Kit 1 each by Misc.(Non-Drug; Combo Route) route once daily. 2 crutches for home use 2 kit 0    omeprazole (PRILOSEC) 40 MG capsule Take 1 capsule (40 mg total) by mouth once daily. 90 capsule 3    vitamin D (VITAMIN D3) 1000 units Tab Take 1 tablet (1,000 Units total) by mouth once daily.      [DISCONTINUED] albuterol (PROVENTIL/VENTOLIN HFA) 90 mcg/actuation inhaler INHALE 1 TO 2 PUFFS BY MOUTH EVERY SIX HOURS AS NEEDED FOR WHEEZING 8.5 g 11    [DISCONTINUED] fluticasone-umeclidin-vilanter (TRELEGY ELLIPTA) 100-62.5-25 mcg DsDv Inhale 1 puff into the lungs once daily. 60 each 11    [DISCONTINUED] dextroamphetamine-amphetamine (ADDERALL) 30 mg Tab Take 1 tablet (30 mg total) by mouth 2 (two) times a day. 60 tablet 0    [DISCONTINUED] ergocalciferol (ERGOCALCIFEROL) 50,000 unit Cap Vitamin D2 Take No date recorded No form recorded No frequency recorded No route recorded No set duration recorded No set duration amount recorded active No dosage strength recorded No dosage strength units of measure recorded      [DISCONTINUED] meloxicam (MOBIC) 15 MG tablet Take 1 tablet (15 mg total) by mouth once daily. 90 tablet 3     Current Facility-Administered Medications on File Prior to Visit   Medication Dose Route Frequency Provider Last Rate Last Admin    influenza (Afluria) 45 mcg/0.5 mL IM vaccine (> or = 36 mo) 0.5 mL  0.5 mL Intramuscular 1 time in Clinic/HOD          She is  "allergic to bupropion hcl and lamisil [terbinafine]..    Review of Systems   Review of Systems   Constitutional: Negative.    HENT: Negative.     Eyes: Negative.    Respiratory:  Negative for cough, hemoptysis, sputum production, shortness of breath and wheezing.    Cardiovascular: Negative.    Gastrointestinal: Negative.    Genitourinary: Negative.    Musculoskeletal:  Negative for neck pain.   Skin: Negative.    All other systems reviewed and are negative.       Objective:        Vitals:    02/24/25 0907   BP: 122/84   Patient Position: Sitting   Pulse: 81   Resp: 20   SpO2: 96%   Weight: 71.7 kg (158 lb 2.9 oz)   Height: 5' 5" (1.651 m)               Physical Exam  Vitals and nursing note reviewed.   Constitutional:       Appearance: She is well-developed. She is not ill-appearing.       HENT:      Head: Normocephalic and atraumatic.      Nose: Nose normal.      Mouth/Throat:      Pharynx: No oropharyngeal exudate.   Eyes:      General: No scleral icterus.     Conjunctiva/sclera: Conjunctivae normal.      Pupils: Pupils are equal, round, and reactive to light.   Neck:      Thyroid: No thyromegaly.      Vascular: No JVD.      Trachea: No tracheal deviation.   Cardiovascular:      Rate and Rhythm: Normal rate and regular rhythm.      Heart sounds: Normal heart sounds, S1 normal and S2 normal. No murmur heard.  Pulmonary:      Effort: Pulmonary effort is normal. No tachypnea, accessory muscle usage or respiratory distress.      Breath sounds: Normal breath sounds. No stridor.   Abdominal:      General: Bowel sounds are normal. There is no distension.      Palpations: Abdomen is soft. There is no hepatomegaly, splenomegaly or mass.      Tenderness: There is no abdominal tenderness. There is no guarding or rebound.   Musculoskeletal:         General: No tenderness. Normal range of motion.      Cervical back: Normal range of motion and neck supple.   Lymphadenopathy:      Upper Body:      Right upper body: No " supraclavicular adenopathy.      Left upper body: No supraclavicular adenopathy.   Skin:     General: Skin is warm and dry.      Findings: No rash.      Nails: There is no clubbing.   Neurological:      Mental Status: She is alert and oriented to person, place, and time.      Coordination: Coordination normal.      Gait: Gait normal.      Deep Tendon Reflexes: Reflexes are normal and symmetric.         Data Review:   CBC:   Lab Results   Component Value Date    WBC 5.50 11/20/2024    RBC 4.08 11/20/2024    HGB 12.0 11/20/2024    HCT 38.8 11/20/2024     11/20/2024     BMP:   Lab Results   Component Value Date    GLU 97 11/20/2024     11/20/2024    K 4.0 11/20/2024     11/20/2024    CO2 26 11/20/2024    BUN 17 11/20/2024    CREATININE 0.8 11/20/2024    CALCIUM 9.0 11/20/2024     Radiology review: CHEST CT         Diagnostics:    X-Ray Ankle Complete Left  Narrative: EXAMINATION:  XR ANKLE COMPLETE 3 VIEW LEFT    CLINICAL HISTORY:  Pain in left ankle and joints of left foot    TECHNIQUE:  AP, lateral and oblique views of the left ankle were performed.    COMPARISON:  01/14/2025    FINDINGS:  There has been some interval healing with development of callus formation at the site of the fracture of the distal fibular shaft but the fracture still visualized.  Position and alignment not significantly changed  Impression: As above    Electronically signed by: Yue Magallanes MD  Date:    02/20/2025  Time:    13:10            1.  Left lower lobe bronchial brushings, for cytology (1 ThinPrep, 6 smears):   Negative for malignant cells   Benign bronchial cells with mild reactive atypia present       2.  Left lower lobe mass, biopsy:   Negative for malignant cells.   Superficial strips of benign bronchial mucosa       3.  Left lower lobe mass, fine needle aspiration (8 smears, 1 cell block):   Negative for malignant cells   Benign bronchial cells with mild reactive atypia present       4.  Station 11 L lymph  node, fine needle aspiration (6 smears, 1 cell block):   Negative for malignant cells.   Blood present.   No definitive lymph node tissue appreciated        Assessment:      Problem List Items Addressed This Visit       Primary hypertension    Asthma-COPD overlap syndrome - Primary    Asthma score 15  COPD score 24  REGIME TRELEGY     STABLE    Requested Prescriptions     Signed Prescriptions Disp Refills    albuterol (PROVENTIL/VENTOLIN HFA) 90 mcg/actuation inhaler 8.5 g 11     Sig: INHALE 1 TO 2 PUFFS BY MOUTH EVERY SIX HOURS AS NEEDED FOR WHEEZING    fluticasone-umeclidin-vilanter (TRELEGY ELLIPTA) 100-62.5-25 mcg DsDv 60 each 11     Sig: Inhale 1 puff into the lungs once daily.             Relevant Medications    albuterol (PROVENTIL/VENTOLIN HFA) 90 mcg/actuation inhaler    fluticasone-umeclidin-vilanter (TRELEGY ELLIPTA) 100-62.5-25 mcg DsDv    Other Relevant Orders    Spirometry without Bronchodilator    Cigarette nicotine dependence with nicotine-induced disorder    Dangers of cigarette smoking were reviewed with patient in detail. Patient was Counseled for 3-10 minutes. Nicotine replacement options were discussed. Nicotine replacement was discussed- not prescribed per patient's request    Currently smoking 1 pack for 3 days    Referral to cessation         Relevant Orders    Ambulatory referral/consult to Smoking Cessation Program    Pulmonary nodule    Needs repeat Chest CT         Relevant Orders    CT Chest Without Contrast     Other Visit Diagnoses         Solitary pulmonary nodule        Relevant Orders    CT Chest Without Contrast          Adhrence to smoking cessation     Continue TRELEGY      Follow up after chest CT scan     Plan:          Problem List Items Addressed This Visit       Primary hypertension    Asthma-COPD overlap syndrome - Primary    Asthma score 15  COPD score 24  REGIME TRELEGY     STABLE    Requested Prescriptions     Signed Prescriptions Disp Refills    albuterol  (PROVENTIL/VENTOLIN HFA) 90 mcg/actuation inhaler 8.5 g 11     Sig: INHALE 1 TO 2 PUFFS BY MOUTH EVERY SIX HOURS AS NEEDED FOR WHEEZING    fluticasone-umeclidin-vilanter (TRELEGY ELLIPTA) 100-62.5-25 mcg DsDv 60 each 11     Sig: Inhale 1 puff into the lungs once daily.             Relevant Medications    albuterol (PROVENTIL/VENTOLIN HFA) 90 mcg/actuation inhaler    fluticasone-umeclidin-vilanter (TRELEGY ELLIPTA) 100-62.5-25 mcg DsDv    Other Relevant Orders    Spirometry without Bronchodilator    Cigarette nicotine dependence with nicotine-induced disorder    Dangers of cigarette smoking were reviewed with patient in detail. Patient was Counseled for 3-10 minutes. Nicotine replacement options were discussed. Nicotine replacement was discussed- not prescribed per patient's request    Currently smoking 1 pack for 3 days    Referral to cessation         Relevant Orders    Ambulatory referral/consult to Smoking Cessation Program    Pulmonary nodule    Needs repeat Chest CT         Relevant Orders    CT Chest Without Contrast     Other Visit Diagnoses         Solitary pulmonary nodule        Relevant Orders    CT Chest Without Contrast            Orders Placed This Encounter   Procedures    CT Chest Without Contrast     Standing Status:   Future     Expected Date:   2/24/2025     Expiration Date:   2/24/2026    Ambulatory referral/consult to Smoking Cessation Program     Standing Status:   Future     Expected Date:   3/3/2025     Expiration Date:   3/24/2026     Referral Priority:   Routine     Referral Type:   Consultation     Referral Reason:   Specialty Services Required     Requested Specialty:   CTTS     Number of Visits Requested:   1    Spirometry without Bronchodilator     Standing Status:   Future     Expiration Date:   2/24/2026     Release to patient:   Immediate       Follow up in about 6 months (around 8/24/2025), or chest ct this week, refill meds, smoking cesation, marci.    This note was prepared using  voice recognition system and is likely to have sound alike errors that may have been overlooked even after proof reading.  Please call me with any questions    Discussed diagnosis, its evaluation, treatment and usual course. All questions answered.    Thank you for the courtesy of participating in the care of this patient    Antelmo Mcknight MD

## 2025-02-25 ENCOUNTER — HOSPITAL ENCOUNTER (OUTPATIENT)
Dept: RADIOLOGY | Facility: HOSPITAL | Age: 55
Discharge: HOME | End: 2025-02-25
Attending: FAMILY MEDICINE
Payer: MEDICAID

## 2025-02-25 ENCOUNTER — RESULTS FOLLOW-UP (OUTPATIENT)
Dept: FAMILY MEDICINE | Facility: CLINIC | Age: 55
End: 2025-02-25
Payer: MEDICAID

## 2025-02-25 DIAGNOSIS — Z78.0 ASYMPTOMATIC AGE-RELATED POSTMENOPAUSAL STATE: ICD-10-CM

## 2025-02-25 DIAGNOSIS — M81.0 OSTEOPOROSIS, UNSPECIFIED OSTEOPOROSIS TYPE, UNSPECIFIED PATHOLOGICAL FRACTURE PRESENCE: Primary | ICD-10-CM

## 2025-02-25 DIAGNOSIS — Z87.81 HISTORY OF FRACTURE: ICD-10-CM

## 2025-02-25 PROCEDURE — 77080 DXA BONE DENSITY AXIAL: CPT | Mod: 26,,, | Performed by: RADIOLOGY

## 2025-02-25 PROCEDURE — 77080 DXA BONE DENSITY AXIAL: CPT | Mod: TC,PO

## 2025-02-25 RX ORDER — PSYLLIUM HUSK 0.4 G
1 CAPSULE ORAL
Qty: 90 TABLET | Refills: 11 | Status: SHIPPED | OUTPATIENT
Start: 2025-02-25 | End: 2026-02-25

## 2025-02-25 RX ORDER — ALENDRONATE SODIUM 70 MG/1
70 TABLET ORAL
Qty: 12 TABLET | Refills: 3 | Status: SHIPPED | OUTPATIENT
Start: 2025-02-25 | End: 2026-02-25

## 2025-02-25 NOTE — PROGRESS NOTES
I have reviewed the labs and am recommending the following:  DEXA-ABNORMAL OSTEOPOROSIS-The bone density test (DEXA scan) shows osteoporosis.  Given your recent fracture, you need to have ongoing treatment for this.  Use oscal 500+d taking 1 tablet by mouth three times a day OTC-we will put it on the medcard if not already taking it.  Use FOSAMAX 70 mg orally once a week with a glass of water and no lying down or eating for 30 minutes after.  We will send an order for #12 and refill x 3.  RECHECK a DEXA scan in 2 years.      RECOMMENDATIONS TO PATIENT:Alcohol moderation = 4 drinks per day for men or = 2 drinks per day for women-Decreased caffeine intake = 2.5 cups of coffee or = 5 cups of tea per day-Multicomponent exercise with strength and balance training-Smoking cessation -Sunlight/ultraviolet exposure 30 minutes per day, 5 days per week-Vitamin D supplementation should be around 800 IU per day.   A great reference for the patient can be found at [email or web address]    Health maintenance items that remain on your list that need to be arranged are listed below.   Please notify me if you are pre  pared to get them completed.    COVID-19 Vaccine(3 - 2024-25 season) due on 09/01/2024  Mammogram due on 02/07/2025  Cervical Cancer Screening due on 05/20/2025    Dr. Kb Mcgarry

## 2025-03-05 ENCOUNTER — RESULTS FOLLOW-UP (OUTPATIENT)
Dept: SLEEP MEDICINE | Facility: CLINIC | Age: 55
End: 2025-03-05
Payer: MEDICAID

## 2025-03-05 DIAGNOSIS — R91.1 PULMONARY NODULE: Primary | ICD-10-CM

## 2025-03-12 ENCOUNTER — CLINICAL SUPPORT (OUTPATIENT)
Dept: SMOKING CESSATION | Facility: CLINIC | Age: 55
End: 2025-03-12
Payer: MEDICAID

## 2025-03-12 ENCOUNTER — HOSPITAL ENCOUNTER (OUTPATIENT)
Dept: RADIOLOGY | Facility: HOSPITAL | Age: 55
Discharge: HOME | End: 2025-03-12
Attending: FAMILY MEDICINE
Payer: MEDICAID

## 2025-03-12 VITALS
DIASTOLIC BLOOD PRESSURE: 85 MMHG | WEIGHT: 158 LBS | OXYGEN SATURATION: 97 % | SYSTOLIC BLOOD PRESSURE: 133 MMHG | HEIGHT: 65 IN | BODY MASS INDEX: 26.33 KG/M2

## 2025-03-12 DIAGNOSIS — F17.200 NICOTINE DEPENDENCE: Primary | ICD-10-CM

## 2025-03-12 DIAGNOSIS — Z12.31 ENCOUNTER FOR SCREENING MAMMOGRAM FOR BREAST CANCER: ICD-10-CM

## 2025-03-12 PROCEDURE — 77067 SCR MAMMO BI INCL CAD: CPT | Mod: 26,,, | Performed by: STUDENT IN AN ORGANIZED HEALTH CARE EDUCATION/TRAINING PROGRAM

## 2025-03-12 PROCEDURE — 77063 BREAST TOMOSYNTHESIS BI: CPT | Mod: TC,PO

## 2025-03-12 PROCEDURE — 99999 PR PBB SHADOW E&M-EST. PATIENT-LVL III: CPT | Mod: PBBFAC,,,

## 2025-03-12 PROCEDURE — 99213 OFFICE O/P EST LOW 20 MIN: CPT | Mod: PBBFAC,PO

## 2025-03-12 PROCEDURE — 77063 BREAST TOMOSYNTHESIS BI: CPT | Mod: 26,,, | Performed by: STUDENT IN AN ORGANIZED HEALTH CARE EDUCATION/TRAINING PROGRAM

## 2025-03-12 RX ORDER — VARENICLINE TARTRATE 0.5 (11)-1
KIT ORAL
Qty: 53 EACH | Refills: 0 | Status: SHIPPED | OUTPATIENT
Start: 2025-03-12

## 2025-03-12 NOTE — PROGRESS NOTES
Addendum created to correct the billing. This is the patients first attempt to quit with our program. She is highly motivated to quit smoking. She has quit for 9 months to a year on Chantix in 2008. Per [employer]oker[facility] CO 17 ppm, last smoked 30 minutes ago.prior to meeting. She is smoking 20 cigarettes per day. She will follow up with Virtual visits. She will begin with Chantix Starter Pack.  FTND of 8 indicates a high level of tobacco/nicotine dependency; CESD of 23 is preceived as a moderate level of mental distress or depression at this time. Will be monitored.

## 2025-03-12 NOTE — Clinical Note
This is the patients first attempt to quit with our program. She is highly motivated to quit smoking. She has quit for 9 months to a year on Chantix in 2008. Per Smoker[facility] CO 17 ppm, last smoked 30 minutes ago.prior to meeting. She is smoking 20 cigarettes per day. She will follow up with Virtual visits. She will begin with Chantix Starter Pack.  FTND of 8 indicates a high level of tobacco/nicotine dependency; CESD of 23 is preceived as a moderate level of mental distress or depression at this time. Will be monitored.

## 2025-03-24 DIAGNOSIS — M84.364A STRESS FRACTURE OF LEFT FIBULA, INITIAL ENCOUNTER: Primary | ICD-10-CM

## 2025-03-28 ENCOUNTER — HOSPITAL ENCOUNTER (OUTPATIENT)
Dept: RADIOLOGY | Facility: HOSPITAL | Age: 55
Discharge: HOME | End: 2025-03-28
Attending: ORTHOPAEDIC SURGERY
Payer: MEDICAID

## 2025-03-28 ENCOUNTER — OFFICE VISIT (OUTPATIENT)
Dept: ORTHOPEDICS | Facility: CLINIC | Age: 55
End: 2025-03-28
Payer: MEDICAID

## 2025-03-28 DIAGNOSIS — M84.364A STRESS FRACTURE OF LEFT FIBULA, INITIAL ENCOUNTER: ICD-10-CM

## 2025-03-28 DIAGNOSIS — M84.364A STRESS FRACTURE OF LEFT FIBULA, INITIAL ENCOUNTER: Primary | ICD-10-CM

## 2025-03-28 PROCEDURE — 99213 OFFICE O/P EST LOW 20 MIN: CPT | Mod: PBBFAC,25,PO | Performed by: ORTHOPAEDIC SURGERY

## 2025-03-28 PROCEDURE — 73610 X-RAY EXAM OF ANKLE: CPT | Mod: 26,LT,, | Performed by: RADIOLOGY

## 2025-03-28 PROCEDURE — 99999 PR PBB SHADOW E&M-EST. PATIENT-LVL III: CPT | Mod: PBBFAC,,, | Performed by: ORTHOPAEDIC SURGERY

## 2025-03-28 PROCEDURE — 73610 X-RAY EXAM OF ANKLE: CPT | Mod: TC,PO,LT

## 2025-03-28 NOTE — PROGRESS NOTES
Status/Diagnosis: Left distal fibula shaft stress fracture.  Date of Surgery: none  Date of Injury: none; DOO 01/08/2025  Return visit: PRN  X-rays on Return: pending patient complaint    Chief Complaint: Left ankle/lower leg pain    Present History:  Patient presents today via referral from No ref. provider found   Luz presents with left ankle pain and swelling that started about two weeks ago. One week ago, while mopping, her foot slipped and she heard a pop sound, which exacerbated the condition. She reports that the ankle was bothering her even before the slip occurred.    Pain progressively worsened since the day before the visit, despite not engaging in any strenuous activity. She reports tenderness when pressure is applied to certain areas of the foot. She has been trying to keep the foot elevated for relief.    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 She has a recent history of a torn meniscus about a month and a half ago, which was initially thought to be a fracture but turned out to be a severe bruise.    PMH significant for asthma and chronic tobacco use (1ppd x 30+ years). Works cleaning houses.    02/20/2025:  Patient returns today for repeat clinical evaluation and x-ray.  Overall doing well.  1/10 pain.  Reports wearing her boot for the most part.  She has been bearing full weight out of the boot while at home at night.  She continues to work cleaning houses.  No new injuries.    03/28/2025:  Patient returns today for repeat clinical evaluation and x-ray.  Overall doing well.  She has been full weight-bearing in her tall cam boot.  She has resumed work cleaning houses.  No new injuries.  She did get her bone density scan has been started on an osteoporosis treatment regimen.      Past Medical History:   Diagnosis Date    Allergy     Asthma     Attention deficit     Carpal tunnel syndrome     Cervical spine degeneration     COPD (chronic obstructive pulmonary  disease)     GERD (gastroesophageal reflux disease)     Hiatal hernia 11/01/2019    Kidney stones     Osteoporosis        Past Surgical History:   Procedure Laterality Date    CARPAL TUNNEL RELEASE Left 10/7/2019    Procedure: RELEASE, CARPAL TUNNEL;  Surgeon: Delfino Cain MD;  Location: [facility] OR;  Service: Orthopedics;  Laterality: Left;    CARPAL TUNNEL RELEASE Right 11/4/2019    Procedure: RELEASE, CARPAL TUNNEL;  Surgeon: Delfino Cain MD;  Location: [facility] OR;  Service: Orthopedics;  Laterality: Right;    CHOLECYSTECTOMY      COLONOSCOPY  4/1/2012    date is approximate-done by Dr. Naranjo due to chronic constipation    COLONOSCOPY N/A 6/18/2020    Procedure: COLONOSCOPY;  Surgeon: Kb Mcgarry MD;  Location: [facility];  Service: Endoscopy;  Laterality: N/A;    ENDOBRONCHIAL ULTRASOUND Left 6/28/2023    Procedure: ENDOBRONCHIAL ULTRASOUND (EBUS);  Surgeon: Antelmo Mcknight MD;  Location: [facility];  Service: Pulmonary;  Laterality: Left;    ESOPHAGOGASTRODUODENOSCOPY N/A 6/18/2020    Procedure: ESOPHAGOGASTRODUODENOSCOPY (EGD);  Surgeon: Kb Mcgarry MD;  Location: [facility];  Service: Endoscopy;  Laterality: N/A;    NAVIGATIONAL BRONCHOSCOPY Bilateral 6/28/2023    Procedure: BRONCHOSCOPY, NAVIGATIONAL;  Surgeon: Antelmo Mcknight MD;  Location: [facility];  Service: Pulmonary;  Laterality: Bilateral;    SLEEVE GASTROPLASTY      TOTAL REDUCTION MAMMOPLASTY         Current Outpatient Medications   Medication Sig    albuterol (PROVENTIL/VENTOLIN HFA) 90 mcg/actuation inhaler INHALE 1 TO 2 PUFFS BY MOUTH EVERY SIX HOURS AS NEEDED FOR WHEEZING    alendronate (FOSAMAX) 70 MG tablet Take 1 tablet (70 mg total) by mouth every 7 days.    amLODIPine (NORVASC) 5 MG tablet Take 1 tablet (5 mg total) by mouth once daily.    calcium carbonate (CALCIUM 500) 500 mg calcium (1,250 mg) chewable tablet Take 1 tablet (500 mg total) by mouth 2 (two) times daily.    calcium-vitamin D3 (OYSTER SHELL  CALCIUM-VIT D3) 500 mg-5 mcg (200 unit) per tablet Take 1 tablet by mouth 3 (three) times daily with meals.    dextroamphetamine-amphetamine 30 mg Tab Take 1 tablet (30 mg total) by mouth 2 (two) times a day.    [START ON 4/25/2025] dextroamphetamine-amphetamine 30 mg Tab Take 1 tablet (30 mg total) by mouth 2 (two) times a day.    ferrous sulfate (IRON, FERROUS SULFATE,) 325 mg (65 mg iron) Tab tablet Take 1 tablet (325 mg total) by mouth daily with breakfast.    fluticasone-umeclidin-vilanter (TRELEGY ELLIPTA) 100-62.5-25 mcg DsDv Inhale 1 puff into the lungs once daily.    linaCLOtide (LINZESS) 290 mcg Cap capsule Take 1 capsule (290 mcg total) by mouth before breakfast.    losartan (COZAAR) 100 MG tablet Take 1 tablet (100 mg total) by mouth once daily.    miscellaneous medical supply Kit 1 each by Misc.(Non-Drug; Combo Route) route once daily. 2 crutches for home use    omeprazole (PRILOSEC) 40 MG capsule Take 1 capsule (40 mg total) by mouth once daily.    varenicline tartrate (CHANTIX STARTING MONTH BOX) 0.5 mg (11)- 1 mg (42) tablet Take one 0.5mg tab by mouth once daily X3 days,then increase to one 0.5mg tab twice daily X4 days,then increase to one 1mg tab twice daily    vitamin D (VITAMIN D3) 1000 units Tab Take 1 tablet (1,000 Units total) by mouth once daily.     Current Facility-Administered Medications   Medication    influenza (Afluria) 45 mcg/0.5 mL IM vaccine (> or = 36 mo) 0.5 mL       Review of patient's allergies indicates:   Allergen Reactions    Bupropion hcl Other (See Comments)     Mood swings  Mood swings    Lamisil [terbinafine] Rash       Family History   Problem Relation Name Age of Onset    Hypertension Mother      Diabetes Mother      Hyperlipidemia Mother      Heart disease Mother  58    Colon cancer Maternal Grandmother      Breast cancer Maternal Grandmother      Breast cancer Paternal Grandmother          Breast    Heart attack Father  70    Thyroid disease Sister      Hypertension  Sister      Breast cancer Paternal Aunt      Stroke Neg Hx         Social History     Socioeconomic History    Marital status:     Number of children: 2   Occupational History    Occupation:      Employer: [employer]   Tobacco Use    Smoking status: Every Day     Current packs/day: 0.50     Average packs/day: 0.5 packs/day for 37.2 years (18.6 ttl pk-yrs)     Types: Cigarettes     Start date: 1988     Passive exposure: Current    Smokeless tobacco: Never    Tobacco comments:     Quit 2008 for 9 months to 1 year.    Substance and Sexual Activity    Alcohol use: Yes     Comment: Socially    Drug use: No    Sexual activity: Yes     Partners: Male   Social History Narrative    Live w/ spouse no pets      Social Drivers of Health     Financial Resource Strain: Medium Risk (2/13/2025)    Overall Financial Resource Strain (CARDIA)     Difficulty of Paying Living Expenses: Somewhat hard   Food Insecurity: Food Insecurity Present (2/13/2025)    Hunger Vital Sign     Worried About Running Out of Food in the Last Year: Sometimes true     Ran Out of Food in the Last Year: Sometimes true   Transportation Needs: No Transportation Needs (2/13/2025)    PRAPARE - Transportation     Lack of Transportation (Medical): No     Lack of Transportation (Non-Medical): No   Physical Activity: Inactive (2/13/2025)    Exercise Vital Sign     Days of Exercise per Week: 0 days     Minutes of Exercise per Session: 0 min   Stress: Stress Concern Present (2/13/2025)    [place] [place] [employer] - Occupational Stress Questionnaire     Feeling of Stress : Rather much   Housing Stability: High Risk (2/13/2025)    Housing Stability Vital Sign     Unable to Pay for Housing in the Last Year: Yes     Number of Times Moved in the Last Year: 1     Homeless in the Last Year: No       Physical exam:  There were no vitals filed for this visit.  There is no height or weight on file to calculate  BMI.  General: In no apparent distress; well developed and well nourished.  HEENT: normocephalic; atraumatic.  Cardiovascular: regular rate.  Respiratory: no increased work of breathing.  Musculoskeletal:   Gait:  Nonantalgic  Inspection:   Still with palpable callus.  No tenderness on deep palpation.  No residual ankle swelling.  No ATFL tenderness noted.  No laxity with anterior drawer or varus/valgus talar tilt testing.  Silfverskiold: Negative  Alignment:  Knee: neutral               Ankle: neutral              Hindfoot: neutral              Forefoot: neutral   Strength:              Dorsiflexion 5/5  Plantar flexion 5/5  Inversion 5/5  Eversion 5/5  Sensation:              SILT distally  ROM:              Ankle and subtalar: Near full without pain.  Pulses: Palpable pedal pulse                   Imaging Studies/Outside documentation:  I have ordered/reviewed/interpreted the following images/outside documentation:  1. Weight-bearing 3-views of Left ankle:   On my independent review, previously noted nondisplaced transverse fracture involving the distal fibular shaft above the level of the syndesmosis.  Again note significant callus formation with well corticated margins.  Ankle mortise remains congruent.  Moderate enthesophyte at the Achilles insertion with adjacent Ramón deformity.        Assessment:  Luz Pelaez is a 54 y.o. female with Left distal fibula shaft stress fracture.     Plan:   Clinical and radiographic findings were discussed.    Fracture appears to be well healed.    Okay to transition out of the boot into a good supportive tennis shoe.    Specifically discussed bone density scan results with the patient with significant osteoporosis, specifically involving the lumbar spine.    Patient is already on an oral treatment regimen.    Patient voiced understanding.  All questions were answered.  She will follow up on an as-needed basis.          This note was created using voice recognition software  and may contain grammatical errors.

## 2025-04-22 ENCOUNTER — TELEPHONE (OUTPATIENT)
Dept: FAMILY MEDICINE | Facility: CLINIC | Age: 55
End: 2025-04-22
Payer: MEDICAID

## 2025-04-22 NOTE — TELEPHONE ENCOUNTER
----- Message from Erica sent at 4/22/2025  4:04 PM CDT -----  Contact: Patient, [phone]  [email or web address] Patient is calling for Medical Advice regarding: Calling to schedule an established Medicaid appointment for Rx refills. Please call her. Thanks.How long has patient had these symptoms: N/APharmacy name and phone#: N/APatient wants a call back or [facility]: N/AComments: N/APlease advise patient replies from provider may take up to 48 hours.

## 2025-04-25 ENCOUNTER — LAB VISIT (OUTPATIENT)
Dept: LAB | Facility: HOSPITAL | Age: 55
End: 2025-04-25
Attending: FAMILY MEDICINE
Payer: MEDICAID

## 2025-04-25 ENCOUNTER — TELEPHONE (OUTPATIENT)
Dept: FAMILY MEDICINE | Facility: CLINIC | Age: 55
End: 2025-04-25

## 2025-04-25 ENCOUNTER — OFFICE VISIT (OUTPATIENT)
Dept: FAMILY MEDICINE | Facility: CLINIC | Age: 55
End: 2025-04-25
Payer: MEDICAID

## 2025-04-25 DIAGNOSIS — M81.0 OSTEOPOROSIS, UNSPECIFIED OSTEOPOROSIS TYPE, UNSPECIFIED PATHOLOGICAL FRACTURE PRESENCE: Primary | ICD-10-CM

## 2025-04-25 DIAGNOSIS — Z12.11 COLON CANCER SCREENING: ICD-10-CM

## 2025-04-25 DIAGNOSIS — M25.50 ARTHRALGIA, UNSPECIFIED JOINT: ICD-10-CM

## 2025-04-25 DIAGNOSIS — Z86.0100 HISTORY OF COLON POLYPS: ICD-10-CM

## 2025-04-25 DIAGNOSIS — Z83.719 FAMILY HISTORY OF COLONIC POLYPS: ICD-10-CM

## 2025-04-25 DIAGNOSIS — R41.840 ATTENTION DEFICIT: ICD-10-CM

## 2025-04-25 LAB
CRP SERPL-MCNC: 1.6 MG/L
ERYTHROCYTE [SEDIMENTATION RATE] IN BLOOD BY PHOTOMETRIC METHOD: 3 MM/HR
RHEUMATOID FACT SERPL-ACNC: <13 IU/ML
URATE SERPL-MCNC: 4.1 MG/DL (ref 2.4–5.7)

## 2025-04-25 PROCEDURE — 86431 RHEUMATOID FACTOR QUANT: CPT

## 2025-04-25 PROCEDURE — 85652 RBC SED RATE AUTOMATED: CPT

## 2025-04-25 PROCEDURE — 36415 COLL VENOUS BLD VENIPUNCTURE: CPT | Mod: PO

## 2025-04-25 PROCEDURE — 86140 C-REACTIVE PROTEIN: CPT

## 2025-04-25 PROCEDURE — 84550 ASSAY OF BLOOD/URIC ACID: CPT

## 2025-04-25 PROCEDURE — 86200 CCP ANTIBODY: CPT

## 2025-04-25 PROCEDURE — 86038 ANTINUCLEAR ANTIBODIES: CPT

## 2025-04-25 RX ORDER — MELOXICAM 15 MG/1
15 TABLET ORAL DAILY PRN
Qty: 30 TABLET | Refills: 0 | Status: SHIPPED | OUTPATIENT
Start: 2025-04-25 | End: 2026-04-25

## 2025-04-25 RX ORDER — DEXTROAMPHETAMINE SACCHARATE, AMPHETAMINE ASPARTATE, DEXTROAMPHETAMINE SULFATE AND AMPHETAMINE SULFATE 7.5; 7.5; 7.5; 7.5 MG/1; MG/1; MG/1; MG/1
1 TABLET ORAL 2 TIMES DAILY
Qty: 60 TABLET | Refills: 0 | Status: SHIPPED | OUTPATIENT
Start: 2025-05-25 | End: 2025-06-24

## 2025-04-25 RX ORDER — DEXTROAMPHETAMINE SACCHARATE, AMPHETAMINE ASPARTATE, DEXTROAMPHETAMINE SULFATE AND AMPHETAMINE SULFATE 7.5; 7.5; 7.5; 7.5 MG/1; MG/1; MG/1; MG/1
1 TABLET ORAL 2 TIMES DAILY
Qty: 60 TABLET | Refills: 0 | Status: SHIPPED | OUTPATIENT
Start: 2025-06-24 | End: 2025-07-24

## 2025-04-25 RX ORDER — ALENDRONATE SODIUM 70 MG/1
70 TABLET ORAL
Qty: 12 TABLET | Refills: 3 | Status: SHIPPED | OUTPATIENT
Start: 2025-04-25 | End: 2026-04-25

## 2025-04-25 RX ORDER — DEXTROAMPHETAMINE SACCHARATE, AMPHETAMINE ASPARTATE, DEXTROAMPHETAMINE SULFATE AND AMPHETAMINE SULFATE 7.5; 7.5; 7.5; 7.5 MG/1; MG/1; MG/1; MG/1
1 TABLET ORAL 2 TIMES DAILY
Qty: 60 TABLET | Refills: 0 | Status: SHIPPED | OUTPATIENT
Start: 2025-07-24 | End: 2025-08-23

## 2025-04-25 NOTE — PROGRESS NOTES
Primary Care Telemedicine Note   The patient location is:  Patient Home    The chief complaint leading to consultation is: ADD and osteoporosis /arthralgia  Total time spent with patient: 25 min   Visit type: Virtual visit with synchronous audio and video   Each patient to whom he or she provides medical services by telemedicine is:  (1) informed of the relationship between the physician and patient and the respective role of any other health care provider with respect to management of the patient; and (2) notified that he or she may decline to receive medical services by telemedicine and may withdraw from such care at any time.       CC:As Above   HPI:   History of Present Illness               Problem List Items Addressed This Visit       Attention deficit (Chronic)    Overview   Luz Pelaez has ADD. Medication has been used since 2000 and has been stable on the current dose of medicine. She reports being compliant on the medicine and is not receiving narcotics from any other physician. She denies any complications from taking the medicine. The patient's weight and apatite has been stable and has not had difficulties with agitation. She reports improvement in the symptoms with the current dose.      The patient was checked in the [facility] Board of Pharmacy's Prescription Monitoring Program. There appears to be no incongruities with the patient's verbalized history.          Relevant Medications    dextroamphetamine-amphetamine 30 mg Tab (Start on 7/24/2025)    dextroamphetamine-amphetamine 30 mg Tab (Start on 6/24/2025)    dextroamphetamine-amphetamine 30 mg Tab (Start on 5/25/2025)    Colon cancer screening    Relevant Orders    Ambulatory referral/consult to Endo Procedure     Family history of colonic polyps    Relevant Orders    Ambulatory referral/consult to Endo Procedure     History of colon polyps    Relevant Orders    Ambulatory referral/consult to Endo Procedure       Other Visit Diagnoses         Osteoporosis, unspecified osteoporosis type, unspecified pathological fracture presence    -  Primary    Relevant Medications    alendronate (FOSAMAX) 70 MG tablet      Arthralgia, unspecified joint        Relevant Medications    meloxicam (MOBIC) 15 MG tablet    Other Relevant Orders    DUTCH Screen w/Reflex    C-Reactive Protein    Cyclic Citrullinated Peptide Antibody, IgG    Rheumatoid Factor    Sedimentation rate    Uric Acid         Joint/Muscle Pain: Patient complains of arthralgias and myalgias for which has been present for several months. Pain is located in multiple joints, is described as aching, and is intermittent .  Associated symptoms include: tenderness.  The patient has tried nothing for pain relief.  Related to injury:   no.  It is worse with working in the yard or other activities.    The patient presents with osteoporosis.  The patient has back pain, bone pain, hip pain and has had prior fractures.  The patient has not been performing weight bearing exercises.  Current treatment has included medications for this condition.  We started this in early 2025.  The patient has had a bone density in the past and it is not due to be performed at this time.    Results for orders placed during the hospital encounter of 02/25/25    DXA Bone Density Axial Skeleton 1 or more sites    Narrative  EXAMINATION:  DXA BONE DENSITY AXIAL SKELETON 1 OR MORE SITES    CLINICAL HISTORY:  Personal history of (healed) traumatic fracture    TECHNIQUE:  DXA scanning was performed over the left hip and lumbar spine.  Review of the images confirms satisfactory positioning and technique.    COMPARISON:  None    FINDINGS:  The L1 to L4 vertebral bone mineral density is equal to 0.595 g/cm squared with a T score of -4.    The left femoral neck bone mineral density is equal to 0.60 g/cm squared with a T score of -2.2.    Impression  Osteoporosis    Consider FDA approved medical therapies in  postmenopausal women and men aged 50 years and older, based on the following:    *A hip or vertebral (clinical or morphometric) fracture  *T score less than or equal to -2.5 at the femoral neck or spine after appropriate evaluation to exclude secondary causes.  *Low bone mass -- also known as osteopenia (T score between -1.0 and -2.5 at the femoral neck or spine) and a 10 year probability of hip fracture greater than or equal to 3% or a 10 year probability of major osteoporosis-related fracture greater than or equal to 20% based on the [place]-adapted WHO algorithm.  *Clinicians judgment and/or patient preference may indicate treatment for people with 10 year fracture probabilities is above or below these levels.      Electronically signed by: Melecio Carey MD  Date:    02/25/2025  Time:    11:43          The patient's Health Maintenance was reviewed and the following appears to be due at this time:   Health Maintenance Due   Topic Date Due    COVID-19 Vaccine (3 - 2024-25 season) 09/01/2024    Cervical Cancer Screening  05/20/2025    Colorectal Cancer Screening  06/18/2025          Medications Prior to Visit[1]      PMH:  Past Medical History:   Diagnosis Date    Allergy     Asthma     Attention deficit     Carpal tunnel syndrome     Cervical spine degeneration     COPD (chronic obstructive pulmonary disease)     GERD (gastroesophageal reflux disease)     Hiatal hernia 11/01/2019    Kidney stones     Osteoporosis      Past Surgical History:   Procedure Laterality Date    CARPAL TUNNEL RELEASE Left 10/7/2019    Procedure: RELEASE, CARPAL TUNNEL;  Surgeon: Delfino Cain MD;  Location: [facility] OR;  Service: Orthopedics;  Laterality: Left;    CARPAL TUNNEL RELEASE Right 11/4/2019    Procedure: RELEASE, CARPAL TUNNEL;  Surgeon: Delfino Cain MD;  Location: [facility] OR;  Service: Orthopedics;  Laterality: Right;    CHOLECYSTECTOMY      COLONOSCOPY  4/1/2012    date is approximate-done by Dr. Naranjo due to  chronic constipation    COLONOSCOPY N/A 6/18/2020    Procedure: COLONOSCOPY;  Surgeon: Kb Mcgarry MD;  Location: [facility];  Service: Endoscopy;  Laterality: N/A;    ENDOBRONCHIAL ULTRASOUND Left 6/28/2023    Procedure: ENDOBRONCHIAL ULTRASOUND (EBUS);  Surgeon: Antelmo Mcnkight MD;  Location: [facility] ENDO;  Service: Pulmonary;  Laterality: Left;    ESOPHAGOGASTRODUODENOSCOPY N/A 6/18/2020    Procedure: ESOPHAGOGASTRODUODENOSCOPY (EGD);  Surgeon: Kb Mcgarry MD;  Location: [facility] ENDO;  Service: Endoscopy;  Laterality: N/A;    NAVIGATIONAL BRONCHOSCOPY Bilateral 6/28/2023    Procedure: BRONCHOSCOPY, NAVIGATIONAL;  Surgeon: Antelmo Mcknight MD;  Location: [facility];  Service: Pulmonary;  Laterality: Bilateral;    SLEEVE GASTROPLASTY      TOTAL REDUCTION MAMMOPLASTY       Review of patient's allergies indicates:   Allergen Reactions    Bupropion hcl Other (See Comments)     Mood swings  Mood swings    Lamisil [terbinafine] Rash     Medications Ordered Prior to Encounter[2]  Social History[3]  Family History   Problem Relation Name Age of Onset    Hypertension Mother      Diabetes Mother      Hyperlipidemia Mother      Heart disease Mother  58    Colon cancer Maternal Grandmother      Breast cancer Maternal Grandmother      Breast cancer Paternal Grandmother          Breast    Heart attack Father  70    Thyroid disease Sister      Hypertension Sister      Breast cancer Paternal Aunt      Stroke Neg Hx         Review of Systems   HENT:  Negative for hearing loss.    Eyes:  Negative for discharge and visual disturbance.   Cardiovascular:  Negative for chest pain and palpitations.   Respiratory:  Positive for wheezing.    Endocrine: Negative for polydipsia and polyuria.   Musculoskeletal:  Positive for joint pain, myalgias and neck pain. Negative for joint swelling.   Gastrointestinal:  Negative for constipation, diarrhea and vomiting.   Genitourinary:  Negative for dysuria and hematuria.   Neurological:   Negative for headaches and weakness.      Physical Exam    (Vitals taken at home and reported to us and documented)   Pulse If Self Reported:       No data to display                 Last 5 Patient Entered Readings                Current 30 Day Average:   Recent Readings        SBP (mmHg)        DBP (mmHg)        Pulse                     BP If Self Reported:       No data to display               Pulse Readings from Last 3 Encounters:   02/24/25 81   02/24/25 83   01/09/25 98      Weight If Self Reported:       No data to display               Glucose If Self Reported:       No data to display               Last 5 Blood Glucose Readings           No data to display               There were no vitals taken for this visit. if verbally reported and entered.    Constitutional: The patient is oriented to person, place, and time and appears well-developed and well-nourished with no distress.    Eyes: EOM are normal.    Pulmonary/Chest: Effort normal. No respiratory distress.    Neurological: The patient is alert and oriented to person, place, and time.    Skin: the patient is not diaphoretic.    Psychiatric: The patient has a normal mood and affect. The behavior is normal. Judgment, thought and content normal.        DIAGNOSIS  Encounter Diagnoses   Name Primary?    Osteoporosis, unspecified osteoporosis type, unspecified pathological fracture presence Yes    Arthralgia, unspecified joint     Attention deficit     Colon cancer screening     Family history of colonic polyps     History of colon polyps           PLAN:     Assessment & Plan            I am having Megan Pelaez start on dextroamphetamine-amphetamine, dextroamphetamine-amphetamine, dextroamphetamine-amphetamine, and meloxicam. I am also having her maintain her omeprazole, ferrous sulfate, amLODIPine, losartan, miscellaneous medical supply, linaCLOtide, dextroamphetamine-amphetamine, dextroamphetamine-amphetamine, vitamin D, calcium carbonate, albuterol,  TRELEGY ELLIPTA, calcium-vitamin D3, varenicline tartrate, and alendronate. We will continue to administer influenza.  No problem-specific Assessment & Plan notes found for this encounter.      No follow-ups on file.    Diagnoses and all orders for this visit:    Osteoporosis, unspecified osteoporosis type, unspecified pathological fracture presence  -     alendronate (FOSAMAX) 70 MG tablet; Take 1 tablet (70 mg total) by mouth every 7 days.    Arthralgia, unspecified joint  -     DUTCH Screen w/Reflex; Future  -     C-Reactive Protein; Future  -     Cyclic Citrullinated Peptide Antibody, IgG; Future  -     Rheumatoid Factor; Future  -     Sedimentation rate; Future  -     Uric Acid; Future  -     meloxicam (MOBIC) 15 MG tablet; Take 1 tablet (15 mg total) by mouth daily as needed for Pain.    Attention deficit  -     dextroamphetamine-amphetamine 30 mg Tab; Take 1 tablet (30 mg total) by mouth 2 (two) times a day.  -     dextroamphetamine-amphetamine 30 mg Tab; Take 1 tablet (30 mg total) by mouth 2 (two) times a day.  -     dextroamphetamine-amphetamine 30 mg Tab; Take 1 tablet (30 mg total) by mouth 2 (two) times a day.    Colon cancer screening  -     Ambulatory referral/consult to Endo Procedure     Family history of colonic polyps  -     Ambulatory referral/consult to Endo Procedure     History of colon polyps  -     Ambulatory referral/consult to Endo Procedure       Medications Ordered This Encounter   Medications    alendronate (FOSAMAX) 70 MG tablet     Sig: Take 1 tablet (70 mg total) by mouth every 7 days.     Dispense:  12 tablet     Refill:  3    dextroamphetamine-amphetamine 30 mg Tab     Sig: Take 1 tablet (30 mg total) by mouth 2 (two) times a day.     Dispense:  60 tablet     Refill:  0    dextroamphetamine-amphetamine 30 mg Tab     Sig: Take 1 tablet (30 mg total) by mouth 2 (two) times a day.     Dispense:  60 tablet     Refill:  0    dextroamphetamine-amphetamine 30 mg  Tab     Sig: Take 1 tablet (30 mg total) by mouth 2 (two) times a day.     Dispense:  60 tablet     Refill:  0    meloxicam (MOBIC) 15 MG tablet     Sig: Take 1 tablet (15 mg total) by mouth daily as needed for Pain.     Dispense:  30 tablet     Refill:  0     The patient was instructed to stop the following meds:  Medications Discontinued During This Encounter   Medication Reason    alendronate (FOSAMAX) 70 MG tablet Reorder     Orders Placed This Encounter   Procedures    DUTCH Screen w/Reflex     Standing Status:   Future     Expected Date:   4/25/2025     Expiration Date:   4/25/2026     Send normal result to authorizing provider's In Basket if patient is active on MyChart::   Yes    C-Reactive Protein     Standing Status:   Future     Expected Date:   4/25/2025     Expiration Date:   6/25/2026     Send normal result to authorizing provider's In Basket if patient is active on MyChart::   Yes    Cyclic Citrullinated Peptide Antibody, IgG     Standing Status:   Future     Expected Date:   4/25/2025     Expiration Date:   6/25/2026     Send normal result to authorizing provider's In Basket if patient is active on MyChart::   Yes    Rheumatoid Factor     Standing Status:   Future     Expected Date:   4/25/2025     Expiration Date:   4/25/2026     Send normal result to authorizing provider's In Basket if patient is active on MyChart::   Yes    Sedimentation rate     Standing Status:   Future     Expected Date:   4/25/2025     Expiration Date:   4/25/2026     Send normal result to authorizing provider's In Basket if patient is active on MyChart::   Yes    Uric Acid     Standing Status:   Future     Expected Date:   4/25/2025     Expiration Date:   4/25/2026     Send normal result to authorizing provider's In Basket if patient is active on MyChart::   Yes    Ambulatory referral/consult to Endo Procedure      Referral Priority:   Routine     Referral Type:   Procedure     Referred to Provider:   Kb Mcgarry,  MD     Number of Visits Requested:   1       Medication List with Changes/Refills   New Medications    DEXTROAMPHETAMINE-AMPHETAMINE 30 MG TAB    Take 1 tablet (30 mg total) by mouth 2 (two) times a day.    DEXTROAMPHETAMINE-AMPHETAMINE 30 MG TAB    Take 1 tablet (30 mg total) by mouth 2 (two) times a day.    DEXTROAMPHETAMINE-AMPHETAMINE 30 MG TAB    Take 1 tablet (30 mg total) by mouth 2 (two) times a day.    MELOXICAM (MOBIC) 15 MG TABLET    Take 1 tablet (15 mg total) by mouth daily as needed for Pain.   Current Medications    ALBUTEROL (PROVENTIL/VENTOLIN HFA) 90 MCG/ACTUATION INHALER    INHALE 1 TO 2 PUFFS BY MOUTH EVERY SIX HOURS AS NEEDED FOR WHEEZING    AMLODIPINE (NORVASC) 5 MG TABLET    Take 1 tablet (5 mg total) by mouth once daily.    CALCIUM CARBONATE (CALCIUM 500) 500 MG CALCIUM (1,250 MG) CHEWABLE TABLET    Take 1 tablet (500 mg total) by mouth 2 (two) times daily.    CALCIUM-VITAMIN D3 (OYSTER SHELL CALCIUM-VIT D3) 500 MG-5 MCG (200 UNIT) PER TABLET    Take 1 tablet by mouth 3 (three) times daily with meals.    DEXTROAMPHETAMINE-AMPHETAMINE 30 MG TAB    Take 1 tablet (30 mg total) by mouth 2 (two) times a day.    DEXTROAMPHETAMINE-AMPHETAMINE 30 MG TAB    Take 1 tablet (30 mg total) by mouth 2 (two) times a day.    FERROUS SULFATE (IRON, FERROUS SULFATE,) 325 MG (65 MG IRON) TAB TABLET    Take 1 tablet (325 mg total) by mouth daily with breakfast.    FLUTICASONE-UMECLIDIN-VILANTER (TRELEGY ELLIPTA) 100-62.5-25 MCG DSDV    Inhale 1 puff into the lungs once daily.    LINACLOTIDE (LINZESS) 290 MCG CAP CAPSULE    Take 1 capsule (290 mcg total) by mouth before breakfast.    LOSARTAN (COZAAR) 100 MG TABLET    Take 1 tablet (100 mg total) by mouth once daily.    MISCELLANEOUS MEDICAL SUPPLY KIT    1 each by Misc.(Non-Drug; Combo Route) route once daily. 2 crutches for home use    OMEPRAZOLE (PRILOSEC) 40 MG CAPSULE    Take 1 capsule (40 mg total) by mouth once daily.    VARENICLINE TARTRATE (CHANTIX  STARTING MONTH BOX) 0.5 MG (11)- 1 MG (42) TABLET    Take one 0.5mg tab by mouth once daily X3 days,then increase to one 0.5mg tab twice daily X4 days,then increase to one 1mg tab twice daily    VITAMIN D (VITAMIN D3) 1000 UNITS TAB    Take 1 tablet (1,000 Units total) by mouth once daily.   Changed and/or Refilled Medications    Modified Medication Previous Medication    ALENDRONATE (FOSAMAX) 70 MG TABLET alendronate (FOSAMAX) 70 MG tablet       Take 1 tablet (70 mg total) by mouth every 7 days.    Take 1 tablet (70 mg total) by mouth every 7 days.      Medication List with Changes/Refills   New Medications    DEXTROAMPHETAMINE-AMPHETAMINE 30 MG TAB    Take 1 tablet (30 mg total) by mouth 2 (two) times a day.       Start Date: 7/24/2025 End Date: 8/23/2025    DEXTROAMPHETAMINE-AMPHETAMINE 30 MG TAB    Take 1 tablet (30 mg total) by mouth 2 (two) times a day.       Start Date: 6/24/2025 End Date: 7/24/2025    DEXTROAMPHETAMINE-AMPHETAMINE 30 MG TAB    Take 1 tablet (30 mg total) by mouth 2 (two) times a day.       Start Date: 5/25/2025 End Date: 6/24/2025    MELOXICAM (MOBIC) 15 MG TABLET    Take 1 tablet (15 mg total) by mouth daily as needed for Pain.       Start Date: 4/25/2025 End Date: 4/25/2026   Current Medications    ALBUTEROL (PROVENTIL/VENTOLIN HFA) 90 MCG/ACTUATION INHALER    INHALE 1 TO 2 PUFFS BY MOUTH EVERY SIX HOURS AS NEEDED FOR WHEEZING       Start Date: 2/24/2025 End Date: --    AMLODIPINE (NORVASC) 5 MG TABLET    Take 1 tablet (5 mg total) by mouth once daily.       Start Date: 1/8/2025  End Date: --    CALCIUM CARBONATE (CALCIUM 500) 500 MG CALCIUM (1,250 MG) CHEWABLE TABLET    Take 1 tablet (500 mg total) by mouth 2 (two) times daily.       Start Date: 2/24/2025 End Date: 2/24/2026    CALCIUM-VITAMIN D3 (OYSTER SHELL CALCIUM-VIT D3) 500 MG-5 MCG (200 UNIT) PER TABLET    Take 1 tablet by mouth 3 (three) times daily with meals.       Start Date: 2/25/2025 End Date: 2/25/2026     DEXTROAMPHETAMINE-AMPHETAMINE 30 MG TAB    Take 1 tablet (30 mg total) by mouth 2 (two) times a day.       Start Date: 3/26/2025 End Date: 4/25/2025    DEXTROAMPHETAMINE-AMPHETAMINE 30 MG TAB    Take 1 tablet (30 mg total) by mouth 2 (two) times a day.       Start Date: 4/25/2025 End Date: 5/25/2025    FERROUS SULFATE (IRON, FERROUS SULFATE,) 325 MG (65 MG IRON) TAB TABLET    Take 1 tablet (325 mg total) by mouth daily with breakfast.       Start Date: 12/6/2024 End Date: --    FLUTICASONE-UMECLIDIN-VILANTER (TRELEGY ELLIPTA) 100-62.5-25 MCG DSDV    Inhale 1 puff into the lungs once daily.       Start Date: 2/24/2025 End Date: --    LINACLOTIDE (LINZESS) 290 MCG CAP CAPSULE    Take 1 capsule (290 mcg total) by mouth before breakfast.       Start Date: 2/13/2025 End Date: --    LOSARTAN (COZAAR) 100 MG TABLET    Take 1 tablet (100 mg total) by mouth once daily.       Start Date: 1/8/2025  End Date: 1/8/2026    MISCELLANEOUS MEDICAL SUPPLY KIT    1 each by Misc.(Non-Drug; Combo Route) route once daily. 2 crutches for home use       Start Date: 1/9/2025  End Date: --    OMEPRAZOLE (PRILOSEC) 40 MG CAPSULE    Take 1 capsule (40 mg total) by mouth once daily.       Start Date: 11/20/2024End Date: 11/20/2025    VARENICLINE TARTRATE (CHANTIX STARTING MONTH BOX) 0.5 MG (11)- 1 MG (42) TABLET    Take one 0.5mg tab by mouth once daily X3 days,then increase to one 0.5mg tab twice daily X4 days,then increase to one 1mg tab twice daily       Start Date: 3/12/2025 End Date: --    VITAMIN D (VITAMIN D3) 1000 UNITS TAB    Take 1 tablet (1,000 Units total) by mouth once daily.       Start Date: 2/24/2025 End Date: --   Changed and/or Refilled Medications    Modified Medication Previous Medication    ALENDRONATE (FOSAMAX) 70 MG TABLET alendronate (FOSAMAX) 70 MG tablet       Take 1 tablet (70 mg total) by mouth every 7 days.    Take 1 tablet (70 mg total) by mouth every 7 days.       Start Date: 4/25/2025 End Date: 4/25/2026    Start  Date: 2/25/2025 End Date: 4/25/2025                     This note was generated with the assistance of ambient listening technology. Verbal consent was obtained by the patient and accompanying visitor(s) for the recording of patient appointment to facilitate this note. I attest to having reviewed and edited the generated note for accuracy, though some syntax or spelling errors may persist. Please contact the author of this note for any clarification.                 Answers submitted by the patient for this visit:  Review of Systems Questionnaire (Submitted on 4/23/2025)  activity change: Yes  unexpected weight change: No  rhinorrhea: No  trouble swallowing: No  chest tightness: No  blood in stool: No  difficulty urinating: No  menstrual problem: No  arthralgias: Yes  confusion: No  dysphoric mood: No         [1]   Outpatient Medications Prior to Visit   Medication Sig Dispense Refill    albuterol (PROVENTIL/VENTOLIN HFA) 90 mcg/actuation inhaler INHALE 1 TO 2 PUFFS BY MOUTH EVERY SIX HOURS AS NEEDED FOR WHEEZING 8.5 g 11    amLODIPine (NORVASC) 5 MG tablet Take 1 tablet (5 mg total) by mouth once daily. 90 tablet 3    calcium carbonate (CALCIUM 500) 500 mg calcium (1,250 mg) chewable tablet Take 1 tablet (500 mg total) by mouth 2 (two) times daily.      calcium-vitamin D3 (OYSTER SHELL CALCIUM-VIT D3) 500 mg-5 mcg (200 unit) per tablet Take 1 tablet by mouth 3 (three) times daily with meals. 90 tablet 11    dextroamphetamine-amphetamine 30 mg Tab Take 1 tablet (30 mg total) by mouth 2 (two) times a day. 60 tablet 0    dextroamphetamine-amphetamine 30 mg Tab Take 1 tablet (30 mg total) by mouth 2 (two) times a day. 60 tablet 0    ferrous sulfate (IRON, FERROUS SULFATE,) 325 mg (65 mg iron) Tab tablet Take 1 tablet (325 mg total) by mouth daily with breakfast. 90 tablet 3    fluticasone-umeclidin-vilanter (TRELEGY ELLIPTA) 100-62.5-25 mcg DsDv Inhale 1 puff into the lungs once daily. 60 each 11    linaCLOtide  (LINZESS) 290 mcg Cap capsule Take 1 capsule (290 mcg total) by mouth before breakfast. 30 capsule 5    losartan (COZAAR) 100 MG tablet Take 1 tablet (100 mg total) by mouth once daily. 90 tablet 3    miscellaneous medical supply Kit 1 each by Misc.(Non-Drug; Combo Route) route once daily. 2 crutches for home use 2 kit 0    omeprazole (PRILOSEC) 40 MG capsule Take 1 capsule (40 mg total) by mouth once daily. 90 capsule 3    varenicline tartrate (CHANTIX STARTING MONTH BOX) 0.5 mg (11)- 1 mg (42) tablet Take one 0.5mg tab by mouth once daily X3 days,then increase to one 0.5mg tab twice daily X4 days,then increase to one 1mg tab twice daily 53 each 0    vitamin D (VITAMIN D3) 1000 units Tab Take 1 tablet (1,000 Units total) by mouth once daily.      alendronate (FOSAMAX) 70 MG tablet Take 1 tablet (70 mg total) by mouth every 7 days. 12 tablet 3     Facility-Administered Medications Prior to Visit   Medication Dose Route Frequency Provider Last Rate Last Admin    influenza (Afluria) 45 mcg/0.5 mL IM vaccine (> or = 36 mo) 0.5 mL  0.5 mL Intramuscular 1 time in Clinic/HOD        [2]   Current Outpatient Medications on File Prior to Visit   Medication Sig Dispense Refill    albuterol (PROVENTIL/VENTOLIN HFA) 90 mcg/actuation inhaler INHALE 1 TO 2 PUFFS BY MOUTH EVERY SIX HOURS AS NEEDED FOR WHEEZING 8.5 g 11    amLODIPine (NORVASC) 5 MG tablet Take 1 tablet (5 mg total) by mouth once daily. 90 tablet 3    calcium carbonate (CALCIUM 500) 500 mg calcium (1,250 mg) chewable tablet Take 1 tablet (500 mg total) by mouth 2 (two) times daily.      calcium-vitamin D3 (OYSTER SHELL CALCIUM-VIT D3) 500 mg-5 mcg (200 unit) per tablet Take 1 tablet by mouth 3 (three) times daily with meals. 90 tablet 11    dextroamphetamine-amphetamine 30 mg Tab Take 1 tablet (30 mg total) by mouth 2 (two) times a day. 60 tablet 0    dextroamphetamine-amphetamine 30 mg Tab Take 1 tablet (30 mg total) by mouth 2 (two) times a day. 60 tablet 0     ferrous sulfate (IRON, FERROUS SULFATE,) 325 mg (65 mg iron) Tab tablet Take 1 tablet (325 mg total) by mouth daily with breakfast. 90 tablet 3    fluticasone-umeclidin-vilanter (TRELEGY ELLIPTA) 100-62.5-25 mcg DsDv Inhale 1 puff into the lungs once daily. 60 each 11    linaCLOtide (LINZESS) 290 mcg Cap capsule Take 1 capsule (290 mcg total) by mouth before breakfast. 30 capsule 5    losartan (COZAAR) 100 MG tablet Take 1 tablet (100 mg total) by mouth once daily. 90 tablet 3    miscellaneous medical supply Kit 1 each by Misc.(Non-Drug; Combo Route) route once daily. 2 crutches for home use 2 kit 0    omeprazole (PRILOSEC) 40 MG capsule Take 1 capsule (40 mg total) by mouth once daily. 90 capsule 3    varenicline tartrate (CHANTIX STARTING MONTH BOX) 0.5 mg (11)- 1 mg (42) tablet Take one 0.5mg tab by mouth once daily X3 days,then increase to one 0.5mg tab twice daily X4 days,then increase to one 1mg tab twice daily 53 each 0    vitamin D (VITAMIN D3) 1000 units Tab Take 1 tablet (1,000 Units total) by mouth once daily.      [DISCONTINUED] alendronate (FOSAMAX) 70 MG tablet Take 1 tablet (70 mg total) by mouth every 7 days. 12 tablet 3     Current Facility-Administered Medications on File Prior to Visit   Medication Dose Route Frequency Provider Last Rate Last Admin    influenza (Afluria) 45 mcg/0.5 mL IM vaccine (> or = 36 mo) 0.5 mL  0.5 mL Intramuscular 1 time in Clinic/HOD        [3]   Social History  Socioeconomic History    Marital status:     Number of children: 2   Occupational History    Occupation:      Employer: [email or web address]   Tobacco Use    Smoking status: Every Day     Current packs/day: 0.50     Average packs/day: 0.5 packs/day for 37.3 years (18.7 ttl pk-yrs)     Types: Cigarettes     Start date: 1988     Passive exposure: Current    Smokeless tobacco: Never    Tobacco comments:     Quit 2008 for 9 months to 1 year.    Substance and Sexual Activity     Alcohol use: Yes     Comment: Socially    Drug use: No    Sexual activity: Yes     Partners: Male   Social History Narrative    Live w/ spouse no pets      Social Drivers of Health     Financial Resource Strain: Medium Risk (2/13/2025)    Overall Financial Resource Strain (CARDIA)     Difficulty of Paying Living Expenses: Somewhat hard   Food Insecurity: Food Insecurity Present (2/13/2025)    Hunger Vital Sign     Worried About Running Out of Food in the Last Year: Sometimes true     Ran Out of Food in the Last Year: Sometimes true   Transportation Needs: No Transportation Needs (2/13/2025)    PRAPARE - Transportation     Lack of Transportation (Medical): No     Lack of Transportation (Non-Medical): No   Physical Activity: Inactive (2/13/2025)    Exercise Vital Sign     Days of Exercise per Week: 0 days     Minutes of Exercise per Session: 0 min   Stress: Stress Concern Present (2/13/2025)    [employer] [place] [employer] - Occupational Stress Questionnaire     Feeling of Stress : Rather much   Housing Stability: High Risk (2/13/2025)    Housing Stability Vital Sign     Unable to Pay for Housing in the Last Year: Yes     Number of Times Moved in the Last Year: 1     Homeless in the Last Year: No

## 2025-04-25 NOTE — TELEPHONE ENCOUNTER
----- Message from Kb Mcgarry MD sent at 4/25/2025  7:54 AM CDT -----  Get blood today.  Arrange it please.

## 2025-04-26 LAB — CYCLIC CITRULLINATED PEPTIDE (CCP) (OHS): <0.5 U/ML

## 2025-04-28 LAB — ANA (OHS): NORMAL

## 2025-04-30 ENCOUNTER — TELEPHONE (OUTPATIENT)
Dept: SMOKING CESSATION | Facility: CLINIC | Age: 55
End: 2025-04-30
Payer: MEDICAID

## 2025-04-30 NOTE — TELEPHONE ENCOUNTER
Attempted to call patient to reschedule their smoking cessation appointment. Left a message with my contact information to reschedule the appointment. Naty Barnes [phone].

## 2025-05-02 ENCOUNTER — HOSPITAL ENCOUNTER (OUTPATIENT)
Dept: PREADMISSION TESTING | Facility: HOSPITAL | Age: 55
Discharge: HOME | End: 2025-05-02
Attending: FAMILY MEDICINE
Payer: MEDICAID

## 2025-05-02 ENCOUNTER — RESULTS FOLLOW-UP (OUTPATIENT)
Dept: FAMILY MEDICINE | Facility: CLINIC | Age: 55
End: 2025-05-02

## 2025-05-02 DIAGNOSIS — Z83.719 FAMILY HISTORY OF COLONIC POLYPS: ICD-10-CM

## 2025-05-02 DIAGNOSIS — Z80.0 FAMILY HISTORY OF MALIGNANT NEOPLASM OF GASTROINTESTINAL TRACT: Primary | ICD-10-CM

## 2025-05-02 RX ORDER — SOD SULF/POT CHLORIDE/MAG SULF 1.479 G
12 TABLET ORAL DAILY
Qty: 24 TABLET | Refills: 0 | Status: SHIPPED | OUTPATIENT
Start: 2025-05-02

## 2025-05-02 NOTE — PROGRESS NOTES
I have reviewed the labs and am recommending the following:  ARTHRALGIA WORKUP RESULTS:The following tests were performed to workup joint pain. DUTCH NORMAL: The DUTCH test is a blood test that checks to see if you have evidence of something called LUPUS.  The findings at this time indicate that you do not have LUPUS.  CCP NORMAL: The CCP test is a blood test that indicates the level of of a chemical that induces PSEUDOGOUT.  At this time, the result is normal and indicates that you do not have significant risk for pseudogout.  CRP NORMAL: The C REACTIVE PROTEIN test is a blood test that indicates the presence or absence of significant inflammation in your body.  At this time, the result is normal and indicates that you do not have significant inflammation in your body.   RHEUMATOID FACTOR NORMAL: The RHEUMATOID FACTOR which is a blood test that checks to see if you have evidence of something called Rheumatoid Arthritis.  The findings at this time indicate that you do not have Rheumatoid Arthritis based on this test.  SED RATE NORMAL: The SED RATE test is a blood test that indicates the presence or absence of significant inflammation in your body.  At this time, the result is normal and indicates that you do not have significant inflammation in your body.   URIC ACID NORMAL: The URIC ACID test is a blood test that indicates the level of of a chemical that induces gout.  At this time, the result is normal and indicates that you do not have significant risk for gout.    Health maintenance items that remain on your list that need to be arranged are listed below.   Please notify me if you are prepared to get them completed.    COVID-19 Vaccine(3 - 2024-25 season) due on 09/01/2024  Cervical Cancer Screening due on 05/20/2025  Colorectal Cancer Screening due on 06/18/2025    Dr. Kb Mcgarry

## 2025-05-07 ENCOUNTER — TELEPHONE (OUTPATIENT)
Dept: SMOKING CESSATION | Facility: CLINIC | Age: 55
End: 2025-05-07
Payer: MEDICAID

## 2025-05-07 NOTE — TELEPHONE ENCOUNTER
Attempted to call patient to reschedule their smoking cessation appointment. Left a message with my contact information to reschedule the appointment. Naty Barnes [phone].

## 2025-05-22 ENCOUNTER — TELEPHONE (OUTPATIENT)
Dept: FAMILY MEDICINE | Facility: CLINIC | Age: 55
End: 2025-05-22
Payer: MEDICAID

## 2025-05-22 DIAGNOSIS — J44.89 ASTHMA-COPD OVERLAP SYNDROME: ICD-10-CM

## 2025-05-22 DIAGNOSIS — M25.50 ARTHRALGIA, UNSPECIFIED JOINT: ICD-10-CM

## 2025-05-22 RX ORDER — FLUTICASONE FUROATE, UMECLIDINIUM BROMIDE AND VILANTEROL TRIFENATATE 100; 62.5; 25 UG/1; UG/1; UG/1
1 POWDER RESPIRATORY (INHALATION) DAILY
Qty: 60 EACH | Refills: 11 | Status: SHIPPED | OUTPATIENT
Start: 2025-05-22

## 2025-05-22 RX ORDER — MELOXICAM 15 MG/1
15 TABLET ORAL DAILY PRN
Qty: 30 TABLET | Refills: 0 | Status: SHIPPED | OUTPATIENT
Start: 2025-05-22 | End: 2026-05-22

## 2025-05-22 NOTE — TELEPHONE ENCOUNTER
Care Due:                  Date            Visit Type   Department     Provider  --------------------------------------------------------------------------------                                ESTABLISHED                              PATIENT -    [facility]  Last Visit: 04-      [facility]      MEDICINE       Kb Mcgarry  Next Visit: None Scheduled  None         None Found                                                            Last  Test          Frequency    Reason                     Performed    Due Date  --------------------------------------------------------------------------------    Mg Level....  12 months..  alendronate..............  Not Found    Overdue    Phosphate...  12 months..  alendronate..............  Not Found    Overdue    [employer] Embedded Care Due Messages. Reference number: [id number].   5/22/2025 1:37:47 PM CDT

## 2025-05-22 NOTE — TELEPHONE ENCOUNTER
Patient is leaving to go out of town tomorrow and will be gone until Wednesday of next week. Her adderall is due to be filled this coming Sunday, is it okay to call the pharmacy to get this filled early?

## 2025-06-18 ENCOUNTER — TELEPHONE (OUTPATIENT)
Dept: SMOKING CESSATION | Facility: CLINIC | Age: 55
End: 2025-06-18
Payer: MEDICAID

## 2025-07-30 DIAGNOSIS — K59.09 CHRONIC CONSTIPATION: ICD-10-CM

## 2025-07-30 DIAGNOSIS — K21.00 GASTROESOPHAGEAL REFLUX DISEASE WITH ESOPHAGITIS WITHOUT HEMORRHAGE: ICD-10-CM

## 2025-07-30 RX ORDER — OMEPRAZOLE 40 MG/1
CAPSULE, DELAYED RELEASE ORAL
Qty: 90 CAPSULE | Refills: 2 | Status: SHIPPED | OUTPATIENT
Start: 2025-07-30

## 2025-07-30 NOTE — TELEPHONE ENCOUNTER
Refill Routing Note   Medication(s) are not appropriate for processing by [facility] for the following reason(s):        Outside of protocol    [facility] action(s):  Route     Requires labs : Yes             Appointments  past 12m or future 3m with PCP    Date Provider   Last Visit   4/25/2025 Kb Mcgarry MD   Next Visit   Visit date not found Kb Mcgarry MD   ED visits in past 90 days: 0        Note composed:8:50 AM 07/30/2025

## 2025-07-30 NOTE — TELEPHONE ENCOUNTER
Care Due:                  Date            Visit Type   Department     Provider  --------------------------------------------------------------------------------                                ESTABLISHED                              PATIENT -    [facility] FAM[facility]  Last Visit: 04-      [place]      MEDICINE       Kb Mcgarry  Next Visit: None Scheduled  None         None Found                                                            Last  Test          Frequency    Reason                     Performed    Due Date  --------------------------------------------------------------------------------    Mg Level....  12 months..  alendronate..............  Not Found    Overdue    Phosphate...  12 months..  alendronate..............  Not Found    Overdue    [employer] Embedded Care Due Messages. Reference number: [id number].   7/30/2025 8:03:34 AM CDT

## 2025-07-30 NOTE — TELEPHONE ENCOUNTER
Refill Decision Note   Luz Pelaez  is requesting a refill authorization.  Brief Assessment and Rationale for Refill:  Approve     Medication Therapy Plan:       Medication Reconciliation Completed: No   Comments:     No Care Gaps recommended.     Note composed:12:58 PM 07/30/2025

## 2025-07-30 NOTE — TELEPHONE ENCOUNTER
No care due was identified.  [facility] Embedded Care Due Messages. Reference number: [id number].   7/30/2025 9:38:11 AM CDT

## 2025-07-31 RX ORDER — LINACLOTIDE 290 UG/1
CAPSULE, GELATIN COATED ORAL
Qty: 30 CAPSULE | Refills: 11 | Status: SHIPPED | OUTPATIENT
Start: 2025-07-31

## 2025-07-31 NOTE — TELEPHONE ENCOUNTER
The patient requested medicine refills and I did refill it x 1 year.  Message the patient to notify of any health maintenance care gaps that need to be arranged.   Health Maintenance Due   Topic Date Due    COVID-19 Vaccine (3 - 2024-25 season) 09/01/2024    Cervical Cancer Screening  05/20/2025    Colorectal Cancer Screening  06/18/2025     BP Readings from Last 3 Encounters:   03/12/25 133/85   02/24/25 122/84   02/24/25 122/84     Lab Results   Component Value Date    HGBA1C 5.4 08/29/2024

## 2025-08-21 ENCOUNTER — PATIENT MESSAGE (OUTPATIENT)
Dept: FAMILY MEDICINE | Facility: CLINIC | Age: 55
End: 2025-08-21
Payer: MEDICAID

## 2025-08-21 ENCOUNTER — OFFICE VISIT (OUTPATIENT)
Dept: FAMILY MEDICINE | Facility: CLINIC | Age: 55
End: 2025-08-21
Payer: MEDICAID

## 2025-08-21 VITALS — SYSTOLIC BLOOD PRESSURE: 131 MMHG | HEART RATE: 81 BPM | DIASTOLIC BLOOD PRESSURE: 82 MMHG

## 2025-08-21 VITALS — DIASTOLIC BLOOD PRESSURE: 82 MMHG | SYSTOLIC BLOOD PRESSURE: 131 MMHG

## 2025-08-21 DIAGNOSIS — I10 PRIMARY HYPERTENSION: ICD-10-CM

## 2025-08-21 DIAGNOSIS — R41.840 ATTENTION DEFICIT: ICD-10-CM

## 2025-08-21 DIAGNOSIS — M25.50 ARTHRALGIA, UNSPECIFIED JOINT: ICD-10-CM

## 2025-08-21 RX ORDER — DEXTROAMPHETAMINE SACCHARATE, AMPHETAMINE ASPARTATE, DEXTROAMPHETAMINE SULFATE AND AMPHETAMINE SULFATE 7.5; 7.5; 7.5; 7.5 MG/1; MG/1; MG/1; MG/1
30 TABLET ORAL 2 TIMES DAILY
Qty: 60 TABLET | Refills: 0 | Status: SHIPPED | OUTPATIENT
Start: 2025-10-20

## 2025-08-21 RX ORDER — MELOXICAM 15 MG/1
15 TABLET ORAL DAILY PRN
Qty: 90 TABLET | Refills: 1 | Status: SHIPPED | OUTPATIENT
Start: 2025-08-21 | End: 2026-08-21

## 2025-08-21 RX ORDER — DEXTROAMPHETAMINE SACCHARATE, AMPHETAMINE ASPARTATE, DEXTROAMPHETAMINE SULFATE AND AMPHETAMINE SULFATE 7.5; 7.5; 7.5; 7.5 MG/1; MG/1; MG/1; MG/1
1 TABLET ORAL 2 TIMES DAILY
Qty: 60 TABLET | Refills: 0 | Status: SHIPPED | OUTPATIENT
Start: 2025-08-21 | End: 2025-09-20

## 2025-08-21 RX ORDER — DEXTROAMPHETAMINE SACCHARATE, AMPHETAMINE ASPARTATE, DEXTROAMPHETAMINE SULFATE AND AMPHETAMINE SULFATE 7.5; 7.5; 7.5; 7.5 MG/1; MG/1; MG/1; MG/1
30 TABLET ORAL 2 TIMES DAILY
Qty: 60 TABLET | Refills: 0 | Status: SHIPPED | OUTPATIENT
Start: 2025-09-20

## 2025-08-21 RX ORDER — LOSARTAN POTASSIUM 100 MG/1
100 TABLET ORAL DAILY
Qty: 90 TABLET | Refills: 3 | Status: SHIPPED | OUTPATIENT
Start: 2025-08-21 | End: 2026-08-21

## 2025-08-25 ENCOUNTER — OFFICE VISIT (OUTPATIENT)
Dept: PULMONOLOGY | Facility: CLINIC | Age: 55
End: 2025-08-25
Payer: MEDICAID

## 2025-08-25 ENCOUNTER — CLINICAL SUPPORT (OUTPATIENT)
Dept: PULMONOLOGY | Facility: CLINIC | Age: 55
End: 2025-08-25
Payer: MEDICAID

## 2025-08-25 VITALS
DIASTOLIC BLOOD PRESSURE: 64 MMHG | OXYGEN SATURATION: 99 % | HEIGHT: 65 IN | SYSTOLIC BLOOD PRESSURE: 126 MMHG | HEART RATE: 76 BPM | WEIGHT: 165 LBS | BODY MASS INDEX: 27.49 KG/M2 | RESPIRATION RATE: 16 BRPM

## 2025-08-25 DIAGNOSIS — K44.9 HIATAL HERNIA: ICD-10-CM

## 2025-08-25 DIAGNOSIS — R91.1 PULMONARY NODULE: ICD-10-CM

## 2025-08-25 DIAGNOSIS — F17.219 CIGARETTE NICOTINE DEPENDENCE WITH NICOTINE-INDUCED DISORDER: ICD-10-CM

## 2025-08-25 DIAGNOSIS — J44.89 ASTHMA-COPD OVERLAP SYNDROME: ICD-10-CM

## 2025-08-25 DIAGNOSIS — I10 PRIMARY HYPERTENSION: ICD-10-CM

## 2025-08-25 DIAGNOSIS — J44.89 ASTHMA-COPD OVERLAP SYNDROME: Primary | ICD-10-CM

## 2025-08-25 LAB
BRPFT: ABNORMAL
FEF 25 75 LLN: 1.37
FEF 25 75 PRE REF: 19.5 %
FEF 25 75 REF: 2.55
FEV1 FVC LLN: 68
FEV1 FVC PRE REF: 58.1 %
FEV1 FVC REF: 80
FEV1 LLN: 2.09
FEV1 PRE REF: 60.9 %
FEV1 REF: 2.72
FVC LLN: 2.65
FVC PRE REF: 104.2 %
FVC REF: 3.43
PEF LLN: 4.93
PEF PRE REF: 65.4 %
PEF REF: 6.7
PRE FEF 25 75: 0.5 L/S (ref 1.37–4.09)
PRE FET 100: 15.37 SEC
PRE FEV1 FVC: 46.36 % (ref 68.27–89.52)
PRE FEV1: 1.66 L (ref 2.09–3.33)
PRE FVC: 3.58 L (ref 2.65–4.25)
PRE PEF: 4.38 L/S (ref 4.93–8.46)

## 2025-08-25 PROCEDURE — 99215 OFFICE O/P EST HI 40 MIN: CPT | Mod: PBBFAC,25 | Performed by: INTERNAL MEDICINE

## 2025-08-25 PROCEDURE — 99999 PR PBB SHADOW E&M-EST. PATIENT-LVL V: CPT | Mod: PBBFAC,,, | Performed by: INTERNAL MEDICINE

## 2025-08-25 PROCEDURE — 94010 BREATHING CAPACITY TEST: CPT | Mod: 26,S$PBB,, | Performed by: INTERNAL MEDICINE

## 2025-08-25 PROCEDURE — 99214 OFFICE O/P EST MOD 30 MIN: CPT | Mod: 25,S$PBB,, | Performed by: INTERNAL MEDICINE

## 2025-08-25 PROCEDURE — 4010F ACE/ARB THERAPY RXD/TAKEN: CPT | Mod: CPTII,,, | Performed by: INTERNAL MEDICINE

## 2025-08-25 PROCEDURE — 3008F BODY MASS INDEX DOCD: CPT | Mod: CPTII,,, | Performed by: INTERNAL MEDICINE

## 2025-08-25 PROCEDURE — 1160F RVW MEDS BY RX/DR IN RCRD: CPT | Mod: CPTII,,, | Performed by: INTERNAL MEDICINE

## 2025-08-25 PROCEDURE — 94010 BREATHING CAPACITY TEST: CPT | Mod: PBBFAC

## 2025-08-25 PROCEDURE — 1159F MED LIST DOCD IN RCRD: CPT | Mod: CPTII,,, | Performed by: INTERNAL MEDICINE

## 2025-08-25 PROCEDURE — 3074F SYST BP LT 130 MM HG: CPT | Mod: CPTII,,, | Performed by: INTERNAL MEDICINE

## 2025-08-25 PROCEDURE — 3078F DIAST BP <80 MM HG: CPT | Mod: CPTII,,, | Performed by: INTERNAL MEDICINE

## (undated) DEVICE — SEE MEDLINE ITEM 157027

## (undated) DEVICE — GAUZE SPONGE 4X4 12PLY

## (undated) DEVICE — BANDAGE DERMACEA STRETCH 4X1IN

## (undated) DEVICE — SYR 10CC LUER LOCK

## (undated) DEVICE — SEE MEDLINE ITEM 146308

## (undated) DEVICE — GOWN SURG 2XL DISP TIE BACK

## (undated) DEVICE — DRESSING XEROFORM FOIL PK 1X8

## (undated) DEVICE — ALCOHOL 70% ISOP RUBBING 4OZ

## (undated) DEVICE — UNDERGLOVES BIOGEL PI SIZE 7.5

## (undated) DEVICE — SEE MEDLINE ITEM 152622

## (undated) DEVICE — NDL SAFETY 25G X 1.5 ECLIPSE

## (undated) DEVICE — SEE MEDLINE ITEM 157173

## (undated) DEVICE — COVER CAMERA OPERATING ROOM

## (undated) DEVICE — SCRUB HIBICLENS 4% CHG 4OZ

## (undated) DEVICE — APPLICATOR CHLORAPREP ORN 26ML

## (undated) DEVICE — TOURNIQUET SB QC DP 18X4IN

## (undated) DEVICE — PAD CAST SPECIALIST STRL 4

## (undated) DEVICE — DRAPE PLASTIC U 60X72

## (undated) DEVICE — SEE MEDLINE ITEM 157131

## (undated) DEVICE — POSITIONER HEAD DONUT 9IN FOAM

## (undated) DEVICE — SEE MEDLINE ITEM 152522

## (undated) DEVICE — GLOVE BIOGEL SZ 8 1/2

## (undated) DEVICE — BANDAGE ELASTIC 3X5 VELCRO ST

## (undated) DEVICE — PAD ABD 8X10 STERILE

## (undated) DEVICE — DRAPE STERI U-SHAPED 47X51IN

## (undated) DEVICE — SEE MEDLINE ITEM 157117

## (undated) DEVICE — PAD CAST SPECIALIST STRL 3

## (undated) DEVICE — DECANTER VIAL ASEPTIC TRANSFER

## (undated) DEVICE — SUPPORT ULNA NERVE PROTECTOR

## (undated) DEVICE — COVER OVERHEAD SURG LT BLUE

## (undated) DEVICE — SUT 4-0 ETHILON 18 PS-2

## (undated) DEVICE — UNDERGLOVES BIOGEL PI SIZE 8.5

## (undated) DEVICE — SOL 9P NACL IRR PIC IL

## (undated) DEVICE — SYR 3CC LUER LOC